# Patient Record
Sex: MALE | Race: WHITE | NOT HISPANIC OR LATINO | URBAN - METROPOLITAN AREA
[De-identification: names, ages, dates, MRNs, and addresses within clinical notes are randomized per-mention and may not be internally consistent; named-entity substitution may affect disease eponyms.]

---

## 2017-10-17 ENCOUNTER — OUTPATIENT (OUTPATIENT)
Dept: OUTPATIENT SERVICES | Facility: HOSPITAL | Age: 72
LOS: 1 days | Discharge: HOME | End: 2017-10-17

## 2017-10-17 DIAGNOSIS — D68.9 COAGULATION DEFECT, UNSPECIFIED: ICD-10-CM

## 2017-10-17 DIAGNOSIS — Z95.5 PRESENCE OF CORONARY ANGIOPLASTY IMPLANT AND GRAFT: ICD-10-CM

## 2017-10-17 DIAGNOSIS — Z01.810 ENCOUNTER FOR PREPROCEDURAL CARDIOVASCULAR EXAMINATION: ICD-10-CM

## 2017-10-17 DIAGNOSIS — I10 ESSENTIAL (PRIMARY) HYPERTENSION: ICD-10-CM

## 2017-10-17 DIAGNOSIS — I25.10 ATHEROSCLEROTIC HEART DISEASE OF NATIVE CORONARY ARTERY WITHOUT ANGINA PECTORIS: ICD-10-CM

## 2017-10-23 ENCOUNTER — INPATIENT (INPATIENT)
Facility: HOSPITAL | Age: 72
LOS: 1 days | Discharge: HOME | End: 2017-10-25
Attending: INTERNAL MEDICINE

## 2017-10-23 DIAGNOSIS — Z95.5 PRESENCE OF CORONARY ANGIOPLASTY IMPLANT AND GRAFT: ICD-10-CM

## 2017-10-27 DIAGNOSIS — Z79.84 LONG TERM (CURRENT) USE OF ORAL HYPOGLYCEMIC DRUGS: ICD-10-CM

## 2017-10-27 DIAGNOSIS — D69.6 THROMBOCYTOPENIA, UNSPECIFIED: ICD-10-CM

## 2017-10-27 DIAGNOSIS — R94.39 ABNORMAL RESULT OF OTHER CARDIOVASCULAR FUNCTION STUDY: ICD-10-CM

## 2017-10-27 DIAGNOSIS — I25.10 ATHEROSCLEROTIC HEART DISEASE OF NATIVE CORONARY ARTERY WITHOUT ANGINA PECTORIS: ICD-10-CM

## 2017-10-27 DIAGNOSIS — E78.5 HYPERLIPIDEMIA, UNSPECIFIED: ICD-10-CM

## 2017-10-27 DIAGNOSIS — R31.9 HEMATURIA, UNSPECIFIED: ICD-10-CM

## 2017-10-27 DIAGNOSIS — R07.9 CHEST PAIN, UNSPECIFIED: ICD-10-CM

## 2017-10-27 DIAGNOSIS — E11.9 TYPE 2 DIABETES MELLITUS WITHOUT COMPLICATIONS: ICD-10-CM

## 2017-10-27 DIAGNOSIS — Z95.5 PRESENCE OF CORONARY ANGIOPLASTY IMPLANT AND GRAFT: ICD-10-CM

## 2017-10-27 DIAGNOSIS — R31.0 GROSS HEMATURIA: ICD-10-CM

## 2017-10-27 DIAGNOSIS — I10 ESSENTIAL (PRIMARY) HYPERTENSION: ICD-10-CM

## 2017-10-30 DIAGNOSIS — F41.9 ANXIETY DISORDER, UNSPECIFIED: ICD-10-CM

## 2017-10-30 DIAGNOSIS — I25.110 ATHEROSCLEROTIC HEART DISEASE OF NATIVE CORONARY ARTERY WITH UNSTABLE ANGINA PECTORIS: ICD-10-CM

## 2017-11-09 DIAGNOSIS — I25.10 ATHEROSCLEROTIC HEART DISEASE OF NATIVE CORONARY ARTERY WITHOUT ANGINA PECTORIS: ICD-10-CM

## 2017-12-08 ENCOUNTER — OUTPATIENT (OUTPATIENT)
Dept: OUTPATIENT SERVICES | Facility: HOSPITAL | Age: 72
LOS: 1 days | Discharge: HOME | End: 2017-12-08

## 2017-12-08 DIAGNOSIS — I25.10 ATHEROSCLEROTIC HEART DISEASE OF NATIVE CORONARY ARTERY WITHOUT ANGINA PECTORIS: ICD-10-CM

## 2017-12-08 DIAGNOSIS — E11.9 TYPE 2 DIABETES MELLITUS WITHOUT COMPLICATIONS: ICD-10-CM

## 2017-12-08 DIAGNOSIS — Z95.5 PRESENCE OF CORONARY ANGIOPLASTY IMPLANT AND GRAFT: ICD-10-CM

## 2018-02-20 ENCOUNTER — APPOINTMENT (OUTPATIENT)
Dept: UROLOGY | Facility: CLINIC | Age: 73
End: 2018-02-20
Payer: MEDICARE

## 2018-02-20 VITALS
DIASTOLIC BLOOD PRESSURE: 78 MMHG | BODY MASS INDEX: 27.2 KG/M2 | SYSTOLIC BLOOD PRESSURE: 129 MMHG | HEART RATE: 76 BPM | WEIGHT: 190 LBS | HEIGHT: 70 IN

## 2018-02-20 DIAGNOSIS — Z82.49 FAMILY HISTORY OF ISCHEMIC HEART DISEASE AND OTHER DISEASES OF THE CIRCULATORY SYSTEM: ICD-10-CM

## 2018-02-20 DIAGNOSIS — I51.9 HEART DISEASE, UNSPECIFIED: ICD-10-CM

## 2018-02-20 DIAGNOSIS — E78.00 PURE HYPERCHOLESTEROLEMIA, UNSPECIFIED: ICD-10-CM

## 2018-02-20 DIAGNOSIS — F41.9 ANXIETY DISORDER, UNSPECIFIED: ICD-10-CM

## 2018-02-20 DIAGNOSIS — Z80.52 FAMILY HISTORY OF MALIGNANT NEOPLASM OF BLADDER: ICD-10-CM

## 2018-02-20 DIAGNOSIS — R31.9 HEMATURIA, UNSPECIFIED: ICD-10-CM

## 2018-02-20 LAB
BILIRUB UR QL STRIP: NORMAL
CLARITY UR: CLEAR
COLLECTION METHOD: NORMAL
GLUCOSE UR-MCNC: >500
HCG UR QL: 2 EU/DL
HGB UR QL STRIP.AUTO: NORMAL
KETONES UR-MCNC: NORMAL
LEUKOCYTE ESTERASE UR QL STRIP: NORMAL
NITRITE UR QL STRIP: NORMAL
PH UR STRIP: 5
PROT UR STRIP-MCNC: 30
SP GR UR STRIP: 1.02

## 2018-02-20 PROCEDURE — 99213 OFFICE O/P EST LOW 20 MIN: CPT

## 2018-02-20 PROCEDURE — 81003 URINALYSIS AUTO W/O SCOPE: CPT | Mod: QW

## 2018-02-20 RX ORDER — ASPIRIN 81 MG/1
81 TABLET, DELAYED RELEASE ORAL
Refills: 0 | Status: ACTIVE | COMMUNITY

## 2018-02-20 RX ORDER — METFORMIN HYDROCHLORIDE 1000 MG/1
1000 TABLET, COATED ORAL
Refills: 0 | Status: ACTIVE | COMMUNITY

## 2018-02-20 RX ORDER — GLIMEPIRIDE 4 MG/1
4 TABLET ORAL
Refills: 0 | Status: ACTIVE | COMMUNITY

## 2019-02-26 ENCOUNTER — RESULT REVIEW (OUTPATIENT)
Age: 74
End: 2019-02-26

## 2019-02-26 ENCOUNTER — OUTPATIENT (OUTPATIENT)
Dept: OUTPATIENT SERVICES | Facility: HOSPITAL | Age: 74
LOS: 1 days | Discharge: HOME | End: 2019-02-26

## 2019-02-26 ENCOUNTER — TRANSCRIPTION ENCOUNTER (OUTPATIENT)
Age: 74
End: 2019-02-26

## 2019-02-26 VITALS
HEART RATE: 91 BPM | WEIGHT: 195.11 LBS | SYSTOLIC BLOOD PRESSURE: 152 MMHG | DIASTOLIC BLOOD PRESSURE: 90 MMHG | RESPIRATION RATE: 18 BRPM | HEIGHT: 70 IN | TEMPERATURE: 98 F

## 2019-02-26 VITALS — HEART RATE: 88 BPM | SYSTOLIC BLOOD PRESSURE: 152 MMHG | DIASTOLIC BLOOD PRESSURE: 84 MMHG | RESPIRATION RATE: 18 BRPM

## 2019-02-26 DIAGNOSIS — Z98.890 OTHER SPECIFIED POSTPROCEDURAL STATES: Chronic | ICD-10-CM

## 2019-02-26 LAB — GLUCOSE BLDC GLUCOMTR-MCNC: 134 MG/DL — HIGH (ref 70–99)

## 2019-02-26 NOTE — H&P PST ADULT - HISTORY OF PRESENT ILLNESS
A 70 y old man with medical hx of CAD on asa and plavix (last dose 2 days ago), DM, GERD,  HTN presented for screening colonoscopy   Patient denies any complaints

## 2019-02-26 NOTE — ASU PATIENT PROFILE, ADULT - PMH
Anxiety    CAD (coronary artery disease)    Diabetes    GERD (gastroesophageal reflux disease)    HTN (hypertension)

## 2019-02-26 NOTE — PRE-ANESTHESIA EVALUATION ADULT - TEMPERATURE IN CELSIUS (DEGREES C)
36.7
Airway patent, nasal mucosa clear, mouth with normal mucosa. Throat has no vesicles, no oropharyngeal exudates and uvula is midline. Clear tympanic membranes bilaterally.

## 2019-02-28 LAB — SURGICAL PATHOLOGY STUDY: SIGNIFICANT CHANGE UP

## 2019-03-04 DIAGNOSIS — E11.9 TYPE 2 DIABETES MELLITUS WITHOUT COMPLICATIONS: ICD-10-CM

## 2019-03-04 DIAGNOSIS — D12.0 BENIGN NEOPLASM OF CECUM: ICD-10-CM

## 2019-03-04 DIAGNOSIS — K64.8 OTHER HEMORRHOIDS: ICD-10-CM

## 2019-03-04 DIAGNOSIS — I25.10 ATHEROSCLEROTIC HEART DISEASE OF NATIVE CORONARY ARTERY WITHOUT ANGINA PECTORIS: ICD-10-CM

## 2019-03-04 DIAGNOSIS — Z12.11 ENCOUNTER FOR SCREENING FOR MALIGNANT NEOPLASM OF COLON: ICD-10-CM

## 2019-03-04 DIAGNOSIS — I10 ESSENTIAL (PRIMARY) HYPERTENSION: ICD-10-CM

## 2019-03-05 ENCOUNTER — APPOINTMENT (OUTPATIENT)
Dept: OTOLARYNGOLOGY | Facility: CLINIC | Age: 74
End: 2019-03-05
Payer: MEDICARE

## 2019-03-05 VITALS
DIASTOLIC BLOOD PRESSURE: 81 MMHG | SYSTOLIC BLOOD PRESSURE: 133 MMHG | BODY MASS INDEX: 27.2 KG/M2 | WEIGHT: 190 LBS | HEIGHT: 70 IN

## 2019-03-05 DIAGNOSIS — R04.0 EPISTAXIS: ICD-10-CM

## 2019-03-05 PROCEDURE — 31231 NASAL ENDOSCOPY DX: CPT

## 2019-03-05 PROCEDURE — 99203 OFFICE O/P NEW LOW 30 MIN: CPT | Mod: 25

## 2019-03-05 NOTE — PROCEDURE
[Epistaxis] : evaluation of epistaxis [Topical Lidocaine] : topical lidocaine [Oxymetazoline HCl] : oxymetazoline HCl [Rigid Endoscope] : examined with a rigid endoscope [Normal] : the paranasal sinuses had no abnormalities [FreeTextEntry6] : The following anatomic sites were directly examined in a sequential fashion:\par The scope was introduced in the nasal passage between the middle and inferior turbinates to exam the inferior portion of the middle meatus and the fontanelle, as well as the maxillary ostia. Next, the scope was passed medically and posteriorly to the middle turbinates to examine the sphenoethmoid recess and the superior turbinate region.\par

## 2019-03-05 NOTE — HISTORY OF PRESENT ILLNESS
[de-identified] : 73 Year old male comes in the office as a new patient c/o epistaxis. Last episode was 1 week ago. Pt has been on blood thinners and they have been decreased at this point. Pt is doing well since he went off the plavix. Pt has known hypertension which is controlled. Pt has not used an y interventions on his nose. They occur in the morning.

## 2019-03-05 NOTE — REVIEW OF SYSTEMS
[Patient Intake Form Reviewed] : Patient intake form was reviewed [FreeTextEntry1] : All other ROS are negative.

## 2019-06-09 PROBLEM — I51.9 HEART DISEASE: Status: ACTIVE | Noted: 2018-02-20

## 2019-07-01 ENCOUNTER — INPATIENT (INPATIENT)
Facility: HOSPITAL | Age: 74
LOS: 8 days | Discharge: HOME | End: 2019-07-10
Attending: INTERNAL MEDICINE | Admitting: INTERNAL MEDICINE
Payer: MEDICARE

## 2019-07-01 VITALS
HEART RATE: 112 BPM | RESPIRATION RATE: 18 BRPM | DIASTOLIC BLOOD PRESSURE: 88 MMHG | SYSTOLIC BLOOD PRESSURE: 157 MMHG | TEMPERATURE: 98 F | OXYGEN SATURATION: 95 %

## 2019-07-01 DIAGNOSIS — Z98.890 OTHER SPECIFIED POSTPROCEDURAL STATES: Chronic | ICD-10-CM

## 2019-07-01 PROBLEM — I10 ESSENTIAL (PRIMARY) HYPERTENSION: Chronic | Status: ACTIVE | Noted: 2019-02-26

## 2019-07-01 PROBLEM — K21.9 GASTRO-ESOPHAGEAL REFLUX DISEASE WITHOUT ESOPHAGITIS: Chronic | Status: ACTIVE | Noted: 2019-02-26

## 2019-07-01 PROBLEM — I25.10 ATHEROSCLEROTIC HEART DISEASE OF NATIVE CORONARY ARTERY WITHOUT ANGINA PECTORIS: Chronic | Status: ACTIVE | Noted: 2019-02-26

## 2019-07-01 PROBLEM — E11.9 TYPE 2 DIABETES MELLITUS WITHOUT COMPLICATIONS: Chronic | Status: ACTIVE | Noted: 2019-02-26

## 2019-07-01 LAB
ALBUMIN SERPL ELPH-MCNC: 3.3 G/DL — LOW (ref 3.5–5.2)
ALP SERPL-CCNC: 104 U/L — SIGNIFICANT CHANGE UP (ref 30–115)
ALT FLD-CCNC: 37 U/L — SIGNIFICANT CHANGE UP (ref 0–41)
ANION GAP SERPL CALC-SCNC: 15 MMOL/L — HIGH (ref 7–14)
AST SERPL-CCNC: 32 U/L — SIGNIFICANT CHANGE UP (ref 0–41)
BILIRUB SERPL-MCNC: 0.9 MG/DL — SIGNIFICANT CHANGE UP (ref 0.2–1.2)
BUN SERPL-MCNC: 17 MG/DL — SIGNIFICANT CHANGE UP (ref 10–20)
CALCIUM SERPL-MCNC: 8.7 MG/DL — SIGNIFICANT CHANGE UP (ref 8.5–10.1)
CHLORIDE SERPL-SCNC: 104 MMOL/L — SIGNIFICANT CHANGE UP (ref 98–110)
CO2 SERPL-SCNC: 26 MMOL/L — SIGNIFICANT CHANGE UP (ref 17–32)
CREAT SERPL-MCNC: 1 MG/DL — SIGNIFICANT CHANGE UP (ref 0.7–1.5)
GLUCOSE BLDC GLUCOMTR-MCNC: 132 MG/DL — HIGH (ref 70–99)
GLUCOSE BLDC GLUCOMTR-MCNC: 250 MG/DL — HIGH (ref 70–99)
GLUCOSE BLDC GLUCOMTR-MCNC: 252 MG/DL — HIGH (ref 70–99)
GLUCOSE BLDC GLUCOMTR-MCNC: 330 MG/DL — HIGH (ref 70–99)
GLUCOSE SERPL-MCNC: 241 MG/DL — HIGH (ref 70–99)
HCT VFR BLD CALC: 29.8 % — LOW (ref 42–52)
HGB BLD-MCNC: 9.2 G/DL — LOW (ref 14–18)
LACTATE SERPL-SCNC: 2.8 MMOL/L — HIGH (ref 0.5–2.2)
MCHC RBC-ENTMCNC: 29.7 PG — SIGNIFICANT CHANGE UP (ref 27–31)
MCHC RBC-ENTMCNC: 30.9 G/DL — LOW (ref 32–37)
MCV RBC AUTO: 96.1 FL — HIGH (ref 80–94)
NRBC # BLD: 5 /100 WBCS — HIGH (ref 0–0)
NT-PROBNP SERPL-SCNC: 7990 PG/ML — HIGH (ref 0–300)
PLATELET # BLD AUTO: 87 K/UL — LOW (ref 130–400)
POTASSIUM SERPL-MCNC: 3.1 MMOL/L — LOW (ref 3.5–5)
POTASSIUM SERPL-SCNC: 3.1 MMOL/L — LOW (ref 3.5–5)
PROT SERPL-MCNC: 7.2 G/DL — SIGNIFICANT CHANGE UP (ref 6–8)
RBC # BLD: 3.1 M/UL — LOW (ref 4.7–6.1)
RBC # FLD: 18.8 % — HIGH (ref 11.5–14.5)
SODIUM SERPL-SCNC: 145 MMOL/L — SIGNIFICANT CHANGE UP (ref 135–146)
TROPONIN T SERPL-MCNC: <0.01 NG/ML — SIGNIFICANT CHANGE UP
TROPONIN T SERPL-MCNC: <0.01 NG/ML — SIGNIFICANT CHANGE UP
WBC # BLD: 2.65 K/UL — LOW (ref 4.8–10.8)
WBC # FLD AUTO: 2.65 K/UL — LOW (ref 4.8–10.8)

## 2019-07-01 PROCEDURE — 99291 CRITICAL CARE FIRST HOUR: CPT | Mod: GC

## 2019-07-01 PROCEDURE — 93010 ELECTROCARDIOGRAM REPORT: CPT

## 2019-07-01 PROCEDURE — 71045 X-RAY EXAM CHEST 1 VIEW: CPT | Mod: 26

## 2019-07-01 PROCEDURE — 93010 ELECTROCARDIOGRAM REPORT: CPT | Mod: 77

## 2019-07-01 RX ORDER — ENOXAPARIN SODIUM 100 MG/ML
40 INJECTION SUBCUTANEOUS DAILY
Refills: 0 | Status: DISCONTINUED | OUTPATIENT
Start: 2019-07-01 | End: 2019-07-10

## 2019-07-01 RX ORDER — FUROSEMIDE 40 MG
40 TABLET ORAL ONCE
Refills: 0 | Status: COMPLETED | OUTPATIENT
Start: 2019-07-01 | End: 2019-07-01

## 2019-07-01 RX ORDER — LISINOPRIL 2.5 MG/1
10 TABLET ORAL DAILY
Refills: 0 | Status: DISCONTINUED | OUTPATIENT
Start: 2019-07-01 | End: 2019-07-09

## 2019-07-01 RX ORDER — INSULIN LISPRO 100/ML
VIAL (ML) SUBCUTANEOUS
Refills: 0 | Status: DISCONTINUED | OUTPATIENT
Start: 2019-07-01 | End: 2019-07-10

## 2019-07-01 RX ORDER — SODIUM CHLORIDE 9 MG/ML
1000 INJECTION, SOLUTION INTRAVENOUS
Refills: 0 | Status: DISCONTINUED | OUTPATIENT
Start: 2019-07-01 | End: 2019-07-10

## 2019-07-01 RX ORDER — DEXTROSE 50 % IN WATER 50 %
25 SYRINGE (ML) INTRAVENOUS ONCE
Refills: 0 | Status: DISCONTINUED | OUTPATIENT
Start: 2019-07-01 | End: 2019-07-10

## 2019-07-01 RX ORDER — INSULIN LISPRO 100/ML
4 VIAL (ML) SUBCUTANEOUS
Refills: 0 | Status: DISCONTINUED | OUTPATIENT
Start: 2019-07-01 | End: 2019-07-10

## 2019-07-01 RX ORDER — CLOPIDOGREL BISULFATE 75 MG/1
75 TABLET, FILM COATED ORAL DAILY
Refills: 0 | Status: DISCONTINUED | OUTPATIENT
Start: 2019-07-01 | End: 2019-07-01

## 2019-07-01 RX ORDER — DEXTROSE 50 % IN WATER 50 %
12.5 SYRINGE (ML) INTRAVENOUS ONCE
Refills: 0 | Status: DISCONTINUED | OUTPATIENT
Start: 2019-07-01 | End: 2019-07-10

## 2019-07-01 RX ORDER — POTASSIUM CHLORIDE 20 MEQ
20 PACKET (EA) ORAL ONCE
Refills: 0 | Status: COMPLETED | OUTPATIENT
Start: 2019-07-01 | End: 2019-07-01

## 2019-07-01 RX ORDER — DIAZEPAM 5 MG
2 TABLET ORAL AT BEDTIME
Refills: 0 | Status: DISCONTINUED | OUTPATIENT
Start: 2019-07-01 | End: 2019-07-08

## 2019-07-01 RX ORDER — METOPROLOL TARTRATE 50 MG
50 TABLET ORAL
Refills: 0 | Status: DISCONTINUED | OUTPATIENT
Start: 2019-07-01 | End: 2019-07-02

## 2019-07-01 RX ORDER — FAMOTIDINE 10 MG/ML
40 INJECTION INTRAVENOUS AT BEDTIME
Refills: 0 | Status: DISCONTINUED | OUTPATIENT
Start: 2019-07-01 | End: 2019-07-10

## 2019-07-01 RX ORDER — INSULIN GLARGINE 100 [IU]/ML
10 INJECTION, SOLUTION SUBCUTANEOUS AT BEDTIME
Refills: 0 | Status: DISCONTINUED | OUTPATIENT
Start: 2019-07-01 | End: 2019-07-05

## 2019-07-01 RX ORDER — DIAZEPAM 5 MG
1 TABLET ORAL
Qty: 0 | Refills: 0 | DISCHARGE

## 2019-07-01 RX ORDER — GLUCAGON INJECTION, SOLUTION 0.5 MG/.1ML
1 INJECTION, SOLUTION SUBCUTANEOUS ONCE
Refills: 0 | Status: DISCONTINUED | OUTPATIENT
Start: 2019-07-01 | End: 2019-07-10

## 2019-07-01 RX ORDER — CHLORHEXIDINE GLUCONATE 213 G/1000ML
1 SOLUTION TOPICAL
Refills: 0 | Status: DISCONTINUED | OUTPATIENT
Start: 2019-07-01 | End: 2019-07-10

## 2019-07-01 RX ORDER — NITROGLYCERIN 6.5 MG
10 CAPSULE, EXTENDED RELEASE ORAL
Qty: 50 | Refills: 0 | Status: DISCONTINUED | OUTPATIENT
Start: 2019-07-01 | End: 2019-07-01

## 2019-07-01 RX ORDER — MECLIZINE HCL 12.5 MG
25 TABLET ORAL ONCE
Refills: 0 | Status: DISCONTINUED | OUTPATIENT
Start: 2019-07-01 | End: 2019-07-01

## 2019-07-01 RX ORDER — FUROSEMIDE 40 MG
40 TABLET ORAL
Refills: 0 | Status: DISCONTINUED | OUTPATIENT
Start: 2019-07-01 | End: 2019-07-02

## 2019-07-01 RX ORDER — DIAZEPAM 5 MG
2 TABLET ORAL ONCE
Refills: 0 | Status: DISCONTINUED | OUTPATIENT
Start: 2019-07-01 | End: 2019-07-01

## 2019-07-01 RX ORDER — ASPIRIN/CALCIUM CARB/MAGNESIUM 324 MG
81 TABLET ORAL DAILY
Refills: 0 | Status: DISCONTINUED | OUTPATIENT
Start: 2019-07-01 | End: 2019-07-10

## 2019-07-01 RX ORDER — MAGNESIUM SULFATE 500 MG/ML
2 VIAL (ML) INJECTION ONCE
Refills: 0 | Status: COMPLETED | OUTPATIENT
Start: 2019-07-01 | End: 2019-07-01

## 2019-07-01 RX ORDER — ATORVASTATIN CALCIUM 80 MG/1
20 TABLET, FILM COATED ORAL AT BEDTIME
Refills: 0 | Status: DISCONTINUED | OUTPATIENT
Start: 2019-07-01 | End: 2019-07-09

## 2019-07-01 RX ORDER — DEXTROSE 50 % IN WATER 50 %
15 SYRINGE (ML) INTRAVENOUS ONCE
Refills: 0 | Status: DISCONTINUED | OUTPATIENT
Start: 2019-07-01 | End: 2019-07-10

## 2019-07-01 RX ADMIN — Medication 40 MILLIGRAM(S): at 15:09

## 2019-07-01 RX ADMIN — Medication 20 MILLIEQUIVALENT(S): at 16:08

## 2019-07-01 RX ADMIN — Medication 4: at 18:29

## 2019-07-01 RX ADMIN — Medication 50 MILLIGRAM(S): at 17:09

## 2019-07-01 RX ADMIN — Medication 2 GRAM(S): at 16:08

## 2019-07-01 RX ADMIN — Medication 2 MILLIGRAM(S): at 16:21

## 2019-07-01 RX ADMIN — CLOPIDOGREL BISULFATE 75 MILLIGRAM(S): 75 TABLET, FILM COATED ORAL at 17:10

## 2019-07-01 RX ADMIN — ATORVASTATIN CALCIUM 20 MILLIGRAM(S): 80 TABLET, FILM COATED ORAL at 22:48

## 2019-07-01 RX ADMIN — Medication 40 MILLIGRAM(S): at 18:07

## 2019-07-01 RX ADMIN — Medication 50 MILLIEQUIVALENT(S): at 16:08

## 2019-07-01 RX ADMIN — Medication 50 GRAM(S): at 15:09

## 2019-07-01 RX ADMIN — Medication 3 MICROGRAM(S)/MIN: at 18:16

## 2019-07-01 NOTE — H&P ADULT - ASSESSMENT
73M with PMHx of CAD s/p PCI, DMII, HTN, Anxiety, presents from assisted living facility for episode of lightheadedness and worsening SOB.    #Dyspnea 2/2 likely new onset CHF   - CXR with perihilar opacities consistent with CHF  - BNP 7990, 1st set Tn negative, Repeat 2nd set  - EKG with sinus tachycardia   - admit to tele, check TTE   - Keep i < o,  trend daily weights, daily bmp  - Check tsh/t4, lipids, a1c  - Iv diuresis: lasix 40 iv bid  - Bipap and supplemental O2 prn   - cardiology evaluation     #Lightheadedness  - likely due to sinus tachycardia and dyspnea  - Monitor for events on tele  - check orthostatics     #CAD s/p PCI / HTN   - c/w asa, statin, bb, ACEI  - off plavix as per outpatient cardiology     #DMII  - monitor finger sticks, start insulin regimen with correction scale     #Anxiety  - c/w valium home dose     Dispo: from assisted living   DVT PPx; lovenox sc   DASH diet with fluid restriction

## 2019-07-01 NOTE — ED PROVIDER NOTE - CLINICAL SUMMARY MEDICAL DECISION MAKING FREE TEXT BOX
73yM p/w lightheadedness and worsening SOB - found to be hypoxic on RA, improving with O2.  Exam and workup c/w volume overload, suspected CHF.  Pt started on lasix and Mg/K+ repleted.  Pt then became acutely tachypneic, with new rhonchi and hypoxic, family reporting hx similar prior anxiety attacks, but concern for worsening pulmonary edema causing respiratory distress, so pt placed on BiPAP with immediate significant improvement.  Pt admitted to tele for further care.

## 2019-07-01 NOTE — PATIENT PROFILE ADULT - HEALTH LITERACY
Prescription approved per [facility] Refill Protocol  Yvette Rossi RN BS     no

## 2019-07-01 NOTE — ED PROVIDER NOTE - PROGRESS NOTE DETAILS
ERASMO: 73y M w/ PMH of DM, CAD s/p stent, and anxiety presents with exertional dyspnea for the past week. Has been getting progressively worse. Is associated with lightheadedness. Denies fever, chills, CP, n/v/d, abd pain, or numbness/tingling.

## 2019-07-01 NOTE — CHART NOTE - NSCHARTNOTEFT_GEN_A_CORE
Rapid response called for acute desaturation while on BiPAP. STAT ABG showed 7.40/39.3/116/24.6, Lactate 5.7. CXR earlier today showed volume overload. BNP 7990    Vitals: /104, .    Blood Gas Profile - Arterial (07.01.19 @ 17:43)    pH, Arterial: 7.40    pCO2, Arterial: 39 mmHg    pO2, Arterial: 116 mmHg    HCO3, Arterial: 25 mmoL/L    Base Excess, Arterial: -0.1 mmoL/L    Oxygen Saturation, Arterial: 98 %    Blood Gas Arterial, Lactate (07.01.19 @ 17:43)    Blood Gas Arterial, Lactate: 5.7 mmoL/L    < from: Xray Chest 1 View-PORTABLE IMMEDIATE (07.01.19 @ 13:52) >    Impression:      Perihilar opacities consistent with CHF.    Follow-up as needed.    < end of copied text >    BEDSIDE ECHO with B-lines and no right heart strain    -----    Patient was given Lasix 40 mg IV STAT, started on Nitro drip at 10 mcg/hour. Mitchell insertion for strict I&O    ======    ASSESSMENT and PLAN:    # Acute decompensated CHF  - CXR with perihilar opacities consistent with CHF  - BNP 7990, 1st set Tn negative, Repeat 2nd set  - EKG with sinus tachycardia   - ECHO  - Insert Stephen, strict I&O, daily weights  - Continue IV diuresis: Lasix 40 IV BID  - Bipap 4 on/off and qHS  - cardiology evaluation Rapid response called for acute desaturation while on BiPAP. STAT ABG showed 7.40/39.3/116/24.6, Lactate 5.7. CXR earlier today showed volume overload. BNP 7990    Vitals: /104, .    Blood Gas Profile - Arterial (07.01.19 @ 17:43)    pH, Arterial: 7.40    pCO2, Arterial: 39 mmHg    pO2, Arterial: 116 mmHg    HCO3, Arterial: 25 mmoL/L    Base Excess, Arterial: -0.1 mmoL/L    Oxygen Saturation, Arterial: 98 %    Blood Gas Arterial, Lactate (07.01.19 @ 17:43)    Blood Gas Arterial, Lactate: 5.7 mmoL/L    < from: Xray Chest 1 View-PORTABLE IMMEDIATE (07.01.19 @ 13:52) >    Impression:      Perihilar opacities consistent with CHF.    Follow-up as needed.    < end of copied text >    BEDSIDE ECHO with B-lines and no right heart strain    -----    Patient was given Lasix 40 mg IV STAT, started on Nitro drip at 10 mcg/hour. Mitchell insertion for strict I&O    ======    ASSESSMENT and PLAN:    # Acute decompensated CHF  - CXR with perihilar opacities consistent with CHF  - BNP 7990, 1st set Tn negative, Repeat 2nd set  - EKG with sinus tachycardia   - ECHO  - Insert Stephen, strict I&O, daily weights  - Continue IV diuresis: Lasix 40 IV BID  - Bipap 4 on/off and qHS  - Started Nitro infusion. Will taper off slowly over several hours as tolerated  - cardiology evaluation

## 2019-07-01 NOTE — H&P ADULT - NSHPLABSRESULTS_GEN_ALL_CORE
Vitals:    Vital Signs Last 24 Hrs  T(C): 36.7 (01 Jul 2019 11:58), Max: 36.7 (01 Jul 2019 11:58)  T(F): 98 (01 Jul 2019 11:58), Max: 98 (01 Jul 2019 11:58)  HR: 118 (01 Jul 2019 15:39) (112 - 132)  BP: 150/99 (01 Jul 2019 15:39) (150/99 - 157/88)  BP(mean): --  RR: 18 (01 Jul 2019 15:39) (18 - 23)  SpO2: 100% (01 Jul 2019 15:39) (91% - 100%)    Labs:     07-01    145  |  104  |  17  ----------------------------<  241<H>  3.1<L>   |  26  |  1.0    Ca    8.7      01 Jul 2019 13:10  Mg     1.6     07-01    TPro  7.2  /  Alb  3.3<L>  /  TBili  0.9  /  DBili  x   /  AST  32  /  ALT  37  /  AlkPhos  104  07-01                          9.2    2.65  )-----------( 87       ( 01 Jul 2019 13:10 )             29.8     CARDIAC MARKERS ( 01 Jul 2019 13:10 )  x     / <0.01 ng/mL / x     / x     / x          LIVER FUNCTIONS - ( 01 Jul 2019 13:10 )  Alb: 3.3 g/dL / Pro: 7.2 g/dL / ALK PHOS: 104 U/L / ALT: 37 U/L / AST: 32 U/L / GGT: x               RADIOLOGY    < from: Xray Chest 1 View-PORTABLE IMMEDIATE (07.01.19 @ 13:52) >      Impression:      Perihilar opacities consistent withCHF.    Follow-up as needed.    < end of copied text >

## 2019-07-01 NOTE — ED PROVIDER NOTE - CARE PLAN
Assessment and plan of treatment:	A/P: 74 y/o M presenting for lightheadedness x1 week. Has also had worsening of shortness of breath recently with exertion. Considering CHF given B lines on U/S. EKG completed. Will send basic labs, troponins, BNP; CXR; O2 via nasal cannula; plan will be to administer lasix, monitor Principal Discharge DX:	Dyspnea  Assessment and plan of treatment:	A/P: 72 y/o M presenting for lightheadedness x1 week. Has also had worsening of shortness of breath recently with exertion. Considering CHF given B lines on U/S. EKG completed. Will send basic labs, troponins, BNP; CXR; O2 via nasal cannula; plan will be to administer lasix, monitor  Secondary Diagnosis:	Pulmonary edema cardiac cause Principal Discharge DX:	Acute respiratory failure with hypoxia  Assessment and plan of treatment:	A/P: 74 y/o M presenting for lightheadedness x1 week. Has also had worsening of shortness of breath recently with exertion. Considering CHF given B lines on U/S. EKG completed. Will send basic labs, troponins, BNP; CXR; O2 via nasal cannula; plan will be to administer lasix, monitor  Secondary Diagnosis:	Volume overload  Secondary Diagnosis:	Hypokalemia  Secondary Diagnosis:	Hypomagnesemia

## 2019-07-01 NOTE — H&P ADULT - HISTORY OF PRESENT ILLNESS
73M with PMHx of CAD s/p PCI, DMII, HTN, Anxiety, presents from assisted living facility for episode of lightheadedness and worsening SOB. Patient at lunch today had episode of lightheadedness and felt as though he was almost going to pass out. He also had increasing SOB for the past 1 week. He is unable to walk 1 flight of stairs however prior to this week was able to ambulate more. He otherwise denies any fevers, chills, headache, blurry vision, headache, chest pain, n/v/d/c, LE edema, abdominal pain, dysuria. He had last cardiac cath with PCI 2-3 years ago. No history of CHF or COPD.     In ED vitals: , 150/99, 100% on 2L, RR 18. Placed initially on BIPAP with improvement in symptoms. Received x 1 IV lasix 40mg, Magnesium and potassium repleted.

## 2019-07-01 NOTE — ED PROVIDER NOTE - PLAN OF CARE
A/P: 72 y/o M presenting for lightheadedness x1 week. Has also had worsening of shortness of breath recently with exertion. Considering CHF given B lines on U/S. EKG completed. Will send basic labs, troponins, BNP; CXR; O2 via nasal cannula; plan will be to administer lasix, monitor

## 2019-07-01 NOTE — ED PROVIDER NOTE - OBJECTIVE STATEMENT
72 y/o m with pmh of diabetes, cardiac stent placement, HTN, anxiety presenting for lightheadedness x1 week. Pt. notes that this started about 1 week ago and has been worsening since then. He denies room spinning or worsening of dizziness with changes in head position. Also notes that he has been feeling short of breath since his stent placement about 2-3 yrs. ago, but this has been worsening recently. Has mid-sternal CP, also since stent placement. Denies hx of COPD, CHF, or asthma. He has had trouble eating and swallowing foods for about the past 1 month. No recent travel or sick contacts.

## 2019-07-01 NOTE — H&P ADULT - NSICDXPASTMEDICALHX_GEN_ALL_CORE_FT
PAST MEDICAL HISTORY:  Anxiety     CAD (coronary artery disease)     Diabetes     GERD (gastroesophageal reflux disease)     HTN (hypertension)

## 2019-07-01 NOTE — H&P ADULT - NSHPPHYSICALEXAM_GEN_ALL_CORE
PHYSICAL EXAM:  GEN: On Bipap, speaking full sentences, comfortable   LUNGS: Bilateral crackles at the bases, no wheezing   HEART: S1/S2 present. tachycardic   ABD: Soft, non-tender, non-distended. Bowel sounds present  MSK: trace LE edema  NEURO: AAOX3, non-focal PHYSICAL EXAM:  GEN: On Bipap, speaking full sentences, comfortable   LUNGS/RESP: Bilateral crackles at the bases, no wheezing   HEART/CVS: S1/S2 present. tachycardic   ABD/GI: Soft, non-tender, non-distended. Bowel sounds present  MSK: trace LE edema  NEURO: AAOX3, non-focal

## 2019-07-01 NOTE — ED PROVIDER NOTE - ATTENDING CONTRIBUTION TO CARE
I have personally seen and examined this patient.  I have fully participated in the care of this patient. I have reviewed all pertinent clinical information, including history, physical exam, plan and the medical student and resident’s note and agree except as noted.    73yM DM HTN CAD s/p PCI p/w worsening SOB similar to his prior - also admits to feeling dizzy, which he describes as lightheadedness and ill feeling, worse w/ exertion?  No CP or fever. +chronic cough.     Constitutional: no fevers/chills, no sick contacts  Eyes: No visual changes, eye pain or discharge. No photophobia  ENMT: No hearing changes, pain, discharge or infections. No sore throat or drooling.  Neck:  No neck pain or stiffness. No limited ROM  Cardiac: + SOB, +edema. No chest pain with exertion.  Respiratory: No cough or respiratory distress. No hemoptysis. No history of asthma or RAD  GI: No nausea, vomiting, diarrhea or abdominal pain  : No dysuria, frequency or burning. No discharge  MS: No myalgia, muscle weakness, joint pain or back pain  Neuro: No headache or weakness. No LOC, +lightheaded w/o vertigo  Skin: No skin rash  Endo: no diabetes or thyroid dysfunction  Heme: no abnormal bleeding or clotting  Except as documented in the HPI, all other systems are negative     VS notable for  BP mildly elevated 157/88 O2 sat 88% on RA --> 90s on 2L NC (and pt already feeling better)  CONSTITUTIONAL: well developed; well nourished; pale elderly male appearing chronically ill but not in acute distress  HEAD: normocephalic; atraumatic  EYES: no conjunctival injection, no scleral icterus  ENT: no nasal discharge; airway clear.  NECK: supple; non tender. + full passive ROM in all directions  CARD: S1, S2 normal; no murmurs, gallops, or rubs. Regular rate and rhythm  RESP: +b/l diminished breath sounds, mildly inc WOB  ABD: soft; non-distended; non-tender. No rebound, no guarding, no pulsatile abdominal mass  EXT: moving all extremities spontaneously, normal ROM. No clubbing, cyanosis, +edema  SKIN: warm and dry, no lesions noted, +facial/skin pallor  NEURO: alert, oriented, CN II-XII grossly intact,motor and sensory grossly intact, speech nonslurred, no focal deficits. GCS 15  PSYCH: calm, cooperative, appropriate, good eye contact, logical thought process, no apparent danger to self or others    supplemental O2  labs  EKG  CXR  lasix  reassess

## 2019-07-01 NOTE — ED PROVIDER NOTE - PHYSICAL EXAMINATION
Gen: resting in stretcher, appears to be short of breath while in stretcher, 89% O2 on RA, was put on 2L of O2 --> O2 in the 90s after O2  Heart: nml S1/S2, no m/g/r, tachycardic  Lungs: diffuse wheezing heard at posterior lung bases, tachypneic  Abd: soft, no masses, non-tender to palpation  Neuro: A&Ox2, Cn 2-12 intact    U/S: B lines present

## 2019-07-01 NOTE — H&P ADULT - ATTENDING COMMENTS
Patient seen and examined independently. Resident's H & P reviewed. Agree with the findings and plan of care except,    A73 years old presented to the ED from Assisted living for worsening sob for the last one week. Also C/O lightheadedness.     Hb: 8, MCV: 94.4  Na: 149, K: 3.1  M.7  Lactate: 5.7  BNP: 7990  CXR: CHF  EKG: Sinus tachycardia @ 114/min. NS ST, T changes. (Interpreted by me.)    O/E: Decreased BS in the bases B/L    ASSESSMENT:    1. Ac. CHF unspecified. (ECHO pending)  2. Pancytopenia  3. Anemia unspecified  4. H/O CAD S/P PCI  5. DM-2 / HTN  6. Anxiety  7. Hypernatremia  8. Hypokalemia / Hypomagnesemia    PLAN:    . Cont tele  . Change Lasix to 40 mg po q 12h  . ECHO  . Check i's and o's and daily wt.  . Cardiology eval  . Iron studies  . Hem/Onc eval for pancytopenia  . Monitor FS  . Cont his current meds.  . Replete K and Mg.

## 2019-07-01 NOTE — ED PROVIDER NOTE - NS ED ROS FT
Cardiac: endorses mid-sternal CP since stent placement 2-3 yrs. ago  Lungs: dyspnea at rest and with exertion  Abd: no nausea, vomiting, diarrhea, constipation  MSK: no joint pain or tenderness  Neuro: endorses lightheadedness, no trouble ambulating, no headache Eyes:  No visual changes, eye pain or discharge.  ENMT:  No hearing changes, pain, no sore throat or runny nose, no difficulty swallowing  Cardiac:  No chest pain, edema. No chest pain with exertion. + SOB  Respiratory:  No cough or respiratory distress. No hemoptysis. No history of asthma or RAD.   GI:  No nausea, vomiting, diarrhea or abdominal pain.  :  No dysuria, frequency or burning.  MS:  No myalgia, muscle weakness, joint pain or back pain.  Neuro:  No headache or weakness.  No LOC. + lightheadedness  Skin:  No skin rash.   Endocrine: + history of diabetes.

## 2019-07-01 NOTE — ED ADULT NURSE REASSESSMENT NOTE - NS ED NURSE REASSESS COMMENT FT1
Pt anxious and c/o SOB  with 4L NC in place. MD Klerman made aware and at bedside. BIPAP applied by respiratory therapist. Family at bedside. Plan of care reviewed with pt. Pt resting comfortably in bed. Will cont ot monitor.

## 2019-07-02 LAB
ANION GAP SERPL CALC-SCNC: 14 MMOL/L — SIGNIFICANT CHANGE UP (ref 7–14)
ANION GAP SERPL CALC-SCNC: 17 MMOL/L — HIGH (ref 7–14)
BASOPHILS # BLD AUTO: 0 K/UL — SIGNIFICANT CHANGE UP (ref 0–0.2)
BASOPHILS NFR BLD AUTO: 0 % — SIGNIFICANT CHANGE UP (ref 0–1)
BUN SERPL-MCNC: 18 MG/DL — SIGNIFICANT CHANGE UP (ref 10–20)
BUN SERPL-MCNC: 21 MG/DL — HIGH (ref 10–20)
CALCIUM SERPL-MCNC: 8.3 MG/DL — LOW (ref 8.5–10.1)
CALCIUM SERPL-MCNC: 8.4 MG/DL — LOW (ref 8.5–10.1)
CHLORIDE SERPL-SCNC: 102 MMOL/L — SIGNIFICANT CHANGE UP (ref 98–110)
CHLORIDE SERPL-SCNC: 104 MMOL/L — SIGNIFICANT CHANGE UP (ref 98–110)
CO2 SERPL-SCNC: 28 MMOL/L — SIGNIFICANT CHANGE UP (ref 17–32)
CO2 SERPL-SCNC: 30 MMOL/L — SIGNIFICANT CHANGE UP (ref 17–32)
CREAT SERPL-MCNC: 1.1 MG/DL — SIGNIFICANT CHANGE UP (ref 0.7–1.5)
CREAT SERPL-MCNC: 1.1 MG/DL — SIGNIFICANT CHANGE UP (ref 0.7–1.5)
EOSINOPHIL # BLD AUTO: 0.01 K/UL — SIGNIFICANT CHANGE UP (ref 0–0.7)
EOSINOPHIL NFR BLD AUTO: 0.4 % — SIGNIFICANT CHANGE UP (ref 0–8)
ESTIMATED AVERAGE GLUCOSE: 146 MG/DL — HIGH (ref 68–114)
FERRITIN SERPL-MCNC: 452 NG/ML — HIGH (ref 30–400)
FOLATE SERPL-MCNC: 12.6 NG/ML — SIGNIFICANT CHANGE UP
GLUCOSE BLDC GLUCOMTR-MCNC: 167 MG/DL — HIGH (ref 70–99)
GLUCOSE BLDC GLUCOMTR-MCNC: 177 MG/DL — HIGH (ref 70–99)
GLUCOSE BLDC GLUCOMTR-MCNC: 178 MG/DL — HIGH (ref 70–99)
GLUCOSE BLDC GLUCOMTR-MCNC: 226 MG/DL — HIGH (ref 70–99)
GLUCOSE SERPL-MCNC: 243 MG/DL — HIGH (ref 70–99)
GLUCOSE SERPL-MCNC: 92 MG/DL — SIGNIFICANT CHANGE UP (ref 70–99)
HAPTOGLOB SERPL-MCNC: 101 MG/DL — SIGNIFICANT CHANGE UP (ref 34–200)
HBA1C BLD-MCNC: 6.7 % — HIGH (ref 4–5.6)
HCT VFR BLD CALC: 25.5 % — LOW (ref 42–52)
HGB BLD-MCNC: 8 G/DL — LOW (ref 14–18)
IMM GRANULOCYTES NFR BLD AUTO: 0.7 % — HIGH (ref 0.1–0.3)
IRON SATN MFR SERPL: 30 % — SIGNIFICANT CHANGE UP (ref 15–50)
IRON SATN MFR SERPL: 73 UG/DL — SIGNIFICANT CHANGE UP (ref 35–150)
LYMPHOCYTES # BLD AUTO: 1.17 K/UL — LOW (ref 1.2–3.4)
LYMPHOCYTES # BLD AUTO: 42.9 % — SIGNIFICANT CHANGE UP (ref 20.5–51.1)
MAGNESIUM SERPL-MCNC: 1.7 MG/DL — LOW (ref 1.8–2.4)
MCHC RBC-ENTMCNC: 29.6 PG — SIGNIFICANT CHANGE UP (ref 27–31)
MCHC RBC-ENTMCNC: 31.4 G/DL — LOW (ref 32–37)
MCV RBC AUTO: 94.4 FL — HIGH (ref 80–94)
MONOCYTES # BLD AUTO: 0.46 K/UL — SIGNIFICANT CHANGE UP (ref 0.1–0.6)
MONOCYTES NFR BLD AUTO: 16.8 % — HIGH (ref 1.7–9.3)
NEUTROPHILS # BLD AUTO: 1.07 K/UL — LOW (ref 1.4–6.5)
NEUTROPHILS NFR BLD AUTO: 39.2 % — LOW (ref 42.2–75.2)
NRBC # BLD: 5 /100 WBCS — HIGH (ref 0–0)
PLATELET # BLD AUTO: 69 K/UL — LOW (ref 130–400)
POTASSIUM SERPL-MCNC: 3.1 MMOL/L — LOW (ref 3.5–5)
POTASSIUM SERPL-MCNC: 3.6 MMOL/L — SIGNIFICANT CHANGE UP (ref 3.5–5)
POTASSIUM SERPL-SCNC: 3.1 MMOL/L — LOW (ref 3.5–5)
POTASSIUM SERPL-SCNC: 3.6 MMOL/L — SIGNIFICANT CHANGE UP (ref 3.5–5)
RBC # BLD: 2.7 M/UL — LOW (ref 4.7–6.1)
RBC # BLD: 2.7 M/UL — LOW (ref 4.7–6.1)
RBC # FLD: 19.1 % — HIGH (ref 11.5–14.5)
RETICS #: 119.4 K/UL — SIGNIFICANT CHANGE UP (ref 25–125)
RETICS/RBC NFR: 4.4 % — HIGH (ref 0.5–1.5)
SODIUM SERPL-SCNC: 146 MMOL/L — SIGNIFICANT CHANGE UP (ref 135–146)
SODIUM SERPL-SCNC: 149 MMOL/L — HIGH (ref 135–146)
T4 AB SER-ACNC: 6.1 UG/DL — SIGNIFICANT CHANGE UP (ref 4.6–12)
TIBC SERPL-MCNC: 243 UG/DL — SIGNIFICANT CHANGE UP (ref 220–430)
TRANSFERRIN SERPL-MCNC: 208 MG/DL — SIGNIFICANT CHANGE UP (ref 200–360)
TSH SERPL-MCNC: 1.1 UIU/ML — SIGNIFICANT CHANGE UP (ref 0.27–4.2)
UIBC SERPL-MCNC: 170 UG/DL — SIGNIFICANT CHANGE UP (ref 110–370)
VIT B12 SERPL-MCNC: 1338 PG/ML — HIGH (ref 232–1245)
WBC # BLD: 2.73 K/UL — LOW (ref 4.8–10.8)
WBC # FLD AUTO: 2.73 K/UL — LOW (ref 4.8–10.8)

## 2019-07-02 PROCEDURE — 99223 1ST HOSP IP/OBS HIGH 75: CPT | Mod: AI

## 2019-07-02 PROCEDURE — 99223 1ST HOSP IP/OBS HIGH 75: CPT

## 2019-07-02 RX ORDER — FUROSEMIDE 40 MG
40 TABLET ORAL EVERY 12 HOURS
Refills: 0 | Status: DISCONTINUED | OUTPATIENT
Start: 2019-07-02 | End: 2019-07-02

## 2019-07-02 RX ORDER — METOPROLOL TARTRATE 50 MG
50 TABLET ORAL EVERY 8 HOURS
Refills: 0 | Status: DISCONTINUED | OUTPATIENT
Start: 2019-07-02 | End: 2019-07-09

## 2019-07-02 RX ORDER — FUROSEMIDE 40 MG
40 TABLET ORAL ONCE
Refills: 0 | Status: COMPLETED | OUTPATIENT
Start: 2019-07-02 | End: 2019-07-02

## 2019-07-02 RX ORDER — MAGNESIUM SULFATE 500 MG/ML
2 VIAL (ML) INJECTION ONCE
Refills: 0 | Status: COMPLETED | OUTPATIENT
Start: 2019-07-02 | End: 2019-07-02

## 2019-07-02 RX ORDER — FUROSEMIDE 40 MG
40 TABLET ORAL DAILY
Refills: 0 | Status: DISCONTINUED | OUTPATIENT
Start: 2019-07-03 | End: 2019-07-03

## 2019-07-02 RX ORDER — POTASSIUM CHLORIDE 20 MEQ
20 PACKET (EA) ORAL
Refills: 0 | Status: COMPLETED | OUTPATIENT
Start: 2019-07-02 | End: 2019-07-02

## 2019-07-02 RX ADMIN — Medication 40 MILLIGRAM(S): at 06:06

## 2019-07-02 RX ADMIN — Medication 4 UNIT(S): at 17:31

## 2019-07-02 RX ADMIN — Medication 4 UNIT(S): at 12:04

## 2019-07-02 RX ADMIN — Medication 2 MILLIGRAM(S): at 22:44

## 2019-07-02 RX ADMIN — Medication 40 MILLIGRAM(S): at 17:29

## 2019-07-02 RX ADMIN — Medication 50 MILLIGRAM(S): at 22:38

## 2019-07-02 RX ADMIN — INSULIN GLARGINE 10 UNIT(S): 100 INJECTION, SOLUTION SUBCUTANEOUS at 22:38

## 2019-07-02 RX ADMIN — Medication 50 GRAM(S): at 12:09

## 2019-07-02 RX ADMIN — Medication 4 UNIT(S): at 07:58

## 2019-07-02 RX ADMIN — Medication 2: at 17:32

## 2019-07-02 RX ADMIN — LISINOPRIL 10 MILLIGRAM(S): 2.5 TABLET ORAL at 06:06

## 2019-07-02 RX ADMIN — CHLORHEXIDINE GLUCONATE 1 APPLICATION(S): 213 SOLUTION TOPICAL at 06:06

## 2019-07-02 RX ADMIN — Medication 81 MILLIGRAM(S): at 12:17

## 2019-07-02 RX ADMIN — ENOXAPARIN SODIUM 40 MILLIGRAM(S): 100 INJECTION SUBCUTANEOUS at 12:59

## 2019-07-02 RX ADMIN — ATORVASTATIN CALCIUM 20 MILLIGRAM(S): 80 TABLET, FILM COATED ORAL at 22:44

## 2019-07-02 RX ADMIN — Medication 50 MILLIGRAM(S): at 06:06

## 2019-07-02 RX ADMIN — Medication 50 MILLIEQUIVALENT(S): at 12:09

## 2019-07-02 RX ADMIN — FAMOTIDINE 40 MILLIGRAM(S): 10 INJECTION INTRAVENOUS at 22:44

## 2019-07-02 RX ADMIN — Medication 1: at 12:03

## 2019-07-02 RX ADMIN — Medication 1: at 07:58

## 2019-07-02 RX ADMIN — Medication 50 MILLIEQUIVALENT(S): at 17:30

## 2019-07-02 RX ADMIN — Medication 50 MILLIEQUIVALENT(S): at 15:23

## 2019-07-02 NOTE — PROGRESS NOTE ADULT - ASSESSMENT
73M with PMHx of CAD s/p PCI, DMII, HTN, Anxiety, presents from assisted living facility for episode of lightheadedness and worsening SOB.    Dyspnea likely secondary to  new onset CHF   - CXR:  with perihilar opacities consistent with CHF  - BNP 7990, 1st set Tn negative, Repeat 2nd set  - EKG with sinus tachycardia   - C/w IV lasix 40mg BID, Bipap and supplemental O2 prn   - F/up Cardio Consult    -- Check tsh/t4, lipids, a1c    Lightheadedness  - likely due to sinus tachycardia and dyspnea  - Monitor for events on tele for at least 24hrs   - check orthostatics     Chronic Pancytopenia  -f/up Heme/onc Consult     CAD s/p PCI / HTN   - c/w asa, statin, bb, ACEI  - off plavix as per outpatient cardiology     #DMII  - monitor finger sticks, start insulin regimen with correction scale     #Anxiety  - c/w valium home dose     Dispo: from assisted living   DVT PPx; lovenox sc   DASH diet with fluid restriction 73M with PMHx of CAD s/p PCI, DMII, HTN, Anxiety, presents from assisted living facility for episode of lightheadedness and worsening SOB.    Dyspnea likely secondary to  new onset CHF   - CXR:  with perihilar opacities consistent with CHF  - BNP 7990, 1st set Tn negative, Repeat 2nd set  - EKG with sinus tachycardia   - C/w PO Lasix 40mg, Bipap and supplemental O2 prn   - F/up Cardio Consult, TSH, B12, Lipids and Hgb A1C      Lightheadedness: likely due to sinus tachycardia and dyspnea  - Monitor for events on tele for at least 24hrs   - check orthostatics     Chronic Pancytopenia  -f/up Heme/onc Consult     CAD s/p PCI / HTN   - c/w asa, statin, bb, ACEI  - off plavix as per outpatient cardiology     DMII: Uncontrolled  - monitor finger sticks at before meals and at bedtime   -c/w  insulin regimen      Anxiety  - c/w valium home dose     Dispo: from assisted living   DVT PPx; Lovenox sc   GI PPX: not indicated

## 2019-07-02 NOTE — CONSULT NOTE ADULT - SUBJECTIVE AND OBJECTIVE BOX
Patient is a 73y old  Male who presents with a chief complaint of Lightheadedness, and SOB (02 Jul 2019 11:15)      REASON FOR CONSULT     HPI:  73M with PMHx of CAD s/p PCI, DMII, HTN, Anxiety, presents from assisted living facility for episode of lightheadedness and worsening SOB. Patient at lunch today had episode of lightheadedness and felt as though he was almost going to pass out. He also had increasing SOB for the past 1 week. He is unable to walk 1 flight of stairs however prior to this week was able to ambulate more. He otherwise denies any fevers, chills, headache, blurry vision, headache, chest pain, n/v/d/c, LE edema, abdominal pain, dysuria. He had last cardiac cath with PCI 2-3 years ago. No history of CHF or COPD.     In ED vitals: , 150/99, 100% on 2L, RR 18. Placed initially on BIPAP with improvement in symptoms. Received x 1 IV lasix 40mg, Magnesium and potassium repleted. (01 Jul 2019 16:54)    Cardiac History:  Patient reports he follows with Dr. Castro. Has had stents placed in the past with most recent stents placed one year ago. Denies any known knowledge of heart failure. He reports he was short of breath when he was at his assisted living facility. On admission, he was found to have a BNP > 7000 with bilateral crackles on exam.       PAST MEDICAL & SURGICAL HISTORY:  GERD (gastroesophageal reflux disease)  Anxiety  CAD (coronary artery disease)  Diabetes  HTN (hypertension)  H/O colonoscopy: 10 years ago          SOCIAL HISTORY: Quit smoking 40  years ago, Denies alcohol abuse or illicit drug use.    FAMILY HISTORY: Hx of MI in his father and brother     No Known Allergies      MEDICATIONS  (STANDING):  aspirin  chewable 81 milliGRAM(s) Oral daily  atorvastatin 20 milliGRAM(s) Oral at bedtime  chlorhexidine 4% Liquid 1 Application(s) Topical <User Schedule>  dextrose 5%. 1000 milliLiter(s) (50 mL/Hr) IV Continuous <Continuous>  dextrose 50% Injectable 12.5 Gram(s) IV Push once  dextrose 50% Injectable 25 Gram(s) IV Push once  dextrose 50% Injectable 25 Gram(s) IV Push once  diazepam    Tablet 2 milliGRAM(s) Oral at bedtime  enoxaparin Injectable 40 milliGRAM(s) SubCutaneous daily  famotidine    Tablet 40 milliGRAM(s) Oral at bedtime  furosemide    Tablet 40 milliGRAM(s) Oral every 12 hours  insulin glargine Injectable (LANTUS) 10 Unit(s) SubCutaneous at bedtime  insulin lispro (HumaLOG) corrective regimen sliding scale   SubCutaneous three times a day before meals  insulin lispro Injectable (HumaLOG) 4 Unit(s) SubCutaneous three times a day before meals  lisinopril 10 milliGRAM(s) Oral daily  metoprolol tartrate 50 milliGRAM(s) Oral two times a day  potassium chloride  20 mEq/100 mL IVPB 20 milliEquivalent(s) IV Intermittent every 2 hours    MEDICATIONS  (PRN):  dextrose 40% Gel 15 Gram(s) Oral once PRN Blood Glucose LESS THAN 70 milliGRAM(s)/deciliter  glucagon  Injectable 1 milliGRAM(s) IntraMuscular once PRN Glucose LESS THAN 70 milligrams/deciliter      Vital Signs Last 24 Hrs  T(C): 36.3 (02 Jul 2019 05:22), Max: 36.4 (01 Jul 2019 21:07)  T(F): 97.3 (02 Jul 2019 05:22), Max: 97.6 (01 Jul 2019 21:07)  HR: 109 (02 Jul 2019 05:22) (87 - 150)  BP: 132/77 (02 Jul 2019 05:22) (125/81 - 220/104)  BP(mean): --  RR: 18 (02 Jul 2019 05:22) (18 - 23)  SpO2: 99% (01 Jul 2019 21:07) (84% - 100%) I&O's Detail    01 Jul 2019 07:01  -  02 Jul 2019 07:00      General: well developed, well nourished, NAD  Neuro: alert and oriented, no focal deficits, moves all extremities spontaneously  HEENT: NCAT, EOMI, anicteric, mucosa moist  Respiratory: mild crackles at the bases  CVS: tachycardic rate with regular rhythm; prominent murmur over mitral   Abdomen: soft, nontender, nondistended  Extremities: no edema, sensation and movement grossly intact  Skin: warm, dry, appropriate color  --------------------------------------------------------  IN:    Oral Fluid: 80 mL  Total IN: 80 mL    OUT:    Indwelling Catheter - Urethral: 1600 mL    Voided: 200 mL  Total OUT: 1800 mL    Total NET: -1720 mL      REVIEW OF SYSTEMS:    CONSTITUTIONAL: No weakness, fevers or chills  EYES/ENT: No visual changes;  No vertigo or throat pain   NECK: No pain or stiffness  RESPIRATORY: No cough, wheezing, hemoptysis; +VE shortness of breath  CARDIOVASCULAR: No chest pain or palpitations  GASTROINTESTINAL: No abdominal or epigastric pain. No nausea, vomiting, diarrhea or constipation   GENITOURINARY: No dysuria, frequency or hematuria  NEUROLOGICAL: No numbness or weakness  SKIN: No itching, rashes        ECG:  ECHOCARDIOGRAM:  RADIOLOGY & ADDITIONAL STUDIES:      LABS:                        8.0    2.73  )-----------( 69       ( 02 Jul 2019 05:34 )             25.5     07-02    149<H>  |  104  |  18  ----------------------------<  92  3.1<L>   |  28  |  1.1    Ca    8.3<L>      02 Jul 2019 05:34  Mg     1.7     07-02    TPro  7.2  /  Alb  3.3<L>  /  TBili  0.9  /  DBili  x   /  AST  32  /  ALT  37  /  AlkPhos  104  07-01    CARDIAC MARKERS ( 01 Jul 2019 21:31 )  x     / <0.01 ng/mL / x     / x     / x      CARDIAC MARKERS ( 01 Jul 2019 13:10 )  x     / <0.01 ng/mL / x     / x     / x            I&O's Summary    01 Jul 2019 07:01  -  02 Jul 2019 07:00  --------------------------------------------------------  IN: 80 mL / OUT: 1800 mL / NET: -1720 mL      BNP Patient is a 73y old  Male who presents with a chief complaint of Lightheadedness, and SOB (02 Jul 2019 11:15)      REASON FOR CONSULT     HPI:  73M with PMHx of CAD s/p PCI, DMII, HTN, Anxiety, presents from assisted living facility for episode of lightheadedness and worsening SOB. Patient at lunch today had episode of lightheadedness and felt as though he was almost going to pass out. He also had increasing SOB for the past 1 week. He is unable to walk 1 flight of stairs however prior to this week was able to ambulate more. He otherwise denies any fevers, chills, headache, blurry vision, headache, chest pain, n/v/d/c, LE edema, abdominal pain, dysuria. He had last cardiac cath with PCI 2-3 years ago. No history of CHF or COPD.     In ED vitals: , 150/99, 100% on 2L, RR 18. Placed initially on BIPAP with improvement in symptoms. Received x 1 IV lasix 40mg, Magnesium and potassium repleted. (01 Jul 2019 16:54)    Cardiac History:  Patient reports he follows with Dr. Castro. Has had stents placed in the past with most recent stents placed one year ago (as per patient though please note is an overall poor historian). Denies any known knowledge of heart failure. He reports he was short of breath when he was at his assisted living facility. On admission, he was found to have a BNP > 7000 with bilateral crackles on exam.       PAST MEDICAL & SURGICAL HISTORY:  GERD (gastroesophageal reflux disease)  Anxiety  CAD (coronary artery disease)  Diabetes  HTN (hypertension)  H/O colonoscopy: 10 years ago          SOCIAL HISTORY: Quit smoking 40  years ago, Denies alcohol abuse or illicit drug use.    FAMILY HISTORY: Hx of MI in his father and brother     No Known Allergies      MEDICATIONS  (STANDING):  aspirin  chewable 81 milliGRAM(s) Oral daily  atorvastatin 20 milliGRAM(s) Oral at bedtime  chlorhexidine 4% Liquid 1 Application(s) Topical <User Schedule>  dextrose 5%. 1000 milliLiter(s) (50 mL/Hr) IV Continuous <Continuous>  dextrose 50% Injectable 12.5 Gram(s) IV Push once  dextrose 50% Injectable 25 Gram(s) IV Push once  dextrose 50% Injectable 25 Gram(s) IV Push once  diazepam    Tablet 2 milliGRAM(s) Oral at bedtime  enoxaparin Injectable 40 milliGRAM(s) SubCutaneous daily  famotidine    Tablet 40 milliGRAM(s) Oral at bedtime  furosemide    Tablet 40 milliGRAM(s) Oral every 12 hours  insulin glargine Injectable (LANTUS) 10 Unit(s) SubCutaneous at bedtime  insulin lispro (HumaLOG) corrective regimen sliding scale   SubCutaneous three times a day before meals  insulin lispro Injectable (HumaLOG) 4 Unit(s) SubCutaneous three times a day before meals  lisinopril 10 milliGRAM(s) Oral daily  metoprolol tartrate 50 milliGRAM(s) Oral two times a day  potassium chloride  20 mEq/100 mL IVPB 20 milliEquivalent(s) IV Intermittent every 2 hours    MEDICATIONS  (PRN):  dextrose 40% Gel 15 Gram(s) Oral once PRN Blood Glucose LESS THAN 70 milliGRAM(s)/deciliter  glucagon  Injectable 1 milliGRAM(s) IntraMuscular once PRN Glucose LESS THAN 70 milligrams/deciliter      Vital Signs Last 24 Hrs  T(C): 36.3 (02 Jul 2019 05:22), Max: 36.4 (01 Jul 2019 21:07)  T(F): 97.3 (02 Jul 2019 05:22), Max: 97.6 (01 Jul 2019 21:07)  HR: 109 (02 Jul 2019 05:22) (87 - 150)  BP: 132/77 (02 Jul 2019 05:22) (125/81 - 220/104)  BP(mean): --  RR: 18 (02 Jul 2019 05:22) (18 - 23)  SpO2: 99% (01 Jul 2019 21:07) (84% - 100%) I&O's Detail    01 Jul 2019 07:01  -  02 Jul 2019 07:00      General: well developed, well nourished, NAD  Neuro: alert and oriented, no focal deficits, moves all extremities spontaneously  HEENT: NCAT, EOMI, anicteric, mucosa moist  Respiratory: mild crackles at the bases  CVS: tachycardic rate with regular rhythm; prominent murmur over mitral   Abdomen: soft, nontender, nondistended  Extremities: no edema, sensation and movement grossly intact  Skin: warm, dry, appropriate color  --------------------------------------------------------  IN:    Oral Fluid: 80 mL  Total IN: 80 mL    OUT:    Indwelling Catheter - Urethral: 1600 mL    Voided: 200 mL  Total OUT: 1800 mL    Total NET: -1720 mL      REVIEW OF SYSTEMS:    CONSTITUTIONAL: No weakness, fevers or chills  EYES/ENT: No visual changes;  No vertigo or throat pain   NECK: No pain or stiffness  RESPIRATORY: No cough, wheezing, hemoptysis; +VE shortness of breath  CARDIOVASCULAR: No chest pain or palpitations  GASTROINTESTINAL: No abdominal or epigastric pain. No nausea, vomiting, diarrhea or constipation   GENITOURINARY: No dysuria, frequency or hematuria  NEUROLOGICAL: No numbness or weakness  SKIN: No itching, rashes        ECG:  ECHOCARDIOGRAM:  RADIOLOGY & ADDITIONAL STUDIES:      LABS:                        8.0    2.73  )-----------( 69       ( 02 Jul 2019 05:34 )             25.5     07-02    149<H>  |  104  |  18  ----------------------------<  92  3.1<L>   |  28  |  1.1    Ca    8.3<L>      02 Jul 2019 05:34  Mg     1.7     07-02    TPro  7.2  /  Alb  3.3<L>  /  TBili  0.9  /  DBili  x   /  AST  32  /  ALT  37  /  AlkPhos  104  07-01    CARDIAC MARKERS ( 01 Jul 2019 21:31 )  x     / <0.01 ng/mL / x     / x     / x      CARDIAC MARKERS ( 01 Jul 2019 13:10 )  x     / <0.01 ng/mL / x     / x     / x            I&O's Summary    01 Jul 2019 07:01  -  02 Jul 2019 07:00  --------------------------------------------------------  IN: 80 mL / OUT: 1800 mL / NET: -1720 mL      BNP

## 2019-07-02 NOTE — CONSULT NOTE ADULT - SUBJECTIVE AND OBJECTIVE BOX
Patient is a 73y old  Male who presents with a chief complaint of Lightheadedness, and SOB (01 Jul 2019 16:54)      HPI:  73M with PMHx of CAD s/p PCI, DMII, HTN, Anxiety, presents from assisted living facility for episode of lightheadedness and worsening SOB. Patient at lunch today had episode of lightheadedness and felt as though he was almost going to pass out. He also had increasing SOB for the past 1 week. He is unable to walk 1 flight of stairs however prior to this week was able to ambulate more. He otherwise denies any fevers, chills, headache, blurry vision, headache, chest pain, n/v/d/c, LE edema, abdominal pain, dysuria. He had last cardiac cath with PCI 2-3 years ago. No history of CHF or COPD.     In ED vitals: , 150/99, 100% on 2L, RR 18. Placed initially on BIPAP with improvement in symptoms. Received x 1 IV lasix 40mg, Magnesium and potassium repleted.     Hematological hisotory       ROS:  Negative except for:    PAST MEDICAL & SURGICAL HISTORY:  GERD (gastroesophageal reflux disease)  Anxiety  CAD (coronary artery disease)  Diabetes  HTN (hypertension)  H/O colonoscopy: 10 yeras ago      SOCIAL HISTORY:    FAMILY HISTORY:      MEDICATIONS  (STANDING):  aspirin  chewable 81 milliGRAM(s) Oral daily  atorvastatin 20 milliGRAM(s) Oral at bedtime  chlorhexidine 4% Liquid 1 Application(s) Topical <User Schedule>  dextrose 5%. 1000 milliLiter(s) (50 mL/Hr) IV Continuous <Continuous>  dextrose 50% Injectable 12.5 Gram(s) IV Push once  dextrose 50% Injectable 25 Gram(s) IV Push once  dextrose 50% Injectable 25 Gram(s) IV Push once  diazepam    Tablet 2 milliGRAM(s) Oral at bedtime  enoxaparin Injectable 40 milliGRAM(s) SubCutaneous daily  famotidine    Tablet 40 milliGRAM(s) Oral at bedtime  furosemide    Tablet 40 milliGRAM(s) Oral every 12 hours  insulin glargine Injectable (LANTUS) 10 Unit(s) SubCutaneous at bedtime  insulin lispro (HumaLOG) corrective regimen sliding scale   SubCutaneous three times a day before meals  insulin lispro Injectable (HumaLOG) 4 Unit(s) SubCutaneous three times a day before meals  lisinopril 10 milliGRAM(s) Oral daily  magnesium sulfate  IVPB 2 Gram(s) IV Intermittent once  metoprolol tartrate 50 milliGRAM(s) Oral two times a day  potassium chloride  20 mEq/100 mL IVPB 20 milliEquivalent(s) IV Intermittent every 2 hours    MEDICATIONS  (PRN):  dextrose 40% Gel 15 Gram(s) Oral once PRN Blood Glucose LESS THAN 70 milliGRAM(s)/deciliter  glucagon  Injectable 1 milliGRAM(s) IntraMuscular once PRN Glucose LESS THAN 70 milligrams/deciliter    Height (cm): 177.8 (07-01-19 @ 21:07)  Weight (kg): 90 (07-01-19 @ 21:07)  BMI (kg/m2): 28.5 (07-01-19 @ 21:07)  BSA (m2): 2.08 (07-01-19 @ 21:07)  Allergies    No Known Allergies    Intolerances        Vital Signs Last 24 Hrs  T(C): 36.3 (02 Jul 2019 05:22), Max: 36.7 (01 Jul 2019 11:58)  T(F): 97.3 (02 Jul 2019 05:22), Max: 98 (01 Jul 2019 11:58)  HR: 109 (02 Jul 2019 05:22) (87 - 150)  BP: 132/77 (02 Jul 2019 05:22) (125/81 - 220/104)  BP(mean): --  RR: 18 (02 Jul 2019 05:22) (18 - 23)  SpO2: 99% (01 Jul 2019 21:07) (84% - 100%)    PHYSICAL EXAM  General: adult in NAD, on 2liter NC  Neck: supple  CV: normal S1/S2   Lungs: positive air movement b/l. Basal crackles+  Abdomen: soft non-tender non-distended,  Neuro: alert and oriented X 4,    LABS:                          8.0    2.73  )-----------( 69       ( 02 Jul 2019 05:34 )             25.5         Mean Cell Volume : 94.4 fL  Mean Cell Hemoglobin : 29.6 pg  Mean Cell Hemoglobin Concentration : 31.4 g/dL  Auto Neutrophil # : 1.07 K/uL  Auto Lymphocyte # : 1.17 K/uL  Auto Monocyte # : 0.46 K/uL  Auto Eosinophil # : 0.01 K/uL  Auto Basophil # : 0.00 K/uL  Auto Neutrophil % : 39.2 %  Auto Lymphocyte % : 42.9 %  Auto Monocyte % : 16.8 %  Auto Eosinophil % : 0.4 %  Auto Basophil % : 0.0 %      Serial CBC's  07-02 @ 05:34  Hct-25.5 / Hgb-8.0 / Plat-69 / RBC-2.70 / WBC-2.73  Serial CBC's  07-01 @ 13:10  Hct-29.8 / Hgb-9.2 / Plat-87 / RBC-3.10 / WBC-2.65      07-02    149<H>  |  104  |  18  ----------------------------<  92  3.1<L>   |  28  |  1.1    Ca    8.3<L>      02 Jul 2019 05:34  Mg     1.7     07-02    TPro  7.2  /  Alb  3.3<L>  /  TBili  0.9  /  DBili  x   /  AST  32  /  ALT  37  /  AlkPhos  104  07-01          Reticulocyte Percent: 4.4 % (07-02 @ 05:34)  Iron - Total Binding Capacity.: 243 ug/dL (07-01 @ 21:31) Patient is a 73y old  Male who presents with a chief complaint of Lightheadedness, and SOB (01 Jul 2019 16:54)      HPI:  73M with PMHx of CAD s/p PCI, DMII, HTN, Anxiety, presents from assisted living facility for episode of lightheadedness and worsening SOB. Patient at lunch today had episode of lightheadedness and felt as though he was almost going to pass out. He also had increasing SOB for the past 1 week. He is unable to walk 1 flight of stairs however prior to this week was able to ambulate more. He otherwise denies any fevers, chills, headache, blurry vision, headache, chest pain, n/v/d/c, LE edema, abdominal pain, dysuria. He had last cardiac cath with PCI 2-3 years ago. No history of CHF or COPD.       Hematological history:  Known h/o pancytopenia 10 years ago (saw hematologist at [place]- does not know name). No h/o known BM biopsy. No known treatment for the condition  On and off epistaxis while on Plavix  No recurrent hospitalizations for infections ( recent PNA)  No alcohol abuse/ Liver disease known        ROS:  Negative except for:    PAST MEDICAL & SURGICAL HISTORY:  GERD (gastroesophageal reflux disease)  Anxiety  CAD (coronary artery disease)  Diabetes  HTN (hypertension)  H/O colonoscopy: 10 yeras ago      SOCIAL HISTORY: Quit smoking 30 years ago  No alcohol abuse. No illicit drug use    FAMILY HISTORY: Bladder ca in mother at age 70  Worked as a  and then worked as an       MEDICATIONS  (STANDING):  aspirin  chewable 81 milliGRAM(s) Oral daily  atorvastatin 20 milliGRAM(s) Oral at bedtime  chlorhexidine 4% Liquid 1 Application(s) Topical <User Schedule>  dextrose 5%. 1000 milliLiter(s) (50 mL/Hr) IV Continuous <Continuous>  dextrose 50% Injectable 12.5 Gram(s) IV Push once  dextrose 50% Injectable 25 Gram(s) IV Push once  dextrose 50% Injectable 25 Gram(s) IV Push once  diazepam    Tablet 2 milliGRAM(s) Oral at bedtime  enoxaparin Injectable 40 milliGRAM(s) SubCutaneous daily  famotidine    Tablet 40 milliGRAM(s) Oral at bedtime  furosemide    Tablet 40 milliGRAM(s) Oral every 12 hours  insulin glargine Injectable (LANTUS) 10 Unit(s) SubCutaneous at bedtime  insulin lispro (HumaLOG) corrective regimen sliding scale   SubCutaneous three times a day before meals  insulin lispro Injectable (HumaLOG) 4 Unit(s) SubCutaneous three times a day before meals  lisinopril 10 milliGRAM(s) Oral daily  magnesium sulfate  IVPB 2 Gram(s) IV Intermittent once  metoprolol tartrate 50 milliGRAM(s) Oral two times a day  potassium chloride  20 mEq/100 mL IVPB 20 milliEquivalent(s) IV Intermittent every 2 hours    MEDICATIONS  (PRN):  dextrose 40% Gel 15 Gram(s) Oral once PRN Blood Glucose LESS THAN 70 milliGRAM(s)/deciliter  glucagon  Injectable 1 milliGRAM(s) IntraMuscular once PRN Glucose LESS THAN 70 milligrams/deciliter    Height (cm): 177.8 (07-01-19 @ 21:07)  Weight (kg): 90 (07-01-19 @ 21:07)  BMI (kg/m2): 28.5 (07-01-19 @ 21:07)  BSA (m2): 2.08 (07-01-19 @ 21:07)  Allergies    No Known Allergies    Intolerances        Vital Signs Last 24 Hrs  T(C): 36.3 (02 Jul 2019 05:22), Max: 36.7 (01 Jul 2019 11:58)  T(F): 97.3 (02 Jul 2019 05:22), Max: 98 (01 Jul 2019 11:58)  HR: 109 (02 Jul 2019 05:22) (87 - 150)  BP: 132/77 (02 Jul 2019 05:22) (125/81 - 220/104)  BP(mean): --  RR: 18 (02 Jul 2019 05:22) (18 - 23)  SpO2: 99% (01 Jul 2019 21:07) (84% - 100%)    PHYSICAL EXAM  General: adult in NAD, on 2liter NC  Neck: supple  CV: normal S1/S2   Lungs: positive air movement b/l. Basal crackles+  Abdomen: soft non-tender non-distended,  Neuro: alert and oriented X 4,    LABS:                          8.0    2.73  )-----------( 69       ( 02 Jul 2019 05:34 )             25.5         Mean Cell Volume : 94.4 fL  Mean Cell Hemoglobin : 29.6 pg  Mean Cell Hemoglobin Concentration : 31.4 g/dL  Auto Neutrophil # : 1.07 K/uL  Auto Lymphocyte # : 1.17 K/uL  Auto Monocyte # : 0.46 K/uL  Auto Eosinophil # : 0.01 K/uL  Auto Basophil # : 0.00 K/uL  Auto Neutrophil % : 39.2 %  Auto Lymphocyte % : 42.9 %  Auto Monocyte % : 16.8 %  Auto Eosinophil % : 0.4 %  Auto Basophil % : 0.0 %      Serial CBC's  07-02 @ 05:34  Hct-25.5 / Hgb-8.0 / Plat-69 / RBC-2.70 / WBC-2.73  Serial CBC's  07-01 @ 13:10  Hct-29.8 / Hgb-9.2 / Plat-87 / RBC-3.10 / WBC-2.65      07-02    149<H>  |  104  |  18  ----------------------------<  92  3.1<L>   |  28  |  1.1    Ca    8.3<L>      02 Jul 2019 05:34  Mg     1.7     07-02    TPro  7.2  /  Alb  3.3<L>  /  TBili  0.9  /  DBili  x   /  AST  32  /  ALT  37  /  AlkPhos  104  07-01    Reticulocyte Count in AM (07.02.19 @ 05:34)    RBC Count: 2.70 M/uL    Reticulocyte Percent: 4.4 %    Absolute Reticulocytes: 119.4 K/uL    Iron with Total Binding Capacity (07.01.19 @ 21:31)    Iron - Total Binding Capacity.: 243 ug/dL    % Saturation, Iron: 30 %    Iron Total, Serum: 73 ug/dL    Unsaturated Iron Binding Capacity: 170 ug/dL Patient is a 73y old  Male who presents with a chief complaint of Lightheadedness, and SOB (01 Jul 2019 16:54)      HPI:  73M with PMHx of CAD s/p PCI, DMII, HTN, Anxiety, presents from assisted living facility for episode of lightheadedness and worsening SOB. Patient at lunch today had episode of lightheadedness and felt as though he was almost going to pass out. He also had increasing SOB for the past 1 week. He is unable to walk 1 flight of stairs however prior to this week was able to ambulate more. He otherwise denies any fevers, chills, headache, blurry vision, headache, chest pain, n/v/d/c, LE edema, abdominal pain, dysuria. He had last cardiac cath with PCI 2-3 years ago. No history of CHF or COPD.       Hematological history:  Known h/o pancytopenia 10 years ago (saw hematologist at [place]- does not know name). No h/o known BM biopsy. No known treatment for the condition.  No c/o wt loss, fever, chills, night sweats. No h/o prior blood transfusions  On and off epistaxis while on Plavix  No recurrent hospitalizations for infections ( recent PNA)  No alcohol abuse/ Liver disease known        ROS:  Negative except for:    PAST MEDICAL & SURGICAL HISTORY:  GERD (gastroesophageal reflux disease)  Anxiety  CAD (coronary artery disease)  Diabetes  HTN (hypertension)  H/O colonoscopy: 10 yeras ago      SOCIAL HISTORY: Quit smoking 30 years ago  No alcohol abuse. No illicit drug use    FAMILY HISTORY: Bladder ca in mother at age 70  Worked as a  and then worked as an       MEDICATIONS  (STANDING):  aspirin  chewable 81 milliGRAM(s) Oral daily  atorvastatin 20 milliGRAM(s) Oral at bedtime  chlorhexidine 4% Liquid 1 Application(s) Topical <User Schedule>  dextrose 5%. 1000 milliLiter(s) (50 mL/Hr) IV Continuous <Continuous>  dextrose 50% Injectable 12.5 Gram(s) IV Push once  dextrose 50% Injectable 25 Gram(s) IV Push once  dextrose 50% Injectable 25 Gram(s) IV Push once  diazepam    Tablet 2 milliGRAM(s) Oral at bedtime  enoxaparin Injectable 40 milliGRAM(s) SubCutaneous daily  famotidine    Tablet 40 milliGRAM(s) Oral at bedtime  furosemide    Tablet 40 milliGRAM(s) Oral every 12 hours  insulin glargine Injectable (LANTUS) 10 Unit(s) SubCutaneous at bedtime  insulin lispro (HumaLOG) corrective regimen sliding scale   SubCutaneous three times a day before meals  insulin lispro Injectable (HumaLOG) 4 Unit(s) SubCutaneous three times a day before meals  lisinopril 10 milliGRAM(s) Oral daily  magnesium sulfate  IVPB 2 Gram(s) IV Intermittent once  metoprolol tartrate 50 milliGRAM(s) Oral two times a day  potassium chloride  20 mEq/100 mL IVPB 20 milliEquivalent(s) IV Intermittent every 2 hours    MEDICATIONS  (PRN):  dextrose 40% Gel 15 Gram(s) Oral once PRN Blood Glucose LESS THAN 70 milliGRAM(s)/deciliter  glucagon  Injectable 1 milliGRAM(s) IntraMuscular once PRN Glucose LESS THAN 70 milligrams/deciliter    Height (cm): 177.8 (07-01-19 @ 21:07)  Weight (kg): 90 (07-01-19 @ 21:07)  BMI (kg/m2): 28.5 (07-01-19 @ 21:07)  BSA (m2): 2.08 (07-01-19 @ 21:07)  Allergies    No Known Allergies    Intolerances        Vital Signs Last 24 Hrs  T(C): 36.3 (02 Jul 2019 05:22), Max: 36.7 (01 Jul 2019 11:58)  T(F): 97.3 (02 Jul 2019 05:22), Max: 98 (01 Jul 2019 11:58)  HR: 109 (02 Jul 2019 05:22) (87 - 150)  BP: 132/77 (02 Jul 2019 05:22) (125/81 - 220/104)  BP(mean): --  RR: 18 (02 Jul 2019 05:22) (18 - 23)  SpO2: 99% (01 Jul 2019 21:07) (84% - 100%)    PHYSICAL EXAM  General: adult in NAD, on 2liter NC  Neck: supple, No palpable lymphadenopathy  CV: normal S1/S2   Lungs: positive air movement b/l. Basal crackles+  Abdomen: soft non-tender non-distended, No HSM  Neuro: alert and oriented X 4,    LABS:                          8.0    2.73  )-----------( 69       ( 02 Jul 2019 05:34 )             25.5         Mean Cell Volume : 94.4 fL  Mean Cell Hemoglobin : 29.6 pg  Mean Cell Hemoglobin Concentration : 31.4 g/dL  Auto Neutrophil # : 1.07 K/uL  Auto Lymphocyte # : 1.17 K/uL  Auto Monocyte # : 0.46 K/uL  Auto Eosinophil # : 0.01 K/uL  Auto Basophil # : 0.00 K/uL  Auto Neutrophil % : 39.2 %  Auto Lymphocyte % : 42.9 %  Auto Monocyte % : 16.8 %  Auto Eosinophil % : 0.4 %  Auto Basophil % : 0.0 %      Serial CBC's  07-02 @ 05:34  Hct-25.5 / Hgb-8.0 / Plat-69 / RBC-2.70 / WBC-2.73  Serial CBC's  07-01 @ 13:10  Hct-29.8 / Hgb-9.2 / Plat-87 / RBC-3.10 / WBC-2.65      07-02    149<H>  |  104  |  18  ----------------------------<  92  3.1<L>   |  28  |  1.1    Ca    8.3<L>      02 Jul 2019 05:34  Mg     1.7     07-02    TPro  7.2  /  Alb  3.3<L>  /  TBili  0.9  /  DBili  x   /  AST  32  /  ALT  37  /  AlkPhos  104  07-01    Reticulocyte Count in AM (07.02.19 @ 05:34)    RBC Count: 2.70 M/uL    Reticulocyte Percent: 4.4 %    Absolute Reticulocytes: 119.4 K/uL    Iron with Total Binding Capacity (07.01.19 @ 21:31)    Iron - Total Binding Capacity.: 243 ug/dL    % Saturation, Iron: 30 %    Iron Total, Serum: 73 ug/dL    Unsaturated Iron Binding Capacity: 170 ug/dL

## 2019-07-02 NOTE — CONSULT NOTE ADULT - ASSESSMENT
Patient is a 73M with PMHx of CAD s/p PCI, DMII, HTN, Anxiety, presents from assisted living facility for episode of lightheadedness and worsening SOB. Currently undergoing treatment for suspected CHF exacerbation    #) Suspected CHF Exacerbation (EF unknown at this time)  - On presentation found to have crackles with elevated BNP  - Improved with IV diuresis and BiPAP administration, now on oral lasix q12h  - Still tachycardic on examination today, EKG showing sinus tachycardia  - Follow up results of TSH/T4  - Awaiting results of 2D Echo  - Patient is a 73M with PMHx of CAD s/p PCI, DMII, HTN, Anxiety, presents from assisted living facility for episode of lightheadedness and worsening SOB. Currently undergoing treatment for suspected CHF exacerbation    #) Suspected CHF Exacerbation (EF unknown at this time)  - On presentation found to have crackles with elevated BNP  - Improved with IV diuresis and BiPAP administration, now on oral lasix q12h  - Still tachycardic on examination today, EKG showing sinus tachycardia  - Follow up results of TSH/T4  - Awaiting results of 2D Echo  - Would recommend to increase Metoprolol to 50mg q8h  - Give IV lasix 40mg x 1 tonight and can switch to PO lasix 40mg q12h tomorrow based on volume status/ clinical improvement     Plan discussed with attending, attending note to follow

## 2019-07-02 NOTE — PROGRESS NOTE ADULT - SUBJECTIVE AND OBJECTIVE BOX
Patient is a 73y old  Male who presents with a chief complaint of Lightheadedness, and SOB (02 Jul 2019 13:44)    Interval events: None    Today is hosp day 1  Patient is resting comfortably in bed   SOB improved this AM, now on NC  Tolerating Diet with regular BM  Plan: f/up Echo and cardiology consult     PAST MEDICAL & SURGICAL HISTORY:  GERD (gastroesophageal reflux disease)  Anxiety  CAD (coronary artery disease)  Diabetes  HTN (hypertension)  H/O colonoscopy: 10 yeras ago      MEDICATIONS  (STANDING):  aspirin  chewable 81 milliGRAM(s) Oral daily  atorvastatin 20 milliGRAM(s) Oral at bedtime  chlorhexidine 4% Liquid 1 Application(s) Topical <User Schedule>  dextrose 5%. 1000 milliLiter(s) (50 mL/Hr) IV Continuous <Continuous>  dextrose 50% Injectable 12.5 Gram(s) IV Push once  dextrose 50% Injectable 25 Gram(s) IV Push once  dextrose 50% Injectable 25 Gram(s) IV Push once  diazepam    Tablet 2 milliGRAM(s) Oral at bedtime  enoxaparin Injectable 40 milliGRAM(s) SubCutaneous daily  famotidine    Tablet 40 milliGRAM(s) Oral at bedtime  furosemide    Tablet 40 milliGRAM(s) Oral every 12 hours  insulin glargine Injectable (LANTUS) 10 Unit(s) SubCutaneous at bedtime  insulin lispro (HumaLOG) corrective regimen sliding scale   SubCutaneous three times a day before meals  insulin lispro Injectable (HumaLOG) 4 Unit(s) SubCutaneous three times a day before meals  lisinopril 10 milliGRAM(s) Oral daily  metoprolol tartrate 50 milliGRAM(s) Oral two times a day  potassium chloride  20 mEq/100 mL IVPB 20 milliEquivalent(s) IV Intermittent every 2 hours    MEDICATIONS  (PRN):  dextrose 40% Gel 15 Gram(s) Oral once PRN Blood Glucose LESS THAN 70 milliGRAM(s)/deciliter  glucagon  Injectable 1 milliGRAM(s) IntraMuscular once PRN Glucose LESS THAN 70 milligrams/deciliter          Vital Signs Last 24 Hrs  T(C): 35.9 (02 Jul 2019 14:40), Max: 36.4 (01 Jul 2019 21:07)  T(F): 96.7 (02 Jul 2019 14:40), Max: 97.6 (01 Jul 2019 21:07)  HR: 123 (02 Jul 2019 14:40) (87 - 150)  BP: 136/66 (02 Jul 2019 14:40) (125/81 - 220/104)  BP(mean): --  RR: 18 (02 Jul 2019 14:40) (18 - 22)  SpO2: 99% (01 Jul 2019 21:07) (84% - 100%)  CAPILLARY BLOOD GLUCOSE      POCT Blood Glucose.: 177 mg/dL (02 Jul 2019 11:42)  POCT Blood Glucose.: 167 mg/dL (02 Jul 2019 07:41)  POCT Blood Glucose.: 132 mg/dL (01 Jul 2019 22:48)  POCT Blood Glucose.: 330 mg/dL (01 Jul 2019 18:28)  POCT Blood Glucose.: 252 mg/dL (01 Jul 2019 17:16)    I&O's Summary    01 Jul 2019 07:01  -  02 Jul 2019 07:00  --------------------------------------------------------  IN: 80 mL / OUT: 1800 mL / NET: -1720 mL        Physical Exam:    -     General : NAD, resting comfortably in bed     -      Cardiac: S1/S2 appreciated, Systolic murmur with radiation to Axilla     -      Pulm: Rhonchi B/L lung bases     -      GI: Soft, NT, ND, +BS     -      Musculoskeletal: Atraumatic, No LE edema, no skin color changes      -      Neuro: AO x 2, slurring speech ??baseline          Labs:                        8.0    2.73  )-----------( 69       ( 02 Jul 2019 05:34 )             25.5             07-02    149<H>  |  104  |  18  ----------------------------<  92  3.1<L>   |  28  |  1.1    Ca    8.3<L>      02 Jul 2019 05:34  Mg     1.7     07-02    TPro  7.2  /  Alb  3.3<L>  /  TBili  0.9  /  DBili  x   /  AST  32  /  ALT  37  /  AlkPhos  104  07-01    LIVER FUNCTIONS - ( 01 Jul 2019 13:10 )  Alb: 3.3 g/dL / Pro: 7.2 g/dL / ALK PHOS: 104 U/L / ALT: 37 U/L / AST: 32 U/L / GGT: x                   CARDIAC MARKERS ( 01 Jul 2019 21:31 )  x     / <0.01 ng/mL / x     / x     / x      CARDIAC MARKERS ( 01 Jul 2019 13:10 )  x     / <0.01 ng/mL / x     / x     / x          ABG - ( 01 Jul 2019 17:43 )  pH, Arterial: 7.40  pH, Blood: x     /  pCO2: 39    /  pO2: 116   / HCO3: 25    / Base Excess: -0.1  /  SaO2: 98              Imaging:    ECG:

## 2019-07-02 NOTE — CONSULT NOTE ADULT - ASSESSMENT
73M with PMHx of CAD s/p PCI, DMII, HTN, Anxiety, presents from assisted living facility for episode of lightheadedness and worsening SOB admitted to telemetry for Acute CHF exacerbation. Pt is found to have chronic pancytopenia ( at least 10 years history) with our lab work in 2017 showing similar CBC results.     1) Chronic pancytopenia ( No acute signs/symptoms)   Check PT, PTT, B12, folate  will look at peripheral smear  Will discuss further plan with Dr Santoro. 73M with PMHx of CAD s/p PCI, DMII, HTN, Anxiety, presents from assisted living facility for episode of lightheadedness and worsening SOB admitted to telemetry for Acute CHF exacerbation.   Pt was found to have chronic pancytopenia ( at least 10 years history) with our lab work in 2017 showing similar CBC results.     1) Chronic pancytopenia ( No acute signs/symptoms)   Check PT, PTT, B12, folate, Methylmalonic acid, LDH   Peripheral smear reviewed- Anisocytosis, Poikilocytosis, low platelets  f/u US abdomen to evaluate spleen and liver  Patient will need outpt BM biopsy and evaluation  Discussed with Dr Santoro    Medical resident aware of plan    2) Acute CHF- plan as per primary team and cardiology

## 2019-07-03 ENCOUNTER — TRANSCRIPTION ENCOUNTER (OUTPATIENT)
Age: 74
End: 2019-07-03

## 2019-07-03 LAB
ALBUMIN SERPL ELPH-MCNC: 2.8 G/DL — LOW (ref 3.5–5.2)
ALP SERPL-CCNC: 88 U/L — SIGNIFICANT CHANGE UP (ref 30–115)
ALT FLD-CCNC: 22 U/L — SIGNIFICANT CHANGE UP (ref 0–41)
ANION GAP SERPL CALC-SCNC: 11 MMOL/L — SIGNIFICANT CHANGE UP (ref 7–14)
ANION GAP SERPL CALC-SCNC: 11 MMOL/L — SIGNIFICANT CHANGE UP (ref 7–14)
APTT BLD: 30.7 SEC — SIGNIFICANT CHANGE UP (ref 27–39.2)
AST SERPL-CCNC: 16 U/L — SIGNIFICANT CHANGE UP (ref 0–41)
BASOPHILS # BLD AUTO: 0 K/UL — SIGNIFICANT CHANGE UP (ref 0–0.2)
BASOPHILS NFR BLD AUTO: 0 % — SIGNIFICANT CHANGE UP (ref 0–1)
BILIRUB SERPL-MCNC: 1 MG/DL — SIGNIFICANT CHANGE UP (ref 0.2–1.2)
BUN SERPL-MCNC: 20 MG/DL — SIGNIFICANT CHANGE UP (ref 10–20)
BUN SERPL-MCNC: 24 MG/DL — HIGH (ref 10–20)
CALCIUM SERPL-MCNC: 8.1 MG/DL — LOW (ref 8.5–10.1)
CALCIUM SERPL-MCNC: 8.1 MG/DL — LOW (ref 8.5–10.1)
CHLORIDE SERPL-SCNC: 100 MMOL/L — SIGNIFICANT CHANGE UP (ref 98–110)
CHLORIDE SERPL-SCNC: 102 MMOL/L — SIGNIFICANT CHANGE UP (ref 98–110)
CHOLEST SERPL-MCNC: 83 MG/DL — LOW (ref 100–200)
CO2 SERPL-SCNC: 29 MMOL/L — SIGNIFICANT CHANGE UP (ref 17–32)
CO2 SERPL-SCNC: 32 MMOL/L — SIGNIFICANT CHANGE UP (ref 17–32)
CREAT SERPL-MCNC: 1.1 MG/DL — SIGNIFICANT CHANGE UP (ref 0.7–1.5)
CREAT SERPL-MCNC: 1.2 MG/DL — SIGNIFICANT CHANGE UP (ref 0.7–1.5)
EOSINOPHIL # BLD AUTO: 0.01 K/UL — SIGNIFICANT CHANGE UP (ref 0–0.7)
EOSINOPHIL NFR BLD AUTO: 0.4 % — SIGNIFICANT CHANGE UP (ref 0–8)
FOLATE SERPL-MCNC: 7.4 NG/ML — SIGNIFICANT CHANGE UP
GLUCOSE BLDC GLUCOMTR-MCNC: 184 MG/DL — HIGH (ref 70–99)
GLUCOSE BLDC GLUCOMTR-MCNC: 185 MG/DL — HIGH (ref 70–99)
GLUCOSE BLDC GLUCOMTR-MCNC: 204 MG/DL — HIGH (ref 70–99)
GLUCOSE BLDC GLUCOMTR-MCNC: 222 MG/DL — HIGH (ref 70–99)
GLUCOSE SERPL-MCNC: 168 MG/DL — HIGH (ref 70–99)
GLUCOSE SERPL-MCNC: 202 MG/DL — HIGH (ref 70–99)
HCT VFR BLD CALC: 28 % — LOW (ref 42–52)
HDLC SERPL-MCNC: 25 MG/DL — LOW
HGB BLD-MCNC: 8.8 G/DL — LOW (ref 14–18)
IMM GRANULOCYTES NFR BLD AUTO: 0.8 % — HIGH (ref 0.1–0.3)
INR BLD: 1.24 RATIO — SIGNIFICANT CHANGE UP (ref 0.65–1.3)
LDH SERPL L TO P-CCNC: 268 U/L — HIGH (ref 50–242)
LIPID PNL WITH DIRECT LDL SERPL: 39 MG/DL — SIGNIFICANT CHANGE UP (ref 4–129)
LYMPHOCYTES # BLD AUTO: 1.05 K/UL — LOW (ref 1.2–3.4)
LYMPHOCYTES # BLD AUTO: 40.4 % — SIGNIFICANT CHANGE UP (ref 20.5–51.1)
MAGNESIUM SERPL-MCNC: 1.8 MG/DL — SIGNIFICANT CHANGE UP (ref 1.8–2.4)
MCHC RBC-ENTMCNC: 30 PG — SIGNIFICANT CHANGE UP (ref 27–31)
MCHC RBC-ENTMCNC: 31.4 G/DL — LOW (ref 32–37)
MCV RBC AUTO: 95.6 FL — HIGH (ref 80–94)
MONOCYTES # BLD AUTO: 0.54 K/UL — SIGNIFICANT CHANGE UP (ref 0.1–0.6)
MONOCYTES NFR BLD AUTO: 20.8 % — HIGH (ref 1.7–9.3)
NEUTROPHILS # BLD AUTO: 0.98 K/UL — LOW (ref 1.4–6.5)
NEUTROPHILS NFR BLD AUTO: 37.6 % — LOW (ref 42.2–75.2)
NRBC # BLD: 4 /100 WBCS — HIGH (ref 0–0)
PLATELET # BLD AUTO: 85 K/UL — LOW (ref 130–400)
POTASSIUM SERPL-MCNC: 3 MMOL/L — LOW (ref 3.5–5)
POTASSIUM SERPL-MCNC: 3.4 MMOL/L — LOW (ref 3.5–5)
POTASSIUM SERPL-SCNC: 3 MMOL/L — LOW (ref 3.5–5)
POTASSIUM SERPL-SCNC: 3.4 MMOL/L — LOW (ref 3.5–5)
PROT SERPL-MCNC: 6.5 G/DL — SIGNIFICANT CHANGE UP (ref 6–8)
PROTHROM AB SERPL-ACNC: 14.2 SEC — HIGH (ref 9.95–12.87)
RBC # BLD: 2.93 M/UL — LOW (ref 4.7–6.1)
RBC # FLD: 18.8 % — HIGH (ref 11.5–14.5)
SODIUM SERPL-SCNC: 142 MMOL/L — SIGNIFICANT CHANGE UP (ref 135–146)
SODIUM SERPL-SCNC: 143 MMOL/L — SIGNIFICANT CHANGE UP (ref 135–146)
TOTAL CHOLESTEROL/HDL RATIO MEASUREMENT: 3.3 RATIO — LOW (ref 4–5.5)
TRIGL SERPL-MCNC: 93 MG/DL — SIGNIFICANT CHANGE UP (ref 10–149)
VIT B12 SERPL-MCNC: 1092 PG/ML — SIGNIFICANT CHANGE UP (ref 232–1245)
WBC # BLD: 2.6 K/UL — LOW (ref 4.8–10.8)
WBC # FLD AUTO: 2.6 K/UL — LOW (ref 4.8–10.8)

## 2019-07-03 PROCEDURE — 99233 SBSQ HOSP IP/OBS HIGH 50: CPT

## 2019-07-03 PROCEDURE — 76700 US EXAM ABDOM COMPLETE: CPT | Mod: 26

## 2019-07-03 PROCEDURE — 76857 US EXAM PELVIC LIMITED: CPT | Mod: 26

## 2019-07-03 PROCEDURE — 99222 1ST HOSP IP/OBS MODERATE 55: CPT

## 2019-07-03 PROCEDURE — 93306 TTE W/DOPPLER COMPLETE: CPT | Mod: 26

## 2019-07-03 RX ORDER — POTASSIUM CHLORIDE 20 MEQ
40 PACKET (EA) ORAL ONCE
Refills: 0 | Status: COMPLETED | OUTPATIENT
Start: 2019-07-03 | End: 2019-07-03

## 2019-07-03 RX ORDER — TAMSULOSIN HYDROCHLORIDE 0.4 MG/1
0.4 CAPSULE ORAL AT BEDTIME
Refills: 0 | Status: DISCONTINUED | OUTPATIENT
Start: 2019-07-03 | End: 2019-07-10

## 2019-07-03 RX ORDER — FUROSEMIDE 40 MG
40 TABLET ORAL EVERY 12 HOURS
Refills: 0 | Status: DISCONTINUED | OUTPATIENT
Start: 2019-07-03 | End: 2019-07-05

## 2019-07-03 RX ORDER — POTASSIUM CHLORIDE 20 MEQ
40 PACKET (EA) ORAL
Refills: 0 | Status: COMPLETED | OUTPATIENT
Start: 2019-07-03 | End: 2019-07-03

## 2019-07-03 RX ADMIN — ATORVASTATIN CALCIUM 20 MILLIGRAM(S): 80 TABLET, FILM COATED ORAL at 22:21

## 2019-07-03 RX ADMIN — Medication 4 UNIT(S): at 08:03

## 2019-07-03 RX ADMIN — Medication 81 MILLIGRAM(S): at 11:54

## 2019-07-03 RX ADMIN — Medication 4 UNIT(S): at 17:44

## 2019-07-03 RX ADMIN — Medication 40 MILLIGRAM(S): at 06:21

## 2019-07-03 RX ADMIN — Medication 40 MILLIGRAM(S): at 17:43

## 2019-07-03 RX ADMIN — Medication 2 MILLIGRAM(S): at 22:21

## 2019-07-03 RX ADMIN — Medication 50 MILLIGRAM(S): at 22:21

## 2019-07-03 RX ADMIN — FAMOTIDINE 40 MILLIGRAM(S): 10 INJECTION INTRAVENOUS at 22:22

## 2019-07-03 RX ADMIN — Medication 1: at 08:03

## 2019-07-03 RX ADMIN — LISINOPRIL 10 MILLIGRAM(S): 2.5 TABLET ORAL at 06:21

## 2019-07-03 RX ADMIN — ENOXAPARIN SODIUM 40 MILLIGRAM(S): 100 INJECTION SUBCUTANEOUS at 11:54

## 2019-07-03 RX ADMIN — Medication 50 MILLIGRAM(S): at 13:25

## 2019-07-03 RX ADMIN — TAMSULOSIN HYDROCHLORIDE 0.4 MILLIGRAM(S): 0.4 CAPSULE ORAL at 22:21

## 2019-07-03 RX ADMIN — CHLORHEXIDINE GLUCONATE 1 APPLICATION(S): 213 SOLUTION TOPICAL at 06:21

## 2019-07-03 RX ADMIN — Medication 40 MILLIEQUIVALENT(S): at 13:25

## 2019-07-03 RX ADMIN — Medication 50 MILLIGRAM(S): at 06:21

## 2019-07-03 RX ADMIN — Medication 40 MILLIEQUIVALENT(S): at 20:39

## 2019-07-03 RX ADMIN — Medication 40 MILLIEQUIVALENT(S): at 11:56

## 2019-07-03 RX ADMIN — Medication 4 UNIT(S): at 11:56

## 2019-07-03 RX ADMIN — Medication 2: at 11:56

## 2019-07-03 RX ADMIN — INSULIN GLARGINE 10 UNIT(S): 100 INJECTION, SOLUTION SUBCUTANEOUS at 22:22

## 2019-07-03 NOTE — CONSULT NOTE ADULT - SUBJECTIVE AND OBJECTIVE BOX
Patient is a 73y old  Male who presents with a chief complaint of Lightheadedness, and SOB (2019 15:52)    HPI:  73M with PMHx of CAD s/p PCI (last 2018), DMII, HTN, HLD, cataracts, anxiety, presents from assisted living facility for episode of lightheadedness and worsening SOB. Patient at lunch today had episode of lightheadedness and felt as though he was almost going to pass out. He also had increasing SOB for the past 1 week. He is unable to walk 1 flight of stairs however prior to this week was able to ambulate more. He otherwise denies any fevers, chills, headache, blurry vision, headache, chest pain, n/v/d/c, LE edema, abdominal pain, dysuria. He had last cardiac cath with PCI 2-3 years ago. No history of CHF or COPD.     In ED vitals: , 150/99, 100% on 2L, RR 18. Placed initially on BIPAP with improvement in symptoms. Received x 1 IV lasix 40mg, Magnesium and potassium repleted. (2019 16:54)    Patient had an echo which revealed a new low EF 26%, G1DD, mild-mod MR and mild AS. Noted to have SVT and NSVT on tele. EP consulted for further evaluation.     REVIEW OF SYSTEMS  A ten-point review of systems was otherwise negative except as noted.      PAST MEDICAL & SURGICAL HISTORY:  GERD (gastroesophageal reflux disease)  Anxiety  CAD (coronary artery disease)  Diabetes  HTN (hypertension)  H/O colonoscopy: 10 yeras ago      Home Medications:  aspirin 81 mg oral tablet, chewable: 1 tab(s) orally once a day (2019 17:12)  atorvastatin 20 mg oral tablet: 1 tab(s) orally once a day (2019 17:12)  diazePAM 2 mg oral tablet: 1 tab(s) orally once a day (in the evening) (2019 17:12)  famotidine 40 mg oral tablet: 1 tab(s) orally once a day (at bedtime) (2019 17:12)  glimepiride 4 mg oral tablet: 1 tab(s) orally once a day (2019 17:12)  lisinopril 10 mg oral tablet: 1 tab(s) orally once a day (2019 17:12)  metFORMIN 1000 mg oral tablet: 1 tab(s) orally 2 times a day (2019 17:12)  metoprolol tartrate 50 mg oral tablet: 1 tab(s) orally 2 times a day (2019 17:12)      Allergies:    No Known Allergies      PREVIOUS DIAGNOSTIC TESTING:      ECHO  FINDINGS:  < from: Transthoracic Echocardiogram (19 @ 09:56) >  Summary:   1. LV Ejection Fraction by Cheng's Method with a biplane EF of 26 %.   2. Spectral Doppler shows impaired relaxation pattern of left   ventricular myocardial filling (Grade I diastolic dysfunction).   3. Mild to moderate mitral valve regurgitation.   4. Mitral annular calcification.   5. Thickening and calcification of the anterior and posterior mitral   valve leaflets.   6. Aortic valve thickening with decreased leaflet opening.   7. Estimated pulmonary artery systolic pressure is 41.5 mmHg assuming a   right atrial pressure of 5 mmHg, which is consistent with mild pulmonary   hypertension.   8. Peak transaortic gradient equals 16.2 mmHg, mean transaortic gradient   equals 9.9 mmHg, the calculated aortic valve area equals 1.64 cm²by the   continuity equation consistent with mild aortic stenosis.    PHYSICIAN INTERPRETATION:  Left Ventricle: The left ventricular internal cavity size is normal. Left   ventricular wall thickness is normal. Spectral Doppler shows impaired   relaxation pattern of left ventricular myocardial filling (Grade I   diastolic dysfunction).  Right Ventricle: Normal right ventricular size and function.  Left Atrium: Normal left atrial size.  Right Atrium: Normal right atrial size.  Pericardium: There is no evidence of pericardial effusion. There is a   small pleural effusion in both left and right lateral regions.  Mitral Valve: Thickening and calcification of the anterior and posterior   mitral valve leaflets. There is mitral annular calcification. No evidence   of mitral stenosis. Mild to moderate mitral valve regurgitation is seen.  Tricuspid Valve: The tricuspid valve is normal in structure. Mild   tricuspid regurgitation is visualized. Estimated pulmonary artery   systolic pressure is 41.5mmHg assuming a right atrial pressure of 5   mmHg, which is consistent with mild pulmonary hypertension.  Aortic Valve: The aortic valve is trileaflet. Aortic valve thickening   with decreased leaflet opening. Peak transaortic gradient equals 16.2   mmHg, mean transaortic gradient equals 9.9 mmHg, the calculated aortic   valve area equals 1.64 cm² by the continuity equation consistent with   mild aortic stenosis. Trivial aortic valve regurgitation is seen.  Pulmonic Valve: The pulmonic valve is normal. Trace pulmonic valve   regurgitation.  Aorta: The aortic root and ascending aorta are structurally normal, with   no evidence of dilitation.  Pulmonary Artery: The main pulmonary artery is normal in size.  Venous: The inferior vena cava was normal sized, with respiratory size   variation greater than 50%.    < end of copied text >    STRESS  FINDINGS:    CATHETERIZATION  FINDINGS:    ELECTROPHYSIOLOGY STUDY  FINDINGS:    CAROTID ULTRASOUND:  FINDINGS    VENOUS DUPLEX SCAN:  FINDINGS:    CHEST CT PULMONARY ANGIO with IV Contrast:  FINDINGS:    FAMILY HISTORY:  Father, , [place]  Brother- , [place]    SOCIAL HISTORY:  Quit smoking 40  years ago, Denies alcohol abuse or illicit drug use    MEDICATIONS  (STANDING):  aspirin  chewable 81 milliGRAM(s) Oral daily  atorvastatin 20 milliGRAM(s) Oral at bedtime  chlorhexidine 4% Liquid 1 Application(s) Topical <User Schedule>  dextrose 5%. 1000 milliLiter(s) (50 mL/Hr) IV Continuous <Continuous>  dextrose 50% Injectable 12.5 Gram(s) IV Push once  dextrose 50% Injectable 25 Gram(s) IV Push once  dextrose 50% Injectable 25 Gram(s) IV Push once  diazepam    Tablet 2 milliGRAM(s) Oral at bedtime  enoxaparin Injectable 40 milliGRAM(s) SubCutaneous daily  famotidine    Tablet 40 milliGRAM(s) Oral at bedtime  furosemide    Tablet 40 milliGRAM(s) Oral every 12 hours  insulin glargine Injectable (LANTUS) 10 Unit(s) SubCutaneous at bedtime  insulin lispro (HumaLOG) corrective regimen sliding scale   SubCutaneous three times a day before meals  insulin lispro Injectable (HumaLOG) 4 Unit(s) SubCutaneous three times a day before meals  lisinopril 10 milliGRAM(s) Oral daily  metoprolol tartrate 50 milliGRAM(s) Oral every 8 hours  tamsulosin 0.4 milliGRAM(s) Oral at bedtime    MEDICATIONS  (PRN):  dextrose 40% Gel 15 Gram(s) Oral once PRN Blood Glucose LESS THAN 70 milliGRAM(s)/deciliter  glucagon  Injectable 1 milliGRAM(s) IntraMuscular once PRN Glucose LESS THAN 70 milligrams/deciliter      Vital Signs Last 24 Hrs  T(C): 35.4 (2019 13:21), Max: 36.6 (2019 20:38)  T(F): 95.7 (2019 13:21), Max: 97.9 (2019 20:38)  HR: 126 (2019 13:21) (86 - 126)  BP: 113/58 (2019 13:21) (113/58 - 153/82)  BP(mean): --  RR: 18 (2019 13:21) (18 - 18)  SpO2: 95% (2019 20:38) (95% - 95%)    PHYSICAL EXAM:    GENERAL: In no apparent distress, well nourished, and hydrated.  HEAD:  Atraumatic, Normocephalic  EYES: EOMI, PERRLA, conjunctiva and sclera clear  NECK: Supple and normal thyroid.  No JVD or carotid bruit.  Carotid pulse is 2+ bilaterally.  HEART: Regular rate and rhythm; No murmurs, rubs, or gallops.  PULMONARY: Clear to auscultation and perfusion.  No rales, wheezing, or rhonchi bilaterally.  ABDOMEN: Soft, Nontender, Nondistended; Bowel sounds present  EXTREMITIES:  2+ Peripheral Pulses, No clubbing, cyanosis, or edema  NEUROLOGICAL: Grossly nonfocal      INTERPRETATION OF TELEMETRY:     ECG:  < from: 12 Lead ECG (19 @ 17:34) >  Ventricular Rate 142 BPM    Atrial Rate 142 BPM    P-R Interval 124 ms    QRS Duration 62 ms    Q-T Interval 280 ms    QTC Calculation(Bezet) 430 ms    P Axis 56 degrees    R Axis 9 degrees    T Axis -2 degrees    Diagnosis Line Sinus tachycardia  Minimal voltage criteria for LVH, may be normal variant  Nonspecific ST abnormality  Abnormal ECG    < end of copied text >    I&O's Detail    2019 07:  -  2019 07:00  --------------------------------------------------------  IN:  Total IN: 0 mL    OUT:    Indwelling Catheter - Urethral: 2750 mL  Total OUT: 2750 mL    Total NET: -2750 mL      2019 07:01  -  2019 16:02  --------------------------------------------------------  IN:    Oral Fluid: 1200 mL  Total IN: 1200 mL    OUT:    Indwelling Catheter - Urethral: 1000 mL  Total OUT: 1000 mL    Total NET: 200 mL    LABS:                        8.8    2.60  )-----------( 85       ( 2019 05:32 )             28.0     07-03    143  |  100  |  20  ----------------------------<  202<H>  3.0<L>   |  32  |  1.1    Ca    8.1<L>      2019 05:32  Mg     1.8     03    TPro  6.5  /  Alb  2.8<L>  /  TBili  1.0  /  DBili  x   /  AST  16  /  ALT  22  /  AlkPhos  88  07    CARDIAC MARKERS ( 2019 21:31 )  x     / <0.01 ng/mL / x     / x     / x          PT/INR - ( 2019 05:32 )   PT: 14.20 sec;   INR: 1.24 ratio         PTT - ( 2019 05:32 )  PTT:30.7 sec    BNP 7990    I&O's Detail    2019 07:  -  2019 07:00  --------------------------------------------------------  IN:  Total IN: 0 mL    OUT:    Indwelling Catheter - Urethral: 2750 mL  Total OUT: 2750 mL    Total NET: -2750 mL      2019 07:01  -  2019 16:02  --------------------------------------------------------  IN:    Oral Fluid: 1200 mL  Total IN: 1200 mL    OUT:    Indwelling Catheter - Urethral: 1000 mL  Total OUT: 1000 mL    Total NET: 200 mL    RADIOLOGY & ADDITIONAL STUDIES:  < from: Xray Chest 1 View-PORTABLE IMMEDIATE (19 @ 13:52) >  INTERPRETATION:  Clinical History / Reason for exam: Chest pain.    Comparison : Chest radiograph None.    Technique/Positioning: Frontal portable.    Findings:    Support devices: Telemetry leads overlie the thorax.    Cardiac/mediastinum/hilum: Heart is enlarged.    Lung parenchyma/Pleura: Perihilar opacities are seen.    Skeleton/soft tissues: Unchanged.    Impression:      Perihilar opacities consistent withCHF.    Follow-up as needed.    < end of copied text > Patient is a 73y old  Male who presents with a chief complaint of Lightheadedness, and SOB (2019 15:52)    HPI:  73M with PMHx of CAD s/p PCI (last 2018), DMII, HTN, HLD, cataracts, anxiety, presents from assisted living facility for episode of lightheadedness and worsening SOB. Patient at lunch today had episode of lightheadedness and felt as though he was almost going to pass out. He also had increasing SOB for the past 1 week. He is unable to walk 1 flight of stairs however prior to this week was able to ambulate more. He otherwise denies any fevers, chills, headache, blurry vision, headache, chest pain, n/v/d/c, LE edema, abdominal pain, dysuria. He had last cardiac cath with PCI 2-3 years ago. No history of CHF or COPD.     In ED vitals: , 150/99, 100% on 2L, RR 18. Placed initially on BIPAP with improvement in symptoms. Received x 1 IV lasix 40mg, Magnesium and potassium repleted. (2019 16:54)    Patient had an echo which revealed a new low EF 26%, G1DD, mild-mod MR and mild AS. Noted to have SVT and NSVT on tele. EP consulted for further evaluation.     REVIEW OF SYSTEMS  A ten-point review of systems was otherwise negative except as noted.      PAST MEDICAL & SURGICAL HISTORY:  GERD (gastroesophageal reflux disease)  Anxiety  CAD (coronary artery disease)  Diabetes  HTN (hypertension)  H/O colonoscopy: 10 yeras ago      Home Medications:  aspirin 81 mg oral tablet, chewable: 1 tab(s) orally once a day (2019 17:12)  atorvastatin 20 mg oral tablet: 1 tab(s) orally once a day (2019 17:12)  diazePAM 2 mg oral tablet: 1 tab(s) orally once a day (in the evening) (2019 17:12)  famotidine 40 mg oral tablet: 1 tab(s) orally once a day (at bedtime) (2019 17:12)  glimepiride 4 mg oral tablet: 1 tab(s) orally once a day (2019 17:12)  lisinopril 10 mg oral tablet: 1 tab(s) orally once a day (2019 17:12)  metFORMIN 1000 mg oral tablet: 1 tab(s) orally 2 times a day (2019 17:12)  metoprolol tartrate 50 mg oral tablet: 1 tab(s) orally 2 times a day (2019 17:12)      Allergies:    No Known Allergies      PREVIOUS DIAGNOSTIC TESTING:      ECHO  FINDINGS:  < from: Transthoracic Echocardiogram (19 @ 09:56) >  Summary:   1. LV Ejection Fraction by Cheng's Method with a biplane EF of 26 %.   2. Spectral Doppler shows impaired relaxation pattern of left   ventricular myocardial filling (Grade I diastolic dysfunction).   3. Mild to moderate mitral valve regurgitation.   4. Mitral annular calcification.   5. Thickening and calcification of the anterior and posterior mitral   valve leaflets.   6. Aortic valve thickening with decreased leaflet opening.   7. Estimated pulmonary artery systolic pressure is 41.5 mmHg assuming a   right atrial pressure of 5 mmHg, which is consistent with mild pulmonary   hypertension.   8. Peak transaortic gradient equals 16.2 mmHg, mean transaortic gradient   equals 9.9 mmHg, the calculated aortic valve area equals 1.64 cm²by the   continuity equation consistent with mild aortic stenosis.    PHYSICIAN INTERPRETATION:  Left Ventricle: The left ventricular internal cavity size is normal. Left   ventricular wall thickness is normal. Spectral Doppler shows impaired   relaxation pattern of left ventricular myocardial filling (Grade I   diastolic dysfunction).  Right Ventricle: Normal right ventricular size and function.  Left Atrium: Normal left atrial size.  Right Atrium: Normal right atrial size.  Pericardium: There is no evidence of pericardial effusion. There is a   small pleural effusion in both left and right lateral regions.  Mitral Valve: Thickening and calcification of the anterior and posterior   mitral valve leaflets. There is mitral annular calcification. No evidence   of mitral stenosis. Mild to moderate mitral valve regurgitation is seen.  Tricuspid Valve: The tricuspid valve is normal in structure. Mild   tricuspid regurgitation is visualized. Estimated pulmonary artery   systolic pressure is 41.5mmHg assuming a right atrial pressure of 5   mmHg, which is consistent with mild pulmonary hypertension.  Aortic Valve: The aortic valve is trileaflet. Aortic valve thickening   with decreased leaflet opening. Peak transaortic gradient equals 16.2   mmHg, mean transaortic gradient equals 9.9 mmHg, the calculated aortic   valve area equals 1.64 cm² by the continuity equation consistent with   mild aortic stenosis. Trivial aortic valve regurgitation is seen.  Pulmonic Valve: The pulmonic valve is normal. Trace pulmonic valve   regurgitation.  Aorta: The aortic root and ascending aorta are structurally normal, with   no evidence of dilitation.  Pulmonary Artery: The main pulmonary artery is normal in size.  Venous: The inferior vena cava was normal sized, with respiratory size   variation greater than 50%.    < end of copied text >    STRESS  FINDINGS:    CATHETERIZATION  FINDINGS:    ELECTROPHYSIOLOGY STUDY  FINDINGS:    CAROTID ULTRASOUND:  FINDINGS    VENOUS DUPLEX SCAN:  FINDINGS:    CHEST CT PULMONARY ANGIO with IV Contrast:  FINDINGS:    FAMILY HISTORY:  Father, , [place]  Brother- , [place]  Mother- malignant neoplasm or urinary bladder    SOCIAL HISTORY:  Quit smoking 40  years ago, Denies alcohol abuse or illicit drug use    MEDICATIONS  (STANDING):  aspirin  chewable 81 milliGRAM(s) Oral daily  atorvastatin 20 milliGRAM(s) Oral at bedtime  chlorhexidine 4% Liquid 1 Application(s) Topical <User Schedule>  dextrose 5%. 1000 milliLiter(s) (50 mL/Hr) IV Continuous <Continuous>  dextrose 50% Injectable 12.5 Gram(s) IV Push once  dextrose 50% Injectable 25 Gram(s) IV Push once  dextrose 50% Injectable 25 Gram(s) IV Push once  diazepam    Tablet 2 milliGRAM(s) Oral at bedtime  enoxaparin Injectable 40 milliGRAM(s) SubCutaneous daily  famotidine    Tablet 40 milliGRAM(s) Oral at bedtime  furosemide    Tablet 40 milliGRAM(s) Oral every 12 hours  insulin glargine Injectable (LANTUS) 10 Unit(s) SubCutaneous at bedtime  insulin lispro (HumaLOG) corrective regimen sliding scale   SubCutaneous three times a day before meals  insulin lispro Injectable (HumaLOG) 4 Unit(s) SubCutaneous three times a day before meals  lisinopril 10 milliGRAM(s) Oral daily  metoprolol tartrate 50 milliGRAM(s) Oral every 8 hours  tamsulosin 0.4 milliGRAM(s) Oral at bedtime    MEDICATIONS  (PRN):  dextrose 40% Gel 15 Gram(s) Oral once PRN Blood Glucose LESS THAN 70 milliGRAM(s)/deciliter  glucagon  Injectable 1 milliGRAM(s) IntraMuscular once PRN Glucose LESS THAN 70 milligrams/deciliter      Vital Signs Last 24 Hrs  T(C): 35.4 (2019 13:21), Max: 36.6 (2019 20:38)  T(F): 95.7 (2019 13:21), Max: 97.9 (2019 20:38)  HR: 126 (2019 13:21) (86 - 126)  BP: 113/58 (2019 13:21) (113/58 - 153/82)  BP(mean): --  RR: 18 (2019 13:21) (18 - 18)  SpO2: 95% (2019 20:38) (95% - 95%)    PHYSICAL EXAM:    GENERAL: In no apparent distress, well nourished, and hydrated.  HEAD:  Atraumatic, Normocephalic  EYES: EOMI, PERRLA, conjunctiva and sclera clear  NECK: Supple and normal thyroid.  No JVD or carotid bruit.  Carotid pulse is 2+ bilaterally.  HEART: Regular rate and rhythm; No murmurs, rubs, or gallops.  PULMONARY: Clear to auscultation and perfusion.  No rales, wheezing, or rhonchi bilaterally.  ABDOMEN: Soft, Nontender, Nondistended; Bowel sounds present  EXTREMITIES:  2+ Peripheral Pulses, No clubbing, cyanosis, or edema  NEUROLOGICAL: Grossly nonfocal      INTERPRETATION OF TELEMETRY:     ECG:  < from: 12 Lead ECG (19 @ 17:34) >  Ventricular Rate 142 BPM    Atrial Rate 142 BPM    P-R Interval 124 ms    QRS Duration 62 ms    Q-T Interval 280 ms    QTC Calculation(Bezet) 430 ms    P Axis 56 degrees    R Axis 9 degrees    T Axis -2 degrees    Diagnosis Line Sinus tachycardia  Minimal voltage criteria for LVH, may be normal variant  Nonspecific ST abnormality  Abnormal ECG    < end of copied text >    I&O's Detail    2019 07:01  -  2019 07:00  --------------------------------------------------------  IN:  Total IN: 0 mL    OUT:    Indwelling Catheter - Urethral: 2750 mL  Total OUT: 2750 mL    Total NET: -2750 mL      2019 07:01  -  2019 16:02  --------------------------------------------------------  IN:    Oral Fluid: 1200 mL  Total IN: 1200 mL    OUT:    Indwelling Catheter - Urethral: 1000 mL  Total OUT: 1000 mL    Total NET: 200 mL    LABS:                        8.8    2.60  )-----------( 85       ( 2019 05:32 )             28.0     07-03    143  |  100  |  20  ----------------------------<  202<H>  3.0<L>   |  32  |  1.1    Ca    8.1<L>      2019 05:32  Mg     1.8     07    TPro  6.5  /  Alb  2.8<L>  /  TBili  1.0  /  DBili  x   /  AST  16  /  ALT  22  /  AlkPhos  88  07    CARDIAC MARKERS ( 2019 21:31 )  x     / <0.01 ng/mL / x     / x     / x          PT/INR - ( 2019 05:32 )   PT: 14.20 sec;   INR: 1.24 ratio         PTT - ( 2019 05:32 )  PTT:30.7 sec    BNP 7990    I&O's Detail    2019 07:01  -  2019 07:00  --------------------------------------------------------  IN:  Total IN: 0 mL    OUT:    Indwelling Catheter - Urethral: 2750 mL  Total OUT: 2750 mL    Total NET: -2750 mL      2019 07:01  -  2019 16:02  --------------------------------------------------------  IN:    Oral Fluid: 1200 mL  Total IN: 1200 mL    OUT:    Indwelling Catheter - Urethral: 1000 mL  Total OUT: 1000 mL    Total NET: 200 mL    RADIOLOGY & ADDITIONAL STUDIES:  < from: Xray Chest 1 View-PORTABLE IMMEDIATE (19 @ 13:52) >  INTERPRETATION:  Clinical History / Reason for exam: Chest pain.    Comparison : Chest radiograph None.    Technique/Positioning: Frontal portable.    Findings:    Support devices: Telemetry leads overlie the thorax.    Cardiac/mediastinum/hilum: Heart is enlarged.    Lung parenchyma/Pleura: Perihilar opacities are seen.    Skeleton/soft tissues: Unchanged.    Impression:      Perihilar opacities consistent withCHF.    Follow-up as needed.    < end of copied text >

## 2019-07-03 NOTE — CONSULT NOTE ADULT - SUBJECTIVE AND OBJECTIVE BOX
HPI:  73M with PMHx of CAD s/p PCI, DMII, HTN, Anxiety, presents from assisted living facility for episode of lightheadedness and worsening SOB. Patient at lunch today had episode of lightheadedness and felt as though he was almost going to pass out. He also had increasing SOB for the past 1 week. He is unable to walk 1 flight of stairs however prior to this week was able to ambulate more. He otherwise denies any fevers, chills, headache, blurry vision, headache, chest pain, n/v/d/c, LE edema, abdominal pain, dysuria. He had last cardiac cath with PCI 2-3 years ago. No history of CHF or COPD.     In ED vitals: , 150/99, 100% on 2L, RR 18. Placed initially on BIPAP with improvement in symptoms. Received x 1 IV lasix 40mg, Magnesium and potassium repleted. (01 Jul 2019 16:54)      PAST MEDICAL & SURGICAL HISTORY:  GERD (gastroesophageal reflux disease)  Anxiety  CAD (coronary artery disease)  Diabetes  HTN (hypertension)  H/O colonoscopy: 10 yeras ago      Hospital Course:    TODAY'S SUBJECTIVE & REVIEW OF SYMPTOMS:     Constitutional WNL   Cardio WNL   Resp WNL   GI WNL  Heme WNL  Endo WNL  Skin WNL  MSK Weakness  Neuro WNL  Cognitive WNL  Psych WNL      MEDICATIONS  (STANDING):  aspirin  chewable 81 milliGRAM(s) Oral daily  atorvastatin 20 milliGRAM(s) Oral at bedtime  chlorhexidine 4% Liquid 1 Application(s) Topical <User Schedule>  dextrose 5%. 1000 milliLiter(s) (50 mL/Hr) IV Continuous <Continuous>  dextrose 50% Injectable 12.5 Gram(s) IV Push once  dextrose 50% Injectable 25 Gram(s) IV Push once  dextrose 50% Injectable 25 Gram(s) IV Push once  diazepam    Tablet 2 milliGRAM(s) Oral at bedtime  enoxaparin Injectable 40 milliGRAM(s) SubCutaneous daily  famotidine    Tablet 40 milliGRAM(s) Oral at bedtime  furosemide    Tablet 40 milliGRAM(s) Oral every 12 hours  insulin glargine Injectable (LANTUS) 10 Unit(s) SubCutaneous at bedtime  insulin lispro (HumaLOG) corrective regimen sliding scale   SubCutaneous three times a day before meals  insulin lispro Injectable (HumaLOG) 4 Unit(s) SubCutaneous three times a day before meals  lisinopril 10 milliGRAM(s) Oral daily  metoprolol tartrate 50 milliGRAM(s) Oral every 8 hours  tamsulosin 0.4 milliGRAM(s) Oral at bedtime    MEDICATIONS  (PRN):  dextrose 40% Gel 15 Gram(s) Oral once PRN Blood Glucose LESS THAN 70 milliGRAM(s)/deciliter  glucagon  Injectable 1 milliGRAM(s) IntraMuscular once PRN Glucose LESS THAN 70 milligrams/deciliter      FAMILY HISTORY:      Allergies    No Known Allergies    Intolerances        SOCIAL HISTORY:    [  ] Etoh  [  ] Smoking  [  ] Substance abuse     Home Environment:  [  ] Home Alone  [  ] Lives with Family  [  ] Home Health Aid    Dwelling:  [  ] Apartment  [  ] Private House  [  ] Adult Home  [x  ] Skilled Nursing Facility      [  ] Short Term  [  ] Long Term  [  ] Stairs       Elevator [  ]    FUNCTIONAL STATUS PTA: (Check all that apply)  Ambulation: [ x  ]Independent    [  ] Dependent     [  ] Non-Ambulatory  Assistive Device: [  ] SA Cane  [  ]  Q Cane  [  ] Walker  [  ]  Wheelchair  ADL : [ x ] Independent  [  ]  Dependent       Vital Signs Last 24 Hrs  T(C): 35.4 (03 Jul 2019 13:21), Max: 36.6 (02 Jul 2019 20:38)  T(F): 95.7 (03 Jul 2019 13:21), Max: 97.9 (02 Jul 2019 20:38)  HR: 126 (03 Jul 2019 13:21) (86 - 126)  BP: 113/58 (03 Jul 2019 13:21) (113/58 - 153/82)  BP(mean): --  RR: 18 (03 Jul 2019 13:21) (18 - 18)  SpO2: 95% (02 Jul 2019 20:38) (95% - 95%)      PHYSICAL EXAM: Alert / awake  GENERAL: NAD  HEAD:  Atraumatic, Normocephalic  CHEST/LUNG: Clear   HEART: S1S2+  ABDOMEN: Soft, Nontender  EXTREMITIES:  no calf tenderness    NERVOUS SYSTEM:  Cranial Nerves 2-12 intact [  ] Abnormal  [  ]  ROM: WFL all extremities [x  ]  Abnormal [  ]  Motor Strength: WFL all extremities  [  ]  Abnormal [ x ]4/5 all ext  Sensation: intact to light touch [ x ] Abnormal [  ]  Reflexes: Symmetric [  ]  Abnormal [  ]    FUNCTIONAL STATUS:  Bed Mobility: Independent [  ]  Supervision [  ]  Needs Assistance [x  ]  N/A [  ]  Transfers: Independent [  ]  Supervision [  ]  Needs Assistance [x  ]  N/A [  ]   Ambulation: Independent [  ]  Supervision [  ]  Needs Assistance [  ]  N/A [  ]  ADL: Independent [  ] Requires Assistance [  ] N/A [  ]      LABS:                        8.8    2.60  )-----------( 85       ( 03 Jul 2019 05:32 )             28.0     07-03    143  |  100  |  20  ----------------------------<  202<H>  3.0<L>   |  32  |  1.1    Ca    8.1<L>      03 Jul 2019 05:32  Mg     1.8     07-03    TPro  6.5  /  Alb  2.8<L>  /  TBili  1.0  /  DBili  x   /  AST  16  /  ALT  22  /  AlkPhos  88  07-03    PT/INR - ( 03 Jul 2019 05:32 )   PT: 14.20 sec;   INR: 1.24 ratio         PTT - ( 03 Jul 2019 05:32 )  PTT:30.7 sec      RADIOLOGY & ADDITIONAL STUDIES:    Assesment:

## 2019-07-03 NOTE — PROGRESS NOTE ADULT - SUBJECTIVE AND OBJECTIVE BOX
SHANE ASHTON  73y Male    CHIEF COMPLAINT:    Patient is a 73y old  Male who presents with a chief complaint of Lightheadedness, and SOB (02 Jul 2019 16:28)      INTERVAL HPI/OVERNIGHT EVENTS:    Patient seen and examined. Still having sob. On 2LNC. No cough. No fever. No cp    ROS: All other systems are negative.    Vital Signs:    T(F): 97.9 (07-02-19 @ 20:38), Max: 97.9 (07-02-19 @ 20:38)  HR: 86 (07-02-19 @ 20:38) (86 - 123)  BP: 153/82 (07-02-19 @ 20:38) (136/66 - 153/82)  RR: 18 (07-02-19 @ 20:38) (18 - 18)  SpO2: 95% (07-02-19 @ 20:38) (95% - 95%)  I&O's Summary    02 Jul 2019 07:01  -  03 Jul 2019 07:00  --------------------------------------------------------  IN: 0 mL / OUT: 2750 mL / NET: -2750 mL      Daily     Daily   CAPILLARY BLOOD GLUCOSE      POCT Blood Glucose.: 204 mg/dL (03 Jul 2019 11:18)  POCT Blood Glucose.: 184 mg/dL (03 Jul 2019 07:35)  POCT Blood Glucose.: 178 mg/dL (02 Jul 2019 21:41)  POCT Blood Glucose.: 226 mg/dL (02 Jul 2019 16:44)      PHYSICAL EXAM:    GENERAL:  NAD  SKIN: No rashes or lesions  HENT: Atraumatic Normocephalic. PERRL. Moist membranes.  NECK: Supple, No JVD. No lymphadenopathy.  PULMONARY: BS are decreased in the bases B/L. No wheezing. No rales  CVS: Normal S1, S2. Rate and Rhythm are regular. No murmurs.  ABDOMEN/GI: Soft, Nontender, Nondistended; BS present  EXTREMITIES: Peripheral pulses intact. No edema B/L LE.  NEUROLOGIC:  No motor or sensory deficit.  PSYCH: Alert & oriented x 3    Consultant(s) Notes Reviewed:  [x ] YES  [ ] NO  Care Discussed with Consultants/Other Providers [ x] YES  [ ] NO    EKG reviewed  Telemetry reviewed    LABS:                        8.8    2.60  )-----------( 85       ( 03 Jul 2019 05:32 )             28.0     07-03    143  |  100  |  20  ----------------------------<  202<H>  3.0<L>   |  32  |  1.1    Ca    8.1<L>      03 Jul 2019 05:32  Mg     1.8     07-03    TPro  6.5  /  Alb  2.8<L>  /  TBili  1.0  /  DBili  x   /  AST  16  /  ALT  22  /  AlkPhos  88  07-03    PT/INR - ( 03 Jul 2019 05:32 )   PT: 14.20 sec;   INR: 1.24 ratio         PTT - ( 03 Jul 2019 05:32 )  PTT:30.7 sec  Serum Pro-Brain Natriuretic Peptide: 7990 pg/mL (07-01-19 @ 13:10)    Trop <0.01, CKMB --, CK --, 07-01-19 @ 21:31  Trop <0.01, CKMB --, CK --, 07-01-19 @ 13:10        RADIOLOGY & ADDITIONAL TESTS:      Imaging or report Personally Reviewed:  [ ] YES  [ ] NO    Medications:  Standing  aspirin  chewable 81 milliGRAM(s) Oral daily  atorvastatin 20 milliGRAM(s) Oral at bedtime  chlorhexidine 4% Liquid 1 Application(s) Topical <User Schedule>  dextrose 5%. 1000 milliLiter(s) IV Continuous <Continuous>  dextrose 50% Injectable 12.5 Gram(s) IV Push once  dextrose 50% Injectable 25 Gram(s) IV Push once  dextrose 50% Injectable 25 Gram(s) IV Push once  diazepam    Tablet 2 milliGRAM(s) Oral at bedtime  enoxaparin Injectable 40 milliGRAM(s) SubCutaneous daily  famotidine    Tablet 40 milliGRAM(s) Oral at bedtime  furosemide    Tablet 40 milliGRAM(s) Oral every 12 hours  insulin glargine Injectable (LANTUS) 10 Unit(s) SubCutaneous at bedtime  insulin lispro (HumaLOG) corrective regimen sliding scale   SubCutaneous three times a day before meals  insulin lispro Injectable (HumaLOG) 4 Unit(s) SubCutaneous three times a day before meals  lisinopril 10 milliGRAM(s) Oral daily  metoprolol tartrate 50 milliGRAM(s) Oral every 8 hours  potassium chloride   Powder 40 milliEquivalent(s) Oral every 2 hours  tamsulosin 0.4 milliGRAM(s) Oral at bedtime    PRN Meds  dextrose 40% Gel 15 Gram(s) Oral once PRN  glucagon  Injectable 1 milliGRAM(s) IntraMuscular once PRN      Case discussed with resident    Care discussed with pt/family SHANE ASHTON  73y Male    CHIEF COMPLAINT:    Patient is a 73y old  Male who presents with a chief complaint of Lightheadedness, and SOB (02 Jul 2019 16:28)      INTERVAL HPI/OVERNIGHT EVENTS:    Patient seen and examined. Still having sob. On 2LNC. No cough. No fever. No cp. No palpitations. Having abnormal rhythm on tele.     ROS: All other systems are negative.    Vital Signs:    T(F): 97.9 (07-02-19 @ 20:38), Max: 97.9 (07-02-19 @ 20:38)  HR: 86 (07-02-19 @ 20:38) (86 - 123)  BP: 153/82 (07-02-19 @ 20:38) (136/66 - 153/82)  RR: 18 (07-02-19 @ 20:38) (18 - 18)  SpO2: 95% (07-02-19 @ 20:38) (95% - 95%)  I&O's Summary    02 Jul 2019 07:01  -  03 Jul 2019 07:00  --------------------------------------------------------  IN: 0 mL / OUT: 2750 mL / NET: -2750 mL      Daily     Daily   CAPILLARY BLOOD GLUCOSE      POCT Blood Glucose.: 204 mg/dL (03 Jul 2019 11:18)  POCT Blood Glucose.: 184 mg/dL (03 Jul 2019 07:35)  POCT Blood Glucose.: 178 mg/dL (02 Jul 2019 21:41)  POCT Blood Glucose.: 226 mg/dL (02 Jul 2019 16:44)      PHYSICAL EXAM:    GENERAL:  NAD  SKIN: No rashes or lesions  HENT: Atraumatic Normocephalic. PERRL. Moist membranes.  NECK: Supple, No JVD. No lymphadenopathy.  PULMONARY: BS are decreased in the bases B/L. No wheezing. No rales  CVS: Normal S1, S2. Rate and Rhythm are regular. No murmurs.  ABDOMEN/GI: Soft, Nontender, Nondistended; BS present  EXTREMITIES: Peripheral pulses intact. No edema B/L LE.  NEUROLOGIC:  No motor or sensory deficit.  PSYCH: Alert & oriented x 3    Consultant(s) Notes Reviewed:  [x ] YES  [ ] NO  Care Discussed with Consultants/Other Providers [ x] YES  [ ] NO    EKG reviewed  Telemetry reviewed    LABS:                        8.8    2.60  )-----------( 85       ( 03 Jul 2019 05:32 )             28.0     07-03    143  |  100  |  20  ----------------------------<  202<H>  3.0<L>   |  32  |  1.1    Ca    8.1<L>      03 Jul 2019 05:32  Mg     1.8     07-03    TPro  6.5  /  Alb  2.8<L>  /  TBili  1.0  /  DBili  x   /  AST  16  /  ALT  22  /  AlkPhos  88  07-03    PT/INR - ( 03 Jul 2019 05:32 )   PT: 14.20 sec;   INR: 1.24 ratio         PTT - ( 03 Jul 2019 05:32 )  PTT:30.7 sec  Serum Pro-Brain Natriuretic Peptide: 7990 pg/mL (07-01-19 @ 13:10)    Trop <0.01, CKMB --, CK --, 07-01-19 @ 21:31  Trop <0.01, CKMB --, CK --, 07-01-19 @ 13:10        RADIOLOGY & ADDITIONAL TESTS:      Imaging or report Personally Reviewed:  [ ] YES  [ ] NO    Medications:  Standing  aspirin  chewable 81 milliGRAM(s) Oral daily  atorvastatin 20 milliGRAM(s) Oral at bedtime  chlorhexidine 4% Liquid 1 Application(s) Topical <User Schedule>  dextrose 5%. 1000 milliLiter(s) IV Continuous <Continuous>  dextrose 50% Injectable 12.5 Gram(s) IV Push once  dextrose 50% Injectable 25 Gram(s) IV Push once  dextrose 50% Injectable 25 Gram(s) IV Push once  diazepam    Tablet 2 milliGRAM(s) Oral at bedtime  enoxaparin Injectable 40 milliGRAM(s) SubCutaneous daily  famotidine    Tablet 40 milliGRAM(s) Oral at bedtime  furosemide    Tablet 40 milliGRAM(s) Oral every 12 hours  insulin glargine Injectable (LANTUS) 10 Unit(s) SubCutaneous at bedtime  insulin lispro (HumaLOG) corrective regimen sliding scale   SubCutaneous three times a day before meals  insulin lispro Injectable (HumaLOG) 4 Unit(s) SubCutaneous three times a day before meals  lisinopril 10 milliGRAM(s) Oral daily  metoprolol tartrate 50 milliGRAM(s) Oral every 8 hours  potassium chloride   Powder 40 milliEquivalent(s) Oral every 2 hours  tamsulosin 0.4 milliGRAM(s) Oral at bedtime    PRN Meds  dextrose 40% Gel 15 Gram(s) Oral once PRN  glucagon  Injectable 1 milliGRAM(s) IntraMuscular once PRN      Case discussed with resident    Care discussed with pt/family

## 2019-07-03 NOTE — CONSULT NOTE ADULT - ASSESSMENT
IMPRESSION: Rehab of gait dysfunction      PRECAUTIONS: [  ] Cardiac  [  ] Respiratory  [  ] Seizures [  ] Contact Isolation  [  ] Droplet Isolation  [  ] Other    Weight Bearing Status:     RECOMMENDATION:    Out of Bed to Chair     DVT/Decubiti Prophylaxis    REHAB PLAN:     [ x  ] Bedside P/T 3-5 times a week   [   ]   Bedside O/T  2-3 times a week             [   ] No Rehab Therapy Indicated                   [   ]  Speech Therapy   Conditioning/ROM                                    ADL  Bed Mobility                                               Conditioning/ROM  Transfers                                                     Bed Mobility  Sitting /Standing Balance                         Transfers                                        Gait Training                                               Sitting/Standing Balance  Stair Training [   ]Applicable                    Home equipment Eval                                                                        Splinting  [   ] Only      GOALS:   ADL   [   ]   Independent                    Transfers  [ x  ] Independent                          Ambulation  [  x ] Independent     [  x  ] With device                            [   ]  CG                                                         [   ]  CG                                                                  [   ] CG                            [    ] Min A                                                   [   ] Min A                                                              [   ] Min  A          DISCHARGE PLAN:   [   ]  Good candidate for Intensive Rehabilitation/Hospital based                                             Will tolerate 3hrs Intensive Rehab Daily                                       [  x  ]  Short Term Rehab in Skilled Nursing Facility / assisted living fascility                                       [    ]  Home with Outpatient or  services                                         [    ]  Possible Candidate for Intensive Hospital based Rehab

## 2019-07-03 NOTE — PROGRESS NOTE ADULT - SUBJECTIVE AND OBJECTIVE BOX
Patient is a 73y old  Male who presents with a chief complaint of Lightheadedness, and SOB     Currently admitted to medicine with primary working diagnosis of new-onset CHF exacerbation     Interval events: none    Today is hosp day 2  Patient is resting comfortably in bed   SOB improved this AM, still with b/l base crackles on exam   Tolerating Diet with regular BM  Plan: f/up Echo results    PAST MEDICAL & SURGICAL HISTORY:  GERD (gastroesophageal reflux disease)  Anxiety  CAD (coronary artery disease)  Diabetes  HTN (hypertension)  H/O colonoscopy: 10 yeras ago      MEDICATIONS  (STANDING):  aspirin  chewable 81 milliGRAM(s) Oral daily  atorvastatin 20 milliGRAM(s) Oral at bedtime  chlorhexidine 4% Liquid 1 Application(s) Topical <User Schedule>  dextrose 5%. 1000 milliLiter(s) (50 mL/Hr) IV Continuous <Continuous>  dextrose 50% Injectable 12.5 Gram(s) IV Push once  dextrose 50% Injectable 25 Gram(s) IV Push once  dextrose 50% Injectable 25 Gram(s) IV Push once  diazepam    Tablet 2 milliGRAM(s) Oral at bedtime  enoxaparin Injectable 40 milliGRAM(s) SubCutaneous daily  famotidine    Tablet 40 milliGRAM(s) Oral at bedtime  furosemide    Tablet 40 milliGRAM(s) Oral every 12 hours  insulin glargine Injectable (LANTUS) 10 Unit(s) SubCutaneous at bedtime  insulin lispro (HumaLOG) corrective regimen sliding scale   SubCutaneous three times a day before meals  insulin lispro Injectable (HumaLOG) 4 Unit(s) SubCutaneous three times a day before meals  lisinopril 10 milliGRAM(s) Oral daily  metoprolol tartrate 50 milliGRAM(s) Oral every 8 hours  potassium chloride   Powder 40 milliEquivalent(s) Oral every 2 hours  tamsulosin 0.4 milliGRAM(s) Oral at bedtime    MEDICATIONS  (PRN):  dextrose 40% Gel 15 Gram(s) Oral once PRN Blood Glucose LESS THAN 70 milliGRAM(s)/deciliter  glucagon  Injectable 1 milliGRAM(s) IntraMuscular once PRN Glucose LESS THAN 70 milligrams/deciliter          Vital Signs Last 24 Hrs  T(C): 36.6 (02 Jul 2019 20:38), Max: 36.6 (02 Jul 2019 20:38)  T(F): 97.9 (02 Jul 2019 20:38), Max: 97.9 (02 Jul 2019 20:38)  HR: 86 (02 Jul 2019 20:38) (86 - 123)  BP: 153/82 (02 Jul 2019 20:38) (136/66 - 153/82)  BP(mean): --  RR: 18 (02 Jul 2019 20:38) (18 - 18)  SpO2: 95% (02 Jul 2019 20:38) (95% - 95%)  CAPILLARY BLOOD GLUCOSE      POCT Blood Glucose.: 204 mg/dL (03 Jul 2019 11:18)  POCT Blood Glucose.: 184 mg/dL (03 Jul 2019 07:35)  POCT Blood Glucose.: 178 mg/dL (02 Jul 2019 21:41)  POCT Blood Glucose.: 226 mg/dL (02 Jul 2019 16:44)  POCT Blood Glucose.: 177 mg/dL (02 Jul 2019 11:42)    I&O's Summary    02 Jul 2019 07:01  -  03 Jul 2019 07:00  --------------------------------------------------------  IN: 0 mL / OUT: 2750 mL / NET: -2750 mL        Physical Exam:    -        General : NAD, resting comfortably in bed     -      Cardiac: S1/S2 appreciated, Systolic murmur with radiation to Axilla     -      Pulm: Rhonchi B/L lung bases     -      GI: Soft, NT, ND, +BS     -      Musculoskeletal: Atraumatic, No LE edema, no skin color changes      -      Neuro: AO x 2, slurring speech ??baseline          Labs:                        8.8    2.60  )-----------( 85       ( 03 Jul 2019 05:32 )             28.0             07-03    143  |  100  |  20  ----------------------------<  202<H>  3.0<L>   |  32  |  1.1    Ca    8.1<L>      03 Jul 2019 05:32  Mg     1.8     07-03    TPro  6.5  /  Alb  2.8<L>  /  TBili  1.0  /  DBili  x   /  AST  16  /  ALT  22  /  AlkPhos  88  07-03    LIVER FUNCTIONS - ( 03 Jul 2019 05:32 )  Alb: 2.8 g/dL / Pro: 6.5 g/dL / ALK PHOS: 88 U/L / ALT: 22 U/L / AST: 16 U/L / GGT: x                 PT/INR - ( 03 Jul 2019 05:32 )   PT: 14.20 sec;   INR: 1.24 ratio         PTT - ( 03 Jul 2019 05:32 )  PTT:30.7 sec  CARDIAC MARKERS ( 01 Jul 2019 21:31 )  x     / <0.01 ng/mL / x     / x     / x      CARDIAC MARKERS ( 01 Jul 2019 13:10 )  x     / <0.01 ng/mL / x     / x     / x          ABG - ( 01 Jul 2019 17:43 )  pH, Arterial: 7.40  pH, Blood: x     /  pCO2: 39    /  pO2: 116   / HCO3: 25    / Base Excess: -0.1  /  SaO2: 98                      Imaging:    ECG:

## 2019-07-03 NOTE — PROGRESS NOTE ADULT - ASSESSMENT
73M with PMHx of CAD s/p PCI, DMII, HTN, Anxiety, presents from assisted living facility for episode of lightheadedness and worsening SOB admitted to telemetry for Acute CHF exacerbation.   Pt was found to have chronic pancytopenia ( at least 10 years history) with our lab work in 2017 showing similar CBC results.     1) Chronic pancytopenia   PT, PTT, B12, folate- WNL. LDH- 268(H), Ferritin: 452  f/u methylmalonic acid    Peripheral smear showed Anisocytosis, Poikilocytosis, low platelets  Us abdomen- No enlarged spleen/liver  Patient will need outpt BM biopsy and further evaluation 73M with PMHx of CAD s/p PCI, DMII, HTN, Anxiety, presents from assisted living facility for episode of lightheadedness and worsening SOB admitted to telemetry for Acute CHF exacerbation.   Pt was found to have chronic pancytopenia ( at least 10 years history) with our lab work in 2017 showing similar CBC results.     1) Chronic pancytopenia   PT, PTT, B12, folate- WNL. LDH- 268(H), Ferritin: 452  f/u methylmalonic acid    Peripheral smear showed Anisocytosis, Poikilocytosis, low platelets  Us abdomen- No enlarged spleen/liver  Patient will need outpt BM biopsy and further evaluation  f/u appt made for July 29, 2019 at 4pm

## 2019-07-03 NOTE — DISCHARGE NOTE NURSING/CASE MANAGEMENT/SOCIAL WORK - NSDCDPATPORTLINK_GEN_ALL_CORE
You can access the [facility] Portal, offered by [facility], by registering with the following website: [email or web address]

## 2019-07-03 NOTE — PROGRESS NOTE ADULT - SUBJECTIVE AND OBJECTIVE BOX
Patient is a 73y old  Male who presents with a chief complaint of Lightheadedness, and SOB (03 Jul 2019 11:44)      Subjective: SOb is improving. Pt on NC  No other new complaints       Vital Signs Last 24 Hrs  T(C): 36.6 (02 Jul 2019 20:38), Max: 36.6 (02 Jul 2019 20:38)  T(F): 97.9 (02 Jul 2019 20:38), Max: 97.9 (02 Jul 2019 20:38)  HR: 86 (02 Jul 2019 20:38) (86 - 123)  BP: 153/82 (02 Jul 2019 20:38) (136/66 - 153/82)  BP(mean): --  RR: 18 (02 Jul 2019 20:38) (18 - 18)  SpO2: 95% (02 Jul 2019 20:38) (95% - 95%)    PHYSICAL EXAM  General: adult in NAD  Neck: supple  CV: normal S1/S2   Lungs: Decreased BS b/l bases  Abdomen: soft non-tender  Ext: No  edema  Neuro: alert and oriented X 4, no focal deficits    MEDICATIONS  (STANDING):  aspirin  chewable 81 milliGRAM(s) Oral daily  atorvastatin 20 milliGRAM(s) Oral at bedtime  chlorhexidine 4% Liquid 1 Application(s) Topical <User Schedule>  dextrose 5%. 1000 milliLiter(s) (50 mL/Hr) IV Continuous <Continuous>  dextrose 50% Injectable 12.5 Gram(s) IV Push once  dextrose 50% Injectable 25 Gram(s) IV Push once  dextrose 50% Injectable 25 Gram(s) IV Push once  diazepam    Tablet 2 milliGRAM(s) Oral at bedtime  enoxaparin Injectable 40 milliGRAM(s) SubCutaneous daily  famotidine    Tablet 40 milliGRAM(s) Oral at bedtime  furosemide    Tablet 40 milliGRAM(s) Oral every 12 hours  insulin glargine Injectable (LANTUS) 10 Unit(s) SubCutaneous at bedtime  insulin lispro (HumaLOG) corrective regimen sliding scale   SubCutaneous three times a day before meals  insulin lispro Injectable (HumaLOG) 4 Unit(s) SubCutaneous three times a day before meals  lisinopril 10 milliGRAM(s) Oral daily  metoprolol tartrate 50 milliGRAM(s) Oral every 8 hours  potassium chloride   Powder 40 milliEquivalent(s) Oral every 2 hours  tamsulosin 0.4 milliGRAM(s) Oral at bedtime    MEDICATIONS  (PRN):  dextrose 40% Gel 15 Gram(s) Oral once PRN Blood Glucose LESS THAN 70 milliGRAM(s)/deciliter  glucagon  Injectable 1 milliGRAM(s) IntraMuscular once PRN Glucose LESS THAN 70 milligrams/deciliter      LABS:                          8.8    2.60  )-----------( 85       ( 03 Jul 2019 05:32 )             28.0         Mean Cell Volume : 95.6 fL  Mean Cell Hemoglobin : 30.0 pg  Mean Cell Hemoglobin Concentration : 31.4 g/dL  Auto Neutrophil # : 0.98 K/uL  Auto Lymphocyte # : 1.05 K/uL  Auto Monocyte # : 0.54 K/uL  Auto Eosinophil # : 0.01 K/uL  Auto Basophil # : 0.00 K/uL  Auto Neutrophil % : 37.6 %  Auto Lymphocyte % : 40.4 %  Auto Monocyte % : 20.8 %  Auto Eosinophil % : 0.4 %  Auto Basophil % : 0.0 %      Serial CBC's  07-03 @ 05:32  Hct-28.0 / Hgb-8.8 / Plat-85 / RBC-2.93 / WBC-2.60  Serial CBC's  07-02 @ 05:34  Hct-25.5 / Hgb-8.0 / Plat-69 / RBC-2.70 / WBC-2.73  Serial CBC's  07-01 @ 13:10  Hct-29.8 / Hgb-9.2 / Plat-87 / RBC-3.10 / WBC-2.65      07-03    143  |  100  |  20  ----------------------------<  202<H>  3.0<L>   |  32  |  1.1    Ca    8.1<L>      03 Jul 2019 05:32  Mg     1.8     07-03    TPro  6.5  /  Alb  2.8<L>  /  TBili  1.0  /  DBili  x   /  AST  16  /  ALT  22  /  AlkPhos  88  07-03      PT/INR - ( 03 Jul 2019 05:32 )   PT: 14.20 sec;   INR: 1.24 ratio         PTT - ( 03 Jul 2019 05:32 )  PTT:30.7 sec    Reticulocyte Percent: 4.4 % (07-02 @ 05:34)  Iron - Total Binding Capacity.: 243 ug/dL (07-01 @ 21:31)  Ferritin, Serum: 452 ng/mL (07-01 @ 21:31)  Vitamin B12, Serum: 1338 pg/mL (07-01 @ 21:31)  Folate, Serum: 12.6 ng/mL (07-01 @ 21:31)          RADIOLOGY & ADDITIONAL STUDIES:

## 2019-07-03 NOTE — PROGRESS NOTE ADULT - ASSESSMENT
73M with PMHx of CAD s/p PCI, DMII, HTN, Anxiety, presents from assisted living facility for episode of lightheadedness and worsening SOB.    Dyspnea likely secondary to  new onset CHF   - CXR:  with perihilar opacities consistent with CHF  - BNP 7990, 1st set Tn negative, Repeat 2nd set  - EKG with sinus tachycardia   -Cardiology Recs. appreciated and applied     *Metroprolol 50mg was increased to Q8  - C/w PO Lasix 40mg Q12, BIPAP and supplemental O2 prn   -TSH: wnl  F/up:  Lipids and Hgb A1C and ECHO       Lightheadedness: likely due to sinus tachycardia and dyspnea  - No events on tele for at least 24hrs   -Orthostatics and Echo: pending     Chronic Pancytopenia  -Heme/onc consult appreciated  -Work-up in progress:  may need Bone marrow Bx O/P  -B12 Slightly elevated, Folate: wnl   - F/UP Spleen and Liver US     CAD s/p PCI / HTN   - c/w asa, statin, bb, ACEI  - off plavix as per outpatient cardiology     DMII: Not at goal   - monitor finger sticks at before meals and at bedtime   -c/w  insulin regimen      Anxiety  - c/w valium home dose     Dispo: from assisted living   DVT PPx; Lovenox sc   GI PPX: not indicated

## 2019-07-03 NOTE — PROGRESS NOTE ADULT - ASSESSMENT
A73 years old presented to the ED from Assisted living for worsening sob for the last one week. Also C/O lightheadedness.     Hb: 8, MCV: 94.4  Na: 149, K: 3.1  M.7  Lactate: 5.7  BNP: 7990  CXR: CHF  EKG: Sinus tachycardia @ 114/min. NS ST, T changes. (Interpreted by me.)    O/E: Decreased BS in the bases B/L      1. Ac. CHF unspecified. (ECHO pending)  2. Pancytopenia  3. Anemia unspecified  4. H/O CAD S/P PCI  5. DM-2 / HTN  6. Anxiety  7. Hypernatremia  8. Hypokalemia / Hypomagnesemia         PLAN:    . Cont tele  . Change Lasix to 40 mg po q 12h  . ECHO  . Check i's and o's and daily wt.  . Cardiology eval  . Iron studies  . Hem/Onc eval for pancytopenia  . Monitor FS A73 years old presented to the ED from Assisted living for worsening sob for the last one week. Also C/O lightheadedness.     Hb: 8, MCV: 94.4  Na: 149, K: 3.1  M.7  Lactate: 5.7  BNP: 7990  CXR: CHF  EKG: Sinus tachycardia @ 114/min. NS ST, T changes. (Interpreted by me.)    O/E: Decreased BS in the bases B/L      1. Ac. CHF unspecified. (ECHO pending)  2. Pancytopenia  3. Anemia unspecified  4. H/O CAD S/P PCI  5. DM-2 / HTN  6. Anxiety  7. Hypernatremia  8. Hypokalemia / Hypomagnesemia  9. A. Fib vs SVT  10. NSVT         PLAN:    . Cont tele  . Abnormal rhythm on tele. Could be A. Fib vs SVT. D/W the cardiology. Recommended EP eval  . Change Lasix to 40 mg po q 12h  . Check ECHO  . Check i's and o's and daily wt.  . Iron studies  . Hem/Onc eval for pancytopenia noted. Will need bone marrow biopsy as an out pt.  . Monitor FS  . Replete K. Check Mg and K in AM  . Plan of care D/W the pt and his son on bedside.       #Progress Note Handoff    Pending (specify):  Consults___EP______, Tests________, Test Results_______, Other_________  Family discussion: with son  Disposition: Home___/SNF___/Other________/Unknown at this time________ A73 years old presented to the ED from Assisted living for worsening sob for the last one week. Also C/O lightheadedness.     Hb: 8, MCV: 94.4  Na: 149, K: 3.1  M.7  Lactate: 5.7  BNP: 7990  CXR: CHF  EKG: Sinus tachycardia @ 114/min. NS ST, T changes. (Interpreted by me.)    O/E: Decreased BS in the bases B/L      1. Ac. CHF unspecified. (ECHO pending)  2. Pancytopenia  3. Anemia unspecified  4. H/O CAD S/P PCI  5. DM-2 / HTN  6. Anxiety  7. Hypernatremia  8. Hypokalemia / Hypomagnesemia  9. A. Fib vs SVT  10. NSVT  12. Urinary retention         PLAN:    . Cont tele  . Abnormal rhythm on tele. Could be A. Fib vs SVT. D/W the cardiology. Recommended EP eval  . Change Lasix to 40 mg po q 12h  . Check ECHO  . Check i's and o's and daily wt.  . Iron studies  . Hem/Onc eval for pancytopenia noted. Will need bone marrow biopsy as an out pt.  . Monitor FS  . Replete K. Check Mg and K in AM  . Start Flomax 0.4 mg po daily. Voiding trial in 48-72 hrs.  . Plan of care D/W the pt and his son on bedside.       #Progress Note Handoff    Pending (specify):  Consults___EP______, Tests________, Test Results_______, Other_________  Family discussion: with son  Disposition: Home___/SNF___/Other________/Unknown at this time________

## 2019-07-03 NOTE — CONSULT NOTE ADULT - ASSESSMENT
This is a 73M with PMH of CAD s/p PCI, DM II, HTN, HLD, cataracts, Anxiety, presents from assisted living facility for episode of lightheadedness and worsening SOB, admitted with acute CHF exacerbation. New low EF 26%. Noted to have multiple runs SVT on tele and one episode of NSVT. TSH normal, troponin x 2 negative.    NSVT  SVT  HFrEF EF 26%, BNP 7990  Hypokalemia    ·	Will need ischemic w/u, please discuss with cardiology  ·	Will likely need a life vest prior to discharge and follow up with Dr. Potts in 3 months  ·	Check orthostatics  ·	Maintain k>4 and mag >2  ·	Repeat lytes in AM  ·	Cont tele monitoring  ·	Agree with increase in lopressor to 50 mg Q8 hrs    Will discuss plan with EP attending This is a 73M with PMH of CAD s/p PCI, DM II, HTN, HLD, cataracts, Anxiety, presents from assisted living facility for episode of lightheadedness and worsening SOB, admitted with acute CHF exacerbation. New low EF 26%. Noted to have multiple runs SVT on tele and one episode of NSVT. TSH normal, troponin x 2 negative.    NSVT  SVT  HFrEF EF 26%, BNP 7990  Hypokalemia    ·	Will need ischemic w/u, please discuss with cardiology  ·	Please obtain prior records from cardiologist : last TTE and stress  ·	Will likely need a life vest prior to discharge  ·	Check orthostatics  ·	Maintain k>4 and mag >2  ·	Repeat lytes in AM  ·	Cont tele monitoring  ·	Agree with increase in lopressor to 50 mg Q8 hrs    Will discuss plan with EP attending

## 2019-07-04 LAB
ALBUMIN SERPL ELPH-MCNC: 2.9 G/DL — LOW (ref 3.5–5.2)
ALP SERPL-CCNC: 78 U/L — SIGNIFICANT CHANGE UP (ref 30–115)
ALT FLD-CCNC: 19 U/L — SIGNIFICANT CHANGE UP (ref 0–41)
ANION GAP SERPL CALC-SCNC: 11 MMOL/L — SIGNIFICANT CHANGE UP (ref 7–14)
AST SERPL-CCNC: 14 U/L — SIGNIFICANT CHANGE UP (ref 0–41)
BASOPHILS # BLD AUTO: 0 K/UL — SIGNIFICANT CHANGE UP (ref 0–0.2)
BASOPHILS NFR BLD AUTO: 0 % — SIGNIFICANT CHANGE UP (ref 0–1)
BILIRUB SERPL-MCNC: 1.1 MG/DL — SIGNIFICANT CHANGE UP (ref 0.2–1.2)
BUN SERPL-MCNC: 26 MG/DL — HIGH (ref 10–20)
CALCIUM SERPL-MCNC: 8.4 MG/DL — LOW (ref 8.5–10.1)
CHLORIDE SERPL-SCNC: 104 MMOL/L — SIGNIFICANT CHANGE UP (ref 98–110)
CO2 SERPL-SCNC: 30 MMOL/L — SIGNIFICANT CHANGE UP (ref 17–32)
CREAT SERPL-MCNC: 1.2 MG/DL — SIGNIFICANT CHANGE UP (ref 0.7–1.5)
EOSINOPHIL # BLD AUTO: 0.04 K/UL — SIGNIFICANT CHANGE UP (ref 0–0.7)
EOSINOPHIL NFR BLD AUTO: 1.7 % — SIGNIFICANT CHANGE UP (ref 0–8)
ESTIMATED AVERAGE GLUCOSE: 146 MG/DL — HIGH (ref 68–114)
GLUCOSE BLDC GLUCOMTR-MCNC: 135 MG/DL — HIGH (ref 70–99)
GLUCOSE BLDC GLUCOMTR-MCNC: 203 MG/DL — HIGH (ref 70–99)
GLUCOSE BLDC GLUCOMTR-MCNC: 211 MG/DL — HIGH (ref 70–99)
GLUCOSE BLDC GLUCOMTR-MCNC: 216 MG/DL — HIGH (ref 70–99)
GLUCOSE SERPL-MCNC: 192 MG/DL — HIGH (ref 70–99)
HBA1C BLD-MCNC: 6.7 % — HIGH (ref 4–5.6)
HCT VFR BLD CALC: 28.3 % — LOW (ref 42–52)
HGB BLD-MCNC: 8.6 G/DL — LOW (ref 14–18)
IMM GRANULOCYTES NFR BLD AUTO: 0.4 % — HIGH (ref 0.1–0.3)
LYMPHOCYTES # BLD AUTO: 1.08 K/UL — LOW (ref 1.2–3.4)
LYMPHOCYTES # BLD AUTO: 47.2 % — SIGNIFICANT CHANGE UP (ref 20.5–51.1)
MAGNESIUM SERPL-MCNC: 1.7 MG/DL — LOW (ref 1.8–2.4)
MCHC RBC-ENTMCNC: 29.2 PG — SIGNIFICANT CHANGE UP (ref 27–31)
MCHC RBC-ENTMCNC: 30.4 G/DL — LOW (ref 32–37)
MCV RBC AUTO: 95.9 FL — HIGH (ref 80–94)
MONOCYTES # BLD AUTO: 0.48 K/UL — SIGNIFICANT CHANGE UP (ref 0.1–0.6)
MONOCYTES NFR BLD AUTO: 21 % — HIGH (ref 1.7–9.3)
NEUTROPHILS # BLD AUTO: 0.68 K/UL — LOW (ref 1.4–6.5)
NEUTROPHILS NFR BLD AUTO: 29.7 % — LOW (ref 42.2–75.2)
NRBC # BLD: 4 /100 WBCS — HIGH (ref 0–0)
PLATELET # BLD AUTO: 79 K/UL — LOW (ref 130–400)
POTASSIUM SERPL-MCNC: 3.7 MMOL/L — SIGNIFICANT CHANGE UP (ref 3.5–5)
POTASSIUM SERPL-SCNC: 3.7 MMOL/L — SIGNIFICANT CHANGE UP (ref 3.5–5)
PROT SERPL-MCNC: 6.4 G/DL — SIGNIFICANT CHANGE UP (ref 6–8)
RBC # BLD: 2.95 M/UL — LOW (ref 4.7–6.1)
RBC # FLD: 18.7 % — HIGH (ref 11.5–14.5)
SODIUM SERPL-SCNC: 145 MMOL/L — SIGNIFICANT CHANGE UP (ref 135–146)
WBC # BLD: 2.29 K/UL — LOW (ref 4.8–10.8)
WBC # FLD AUTO: 2.29 K/UL — LOW (ref 4.8–10.8)

## 2019-07-04 PROCEDURE — 99233 SBSQ HOSP IP/OBS HIGH 50: CPT

## 2019-07-04 RX ORDER — MAGNESIUM SULFATE 500 MG/ML
2 VIAL (ML) INJECTION ONCE
Refills: 0 | Status: COMPLETED | OUTPATIENT
Start: 2019-07-04 | End: 2019-07-04

## 2019-07-04 RX ADMIN — Medication 50 MILLIGRAM(S): at 06:08

## 2019-07-04 RX ADMIN — Medication 2: at 08:04

## 2019-07-04 RX ADMIN — INSULIN GLARGINE 10 UNIT(S): 100 INJECTION, SOLUTION SUBCUTANEOUS at 22:28

## 2019-07-04 RX ADMIN — Medication 25 GRAM(S): at 18:38

## 2019-07-04 RX ADMIN — LISINOPRIL 10 MILLIGRAM(S): 2.5 TABLET ORAL at 06:08

## 2019-07-04 RX ADMIN — CHLORHEXIDINE GLUCONATE 1 APPLICATION(S): 213 SOLUTION TOPICAL at 06:08

## 2019-07-04 RX ADMIN — TAMSULOSIN HYDROCHLORIDE 0.4 MILLIGRAM(S): 0.4 CAPSULE ORAL at 22:28

## 2019-07-04 RX ADMIN — Medication 50 MILLIGRAM(S): at 22:28

## 2019-07-04 RX ADMIN — Medication 4 UNIT(S): at 08:04

## 2019-07-04 RX ADMIN — ENOXAPARIN SODIUM 40 MILLIGRAM(S): 100 INJECTION SUBCUTANEOUS at 11:45

## 2019-07-04 RX ADMIN — ATORVASTATIN CALCIUM 20 MILLIGRAM(S): 80 TABLET, FILM COATED ORAL at 22:27

## 2019-07-04 RX ADMIN — Medication 40 MILLIGRAM(S): at 17:22

## 2019-07-04 RX ADMIN — Medication 4 UNIT(S): at 11:44

## 2019-07-04 RX ADMIN — FAMOTIDINE 40 MILLIGRAM(S): 10 INJECTION INTRAVENOUS at 22:28

## 2019-07-04 RX ADMIN — Medication 40 MILLIGRAM(S): at 06:08

## 2019-07-04 RX ADMIN — Medication 4 UNIT(S): at 17:20

## 2019-07-04 RX ADMIN — Medication 50 MILLIGRAM(S): at 14:11

## 2019-07-04 RX ADMIN — Medication 81 MILLIGRAM(S): at 11:48

## 2019-07-04 RX ADMIN — Medication 2 MILLIGRAM(S): at 22:27

## 2019-07-04 RX ADMIN — Medication 2: at 11:45

## 2019-07-04 NOTE — PROGRESS NOTE ADULT - SUBJECTIVE AND OBJECTIVE BOX
CHIEF COMPLAINT:    Patient is a 73y old  Male who presents with a chief complaint of Lightheadedness, and SOB     INTERVAL HPI/OVERNIGHT EVENTS:    Patient seen and examined at bedside. No acute overnight events occurred.    ROS: Denies palpitations. All other systems are negative.    Vital Signs:    T(F): 97.4 (19 @ 14:37), Max: 98 (19 @ 05:25)  HR: 110 (19 @ 05:25) (105 - 110)  BP: 142/86 (19 @ 05:25) (109/57 - 142/86)  RR: 18 (19 @ 14:37) (18 - 19)  SpO2: 93% (19 @ 14:54) (93% - 94%)  I&O's Summary    2019 07:  -  2019 07:00  --------------------------------------------------------  IN: 1200 mL / OUT: 1550 mL / NET: -350 mL    2019 07:  -  2019 17:03  --------------------------------------------------------  IN: 1070 mL / OUT: 500 mL / NET: 570 mL      Daily     Daily Weight in k.8 (2019 05:25)  CAPILLARY BLOOD GLUCOSE      POCT Blood Glucose.: 135 mg/dL (2019 16:49)  POCT Blood Glucose.: 211 mg/dL (2019 11:33)  POCT Blood Glucose.: 203 mg/dL (2019 07:50)  POCT Blood Glucose.: 222 mg/dL (2019 21:36)      PHYSICAL EXAM:  GENERAL:  NAD  SKIN: No rashes or lesions  HEENT: Atraumatic. Normocephalic. Anicteric  NECK:  No JVD.   PULMONARY: Clear to ausculation bilaterally. No wheezing. No rales  CVS: Normal S1, S2. Regular rate and rhythm. No murmurs.  ABDOMEN/GI: Soft, Nontender, Nondistended; Bowel sounds are present  EXTREMITIES:  No edema B/L LE.  NEUROLOGIC:  No motor deficit.  PSYCH: Alert & oriented x 3, normal affect      LABS:                        8.6    2.29  )-----------( 79       ( 2019 05:21 )             28.3     07-    145  |  104  |  26<H>  ----------------------------<  192<H>  3.7   |  30  |  1.2    Ca    8.4<L>      2019 05:21  Mg     1.7         TPro  6.4  /  Alb  2.9<L>  /  TBili  1.1  /  DBili  x   /  AST  14  /  ALT  19  /  AlkPhos  78      PT/INR - ( 2019 05:32 )   PT: 14.20 sec;   INR: 1.24 ratio         PTT - ( 2019 05:32 )  PTT:30.7 sec  Serum Pro-Brain Natriuretic Peptide: 7990 pg/mL (19 @ 13:10)    Trop <0.01, CKMB --, CK --, 19 @ 21:31        RADIOLOGY & ADDITIONAL TESTS:  Imaging or report Personally Reviewed:  [ ] YES  [ ] NO    EKG reviewed independently    Medications:  Standing  aspirin  chewable 81 milliGRAM(s) Oral daily  atorvastatin 20 milliGRAM(s) Oral at bedtime  chlorhexidine 4% Liquid 1 Application(s) Topical <User Schedule>  dextrose 5%. 1000 milliLiter(s) IV Continuous <Continuous>  dextrose 50% Injectable 12.5 Gram(s) IV Push once  dextrose 50% Injectable 25 Gram(s) IV Push once  dextrose 50% Injectable 25 Gram(s) IV Push once  diazepam    Tablet 2 milliGRAM(s) Oral at bedtime  enoxaparin Injectable 40 milliGRAM(s) SubCutaneous daily  famotidine    Tablet 40 milliGRAM(s) Oral at bedtime  furosemide    Tablet 40 milliGRAM(s) Oral every 12 hours  insulin glargine Injectable (LANTUS) 10 Unit(s) SubCutaneous at bedtime  insulin lispro (HumaLOG) corrective regimen sliding scale   SubCutaneous three times a day before meals  insulin lispro Injectable (HumaLOG) 4 Unit(s) SubCutaneous three times a day before meals  lisinopril 10 milliGRAM(s) Oral daily  metoprolol tartrate 50 milliGRAM(s) Oral every 8 hours  tamsulosin 0.4 milliGRAM(s) Oral at bedtime    PRN Meds  dextrose 40% Gel 15 Gram(s) Oral once PRN  glucagon  Injectable 1 milliGRAM(s) IntraMuscular once PRN CHIEF COMPLAINT:    Patient is a 73y old  Male who presents with a chief complaint of Lightheadedness, and SOB     INTERVAL HPI/OVERNIGHT EVENTS:    Patient seen and examined at bedside. No acute overnight events occurred.    ROS: Denies palpitations. All other systems are negative.    Vital Signs:    T(F): 97.4 (19 @ 14:37), Max: 98 (19 @ 05:25)  HR: 110 (19 @ 05:25) (105 - 110)  BP: 142/86 (19 @ 05:25) (109/57 - 142/86)  RR: 18 (19 @ 14:37) (18 - 19)  SpO2: 93% (19 @ 14:54) (93% - 94%)  I&O's Summary    2019 07:  -  2019 07:00  --------------------------------------------------------  IN: 1200 mL / OUT: 1550 mL / NET: -350 mL    2019 07:  -  2019 17:03  --------------------------------------------------------  IN: 1070 mL / OUT: 500 mL / NET: 570 mL      Daily     Daily Weight in k.8 (2019 05:25)  CAPILLARY BLOOD GLUCOSE      POCT Blood Glucose.: 135 mg/dL (2019 16:49)  POCT Blood Glucose.: 211 mg/dL (2019 11:33)  POCT Blood Glucose.: 203 mg/dL (2019 07:50)  POCT Blood Glucose.: 222 mg/dL (2019 21:36)      PHYSICAL EXAM:  GENERAL:  NAD  SKIN: No rashes or lesions  HEENT: Atraumatic. Normocephalic. Anicteric  NECK:  No JVD.   PULMONARY: Clear to ausculation bilaterally. No wheezing. No rales  CVS: Normal S1, S2. Regular rate and rhythm. No murmurs.  ABDOMEN/GI: Soft, Nontender, Nondistended; Bowel sounds are present  EXTREMITIES:  No edema B/L LE.  NEUROLOGIC:  No motor deficit.  PSYCH: Alert & oriented x 3, normal affect      LABS:                        8.6    2.29  )-----------( 79       ( 2019 05:21 )             28.3     07-    145  |  104  |  26<H>  ----------------------------<  192<H>  3.7   |  30  |  1.2    Ca    8.4<L>      2019 05:21  Mg     1.7         TPro  6.4  /  Alb  2.9<L>  /  TBili  1.1  /  DBili  x   /  AST  14  /  ALT  19  /  AlkPhos  78      PT/INR - ( 2019 05:32 )   PT: 14.20 sec;   INR: 1.24 ratio         PTT - ( 2019 05:32 )  PTT:30.7 sec  Serum Pro-Brain Natriuretic Peptide: 7990 pg/mL (19 @ 13:10)    Trop <0.01, CKMB --, CK --, 19 @ 21:31        RADIOLOGY & ADDITIONAL TESTS:  No new images    Medications:  Standing  aspirin  chewable 81 milliGRAM(s) Oral daily  atorvastatin 20 milliGRAM(s) Oral at bedtime  chlorhexidine 4% Liquid 1 Application(s) Topical <User Schedule>  dextrose 5%. 1000 milliLiter(s) IV Continuous <Continuous>  dextrose 50% Injectable 12.5 Gram(s) IV Push once  dextrose 50% Injectable 25 Gram(s) IV Push once  dextrose 50% Injectable 25 Gram(s) IV Push once  diazepam    Tablet 2 milliGRAM(s) Oral at bedtime  enoxaparin Injectable 40 milliGRAM(s) SubCutaneous daily  famotidine    Tablet 40 milliGRAM(s) Oral at bedtime  furosemide    Tablet 40 milliGRAM(s) Oral every 12 hours  insulin glargine Injectable (LANTUS) 10 Unit(s) SubCutaneous at bedtime  insulin lispro (HumaLOG) corrective regimen sliding scale   SubCutaneous three times a day before meals  insulin lispro Injectable (HumaLOG) 4 Unit(s) SubCutaneous three times a day before meals  lisinopril 10 milliGRAM(s) Oral daily  metoprolol tartrate 50 milliGRAM(s) Oral every 8 hours  tamsulosin 0.4 milliGRAM(s) Oral at bedtime    PRN Meds  dextrose 40% Gel 15 Gram(s) Oral once PRN  glucagon  Injectable 1 milliGRAM(s) IntraMuscular once PRN

## 2019-07-04 NOTE — PROGRESS NOTE ADULT - ASSESSMENT
74 yo M presented to the ED from Assisted living for worsening sob x 1 week associated with lightheadedness. Pt found to have new onset CHF    Hb: 8, MCV: 94.4  Na: 149, K: 3.1  M.7  Lactate: 5.7  BNP: 7990  CXR: CHF  EKG: Sinus tachycardia @ 114/min. NS ST, T changes. (Interpreted by me.)    O/E: Decreased BS in the bases B/L      1. Ac. CHF unspecified. (ECHO pending)  2. Pancytopenia  3. Anemia unspecified  4. H/O CAD S/P PCI  5. DM-2 / HTN  6. Anxiety  7. Hypernatremia  8. Hypokalemia / Hypomagnesemia  9. A. Fib vs SVT  10. NSVT  12. Urinary retention         PLAN:    . Cont tele  . Abnormal rhythm on tele. Could be A. Fib vs SVT. D/W the cardiology. Recommended EP eval  . Change Lasix to 40 mg po q 12h  . Check ECHO  . Check i's and o's and daily wt.  . Iron studies  . Hem/Onc eval for pancytopenia noted. Will need bone marrow biopsy as an out pt.  . Monitor FS  . Replete K. Check Mg and K in AM  . Start Flomax 0.4 mg po daily. Voiding trial in 48-72 hrs.  . Plan of care D/W the pt and his son on bedside.       #Progress Note Handoff    Pending (specify):  Consults___EP______, Tests________, Test Results_______, Other_________  Family discussion: with son  Disposition: Home___/SNF___/Other________/Unknown at this time________ 74 yo M presented to the ED from Assisted living for worsening sob x 1 week associated with lightheadedness. Pt found to have new onset CHF complicated by sinus tachycardia and pancytopenia.    Acute HFrEF  -EF 26% G1DD, mild-mod MR and mild AS.  -cardiology follow up for ischemic workup  -EP consult appreciated, will need life vest on discharge  -unable to obtain old cardiology records today, try again in AM  -c/w metoprolol, lisinopril, lasix    Pancytopenia  -hematology consult pending  -outpt BM biopsy    CAD S/P PCI  -c/w aspirin, lipitor, metoprolol    DM II  -c/w insulin    HTN  -controlled  -c/w lisinoptril, metoprlol    BPH  -c/w tamsulosin    Anxiety  -c/x xanax    Hypernatremia  -resolved    Hypokalemia   -resolved    Hypomagnesemia  -replete today, follow up am levels     A. Fib vs SVT  -Tachycardiac to max 110  -c/w metoprolol        Pending: cardio follow up, lifevest  Family discussion: with son  Disposition: Home___/SNF___/Other________/Unknown at this time____x____

## 2019-07-05 LAB
ANION GAP SERPL CALC-SCNC: 10 MMOL/L — SIGNIFICANT CHANGE UP (ref 7–14)
BUN SERPL-MCNC: 29 MG/DL — HIGH (ref 10–20)
CALCIUM SERPL-MCNC: 8.5 MG/DL — SIGNIFICANT CHANGE UP (ref 8.5–10.1)
CHLORIDE SERPL-SCNC: 103 MMOL/L — SIGNIFICANT CHANGE UP (ref 98–110)
CO2 SERPL-SCNC: 32 MMOL/L — SIGNIFICANT CHANGE UP (ref 17–32)
CREAT SERPL-MCNC: 1.1 MG/DL — SIGNIFICANT CHANGE UP (ref 0.7–1.5)
GLUCOSE BLDC GLUCOMTR-MCNC: 127 MG/DL — HIGH (ref 70–99)
GLUCOSE BLDC GLUCOMTR-MCNC: 159 MG/DL — HIGH (ref 70–99)
GLUCOSE BLDC GLUCOMTR-MCNC: 202 MG/DL — HIGH (ref 70–99)
GLUCOSE BLDC GLUCOMTR-MCNC: 227 MG/DL — HIGH (ref 70–99)
GLUCOSE SERPL-MCNC: 176 MG/DL — HIGH (ref 70–99)
MAGNESIUM SERPL-MCNC: 2.1 MG/DL — SIGNIFICANT CHANGE UP (ref 1.8–2.4)
POTASSIUM SERPL-MCNC: 3.5 MMOL/L — SIGNIFICANT CHANGE UP (ref 3.5–5)
POTASSIUM SERPL-SCNC: 3.5 MMOL/L — SIGNIFICANT CHANGE UP (ref 3.5–5)
SODIUM SERPL-SCNC: 145 MMOL/L — SIGNIFICANT CHANGE UP (ref 135–146)

## 2019-07-05 PROCEDURE — 99233 SBSQ HOSP IP/OBS HIGH 50: CPT

## 2019-07-05 PROCEDURE — 93010 ELECTROCARDIOGRAM REPORT: CPT

## 2019-07-05 PROCEDURE — 71045 X-RAY EXAM CHEST 1 VIEW: CPT | Mod: 26

## 2019-07-05 RX ORDER — ADENOSINE 3 MG/ML
60 INJECTION INTRAVENOUS ONCE
Refills: 0 | Status: COMPLETED | OUTPATIENT
Start: 2019-07-05 | End: 2019-07-06

## 2019-07-05 RX ORDER — FUROSEMIDE 40 MG
40 TABLET ORAL DAILY
Refills: 0 | Status: DISCONTINUED | OUTPATIENT
Start: 2019-07-06 | End: 2019-07-10

## 2019-07-05 RX ORDER — INSULIN GLARGINE 100 [IU]/ML
12 INJECTION, SOLUTION SUBCUTANEOUS AT BEDTIME
Refills: 0 | Status: DISCONTINUED | OUTPATIENT
Start: 2019-07-05 | End: 2019-07-10

## 2019-07-05 RX ORDER — POTASSIUM CHLORIDE 20 MEQ
40 PACKET (EA) ORAL ONCE
Refills: 0 | Status: COMPLETED | OUTPATIENT
Start: 2019-07-05 | End: 2019-07-05

## 2019-07-05 RX ORDER — PREGABALIN 225 MG/1
1000 CAPSULE ORAL ONCE
Refills: 0 | Status: DISCONTINUED | OUTPATIENT
Start: 2019-07-06 | End: 2019-07-06

## 2019-07-05 RX ADMIN — LISINOPRIL 10 MILLIGRAM(S): 2.5 TABLET ORAL at 06:44

## 2019-07-05 RX ADMIN — Medication 40 MILLIGRAM(S): at 06:44

## 2019-07-05 RX ADMIN — Medication 4 UNIT(S): at 07:35

## 2019-07-05 RX ADMIN — Medication 50 MILLIGRAM(S): at 13:00

## 2019-07-05 RX ADMIN — Medication 4 UNIT(S): at 17:00

## 2019-07-05 RX ADMIN — Medication 2: at 17:00

## 2019-07-05 RX ADMIN — Medication 50 MILLIGRAM(S): at 06:44

## 2019-07-05 RX ADMIN — Medication 2: at 07:34

## 2019-07-05 RX ADMIN — ENOXAPARIN SODIUM 40 MILLIGRAM(S): 100 INJECTION SUBCUTANEOUS at 11:12

## 2019-07-05 RX ADMIN — Medication 4 UNIT(S): at 11:13

## 2019-07-05 RX ADMIN — Medication 40 MILLIEQUIVALENT(S): at 09:52

## 2019-07-05 RX ADMIN — ATORVASTATIN CALCIUM 20 MILLIGRAM(S): 80 TABLET, FILM COATED ORAL at 22:17

## 2019-07-05 RX ADMIN — FAMOTIDINE 40 MILLIGRAM(S): 10 INJECTION INTRAVENOUS at 22:17

## 2019-07-05 RX ADMIN — Medication 50 MILLIGRAM(S): at 22:18

## 2019-07-05 RX ADMIN — Medication 1: at 11:13

## 2019-07-05 RX ADMIN — Medication 2 MILLIGRAM(S): at 22:19

## 2019-07-05 RX ADMIN — TAMSULOSIN HYDROCHLORIDE 0.4 MILLIGRAM(S): 0.4 CAPSULE ORAL at 22:17

## 2019-07-05 RX ADMIN — INSULIN GLARGINE 12 UNIT(S): 100 INJECTION, SOLUTION SUBCUTANEOUS at 22:16

## 2019-07-05 RX ADMIN — Medication 81 MILLIGRAM(S): at 11:12

## 2019-07-05 RX ADMIN — CHLORHEXIDINE GLUCONATE 1 APPLICATION(S): 213 SOLUTION TOPICAL at 06:44

## 2019-07-05 NOTE — SWALLOW BEDSIDE ASSESSMENT ADULT - ASR SWALLOW ASPIRATION MONITOR
upper respiratory infection/pneumonia/change of breathing pattern/position upright (90Y)/oral hygiene

## 2019-07-05 NOTE — PROGRESS NOTE ADULT - SUBJECTIVE AND OBJECTIVE BOX
Patient is a 73y old  Male who presents with a chief complaint of Lightheadedness, and SOB )    Currently admitted to medicine with primary working diagnosis of new-onset CHF exacerbation     Interval events: none    Today is hosp day 4  Patient is resting comfortably in bed   SOB improved, now on RA: 94%   Tolerating Diet with regular BM  Plan: f/up with Cardiology for possible Ischemic work-up     PAST MEDICAL & SURGICAL HISTORY:  GERD (gastroesophageal reflux disease)  Anxiety  CAD (coronary artery disease)  Diabetes  HTN (hypertension)  H/O colonoscopy: 10 yeras ago      MEDICATIONS  (STANDING):  aspirin  chewable 81 milliGRAM(s) Oral daily  atorvastatin 20 milliGRAM(s) Oral at bedtime  chlorhexidine 4% Liquid 1 Application(s) Topical <User Schedule>  dextrose 5%. 1000 milliLiter(s) (50 mL/Hr) IV Continuous <Continuous>  dextrose 50% Injectable 12.5 Gram(s) IV Push once  dextrose 50% Injectable 25 Gram(s) IV Push once  dextrose 50% Injectable 25 Gram(s) IV Push once  diazepam    Tablet 2 milliGRAM(s) Oral at bedtime  enoxaparin Injectable 40 milliGRAM(s) SubCutaneous daily  famotidine    Tablet 40 milliGRAM(s) Oral at bedtime  furosemide    Tablet 40 milliGRAM(s) Oral every 12 hours  insulin glargine Injectable (LANTUS) 12 Unit(s) SubCutaneous at bedtime  insulin lispro (HumaLOG) corrective regimen sliding scale   SubCutaneous three times a day before meals  insulin lispro Injectable (HumaLOG) 4 Unit(s) SubCutaneous three times a day before meals  lisinopril 10 milliGRAM(s) Oral daily  metoprolol tartrate 50 milliGRAM(s) Oral every 8 hours  potassium chloride    Tablet ER 40 milliEquivalent(s) Oral once  tamsulosin 0.4 milliGRAM(s) Oral at bedtime    MEDICATIONS  (PRN):  dextrose 40% Gel 15 Gram(s) Oral once PRN Blood Glucose LESS THAN 70 milliGRAM(s)/deciliter  glucagon  Injectable 1 milliGRAM(s) IntraMuscular once PRN Glucose LESS THAN 70 milligrams/deciliter          Vital Signs Last 24 Hrs  T(C): 36.6 (05 Jul 2019 05:16), Max: 36.6 (05 Jul 2019 05:16)  T(F): 97.9 (05 Jul 2019 05:16), Max: 97.9 (05 Jul 2019 05:16)  HR: 100 (05 Jul 2019 05:16) (97 - 107)  BP: 113/62 (05 Jul 2019 05:16) (85/56 - 117/59)  BP(mean): --  RR: 18 (05 Jul 2019 05:16) (18 - 18)  SpO2: 95% (05 Jul 2019 07:53) (93% - 95%)  CAPILLARY BLOOD GLUCOSE      POCT Blood Glucose.: 202 mg/dL (05 Jul 2019 07:30)  POCT Blood Glucose.: 216 mg/dL (04 Jul 2019 21:42)  POCT Blood Glucose.: 135 mg/dL (04 Jul 2019 16:49)  POCT Blood Glucose.: 211 mg/dL (04 Jul 2019 11:33)    I&O's Summary    04 Jul 2019 07:01  -  05 Jul 2019 07:00  --------------------------------------------------------  IN: 1360 mL / OUT: 1400 mL / NET: -40 mL        Physical Exam:    --        General : NAD, resting comfortably in bed     -      Cardiac: S1/S2 appreciated, Systolic murmur with radiation to Axilla     -      Pulm: Rhonchi B/L lung bases     -      GI: Soft, NT, ND, +BS     -      Musculoskeletal: Atraumatic, No LE edema, no skin color changes      -      Neuro: AO x 2, slurring speech ??baseline          Labs:                        8.6    2.29  )-----------( 79       ( 04 Jul 2019 05:21 )             28.3             07-05    145  |  103  |  29<H>  ----------------------------<  176<H>  3.5   |  32  |  1.1    Ca    8.5      05 Jul 2019 05:28  Mg     2.1     07-05    TPro  6.4  /  Alb  2.9<L>  /  TBili  1.1  /  DBili  x   /  AST  14  /  ALT  19  /  AlkPhos  78  07-04    LIVER FUNCTIONS - ( 04 Jul 2019 05:21 )  Alb: 2.9 g/dL / Pro: 6.4 g/dL / ALK PHOS: 78 U/L / ALT: 19 U/L / AST: 14 U/L / GGT: x                               Imaging:    ECG: Patient is a 73y old  Male who presents with a chief complaint of Lightheadedness, and SOB )    Currently admitted to medicine with primary working diagnosis of new-onset CHF exacerbation     Interval events: none    Today is hosp day 4  Patient is resting comfortably in bed   SOB improved, now on RA: 94%   Tolerating Diet with regular BM  Plan: f/up with Cardiology for possible Ischemic work-up   no cp, sob, abd pain, fever  no cp, palpitations, orthopnea, pnd    PAST MEDICAL & SURGICAL HISTORY:  GERD (gastroesophageal reflux disease)  Anxiety  CAD (coronary artery disease)  Diabetes  HTN (hypertension)  H/O colonoscopy: 10 yeras ago      MEDICATIONS  (STANDING):  aspirin  chewable 81 milliGRAM(s) Oral daily  atorvastatin 20 milliGRAM(s) Oral at bedtime  chlorhexidine 4% Liquid 1 Application(s) Topical <User Schedule>  dextrose 5%. 1000 milliLiter(s) (50 mL/Hr) IV Continuous <Continuous>  dextrose 50% Injectable 12.5 Gram(s) IV Push once  dextrose 50% Injectable 25 Gram(s) IV Push once  dextrose 50% Injectable 25 Gram(s) IV Push once  diazepam    Tablet 2 milliGRAM(s) Oral at bedtime  enoxaparin Injectable 40 milliGRAM(s) SubCutaneous daily  famotidine    Tablet 40 milliGRAM(s) Oral at bedtime  furosemide    Tablet 40 milliGRAM(s) Oral every 12 hours  insulin glargine Injectable (LANTUS) 12 Unit(s) SubCutaneous at bedtime  insulin lispro (HumaLOG) corrective regimen sliding scale   SubCutaneous three times a day before meals  insulin lispro Injectable (HumaLOG) 4 Unit(s) SubCutaneous three times a day before meals  lisinopril 10 milliGRAM(s) Oral daily  metoprolol tartrate 50 milliGRAM(s) Oral every 8 hours  potassium chloride    Tablet ER 40 milliEquivalent(s) Oral once  tamsulosin 0.4 milliGRAM(s) Oral at bedtime    MEDICATIONS  (PRN):  dextrose 40% Gel 15 Gram(s) Oral once PRN Blood Glucose LESS THAN 70 milliGRAM(s)/deciliter  glucagon  Injectable 1 milliGRAM(s) IntraMuscular once PRN Glucose LESS THAN 70 milligrams/deciliter          Vital Signs Last 24 Hrs  T(C): 36.6 (05 Jul 2019 05:16), Max: 36.6 (05 Jul 2019 05:16)  T(F): 97.9 (05 Jul 2019 05:16), Max: 97.9 (05 Jul 2019 05:16)  HR: 100 (05 Jul 2019 05:16) (97 - 107)  BP: 113/62 (05 Jul 2019 05:16) (85/56 - 117/59)  BP(mean): --  RR: 18 (05 Jul 2019 05:16) (18 - 18)  SpO2: 95% (05 Jul 2019 07:53) (93% - 95%)  CAPILLARY BLOOD GLUCOSE      POCT Blood Glucose.: 202 mg/dL (05 Jul 2019 07:30)  POCT Blood Glucose.: 216 mg/dL (04 Jul 2019 21:42)  POCT Blood Glucose.: 135 mg/dL (04 Jul 2019 16:49)  POCT Blood Glucose.: 211 mg/dL (04 Jul 2019 11:33)    I&O's Summary    04 Jul 2019 07:01  -  05 Jul 2019 07:00  --------------------------------------------------------  IN: 1360 mL / OUT: 1400 mL / NET: -40 mL        Physical Exam:    --        General : NAD, resting comfortably in bed     -      Cardiac: S1/S2 appreciated, Systolic murmur with radiation to Axilla     -      Pulm: Rhonchi B/L lung bases     -      GI: Soft, NT, ND, +BS     -      Musculoskeletal: Atraumatic, No LE edema, no skin color changes      -      Neuro: AO x 2, slurring speech ??baseline          Labs:                        8.6    2.29  )-----------( 79       ( 04 Jul 2019 05:21 )             28.3             07-05    145  |  103  |  29<H>  ----------------------------<  176<H>  3.5   |  32  |  1.1    Ca    8.5      05 Jul 2019 05:28  Mg     2.1     07-05    TPro  6.4  /  Alb  2.9<L>  /  TBili  1.1  /  DBili  x   /  AST  14  /  ALT  19  /  AlkPhos  78  07-04    LIVER FUNCTIONS - ( 04 Jul 2019 05:21 )  Alb: 2.9 g/dL / Pro: 6.4 g/dL / ALK PHOS: 78 U/L / ALT: 19 U/L / AST: 14 U/L / GGT: x                               Imaging:    ECG:

## 2019-07-05 NOTE — PROGRESS NOTE ADULT - ASSESSMENT
73M with PMH of CAD s/p PCI, DM II, HTN, HLD, cataracts, Anxiety, presents from assisted living facility for episode of lightheadedness and worsening SOB, admitted with acute CHF exacerbation.   New low EF 26%.   Noted to have multiple runs SVT on tele and one episode of NSVT.     Plan:  Cardiology on board, plan Adenosine stress test  Maintain electrolytes K>4.0 Mg >2.0  Con't tele  Con't Lopressor 50mg q8h  Consider Entresto over Lisinopril  Will follow after ischemic work up    D/W attending

## 2019-07-05 NOTE — PHYSICAL THERAPY INITIAL EVALUATION ADULT - LEVEL OF INDEPENDENCE: GAIT, REHAB EVAL
minimum assist (75% patients effort)/pt ambulated 75ft RW Amina/CG with an unsteady gait pattern - losing his balance a few times requiring assistance to maintain up right standing, safety compromised unaware of safety concerns. trivializes current functional situation.

## 2019-07-05 NOTE — PHYSICAL THERAPY INITIAL EVALUATION ADULT - PERTINENT HX OF CURRENT PROBLEM, REHAB EVAL
73M with PMHx of CAD s/p PCI, DMII, HTN, Anxiety, presents from assisted living facility for episode of lightheadedness and worsening SOB.

## 2019-07-05 NOTE — PROGRESS NOTE ADULT - ASSESSMENT
73M with PMHx of CAD s/p PCI, DMII, HTN, Anxiety, presents from assisted living facility for episode of lightheadedness and worsening SOB.    Dyspnea likely secondary to  new onset CHF   - CXR:  with perihilar opacities consistent with CHF  - BNP 7990, 1st set Tn negative, Repeat 2nd set  - EKG with sinus tachycardia, -TSH: wnl   -ECHO: EF: 26%, GIDD,mild-Mod MR, Mild Pulm HTN, Mild Aortic Stenosis   -Cardiology Recs. appreciated and applied     *Metroprolol 50mg was increased to Q8 for Tachycardia   - C/w PO Lasix 40mg Q12, BIPAP and supplemental O2 prn   -F/UP with Cardiologist for Possible Ischemic work-up      Lightheadedness: likely due to sinus tachycardia and dyspnea: Resolved   -Orthostatics: neg   - Echo: see Above     Chronic Pancytopenia  -Heme/onc consult appreciated  - Bone marrow Bx O/P: Appt: 7/29/19 with Dr Tompkins   -B12 Slightly elevated, Folate: wnl   -US of spleen  and Liver: Unremarkable     CAD s/p PCI / HTN   - c/w asa, statin, bb, ACEI  - off Plavix as per outpatient cardiology     DMII: Not at goal   - monitor FS before meals and at bedtime   -c/w  insulin regimen    -HGBA1C: 6.7%, Lipids: wnl     Anxiety  - c/w valium home dose     Dispo: from assisted living   DVT PPx; Lovenox sc   GI PPX: not indicated

## 2019-07-05 NOTE — PHYSICAL THERAPY INITIAL EVALUATION ADULT - ADDITIONAL COMMENTS
pt reports being ind PTA, no AD.   pt lives in [facility] where he reports being ind but the facility cooks his meals for him

## 2019-07-05 NOTE — SWALLOW BEDSIDE ASSESSMENT ADULT - SLP PERTINENT HISTORY OF CURRENT PROBLEM
Pt admitted w/ SOB and lightheadedness. +suspected CHF exacerbation. Pt w/ reported h/o anxiety, exhibits jerking body movements and speech is characterized by prolongations, hesitations and imprecise articulation which family stated to MD is baseline

## 2019-07-05 NOTE — PROGRESS NOTE ADULT - SUBJECTIVE AND OBJECTIVE BOX
Subjective:  Patient was admitted with symptoms and signs of CHF, echo was reported 26% EF, in prior cath EF was 55% and in prior nuclear stress test EF 51%. He denies chest pain; no known hx of MI since last cath and stent placement.    MEDICATIONS  (STANDING):  aspirin  chewable 81 milliGRAM(s) Oral daily  atorvastatin 20 milliGRAM(s) Oral at bedtime  chlorhexidine 4% Liquid 1 Application(s) Topical <User Schedule>  dextrose 5%. 1000 milliLiter(s) (50 mL/Hr) IV Continuous <Continuous>  dextrose 50% Injectable 12.5 Gram(s) IV Push once  dextrose 50% Injectable 25 Gram(s) IV Push once  dextrose 50% Injectable 25 Gram(s) IV Push once  diazepam    Tablet 2 milliGRAM(s) Oral at bedtime  enoxaparin Injectable 40 milliGRAM(s) SubCutaneous daily  famotidine    Tablet 40 milliGRAM(s) Oral at bedtime  insulin glargine Injectable (LANTUS) 12 Unit(s) SubCutaneous at bedtime  insulin lispro (HumaLOG) corrective regimen sliding scale   SubCutaneous three times a day before meals  insulin lispro Injectable (HumaLOG) 4 Unit(s) SubCutaneous three times a day before meals  lisinopril 10 milliGRAM(s) Oral daily  metoprolol tartrate 50 milliGRAM(s) Oral every 8 hours  tamsulosin 0.4 milliGRAM(s) Oral at bedtime    MEDICATIONS  (PRN):  dextrose 40% Gel 15 Gram(s) Oral once PRN Blood Glucose LESS THAN 70 milliGRAM(s)/deciliter  glucagon  Injectable 1 milliGRAM(s) IntraMuscular once PRN Glucose LESS THAN 70 milligrams/deciliter            Vital Signs Last 24 Hrs  T(C): 36.6 (05 Jul 2019 05:16), Max: 36.6 (05 Jul 2019 05:16)  T(F): 97.9 (05 Jul 2019 05:16), Max: 97.9 (05 Jul 2019 05:16)  HR: 100 (05 Jul 2019 05:16) (97 - 107)  BP: 113/62 (05 Jul 2019 05:16) (85/56 - 117/59)  BP(mean): --  RR: 18 (05 Jul 2019 05:16) (18 - 18)  SpO2: 95% (05 Jul 2019 07:53) (93% - 95%)             REVIEW OF SYSTEMS:  CONSTITUTIONAL: no fever, no chills, no diaphoresis  CARDIOLOGY: no chest pain, no palpitation, no diaphoresis, no faint   RESPIRATORY: dyspnea, wheeze, orthopnea, no PND   NEUROLOGICAL: no dizziness, headache, focal deficits to report.  GI: no abdominal pain, no dyspepsia, no nausea, no vomiting, no diarrhea.    HEENT: no congestion, no nasal bleeding  SKIN: no ecchymosis, no petechia             PHYSICAL EXAM:  · CONSTITUTIONAL: Looks stable  . NECK: Supple, no JVD, no bruit   · RESPIRATORY: Normal air entry to lung base, no wheeze, no crackle, no wet rales  · CARDIOVASCULAR: S1, A2, P2, no murmur, no gallop  · EXTREMITIES: No cyanosis, no clubbing, no edema  · VASCULAR: Pulses are regular, equal, bilateral in upper and lower extremities  	  TELEMETRY: NSR    ECG: < from: 12 Lead ECG (07.01.19 @ 17:34) >  Sinus tachycardia  Minimal voltage criteria for LVH, may be normal variant  Nonspecific ST abnormality    < end of copied text >      TTE: < from: Transthoracic Echocardiogram (07.03.19 @ 09:56) >   1. LV Ejection Fraction by Cheng's Method with a biplane EF of 26 %.   2. Spectral Doppler shows impaired relaxation pattern of left   ventricular myocardial filling (Grade I diastolic dysfunction).   3. Mild to moderate mitral valve regurgitation.   4. Mitral annular calcification.   5. Thickening and calcification of the anterior and posterior mitral   valve leaflets.   6. Aortic valve thickening with decreased leaflet opening.   7. Estimated pulmonary artery systolic pressure is 41.5 mmHg assuming a   right atrial pressure of 5 mmHg, which is consistent with mild pulmonary   hypertension.   8. Peak transaortic gradient equals 16.2 mmHg, mean transaortic gradient   equals 9.9 mmHg, the calculated aortic valve area equals 1.64 cm²by the   continuity equation consistent with mild aortic stenosis.    < end of copied text >      LABS:                        8.6    2.29  )-----------( 79       ( 04 Jul 2019 05:21 )             28.3     07-05    145  |  103  |  29<H>  ----------------------------<  176<H>  3.5   |  32  |  1.1    Ca    8.5      05 Jul 2019 05:28  Mg     2.1     07-05    TPro  6.4  /  Alb  2.9<L>  /  TBili  1.1  /  DBili  x   /  AST  14  /  ALT  19  /  AlkPhos  78  07-04            I&O's Summary    04 Jul 2019 07:01  -  05 Jul 2019 07:00  --------------------------------------------------------  IN: 1360 mL / OUT: 1400 mL / NET: -40 mL    05 Jul 2019 07:01  -  05 Jul 2019 14:42  --------------------------------------------------------  IN: 0 mL / OUT: 400 mL / NET: -400 mL      BNP  RADIOLOGY & ADDITIONAL STUDIES: < from: Xray Chest 1 View- PORTABLE-Urgent (07.05.19 @ 11:16) >    Basilar effusions, accounting for technique with slight improvement.    < end of copied text >      IMPRESSION AND PLAN:

## 2019-07-05 NOTE — SWALLOW BEDSIDE ASSESSMENT ADULT - COMMENTS
+toleration w/o overt s/s of penetration/aspiration w/ nectar +toleration w/o overt s/s of penetration/aspiration w/ soft solids Pt reported that he coughs w/ larger sips, unable to report how long this is happening, admits to compensting by taking small sips +toleration w/o overt s/s of penetration/aspiration w/ puree

## 2019-07-05 NOTE — PROGRESS NOTE ADULT - SUBJECTIVE AND OBJECTIVE BOX
INTERVAL HPI/OVERNIGHT EVENTS:  on Tele multiple short Supraventricular tachycardia runs 4-6sec, multiple NSVT 2.9sec longest, primarely 2-4 bts  Out-pt records in chart,  Prior EF 55% by LHC (WILLIAM pCx at that time) 10/17 and  51% by ETT 10/17  Denies any symptoms    MEDICATIONS  (STANDING):  adenosine Injectable (ADENOSCAN) 60 milliGRAM(s) IV Bolus once  aspirin  chewable 81 milliGRAM(s) Oral daily  atorvastatin 20 milliGRAM(s) Oral at bedtime  chlorhexidine 4% Liquid 1 Application(s) Topical <User Schedule>  dextrose 5%. 1000 milliLiter(s) (50 mL/Hr) IV Continuous <Continuous>  dextrose 50% Injectable 12.5 Gram(s) IV Push once  dextrose 50% Injectable 25 Gram(s) IV Push once  dextrose 50% Injectable 25 Gram(s) IV Push once  diazepam    Tablet 2 milliGRAM(s) Oral at bedtime  enoxaparin Injectable 40 milliGRAM(s) SubCutaneous daily  famotidine    Tablet 40 milliGRAM(s) Oral at bedtime  insulin glargine Injectable (LANTUS) 12 Unit(s) SubCutaneous at bedtime  insulin lispro (HumaLOG) corrective regimen sliding scale   SubCutaneous three times a day before meals  insulin lispro Injectable (HumaLOG) 4 Unit(s) SubCutaneous three times a day before meals  lisinopril 10 milliGRAM(s) Oral daily  metoprolol tartrate 50 milliGRAM(s) Oral every 8 hours  tamsulosin 0.4 milliGRAM(s) Oral at bedtime    MEDICATIONS  (PRN):  dextrose 40% Gel 15 Gram(s) Oral once PRN Blood Glucose LESS THAN 70 milliGRAM(s)/deciliter  glucagon  Injectable 1 milliGRAM(s) IntraMuscular once PRN Glucose LESS THAN 70 milligrams/deciliter      Allergies    No Known Allergies    Intolerances        REVIEW OF SYSTEMS    [x] A ten-point review of systems was otherwise negative except as noted.      Vital Signs Last 24 Hrs  T(C): 36.6 (05 Jul 2019 05:16), Max: 36.6 (05 Jul 2019 05:16)  T(F): 97.9 (05 Jul 2019 05:16), Max: 97.9 (05 Jul 2019 05:16)  HR: 100 (05 Jul 2019 05:16) (97 - 107)  BP: 113/62 (05 Jul 2019 05:16) (85/56 - 117/59)  BP(mean): --  RR: 18 (05 Jul 2019 05:16) (18 - 18)  SpO2: 95% (05 Jul 2019 07:53) (95% - 95%)    07-04-19 @ 07:01  -  07-05-19 @ 07:00  --------------------------------------------------------  IN: 1360 mL / OUT: 1400 mL / NET: -40 mL    07-05-19 @ 07:01  -  07-05-19 @ 17:18  --------------------------------------------------------  IN: 0 mL / OUT: 400 mL / NET: -400 mL        PHYSICAL EXAM:    GENERAL: In no apparent distress, well nourished, and hydrated.  HEART: Regular rate and rhythm; No murmur; NO rubs, or gallops.  PULMONARY: Clear to auscultation and percussion.  Normal expansion/effort. No rales, wheezing, or rhonchi bilaterally.  ABDOMEN: Soft, Nontender, Nondistended; Bowel sounds present  EXTREMITIES:  Extremities warm, pink, well-perfused, 2+ Peripheral Pulses, No clubbing, cyanosis, or edema  NEUROLOGICAL: alert & oriented x 3, no focal deficits, PERRLA, EOMI    LABS:                        8.6    2.29  )-----------( 79       ( 04 Jul 2019 05:21 )             28.3     07-05    145  |  103  |  29<H>  ----------------------------<  176<H>  3.5   |  32  |  1.1    Ca    8.5      05 Jul 2019 05:28  Mg     2.1     07-05    TPro  6.4  /  Alb  2.9<L>  /  TBili  1.1  /  DBili  x   /  AST  14  /  ALT  19  /  AlkPhos  78  07-04          RADIOLOGY & ADDITIONAL TESTS:

## 2019-07-05 NOTE — PROGRESS NOTE ADULT - ASSESSMENT
new CHF, new drop in EF, will do nuclear stress test, already I ordered it to assess any location of ischemia  CAD, latest cardiac Cath was done on Oct. 2017, stents in LAD and LCx-OM, EF 55%,   low BP    current therapy  will do Adenosine nuclear stress test, if EF is low or ischemic defect then will do cath  possibly down the road will switch Lisinopril to Entresto

## 2019-07-05 NOTE — PROGRESS NOTE ADULT - ATTENDING COMMENTS
#Acute HFrEF #Acute HFrEF, EF 26% down from 55% in 2017; plan for NST today  possible [facility] monday  f/u cards  improving on lasix 40 po  c/b NSVT and SVT; EPS following, no further episodes, controlled on lopressor 50 tid    #Progress Note Handoff:  Pending (specify):  Consults_________, Tests________, Test Results_______, Other___NST______  Family discussion:d/w pt at bedside  Disposition: Home___/SNF___/Other________/Unknown at this time_____x___

## 2019-07-06 LAB
ACANTHOCYTES BLD QL SMEAR: SLIGHT — SIGNIFICANT CHANGE UP
ANION GAP SERPL CALC-SCNC: 13 MMOL/L — SIGNIFICANT CHANGE UP (ref 7–14)
ANISOCYTOSIS BLD QL: SLIGHT — SIGNIFICANT CHANGE UP
APPEARANCE UR: CLEAR — SIGNIFICANT CHANGE UP
BASOPHILS # BLD AUTO: 0.02 K/UL — SIGNIFICANT CHANGE UP (ref 0–0.2)
BASOPHILS NFR BLD AUTO: 0.9 % — SIGNIFICANT CHANGE UP (ref 0–1)
BILIRUB UR-MCNC: NEGATIVE — SIGNIFICANT CHANGE UP
BUN SERPL-MCNC: 28 MG/DL — HIGH (ref 10–20)
CALCIUM SERPL-MCNC: 8.4 MG/DL — LOW (ref 8.5–10.1)
CHLORIDE SERPL-SCNC: 102 MMOL/L — SIGNIFICANT CHANGE UP (ref 98–110)
CO2 SERPL-SCNC: 28 MMOL/L — SIGNIFICANT CHANGE UP (ref 17–32)
COLOR SPEC: YELLOW — SIGNIFICANT CHANGE UP
CREAT SERPL-MCNC: 1.1 MG/DL — SIGNIFICANT CHANGE UP (ref 0.7–1.5)
DIFF PNL FLD: ABNORMAL
ELLIPTOCYTES BLD QL SMEAR: SIGNIFICANT CHANGE UP
EOSINOPHIL # BLD AUTO: 0.05 K/UL — SIGNIFICANT CHANGE UP (ref 0–0.7)
EOSINOPHIL NFR BLD AUTO: 2.6 % — SIGNIFICANT CHANGE UP (ref 0–8)
GIANT PLATELETS BLD QL SMEAR: PRESENT — SIGNIFICANT CHANGE UP
GLUCOSE BLDC GLUCOMTR-MCNC: 179 MG/DL — HIGH (ref 70–99)
GLUCOSE BLDC GLUCOMTR-MCNC: 201 MG/DL — HIGH (ref 70–99)
GLUCOSE BLDC GLUCOMTR-MCNC: 251 MG/DL — HIGH (ref 70–99)
GLUCOSE BLDC GLUCOMTR-MCNC: 252 MG/DL — HIGH (ref 70–99)
GLUCOSE SERPL-MCNC: 167 MG/DL — HIGH (ref 70–99)
GLUCOSE UR QL: NEGATIVE MG/DL — SIGNIFICANT CHANGE UP
HCT VFR BLD CALC: 27.8 % — LOW (ref 42–52)
HGB BLD-MCNC: 8.4 G/DL — LOW (ref 14–18)
KETONES UR-MCNC: NEGATIVE — SIGNIFICANT CHANGE UP
LEUKOCYTE ESTERASE UR-ACNC: NEGATIVE — SIGNIFICANT CHANGE UP
LYMPHOCYTES # BLD AUTO: 1.06 K/UL — LOW (ref 1.2–3.4)
LYMPHOCYTES # BLD AUTO: 54.4 % — HIGH (ref 20.5–51.1)
MACROCYTES BLD QL: SLIGHT — SIGNIFICANT CHANGE UP
MANUAL SMEAR VERIFICATION: SIGNIFICANT CHANGE UP
MCHC RBC-ENTMCNC: 29.3 PG — SIGNIFICANT CHANGE UP (ref 27–31)
MCHC RBC-ENTMCNC: 30.2 G/DL — LOW (ref 32–37)
MCV RBC AUTO: 96.9 FL — HIGH (ref 80–94)
MONOCYTES # BLD AUTO: 0.26 K/UL — SIGNIFICANT CHANGE UP (ref 0.1–0.6)
MONOCYTES NFR BLD AUTO: 13.2 % — HIGH (ref 1.7–9.3)
NEUTROPHILS # BLD AUTO: 0.56 K/UL — LOW (ref 1.4–6.5)
NEUTROPHILS NFR BLD AUTO: 28.9 % — LOW (ref 42.2–75.2)
NITRITE UR-MCNC: NEGATIVE — SIGNIFICANT CHANGE UP
NRBC # BLD: 3 /100 — HIGH (ref 0–0)
NRBC # BLD: SIGNIFICANT CHANGE UP /100 WBCS (ref 0–0)
OVALOCYTES BLD QL SMEAR: SIGNIFICANT CHANGE UP
PH UR: 6.5 — SIGNIFICANT CHANGE UP (ref 5–8)
PLAT MORPH BLD: NORMAL — SIGNIFICANT CHANGE UP
PLATELET # BLD AUTO: 78 K/UL — LOW (ref 130–400)
POIKILOCYTOSIS BLD QL AUTO: SIGNIFICANT CHANGE UP
POTASSIUM SERPL-MCNC: 3.7 MMOL/L — SIGNIFICANT CHANGE UP (ref 3.5–5)
POTASSIUM SERPL-SCNC: 3.7 MMOL/L — SIGNIFICANT CHANGE UP (ref 3.5–5)
PROT UR-MCNC: 30 MG/DL
RBC # BLD: 2.87 M/UL — LOW (ref 4.7–6.1)
RBC # FLD: 19.1 % — HIGH (ref 11.5–14.5)
RBC BLD AUTO: ABNORMAL
RBC CASTS # UR COMP ASSIST: >50 /HPF
SCHISTOCYTES BLD QL AUTO: SLIGHT — SIGNIFICANT CHANGE UP
SMUDGE CELLS # BLD: PRESENT — SIGNIFICANT CHANGE UP
SODIUM SERPL-SCNC: 143 MMOL/L — SIGNIFICANT CHANGE UP (ref 135–146)
SP GR SPEC: 1.01 — SIGNIFICANT CHANGE UP (ref 1.01–1.03)
UROBILINOGEN FLD QL: 1 MG/DL (ref 0.2–0.2)
WBC # BLD: 1.94 K/UL — LOW (ref 4.8–10.8)
WBC # FLD AUTO: 1.94 K/UL — LOW (ref 4.8–10.8)

## 2019-07-06 PROCEDURE — 93016 CV STRESS TEST SUPVJ ONLY: CPT

## 2019-07-06 PROCEDURE — 93018 CV STRESS TEST I&R ONLY: CPT

## 2019-07-06 PROCEDURE — 99233 SBSQ HOSP IP/OBS HIGH 50: CPT

## 2019-07-06 PROCEDURE — 78452 HT MUSCLE IMAGE SPECT MULT: CPT | Mod: 26

## 2019-07-06 PROCEDURE — 99222 1ST HOSP IP/OBS MODERATE 55: CPT

## 2019-07-06 RX ADMIN — Medication 40 MILLIGRAM(S): at 05:59

## 2019-07-06 RX ADMIN — Medication 50 MILLIGRAM(S): at 22:17

## 2019-07-06 RX ADMIN — Medication 4 UNIT(S): at 08:36

## 2019-07-06 RX ADMIN — Medication 50 MILLIGRAM(S): at 16:37

## 2019-07-06 RX ADMIN — ATORVASTATIN CALCIUM 20 MILLIGRAM(S): 80 TABLET, FILM COATED ORAL at 22:18

## 2019-07-06 RX ADMIN — FAMOTIDINE 40 MILLIGRAM(S): 10 INJECTION INTRAVENOUS at 22:18

## 2019-07-06 RX ADMIN — Medication 4 UNIT(S): at 17:14

## 2019-07-06 RX ADMIN — Medication 50 MILLIGRAM(S): at 05:59

## 2019-07-06 RX ADMIN — Medication 1: at 08:36

## 2019-07-06 RX ADMIN — Medication 4 UNIT(S): at 12:18

## 2019-07-06 RX ADMIN — Medication 2 MILLIGRAM(S): at 22:21

## 2019-07-06 RX ADMIN — ADENOSINE 600 MILLIGRAM(S): 3 INJECTION INTRAVENOUS at 14:46

## 2019-07-06 RX ADMIN — INSULIN GLARGINE 12 UNIT(S): 100 INJECTION, SOLUTION SUBCUTANEOUS at 22:17

## 2019-07-06 RX ADMIN — TAMSULOSIN HYDROCHLORIDE 0.4 MILLIGRAM(S): 0.4 CAPSULE ORAL at 22:18

## 2019-07-06 RX ADMIN — LISINOPRIL 10 MILLIGRAM(S): 2.5 TABLET ORAL at 05:59

## 2019-07-06 RX ADMIN — ENOXAPARIN SODIUM 40 MILLIGRAM(S): 100 INJECTION SUBCUTANEOUS at 12:19

## 2019-07-06 RX ADMIN — Medication 3: at 17:14

## 2019-07-06 RX ADMIN — Medication 81 MILLIGRAM(S): at 12:19

## 2019-07-06 RX ADMIN — Medication 2: at 12:18

## 2019-07-06 NOTE — PROGRESS NOTE ADULT - ASSESSMENT
73M with PMHx of CAD s/p PCI, DMII, HTN, Anxiety, presents from assisted living facility for episode of lightheadedness and worsening SOB.    #Acute HFrEF  EF 26% down from 55% in 2017  NST with small reversible defect, f/u cards for possible cath  c/b NSVT and SVT; EPS following, no further episodes, controlled on lopressor 50 tid  euvolemic on lasix 40 po daily          Chronic Pancytopenia  -Heme/onc consult appreciated  - Bone marrow Bx O/P: Appt: 7/29/19 with Dr Tompkins   -B12 Slightly elevated, Folate: wnl   -US of spleen  and Liver: Unremarkable     CAD s/p PCI / HTN   - c/w asa, statin, bb, ACEI  - off Plavix as per outpatient cardiology     DMII: Not at goal   - monitor FS before meals and at bedtime   -c/w  insulin regimen    -HGBA1C: 6.7%, Lipids: wnl     Anxiety  - c/w valium home dose     Dispo: from assisted living   DVT PPx; Lovenox sc   GI PPX: not indicated 73M with PMHx of CAD s/p PCI, DMII, HTN, Anxiety, presents from assisted living facility for episode of lightheadedness and worsening SOB.    Acute HFrEF  EF 26% down from 55% in 2017  NST with small reversible defect, f/u cards for possible cath  c/b NSVT and SVT; EPS following, no further episodes, controlled on lopressor 50 tid  euvolemic on lasix 40 po daily    Chronic Pancytopenia  -Heme/onc consult appreciated  - Bone marrow Bx O/P: Appt: 7/29/19 with Dr Tompkins   -B12 Slightly elevated, Folate: wnl   -US of spleen  and Liver: Unremarkable     Painless Hematuria with Clots: 7/6/19  -Urology consulted     CAD s/p PCI / HTN   - c/w asa, statin, bb, ACEI  - off Plavix as per outpatient cardiology     DMII: Not at goal   - monitor FS before meals and at bedtime   -c/w  insulin regimen    -HGBA1C: 6.7%, Lipids: wnl     Anxiety  - c/w valium home dose     Dispo: from assisted living   DVT PPx; Lovenox sc   GI PPX: not indicated

## 2019-07-06 NOTE — CONSULT NOTE ADULT - SUBJECTIVE AND OBJECTIVE BOX
72 yo male admitted for SOB and weakness. During the course of the hospitalization, pt had Mitchell placed and subsequently removed (pt on ASA and SC Lovenox). After Mitchell removal pt was noted to have blood tinged urine and small clots per meatus,  asked to evaluate. Pt has one to one sit and is very confused at time of consultation    PAST MEDICAL & SURGICAL HISTORY:  GERD (gastroesophageal reflux disease)  Anxiety  CAD (coronary artery disease)  Diabetes  HTN (hypertension)  H/O colonoscopy: 10 yeras ago    MEDICATIONS  (STANDING):  aspirin  chewable 81 milliGRAM(s) Oral daily  atorvastatin 20 milliGRAM(s) Oral at bedtime  diazepam    Tablet 2 milliGRAM(s) Oral at bedtime  enoxaparin Injectable 40 milliGRAM(s) SubCutaneous daily  famotidine    Tablet 40 milliGRAM(s) Oral at bedtime  furosemide    Tablet 40 milliGRAM(s) Oral daily  insulin glargine Injectable (LANTUS) 12 Unit(s) SubCutaneous at bedtime  lisinopril 10 milliGRAM(s) Oral daily  metoprolol tartrate 50 milliGRAM(s) Oral every 8 hours  tamsulosin 0.4 milliGRAM(s) Oral at bedtime    Allergies    No Known Allergies    Intolerances    SHx - NC    FHx - NC    ROS - negative except as noted in HPI    Vital Signs Last 24 Hrs  T(C): 37.2 (06 Jul 2019 12:39), Max: 37.2 (06 Jul 2019 12:39)  T(F): 98.9 (06 Jul 2019 12:39), Max: 98.9 (06 Jul 2019 12:39)  HR: 109 (06 Jul 2019 12:39) (99 - 109)  BP: 112/70 (06 Jul 2019 12:39) (112/70 - 148/71)  RR: 20 (06 Jul 2019 12:39) (18 - 20)  SpO2: 95% (06 Jul 2019 08:17) (95% - 95%)    pt seen and examined at bedside  a+ox1, nad, non toxic, confused, poor historian  abd - soft, nd, nttp, no palpable bladder, no spt  gu - + small blood clot per meatus, genitalia wnl, no swelling                          8.4    1.94  )-----------( 78       ( 06 Jul 2019 05:56 )             27.8     07-06    143  |  102  |  28<H>  ----------------------------<  167<H>  3.7   |  28  |  1.1    Ca    8.4<L>      06 Jul 2019 05:56  Mg     2.1     07-05    < from:  Urinary Bladder (07.03.19 @ 10:06) >  Impression:  The urinary bladderis collapsed around a Mitchell catheter and therefore   not evaluated.

## 2019-07-06 NOTE — PROGRESS NOTE ADULT - SUBJECTIVE AND OBJECTIVE BOX
Patient is a 73y old  Male who presents with a chief complaint of Lightheadedness, and SOB (05 Jul 2019 17:18)      SUBJECTIVE / OVERNIGHT EVENTS:  no cp, sob, abd pain, fever  no cp, palpitations, orthopnea, pnd    MEDICATIONS  (STANDING):  adenosine Injectable (ADENOSCAN) 60 milliGRAM(s) IV Bolus once  aspirin  chewable 81 milliGRAM(s) Oral daily  atorvastatin 20 milliGRAM(s) Oral at bedtime  chlorhexidine 4% Liquid 1 Application(s) Topical <User Schedule>  cyanocobalamin Injectable 1000 MICROGram(s) IntraMuscular once  dextrose 5%. 1000 milliLiter(s) (50 mL/Hr) IV Continuous <Continuous>  dextrose 50% Injectable 12.5 Gram(s) IV Push once  dextrose 50% Injectable 25 Gram(s) IV Push once  dextrose 50% Injectable 25 Gram(s) IV Push once  diazepam    Tablet 2 milliGRAM(s) Oral at bedtime  enoxaparin Injectable 40 milliGRAM(s) SubCutaneous daily  famotidine    Tablet 40 milliGRAM(s) Oral at bedtime  furosemide    Tablet 40 milliGRAM(s) Oral daily  insulin glargine Injectable (LANTUS) 12 Unit(s) SubCutaneous at bedtime  insulin lispro (HumaLOG) corrective regimen sliding scale   SubCutaneous three times a day before meals  insulin lispro Injectable (HumaLOG) 4 Unit(s) SubCutaneous three times a day before meals  lisinopril 10 milliGRAM(s) Oral daily  metoprolol tartrate 50 milliGRAM(s) Oral every 8 hours  tamsulosin 0.4 milliGRAM(s) Oral at bedtime    MEDICATIONS  (PRN):  dextrose 40% Gel 15 Gram(s) Oral once PRN Blood Glucose LESS THAN 70 milliGRAM(s)/deciliter  glucagon  Injectable 1 milliGRAM(s) IntraMuscular once PRN Glucose LESS THAN 70 milligrams/deciliter        CAPILLARY BLOOD GLUCOSE      POCT Blood Glucose.: 179 mg/dL (06 Jul 2019 07:44)  POCT Blood Glucose.: 127 mg/dL (05 Jul 2019 21:35)  POCT Blood Glucose.: 227 mg/dL (05 Jul 2019 16:56)    I&O's Summary    05 Jul 2019 07:01  -  06 Jul 2019 07:00  --------------------------------------------------------  IN: 0 mL / OUT: 400 mL / NET: -400 mL    06 Jul 2019 07:01  -  06 Jul 2019 11:34  --------------------------------------------------------  IN: 0 mL / OUT: 550 mL / NET: -550 mL        PHYSICAL EXAM:  GENERAL: nad, a+o x3  EYES:  NECK:   CHEST/LUNG: ctab no w/r/r  HEART: rrr no m/g/r  ABDOMEN: soft nt nd  EXTREMITIES:  no edema bl  PSYCH:   NEUROLOGY:   SKIN:    LABS:                        8.4    1.94  )-----------( 78       ( 06 Jul 2019 05:56 )             27.8     07-06    143  |  102  |  28<H>  ----------------------------<  167<H>  3.7   |  28  |  1.1    Ca    8.4<L>      06 Jul 2019 05:56  Mg     2.1     07-05                RADIOLOGY & ADDITIONAL TESTS:    Imaging Personally Reviewed:  Yes    Consultant(s) Notes Reviewed:      Care Discussed with Consultants/Other Providers:    Assessment and Plan   PAST MEDICAL & SURGICAL HISTORY:  GERD (gastroesophageal reflux disease)  Anxiety  CAD (coronary artery disease)  Diabetes  HTN (hypertension)  H/O colonoscopy: 10 yeras ago  No significant past surgical history Patient is a 73y old  Male who presents with a chief complaint of Lightheadedness, and SOB      Currently admitted to medicine with primary working diagnosis of new-onset CHF exacerbation     SUBJECTIVE / OVERNIGHT EVENTS:  no cp, sob, abd pain, fever  no cp, palpitations, orthopnea, pnd      Today is hosp day 5  Patient is resting comfortably in bed   SOB improved, now on RA: 95%   Tolerating Diet with regular BM  Plan: Part II of Nuclear stress test   F/up results           MEDICATIONS  (STANDING):  adenosine Injectable (ADENOSCAN) 60 milliGRAM(s) IV Bolus once  aspirin  chewable 81 milliGRAM(s) Oral daily  atorvastatin 20 milliGRAM(s) Oral at bedtime  chlorhexidine 4% Liquid 1 Application(s) Topical <User Schedule>  cyanocobalamin Injectable 1000 MICROGram(s) IntraMuscular once  dextrose 5%. 1000 milliLiter(s) (50 mL/Hr) IV Continuous <Continuous>  dextrose 50% Injectable 12.5 Gram(s) IV Push once  dextrose 50% Injectable 25 Gram(s) IV Push once  dextrose 50% Injectable 25 Gram(s) IV Push once  diazepam    Tablet 2 milliGRAM(s) Oral at bedtime  enoxaparin Injectable 40 milliGRAM(s) SubCutaneous daily  famotidine    Tablet 40 milliGRAM(s) Oral at bedtime  furosemide    Tablet 40 milliGRAM(s) Oral daily  insulin glargine Injectable (LANTUS) 12 Unit(s) SubCutaneous at bedtime  insulin lispro (HumaLOG) corrective regimen sliding scale   SubCutaneous three times a day before meals  insulin lispro Injectable (HumaLOG) 4 Unit(s) SubCutaneous three times a day before meals  lisinopril 10 milliGRAM(s) Oral daily  metoprolol tartrate 50 milliGRAM(s) Oral every 8 hours  tamsulosin 0.4 milliGRAM(s) Oral at bedtime    MEDICATIONS  (PRN):  dextrose 40% Gel 15 Gram(s) Oral once PRN Blood Glucose LESS THAN 70 milliGRAM(s)/deciliter  glucagon  Injectable 1 milliGRAM(s) IntraMuscular once PRN Glucose LESS THAN 70 milligrams/deciliter        CAPILLARY BLOOD GLUCOSE      POCT Blood Glucose.: 179 mg/dL (06 Jul 2019 07:44)  POCT Blood Glucose.: 127 mg/dL (05 Jul 2019 21:35)  POCT Blood Glucose.: 227 mg/dL (05 Jul 2019 16:56)    I&O's Summary    05 Jul 2019 07:01  -  06 Jul 2019 07:00  --------------------------------------------------------  IN: 0 mL / OUT: 400 mL / NET: -400 mL    06 Jul 2019 07:01  -  06 Jul 2019 11:34  --------------------------------------------------------  IN: 0 mL / OUT: 550 mL / NET: -550 mL        PHYSICAL EXAM:  GENERAL: nad, a+o x3  EYES:  NECK:   CHEST/LUNG: ctab no w/r/r  HEART: rrr no m/g/r  ABDOMEN: soft nt nd  EXTREMITIES:  no edema bl  PSYCH:   NEUROLOGY:   SKIN:    LABS:                        8.4    1.94  )-----------( 78       ( 06 Jul 2019 05:56 )             27.8     07-06    143  |  102  |  28<H>  ----------------------------<  167<H>  3.7   |  28  |  1.1    Ca    8.4<L>      06 Jul 2019 05:56  Mg     2.1     07-05        RADIOLOGY & ADDITIONAL TESTS:    Imaging Personally Reviewed:  Yes    Consultant(s) Notes Reviewed:      Care Discussed with Consultants/Other Providers:    Assessment and Plan   PAST MEDICAL & SURGICAL HISTORY:  GERD (gastroesophageal reflux disease)  Anxiety  CAD (coronary artery disease)  Diabetes  HTN (hypertension)  H/O colonoscopy: 10 yeras ago  No significant past surgical history Patient is a 73y old  Male who presents with a chief complaint of Lightheadedness, and SOB      Currently admitted to medicine with primary working diagnosis of new-onset CHF exacerbation     SUBJECTIVE / OVERNIGHT EVENTS:  no cp, sob, abd pain, fever  no cp, palpitations, orthopnea, pnd      Today is hosp day 5  Patient is resting comfortably in bed   SOB improved, now on RA: 95%   Tolerating Diet with regular BM  Plan: Part II of Nuclear stress test , F/up results   F/up Urology Consult for painless hematuria this am           MEDICATIONS  (STANDING):  adenosine Injectable (ADENOSCAN) 60 milliGRAM(s) IV Bolus once  aspirin  chewable 81 milliGRAM(s) Oral daily  atorvastatin 20 milliGRAM(s) Oral at bedtime  chlorhexidine 4% Liquid 1 Application(s) Topical <User Schedule>  cyanocobalamin Injectable 1000 MICROGram(s) IntraMuscular once  dextrose 5%. 1000 milliLiter(s) (50 mL/Hr) IV Continuous <Continuous>  dextrose 50% Injectable 12.5 Gram(s) IV Push once  dextrose 50% Injectable 25 Gram(s) IV Push once  dextrose 50% Injectable 25 Gram(s) IV Push once  diazepam    Tablet 2 milliGRAM(s) Oral at bedtime  enoxaparin Injectable 40 milliGRAM(s) SubCutaneous daily  famotidine    Tablet 40 milliGRAM(s) Oral at bedtime  furosemide    Tablet 40 milliGRAM(s) Oral daily  insulin glargine Injectable (LANTUS) 12 Unit(s) SubCutaneous at bedtime  insulin lispro (HumaLOG) corrective regimen sliding scale   SubCutaneous three times a day before meals  insulin lispro Injectable (HumaLOG) 4 Unit(s) SubCutaneous three times a day before meals  lisinopril 10 milliGRAM(s) Oral daily  metoprolol tartrate 50 milliGRAM(s) Oral every 8 hours  tamsulosin 0.4 milliGRAM(s) Oral at bedtime    MEDICATIONS  (PRN):  dextrose 40% Gel 15 Gram(s) Oral once PRN Blood Glucose LESS THAN 70 milliGRAM(s)/deciliter  glucagon  Injectable 1 milliGRAM(s) IntraMuscular once PRN Glucose LESS THAN 70 milligrams/deciliter        CAPILLARY BLOOD GLUCOSE      POCT Blood Glucose.: 179 mg/dL (06 Jul 2019 07:44)  POCT Blood Glucose.: 127 mg/dL (05 Jul 2019 21:35)  POCT Blood Glucose.: 227 mg/dL (05 Jul 2019 16:56)    I&O's Summary    05 Jul 2019 07:01  -  06 Jul 2019 07:00  --------------------------------------------------------  IN: 0 mL / OUT: 400 mL / NET: -400 mL    06 Jul 2019 07:01  -  06 Jul 2019 11:34  --------------------------------------------------------  IN: 0 mL / OUT: 550 mL / NET: -550 mL        PHYSICAL EXAM:  GENERAL: nad, a+o x3  EYES:  NECK:   CHEST/LUNG: ctab no w/r/r  HEART: rrr no m/g/r  ABDOMEN: soft nt nd  EXTREMITIES:  no edema bl  PSYCH:   NEUROLOGY:   SKIN:    LABS:                        8.4    1.94  )-----------( 78       ( 06 Jul 2019 05:56 )             27.8     07-06    143  |  102  |  28<H>  ----------------------------<  167<H>  3.7   |  28  |  1.1    Ca    8.4<L>      06 Jul 2019 05:56  Mg     2.1     07-05        RADIOLOGY & ADDITIONAL TESTS:    Imaging Personally Reviewed:  Yes    Consultant(s) Notes Reviewed:      Care Discussed with Consultants/Other Providers:    Assessment and Plan   PAST MEDICAL & SURGICAL HISTORY:  GERD (gastroesophageal reflux disease)  Anxiety  CAD (coronary artery disease)  Diabetes  HTN (hypertension)  H/O colonoscopy: 10 yeras ago  No significant past surgical history

## 2019-07-06 NOTE — CONSULT NOTE ADULT - CONSULT REASON
AF  vs SVT
Dyspnea with Acute CHF Exacerbation
gait dysfunction
gross hematuria
Chronic pancytopenia

## 2019-07-06 NOTE — CONSULT NOTE ADULT - REASON FOR ADMISSION
Lightheadedness, and SOB

## 2019-07-06 NOTE — CONSULT NOTE ADULT - ASSESSMENT
72 yo male admitted for SOB and weakness    pt had Mitchell placed and subsequently removed, causing hematuria and clots per meatus, pt also on ASA and SC Lovenox, blood in urine and per meatus likely resulting from catheter manipulation    pt voiding on his own    plan  -no acute gu intervention needed at this time  - trend h/h      recall if bleeding persists

## 2019-07-07 LAB
ANION GAP SERPL CALC-SCNC: 11 MMOL/L — SIGNIFICANT CHANGE UP (ref 7–14)
BASOPHILS # BLD AUTO: 0 K/UL — SIGNIFICANT CHANGE UP (ref 0–0.2)
BASOPHILS NFR BLD AUTO: 0 % — SIGNIFICANT CHANGE UP (ref 0–1)
BUN SERPL-MCNC: 25 MG/DL — HIGH (ref 10–20)
CALCIUM SERPL-MCNC: 8.4 MG/DL — LOW (ref 8.5–10.1)
CHLORIDE SERPL-SCNC: 104 MMOL/L — SIGNIFICANT CHANGE UP (ref 98–110)
CO2 SERPL-SCNC: 26 MMOL/L — SIGNIFICANT CHANGE UP (ref 17–32)
CREAT SERPL-MCNC: 1.1 MG/DL — SIGNIFICANT CHANGE UP (ref 0.7–1.5)
EOSINOPHIL # BLD AUTO: 0.06 K/UL — SIGNIFICANT CHANGE UP (ref 0–0.7)
EOSINOPHIL NFR BLD AUTO: 3 % — SIGNIFICANT CHANGE UP (ref 0–8)
GLUCOSE BLDC GLUCOMTR-MCNC: 161 MG/DL — HIGH (ref 70–99)
GLUCOSE BLDC GLUCOMTR-MCNC: 167 MG/DL — HIGH (ref 70–99)
GLUCOSE BLDC GLUCOMTR-MCNC: 227 MG/DL — HIGH (ref 70–99)
GLUCOSE BLDC GLUCOMTR-MCNC: 227 MG/DL — HIGH (ref 70–99)
GLUCOSE SERPL-MCNC: 177 MG/DL — HIGH (ref 70–99)
HCT VFR BLD CALC: 26.7 % — LOW (ref 42–52)
HGB BLD-MCNC: 8.1 G/DL — LOW (ref 14–18)
IMM GRANULOCYTES NFR BLD AUTO: 1.5 % — HIGH (ref 0.1–0.3)
INR BLD: 1.17 RATIO — SIGNIFICANT CHANGE UP (ref 0.65–1.3)
LYMPHOCYTES # BLD AUTO: 1.02 K/UL — LOW (ref 1.2–3.4)
LYMPHOCYTES # BLD AUTO: 50.2 % — SIGNIFICANT CHANGE UP (ref 20.5–51.1)
MCHC RBC-ENTMCNC: 29.5 PG — SIGNIFICANT CHANGE UP (ref 27–31)
MCHC RBC-ENTMCNC: 30.3 G/DL — LOW (ref 32–37)
MCV RBC AUTO: 97.1 FL — HIGH (ref 80–94)
METHYLMALONATE SERPL-SCNC: 216 NMOL/L — SIGNIFICANT CHANGE UP (ref 0–378)
MONOCYTES # BLD AUTO: 0.46 K/UL — SIGNIFICANT CHANGE UP (ref 0.1–0.6)
MONOCYTES NFR BLD AUTO: 22.7 % — HIGH (ref 1.7–9.3)
NEUTROPHILS # BLD AUTO: 0.46 K/UL — LOW (ref 1.4–6.5)
NEUTROPHILS NFR BLD AUTO: 22.6 % — LOW (ref 42.2–75.2)
NRBC # BLD: 1 /100 WBCS — HIGH (ref 0–0)
PLATELET # BLD AUTO: 71 K/UL — LOW (ref 130–400)
POTASSIUM SERPL-MCNC: 3.5 MMOL/L — SIGNIFICANT CHANGE UP (ref 3.5–5)
POTASSIUM SERPL-SCNC: 3.5 MMOL/L — SIGNIFICANT CHANGE UP (ref 3.5–5)
PROTHROM AB SERPL-ACNC: 13.4 SEC — HIGH (ref 9.95–12.87)
RBC # BLD: 2.75 M/UL — LOW (ref 4.7–6.1)
RBC # FLD: 19 % — HIGH (ref 11.5–14.5)
SODIUM SERPL-SCNC: 141 MMOL/L — SIGNIFICANT CHANGE UP (ref 135–146)
WBC # BLD: 2.03 K/UL — LOW (ref 4.8–10.8)
WBC # FLD AUTO: 2.03 K/UL — LOW (ref 4.8–10.8)

## 2019-07-07 PROCEDURE — 99233 SBSQ HOSP IP/OBS HIGH 50: CPT

## 2019-07-07 RX ADMIN — Medication 4 UNIT(S): at 11:29

## 2019-07-07 RX ADMIN — ENOXAPARIN SODIUM 40 MILLIGRAM(S): 100 INJECTION SUBCUTANEOUS at 11:29

## 2019-07-07 RX ADMIN — Medication 2: at 11:29

## 2019-07-07 RX ADMIN — Medication 1: at 17:16

## 2019-07-07 RX ADMIN — Medication 2 MILLIGRAM(S): at 21:57

## 2019-07-07 RX ADMIN — LISINOPRIL 10 MILLIGRAM(S): 2.5 TABLET ORAL at 06:37

## 2019-07-07 RX ADMIN — INSULIN GLARGINE 12 UNIT(S): 100 INJECTION, SOLUTION SUBCUTANEOUS at 21:57

## 2019-07-07 RX ADMIN — FAMOTIDINE 40 MILLIGRAM(S): 10 INJECTION INTRAVENOUS at 21:58

## 2019-07-07 RX ADMIN — Medication 50 MILLIGRAM(S): at 21:57

## 2019-07-07 RX ADMIN — TAMSULOSIN HYDROCHLORIDE 0.4 MILLIGRAM(S): 0.4 CAPSULE ORAL at 21:57

## 2019-07-07 RX ADMIN — Medication 50 MILLIGRAM(S): at 06:37

## 2019-07-07 RX ADMIN — Medication 1: at 07:44

## 2019-07-07 RX ADMIN — Medication 4 UNIT(S): at 07:44

## 2019-07-07 RX ADMIN — ATORVASTATIN CALCIUM 20 MILLIGRAM(S): 80 TABLET, FILM COATED ORAL at 21:57

## 2019-07-07 RX ADMIN — Medication 40 MILLIGRAM(S): at 06:38

## 2019-07-07 RX ADMIN — Medication 81 MILLIGRAM(S): at 11:29

## 2019-07-07 RX ADMIN — Medication 4 UNIT(S): at 17:16

## 2019-07-07 RX ADMIN — Medication 50 MILLIGRAM(S): at 14:57

## 2019-07-07 NOTE — PROGRESS NOTE ADULT - ASSESSMENT
73M with PMHx of CAD s/p PCI, DMII, HTN, Anxiety, presents from assisted living facility for episode of lightheadedness and worsening SOB.  A/P:   Acute HFrEF  EF 26% down from 55% in 2017  NST with small reversible defect, f/u cards for possible cath tomorrow.   c/b NSVT and SVT; EPS following, no further episodes, controlled on lopressor 50 tid  Continue Metoprolol, Lasix 40 po daily and Lisinopril,     Chronic Pancytopenia  Heme/onc consult appreciated  Bone marrow Bx O/P: Appt: 7/29/19 with Dr Tompkins   B12 Slightly elevated, Folate: wnl   US of spleen  and Liver: Unremarkable     Painless Hematuria with Clots: 7/6/19  Urology evaluated the patient, no further workup needed for now.   Likely from thrombocytopenia and     CAD s/p PCI / HTN   Continue ASA, Lipitor and Metoprolol  off Plavix as per outpatient cardiology     DMII:   monitor FS before meals and at bedtime   -c/w  insulin regimen    -HGBA1C: 6.7%, Lipids: wnl   #Progress Note Handoff:  Pending (specify):  cardiac cath tomorrow.   Family discussion: with patient and his son at bedside, he agreed with plan  Disposition: Home once medically stable.

## 2019-07-07 NOTE — PROGRESS NOTE ADULT - SUBJECTIVE AND OBJECTIVE BOX
SHANE ASHTON  73y  Male      Patient is a 73y old  Male who presents with a chief complaint of Lightheadedness, and SOB (2019 15:33)      INTERVAL HPI/OVERNIGHT EVENTS:  He feels ok, no chest pain or SOB  Vital Signs Last 24 Hrs  T(C): 36.1 (2019 13:46), Max: 36.3 (2019 05:51)  T(F): 96.9 (2019 13:46), Max: 97.3 (2019 05:51)  HR: 104 (2019 13:46) (102 - 110)  BP: 123/61 (2019 13:46) (122/79 - 177/87)  BP(mean): --  RR: 18 (2019 13:46) (18 - 18)  SpO2: 97% (2019 08:25) (97% - 97%)      19 @ 07:01  -  19 @ 07:00  --------------------------------------------------------  IN: 350 mL / OUT: 1350 mL / NET: -1000 mL    19 @ 07:01  -  19 @ 15:12  --------------------------------------------------------  IN: 0 mL / OUT: 900 mL / NET: -900 mL            Consultant(s) Notes Reviewed:  [x ] YES  [ ] NO          MEDICATIONS  (STANDING):  aspirin  chewable 81 milliGRAM(s) Oral daily  atorvastatin 20 milliGRAM(s) Oral at bedtime  chlorhexidine 4% Liquid 1 Application(s) Topical <User Schedule>  dextrose 5%. 1000 milliLiter(s) (50 mL/Hr) IV Continuous <Continuous>  dextrose 50% Injectable 12.5 Gram(s) IV Push once  dextrose 50% Injectable 25 Gram(s) IV Push once  dextrose 50% Injectable 25 Gram(s) IV Push once  diazepam    Tablet 2 milliGRAM(s) Oral at bedtime  enoxaparin Injectable 40 milliGRAM(s) SubCutaneous daily  famotidine    Tablet 40 milliGRAM(s) Oral at bedtime  furosemide    Tablet 40 milliGRAM(s) Oral daily  insulin glargine Injectable (LANTUS) 12 Unit(s) SubCutaneous at bedtime  insulin lispro (HumaLOG) corrective regimen sliding scale   SubCutaneous three times a day before meals  insulin lispro Injectable (HumaLOG) 4 Unit(s) SubCutaneous three times a day before meals  lisinopril 10 milliGRAM(s) Oral daily  metoprolol tartrate 50 milliGRAM(s) Oral every 8 hours  tamsulosin 0.4 milliGRAM(s) Oral at bedtime    MEDICATIONS  (PRN):  dextrose 40% Gel 15 Gram(s) Oral once PRN Blood Glucose LESS THAN 70 milliGRAM(s)/deciliter  glucagon  Injectable 1 milliGRAM(s) IntraMuscular once PRN Glucose LESS THAN 70 milligrams/deciliter      LABS                          8.1    2.03  )-----------( 71       ( 2019 05:40 )             26.7         141  |  104  |  25<H>  ----------------------------<  177<H>  3.5   |  26  |  1.1    Ca    8.4<L>      2019 05:40        Urinalysis Basic - ( 2019 14:00 )    Color: Yellow / Appearance: Clear / S.010 / pH: x  Gluc: x / Ketone: Negative  / Bili: Negative / Urobili: 1.0 mg/dL   Blood: x / Protein: 30 mg/dL / Nitrite: Negative   Leuk Esterase: Negative / RBC: >50 /HPF / WBC x   Sq Epi: x / Non Sq Epi: x / Bacteria: x      PT/INR - ( 2019 05:40 )   PT: 13.40 sec;   INR: 1.17 ratio           Lactate Trend        CAPILLARY BLOOD GLUCOSE      POCT Blood Glucose.: 227 mg/dL (2019 11:17)        RADIOLOGY & ADDITIONAL TESTS:    Imaging Personally Reviewed:  [ ] YES  [ ] NO    HEALTH ISSUES - PROBLEM Dx:        PHYSICAL EXAM:  GENERAL: NAD, well-developed  HEAD:  Atraumatic, Normocephalic  EYES: EOMI, PERRLA, conjunctiva and sclera clear  NECK: Supple, No JVD  CHEST/LUNG: Clear to auscultation bilaterally; No wheeze  HEART: Regular rate and rhythm; S1 S2  ABDOMEN: Soft, Nontender, Nondistended; Bowel sounds present  EXTREMITIES:  2+ Peripheral Pulses, No clubbing, cyanosis, or edema  PSYCH: AAOx3  NEUROLOGY: non-focal  SKIN: No rashes or lesions

## 2019-07-08 LAB
-  AMPICILLIN: SIGNIFICANT CHANGE UP
-  CIPROFLOXACIN: SIGNIFICANT CHANGE UP
-  LEVOFLOXACIN: SIGNIFICANT CHANGE UP
-  NITROFURANTOIN: SIGNIFICANT CHANGE UP
-  TETRACYCLINE: SIGNIFICANT CHANGE UP
-  VANCOMYCIN: SIGNIFICANT CHANGE UP
ANION GAP SERPL CALC-SCNC: 9 MMOL/L — SIGNIFICANT CHANGE UP (ref 7–14)
BASOPHILS # BLD AUTO: 0.01 K/UL — SIGNIFICANT CHANGE UP (ref 0–0.2)
BASOPHILS NFR BLD AUTO: 0.4 % — SIGNIFICANT CHANGE UP (ref 0–1)
BUN SERPL-MCNC: 24 MG/DL — HIGH (ref 10–20)
CALCIUM SERPL-MCNC: 8.7 MG/DL — SIGNIFICANT CHANGE UP (ref 8.5–10.1)
CHLORIDE SERPL-SCNC: 105 MMOL/L — SIGNIFICANT CHANGE UP (ref 98–110)
CO2 SERPL-SCNC: 31 MMOL/L — SIGNIFICANT CHANGE UP (ref 17–32)
CREAT SERPL-MCNC: 1.1 MG/DL — SIGNIFICANT CHANGE UP (ref 0.7–1.5)
CULTURE RESULTS: SIGNIFICANT CHANGE UP
EOSINOPHIL # BLD AUTO: 0.08 K/UL — SIGNIFICANT CHANGE UP (ref 0–0.7)
EOSINOPHIL NFR BLD AUTO: 3.5 % — SIGNIFICANT CHANGE UP (ref 0–8)
GLUCOSE BLDC GLUCOMTR-MCNC: 177 MG/DL — HIGH (ref 70–99)
GLUCOSE BLDC GLUCOMTR-MCNC: 194 MG/DL — HIGH (ref 70–99)
GLUCOSE BLDC GLUCOMTR-MCNC: 200 MG/DL — HIGH (ref 70–99)
GLUCOSE SERPL-MCNC: 179 MG/DL — HIGH (ref 70–99)
HCT VFR BLD CALC: 27.2 % — LOW (ref 42–52)
HGB BLD-MCNC: 8.1 G/DL — LOW (ref 14–18)
IMM GRANULOCYTES NFR BLD AUTO: 0.4 % — HIGH (ref 0.1–0.3)
LYMPHOCYTES # BLD AUTO: 0.99 K/UL — LOW (ref 1.2–3.4)
LYMPHOCYTES # BLD AUTO: 43 % — SIGNIFICANT CHANGE UP (ref 20.5–51.1)
MCHC RBC-ENTMCNC: 29.1 PG — SIGNIFICANT CHANGE UP (ref 27–31)
MCHC RBC-ENTMCNC: 29.8 G/DL — LOW (ref 32–37)
MCV RBC AUTO: 97.8 FL — HIGH (ref 80–94)
METHOD TYPE: SIGNIFICANT CHANGE UP
MONOCYTES # BLD AUTO: 0.64 K/UL — HIGH (ref 0.1–0.6)
MONOCYTES NFR BLD AUTO: 27.8 % — HIGH (ref 1.7–9.3)
NEUTROPHILS # BLD AUTO: 0.57 K/UL — LOW (ref 1.4–6.5)
NEUTROPHILS NFR BLD AUTO: 24.9 % — LOW (ref 42.2–75.2)
NRBC # BLD: 1 /100 WBCS — HIGH (ref 0–0)
ORGANISM # SPEC MICROSCOPIC CNT: SIGNIFICANT CHANGE UP
ORGANISM # SPEC MICROSCOPIC CNT: SIGNIFICANT CHANGE UP
PLATELET # BLD AUTO: 57 K/UL — LOW (ref 130–400)
POTASSIUM SERPL-MCNC: 4 MMOL/L — SIGNIFICANT CHANGE UP (ref 3.5–5)
POTASSIUM SERPL-SCNC: 4 MMOL/L — SIGNIFICANT CHANGE UP (ref 3.5–5)
RBC # BLD: 2.78 M/UL — LOW (ref 4.7–6.1)
RBC # FLD: 19.1 % — HIGH (ref 11.5–14.5)
SODIUM SERPL-SCNC: 145 MMOL/L — SIGNIFICANT CHANGE UP (ref 135–146)
SPECIMEN SOURCE: SIGNIFICANT CHANGE UP
WBC # BLD: 2.3 K/UL — LOW (ref 4.8–10.8)
WBC # FLD AUTO: 2.3 K/UL — LOW (ref 4.8–10.8)

## 2019-07-08 PROCEDURE — 99233 SBSQ HOSP IP/OBS HIGH 50: CPT

## 2019-07-08 RX ORDER — VERAPAMIL HCL 240 MG
40 CAPSULE, EXTENDED RELEASE PELLETS 24 HR ORAL DAILY
Refills: 0 | Status: DISCONTINUED | OUTPATIENT
Start: 2019-07-08 | End: 2019-07-08

## 2019-07-08 RX ORDER — SPIRONOLACTONE 25 MG/1
25 TABLET, FILM COATED ORAL DAILY
Refills: 0 | Status: DISCONTINUED | OUTPATIENT
Start: 2019-07-08 | End: 2019-07-10

## 2019-07-08 RX ADMIN — Medication 1: at 16:59

## 2019-07-08 RX ADMIN — Medication 4 UNIT(S): at 16:59

## 2019-07-08 RX ADMIN — Medication 50 MILLIGRAM(S): at 23:09

## 2019-07-08 RX ADMIN — INSULIN GLARGINE 12 UNIT(S): 100 INJECTION, SOLUTION SUBCUTANEOUS at 23:10

## 2019-07-08 RX ADMIN — Medication 81 MILLIGRAM(S): at 08:57

## 2019-07-08 RX ADMIN — Medication 50 MILLIGRAM(S): at 14:44

## 2019-07-08 RX ADMIN — LISINOPRIL 10 MILLIGRAM(S): 2.5 TABLET ORAL at 06:26

## 2019-07-08 RX ADMIN — Medication 40 MILLIGRAM(S): at 06:26

## 2019-07-08 RX ADMIN — Medication 4 UNIT(S): at 11:24

## 2019-07-08 RX ADMIN — TAMSULOSIN HYDROCHLORIDE 0.4 MILLIGRAM(S): 0.4 CAPSULE ORAL at 23:09

## 2019-07-08 RX ADMIN — Medication 2: at 11:22

## 2019-07-08 RX ADMIN — ATORVASTATIN CALCIUM 20 MILLIGRAM(S): 80 TABLET, FILM COATED ORAL at 23:09

## 2019-07-08 RX ADMIN — FAMOTIDINE 40 MILLIGRAM(S): 10 INJECTION INTRAVENOUS at 23:09

## 2019-07-08 RX ADMIN — Medication 50 MILLIGRAM(S): at 06:26

## 2019-07-08 RX ADMIN — SPIRONOLACTONE 25 MILLIGRAM(S): 25 TABLET, FILM COATED ORAL at 14:44

## 2019-07-08 RX ADMIN — Medication 2 MILLIGRAM(S): at 23:14

## 2019-07-08 RX ADMIN — CHLORHEXIDINE GLUCONATE 1 APPLICATION(S): 213 SOLUTION TOPICAL at 06:26

## 2019-07-08 NOTE — CHART NOTE - NSCHARTNOTEFT_GEN_A_CORE
Preliminary Cardiac Catheterization Post-Procedure Report:07-08-19 @ 10:49    Procedure Performed:  [X ] Left Heart Catheterization  [ ] Right Heart Catheterization  [ ] Percutaneous Coronary Intervention    Primary Physician: Dr. Gloria MD  Assistant(s): Dr. Dereje MD and Dr. Jeremi MD    Preliminary Procedure Summary (Official full report to follow)    Pre-procedure diagnosis: NSTEMI  Post Procedure Diagnosis/Impression:     Left Heart Catheterization:  approximate EF%:   [ ] Normal Coronary Arteries  [ ] Luminal Irregularities  [X ] non-obstructive CAD  [ ] vessel coronary artery disease       Anesthesia Type  [x] conscious sedation  [x] local/regional anesthesia  [  ] general anesthesia    Estimated Blood Loss  [x ] less than 30 ml    Amount of Contrast used:  60 ml    Access  [ ] Rt. Femoral A  [ ] Rt. Femoral V  [X ] Rt. Radial A  [ ] Rt. Brachial V    Condition of patient after procedure  [x] stable  [  ] guarded  [  ] satisfactory     CATH SUMMARY/FINDINGS:    Dominance:   [ ] Right  [ ] Left  [X] Co-dominant                  Coronary Circulation:  The coronary circulation is co-dominant. There was no angiographic evidence for occlusive coronary artery disease. Left main: The vessel was medium to large sized. Angiography showed no evidence of disease. LAD: The vessel was medium to large sized. Angiography showed minor luminal irregularities with no flow limiting lesions. There was a 10 % stenosis at the site of a prior stent. Distal LAD: The vessel was medium sized. Angiography showed the vessel to wrap around the cardiac apex and minor luminal irregularities with no flow limiting lesions. Circumflex: The vessel was medium sized. Angiography showed moderate atherosclerosis with no clinical lesions appreciated. Proximal circumflex: There was a diffuse 10 % stenosis at the site of a prior stent. Distal circumflex: The vessel was medium sized. Angiography showed mild atherosclerosis with no flow limiting lesions. 1st obtuse marginal: There was a diffuse 40 % stenosis after the stent RCA: The vessel was medium sized. Angiography showed mild atherosclerosis with no flow limiting lesions. Mid RCA: The vessel was medium sized. Angiography showed moderate atherosclerosis with no clinical lesions appreciated. Distal RCA: The vessel was medium sized. Angiography showed minor luminal irregularities with no flow limiting lesions. Right PDA: The vessel was medium sized. Angiography showed minor luminal irregularities with no flow limiting lesions.     Specimens obtained: n/a      Follow-up Care:  [ ] D/C Home today  [X ] Return to In-patient bed  [ ] Admit to:  [ ] Return for staged procedure:  [ ] CT Surgery consult called  [ ] DAPT, statin, BB, ACEI  [x ] intensive medical management  [ ] EPS/nephrology evaluation requested Preliminary Cardiac Catheterization Post-Procedure Report:07-08-19 @ 10:49    Procedure Performed:  [X ] Left Heart Catheterization  [ ] Right Heart Catheterization  [ ] Percutaneous Coronary Intervention    Primary Physician: Dr. Gloria MD  Assistant(s): Dr. Dereje MD and Dr. Jeremi MD    Preliminary Procedure Summary (Official full report to follow)    Pre-procedure diagnosis: NSTEMI  Post Procedure Diagnosis/Impression:     Left Heart Catheterization:  approximate EF%:   [ ] Normal Coronary Arteries  [ ] Luminal Irregularities  [X ] non-obstructive CAD  [ ] vessel coronary artery disease       Anesthesia Type  [x] conscious sedation  [x] local/regional anesthesia  [  ] general anesthesia    Estimated Blood Loss  [x ] less than 30 ml    Amount of Contrast used:  60 ml    Access  [ ] Rt. Femoral A  [ ] Rt. Femoral V  [X ] Rt. Radial A  [ ] Rt. Brachial V    Condition of patient after procedure  [x] stable  [  ] guarded  [  ] satisfactory     CATH SUMMARY/FINDINGS:    Dominance:   [ ] Right  [ ] Left  [X] Co-dominant                  Coronary Circulation:  The coronary circulation is co-dominant. There was no angiographic evidence for occlusive coronary artery disease. Left main: The vessel was medium to large sized. Angiography showed no evidence of disease. LAD: The vessel was medium to large sized. Angiography showed minor luminal irregularities with no flow limiting lesions. There was a 10 % stenosis at the site of a prior stent. Distal LAD: The vessel was medium sized. Angiography showed the vessel to wrap around the cardiac apex and minor luminal irregularities with no flow limiting lesions. Circumflex: The vessel was medium sized. Angiography showed moderate atherosclerosis with no clinical lesions appreciated. Proximal circumflex: There was a diffuse 10 % stenosis at the site of a prior stent. Distal circumflex: The vessel was medium sized. Angiography showed mild atherosclerosis with no flow limiting lesions. 1st obtuse marginal: There was a diffuse 40 % stenosis after the stent RCA: The vessel was medium sized. Angiography showed mild atherosclerosis with no flow limiting lesions. Mid RCA: The vessel was medium sized. Angiography showed moderate atherosclerosis with no clinical lesions appreciated. Distal RCA: The vessel was medium sized. Angiography showed minor luminal irregularities with no flow limiting lesions. Right PDA: The vessel was medium sized. Angiography showed minor luminal irregularities with no flow limiting lesions.     Specimens obtained: n/a      Follow-up Care:  [ ] D/C Home today  [X ] Return to In-patient bed  [ ] Admit to:  [ ] Return for staged procedure:  [ ] CT Surgery consult called  [ ] DAPT, statin, BB, ACEI  [x ] intensive medical management  [ ] EPS/nephrology evaluation requested    continue with ASA 81 mg, D/C plavix  closely monitor platelets  no intervention needed  plan is medical thgerapy with Lisinopril, Metoprolol, ASA, Lipitor 80, [place] 25 mg daily, Lasix 40 mg daily, life vest for now  if Renal function remains steady then in the recent future will switch to Entresto

## 2019-07-08 NOTE — PROGRESS NOTE ADULT - SUBJECTIVE AND OBJECTIVE BOX
INTERVAL HPI/OVERNIGHT EVENTS:  Patient with new ICMP EF 53% from 50% '17  after positive stress test over the weekend patient underwent LHC today that revealed non-obstructive CAD.  Tele remains with multiple runs of SVT, frequent PVC and short runs of NSVT despite Metoprolol 50mg q8h    LHC:  The coronary circulation is co-dominant. There was no angiographic evidence for occlusive coronary artery disease.   Left main: The vessel was medium to large sized. Angiography showed no evidence of disease.   LAD: The vessel was medium to large sized. Angiography showed minor luminal irregularities with no flow limiting lesions. There was a 10 % stenosis at the site of a prior stent. Distal LAD: The vessel was medium sized. Angiography showed the vessel to wrap around the cardiac apex and minor luminal irregularities with no flow limiting lesions.   Circumflex: The vessel was medium sized. Angiography showed moderate atherosclerosis with no clinical lesions appreciated. Proximal circumflex: There was a diffuse 10 % stenosis at the site of a prior stent. Distal circumflex: The vessel was medium sized. Angiography showed mild atherosclerosis with no flow limiting lesions. 1st obtuse marginal: There was a diffuse 40 % stenosis after the stent   RCA: The vessel was medium sized. Angiography showed mild atherosclerosis with no flow limiting lesions. Mid RCA: The vessel was medium sized. Angiography showed moderate atherosclerosis with no clinical lesions appreciated. Distal RCA: The vessel was medium sized. Angiography showed minor luminal irregularities with no flow limiting lesions. Right PDA: The vessel was medium sized. Angiography showed minor luminal irregularities with no flow limiting lesions.     NM Nuclear Stress Pharmacologic Multiple (07.06.19 @ 10:54) >  1. Small-size reversible defect in the anterobasal wall of the left ventricle consistent with ischemia.  2. Moderate global hypokinesia.  3. Calculated left ventricular ejection fraction of <35 % which is below the lower limits of normal of 50%.    Left ventricular ejection fraction estimated from echocardiogram dated 7/3/19 was 26%.  < end of copied text >      MEDICATIONS  (STANDING):  aspirin  chewable 81 milliGRAM(s) Oral daily  atorvastatin 20 milliGRAM(s) Oral at bedtime  chlorhexidine 4% Liquid 1 Application(s) Topical <User Schedule>  dextrose 5%. 1000 milliLiter(s) (50 mL/Hr) IV Continuous <Continuous>  dextrose 50% Injectable 12.5 Gram(s) IV Push once  dextrose 50% Injectable 25 Gram(s) IV Push once  dextrose 50% Injectable 25 Gram(s) IV Push once  diazepam    Tablet 2 milliGRAM(s) Oral at bedtime  enoxaparin Injectable 40 milliGRAM(s) SubCutaneous daily  famotidine    Tablet 40 milliGRAM(s) Oral at bedtime  furosemide    Tablet 40 milliGRAM(s) Oral daily  insulin glargine Injectable (LANTUS) 12 Unit(s) SubCutaneous at bedtime  insulin lispro (HumaLOG) corrective regimen sliding scale   SubCutaneous three times a day before meals  insulin lispro Injectable (HumaLOG) 4 Unit(s) SubCutaneous three times a day before meals  lisinopril 10 milliGRAM(s) Oral daily  metoprolol tartrate 50 milliGRAM(s) Oral every 8 hours  spironolactone 25 milliGRAM(s) Oral daily  tamsulosin 0.4 milliGRAM(s) Oral at bedtime    MEDICATIONS  (PRN):  dextrose 40% Gel 15 Gram(s) Oral once PRN Blood Glucose LESS THAN 70 milliGRAM(s)/deciliter  glucagon  Injectable 1 milliGRAM(s) IntraMuscular once PRN Glucose LESS THAN 70 milligrams/deciliter      Allergies    No Known Allergies    Intolerances        REVIEW OF SYSTEMS    [x] A ten-point review of systems was otherwise negative except as noted.      Vital Signs Last 24 Hrs  T(C): 36.9 (08 Jul 2019 14:44), Max: 36.9 (08 Jul 2019 14:44)  T(F): 98.4 (08 Jul 2019 14:44), Max: 98.4 (08 Jul 2019 14:44)  HR: 104 (08 Jul 2019 14:44) (97 - 104)  BP: 117/75 (08 Jul 2019 14:44) (94/58 - 131/71)  BP(mean): --  RR: 17 (08 Jul 2019 14:44) (17 - 18)  SpO2: 95% (08 Jul 2019 05:53) (95% - 97%)    07-07-19 @ 07:01  -  07-08-19 @ 07:00  --------------------------------------------------------  IN: 0 mL / OUT: 1500 mL / NET: -1500 mL        PHYSICAL EXAM:    GENERAL: In no apparent distress, well nourished, and hydrated.  HEART: Regular rate and rhythm; No murmur; NO rubs, or gallops.  PULMONARY: Clear to auscultation and percussion.  Normal expansion/effort. No rales, wheezing, or rhonchi bilaterally.  ABDOMEN: Soft, Nontender, Nondistended; Bowel sounds present  EXTREMITIES:  Extremities warm, pink, well-perfused, 2+ Peripheral Pulses, No clubbing, cyanosis, or edema  NEUROLOGICAL: alert & oriented x 3, no focal deficits, PERRLA, EOMI    LABS:                        8.1    2.30  )-----------( 57       ( 08 Jul 2019 05:31 )             27.2     07-08    145  |  105  |  24<H>  ----------------------------<  179<H>  4.0   |  31  |  1.1    Ca    8.7      08 Jul 2019 05:31      PT/INR - ( 07 Jul 2019 05:40 )   PT: 13.40 sec;   INR: 1.17 ratio               RADIOLOGY & ADDITIONAL TESTS: INTERVAL HPI/OVERNIGHT EVENTS:  Patient with new cardiomyopathy with drop in EF to < 35 % from from 50% in 2017  after positive stress test over the weekend patient underwent LHC today that revealed non-obstructive CAD.  Tele remains with multiple runs of SVT, frequent PVC and short runs of NSVT despite Metoprolol 50mg q8h    ECHO 7.3.19  < from: Transthoracic Echocardiogram (07.03.19 @ 09:56) >   1. LV Ejection Fraction by Cheng's Method with a biplane EF of 26 %.   2. Spectral Doppler shows impaired relaxation pattern of left   ventricular myocardial filling (Grade I diastolic dysfunction).   3. Mild to moderate mitral valve regurgitation.   4. Mitral annular calcification.   5. Thickening and calcification of the anterior and posterior mitral   valve leaflets.   6. Aortic valve thickening with decreased leaflet opening.   7. Estimated pulmonary artery systolic pressure is 41.5 mmHg assuming a   right atrial pressure of 5 mmHg, which is consistent with mild pulmonary   hypertension.   8. Peak transaortic gradient equals 16.2 mmHg, mean transaortic gradient   equals 9.9 mmHg, the calculated aortic valve area equals 1.64 cm²by the   continuity equation consistent with mild aortic stenosis.    < end of copied text >    [facility] 7.8.19  The coronary circulation is co-dominant. There was no angiographic evidence for occlusive coronary artery disease.   Left main: The vessel was medium to large sized. Angiography showed no evidence of disease.   LAD: The vessel was medium to large sized. Angiography showed minor luminal irregularities with no flow limiting lesions. There was a 10 % stenosis at the site of a prior stent. Distal LAD: The vessel was medium sized. Angiography showed the vessel to wrap around the cardiac apex and minor luminal irregularities with no flow limiting lesions.   Circumflex: The vessel was medium sized. Angiography showed moderate atherosclerosis with no clinical lesions appreciated. Proximal circumflex: There was a diffuse 10 % stenosis at the site of a prior stent. Distal circumflex: The vessel was medium sized. Angiography showed mild atherosclerosis with no flow limiting lesions. 1st obtuse marginal: There was a diffuse 40 % stenosis after the stent   RCA: The vessel was medium sized. Angiography showed mild atherosclerosis with no flow limiting lesions. Mid RCA: The vessel was medium sized. Angiography showed moderate atherosclerosis with no clinical lesions appreciated. Distal RCA: The vessel was medium sized. Angiography showed minor luminal irregularities with no flow limiting lesions. Right PDA: The vessel was medium sized. Angiography showed minor luminal irregularities with no flow limiting lesions.     NM Nuclear Stress Pharmacologic Multiple (07.06.19 @ 10:54) >  1. Small-size reversible defect in the anterobasal wall of the left ventricle consistent with ischemia.  2. Moderate global hypokinesia.  3. Calculated left ventricular ejection fraction of <35 % which is below the lower limits of normal of 50%.    Left ventricular ejection fraction estimated from echocardiogram dated 7/3/19 was 26%.  < end of copied text >      MEDICATIONS  (STANDING):  aspirin  chewable 81 milliGRAM(s) Oral daily  atorvastatin 20 milliGRAM(s) Oral at bedtime  chlorhexidine 4% Liquid 1 Application(s) Topical <User Schedule>  dextrose 5%. 1000 milliLiter(s) (50 mL/Hr) IV Continuous <Continuous>  dextrose 50% Injectable 12.5 Gram(s) IV Push once  dextrose 50% Injectable 25 Gram(s) IV Push once  dextrose 50% Injectable 25 Gram(s) IV Push once  diazepam    Tablet 2 milliGRAM(s) Oral at bedtime  enoxaparin Injectable 40 milliGRAM(s) SubCutaneous daily  famotidine    Tablet 40 milliGRAM(s) Oral at bedtime  furosemide    Tablet 40 milliGRAM(s) Oral daily  insulin glargine Injectable (LANTUS) 12 Unit(s) SubCutaneous at bedtime  insulin lispro (HumaLOG) corrective regimen sliding scale   SubCutaneous three times a day before meals  insulin lispro Injectable (HumaLOG) 4 Unit(s) SubCutaneous three times a day before meals  lisinopril 10 milliGRAM(s) Oral daily  metoprolol tartrate 50 milliGRAM(s) Oral every 8 hours  spironolactone 25 milliGRAM(s) Oral daily  tamsulosin 0.4 milliGRAM(s) Oral at bedtime    MEDICATIONS  (PRN):  dextrose 40% Gel 15 Gram(s) Oral once PRN Blood Glucose LESS THAN 70 milliGRAM(s)/deciliter  glucagon  Injectable 1 milliGRAM(s) IntraMuscular once PRN Glucose LESS THAN 70 milligrams/deciliter      Allergies    No Known Allergies    Intolerances        REVIEW OF SYSTEMS    [x] A ten-point review of systems was otherwise negative except as noted.      Vital Signs Last 24 Hrs  T(C): 36.9 (08 Jul 2019 14:44), Max: 36.9 (08 Jul 2019 14:44)  T(F): 98.4 (08 Jul 2019 14:44), Max: 98.4 (08 Jul 2019 14:44)  HR: 104 (08 Jul 2019 14:44) (97 - 104)  BP: 117/75 (08 Jul 2019 14:44) (94/58 - 131/71)  BP(mean): --  RR: 17 (08 Jul 2019 14:44) (17 - 18)  SpO2: 95% (08 Jul 2019 05:53) (95% - 97%)    07-07-19 @ 07:01  -  07-08-19 @ 07:00  --------------------------------------------------------  IN: 0 mL / OUT: 1500 mL / NET: -1500 mL        PHYSICAL EXAM:  GENERAL: In no apparent distress, well nourished, and hydrated.  HEART: Regular rate and rhythm; No murmur; NO rubs, or gallops.  PULMONARY: Clear to auscultation and percussion.  Normal expansion/effort. No rales, wheezing, or rhonchi bilaterally.  ABDOMEN: Soft, Nontender, Nondistended; Bowel sounds present  EXTREMITIES:  Extremities warm, pink, well-perfused, 2+ Peripheral Pulses, No clubbing, cyanosis, or edema  NEUROLOGICAL: alert & oriented x 3, no focal deficits, PERRLA, EOMI    LABS:                        8.1    2.30  )-----------( 57       ( 08 Jul 2019 05:31 )             27.2     07-08    145  |  105  |  24<H>  ----------------------------<  179<H>  4.0   |  31  |  1.1    Ca    8.7      08 Jul 2019 05:31      PT/INR - ( 07 Jul 2019 05:40 )   PT: 13.40 sec;   INR: 1.17 ratio               RADIOLOGY & ADDITIONAL TESTS:

## 2019-07-08 NOTE — CHART NOTE - NSCHARTNOTEFT_GEN_A_CORE
PREOPERATIVE DAY OF PROCEDURE EVALUATION:  I have personally seen and examined the patient.  I agree with the history and physical which I have reviewed and noted any changes below.  (Signed electronically by dr samuels)  07-08-19 @ 10:45

## 2019-07-08 NOTE — DIETITIAN INITIAL EVALUATION ADULT. - ENERGY NEEDS
calorie 1771-1932kcal (MSJ x 1.1-1.2 AF) for overweight BMI  protein 86-13g (1-1.2g/kg CBW)  fluid per LIP

## 2019-07-08 NOTE — DIETITIAN INITIAL EVALUATION ADULT. - PHYSICAL APPEARANCE
overweight/BMI 28.5, alert, somewhat confused, skin: ecchymosis (BS 17), 1+ generalized edema, 2+ to l, R arm

## 2019-07-08 NOTE — DIETITIAN INITIAL EVALUATION ADULT. - CONTINUE CURRENT NUTRITION CARE PLAN
yes/Continue DASH/TLC diet and add consistent carb w/snack diet modifier Continue DASH/TLC diet and add consistent carb w/snack diet modifier and dysphagia 3 soft thin liq per SLP recs/yes

## 2019-07-08 NOTE — DIETITIAN INITIAL EVALUATION ADULT. - OTHER INFO
P/w: Lightheadedness, and SOB. Acute HFrEF. S/p cardiac cath today. Chronic Pancytopenia: heme/onc following. CAD s/p PCI / HTN. DMII: insulin regimen. P/w: Lightheadedness, and SOB. Acute HFrEF. S/p cardiac cath today. Chronic Pancytopenia: heme/onc following. CAD s/p PCI / HTN. DMII: insulin regimen. SLP rec dysphagia 3 soft thin liquids.

## 2019-07-08 NOTE — PROGRESS NOTE ADULT - SUBJECTIVE AND OBJECTIVE BOX
74 y/o male initially admitted with lightheadedness and sob, also with traumatic ocampo placement with small clots, on ac with mild hematuria,  as per pca and pt no hematuria x 2 days, pt voiding normally with normal colored urine.    Vital Signs Last 24 Hrs  T(C): 36.8 (08 Jul 2019 05:53), Max: 36.8 (08 Jul 2019 05:53)  T(F): 98.2 (08 Jul 2019 05:53), Max: 98.2 (08 Jul 2019 05:53)  HR: 99 (08 Jul 2019 05:53) (97 - 104)  BP: 131/71 (08 Jul 2019 05:53) (94/58 - 131/71)  BP(mean): --  RR: 18 (08 Jul 2019 05:53) (18 - 18)  SpO2: 95% (08 Jul 2019 05:53) (95% - 97%)    On exam  pt nad  abd: soft nt nd, no palpable bladder  gu no ocampo present    a/p 74 y/o male on ac with resolved hematuria  - continue to monitor voiding  - continue current management  - no acute urologic intervention at this time

## 2019-07-08 NOTE — PROGRESS NOTE ADULT - ATTENDING COMMENTS
Cardiologist: Dr. Castro  Covering for Dr. Potts    74 yo M with history of MI, CAD s/p PCI, DM II, HTN, HL, admitted from assisted living with lightheadedness and worsening SOB, found to have acute CHF exacerbation.     Impression  Cardiomyopathy  Acute systolic heart failure  Multiple runs of nonsustained SVT  One episode of NSVT with neg ischemic workup    Recommend  - Plan for wearable defibrillator for cardiomyopathy ([employer] rep was contacted)  - In lieu of adding CCB, would recommend increasing Metoprolol and changing it to XL for heart failure  - Consider Entresto over Lisinopril  - Maintain electrolytes K>4.0 Mg >2.0  - f/u as outpt with Dr Potts in 4-6wk  - Repeat Echo in 3 mos, while on appropriate HF regimen. If EF still < 35% in 3 months, pt will need an ICD.

## 2019-07-08 NOTE — PROGRESS NOTE ADULT - ASSESSMENT
73M with PMHx of CAD s/p PCI, DMII, HTN, Anxiety, presents from assisted living facility for episode of lightheadedness and worsening SOB.    Acute HFrEF  -CXR:  with perihilar opacities consistent with CHF  - BNP 7990, Troponin negx2   - EKG with sinus tachycardia, -TSH: wnl   -ECHO: EF: 26%, GIDD,mild-Mod MR, Mild Pulm HTN, Mild Aortic Stenosis       (EF 26% down from 55% in 2017)   -NST with small reversible defect  -S/p LHC: 7/8/19: 40% occlusion after RCA graft, Will need aggressive medical therapy   -c/w ASA, Metoprolol, Aldactone, Lipitor, Lisinopril,  Lasix   -PLan: f/up with EP concerning Life vest Placement     Chronic Pancytopenia  -Heme/onc consult appreciated  - Bone marrow Bx O/P: Appt: 7/29/19 with Dr Tompkins   -B12 Slightly elevated, Folate: wnl   -US of spleen  and Liver: Unremarkable     Painless Hematuria with Clots: 7/6/19: resolved   -Urology consulted: Likely from [facility]     CAD s/p PCI / HTN   - c/w asa, statin, bb, ACEI  - off Plavix as per outpatient cardiology     DMII: Not at goal   - monitor FS before meals and at bedtime   -c/w  insulin regimen    -HGBA1C: 6.7%, Lipids: wnl     Anxiety  - c/w valium home dose     Dispo: from assisted living   DVT PPx; Lovenox sc   GI PPX: not indicated

## 2019-07-08 NOTE — DIETITIAN INITIAL EVALUATION ADULT. - RD TO REMAIN AVAILABLE
yes/RD to monitor energy intake, body composition, NFPF, glucose profile yes/RD to monitor diet order,  energy intake, body composition, NFPF, glucose profile

## 2019-07-08 NOTE — PROGRESS NOTE ADULT - SUBJECTIVE AND OBJECTIVE BOX
SHANE ASHTON  73y  Male      Patient is a 73y old  Male who presents with a chief complaint of Lightheadedness, and SOB (06 Jul 2019 15:33)    Vital Signs Last 24 Hrs  T(C): 36.8 (08 Jul 2019 05:53), Max: 36.8 (08 Jul 2019 05:53)  T(F): 98.2 (08 Jul 2019 05:53), Max: 98.2 (08 Jul 2019 05:53)  HR: 99 (08 Jul 2019 05:53) (97 - 99)  BP: 131/71 (08 Jul 2019 05:53) (94/58 - 131/71)  RR: 18 (08 Jul 2019 05:53) (18 - 18)  SpO2: 95% (08 Jul 2019 05:53) (95% - 97%)            Consultant(s) Notes Reviewed:  [x ] YES  [ ] NO          MEDICATIONS  (STANDING):  aspirin  chewable 81 milliGRAM(s) Oral daily  atorvastatin 20 milliGRAM(s) Oral at bedtime  chlorhexidine 4% Liquid 1 Application(s) Topical <User Schedule>  dextrose 5%. 1000 milliLiter(s) (50 mL/Hr) IV Continuous <Continuous>  dextrose 50% Injectable 12.5 Gram(s) IV Push once  dextrose 50% Injectable 25 Gram(s) IV Push once  dextrose 50% Injectable 25 Gram(s) IV Push once  diazepam    Tablet 2 milliGRAM(s) Oral at bedtime  enoxaparin Injectable 40 milliGRAM(s) SubCutaneous daily  famotidine    Tablet 40 milliGRAM(s) Oral at bedtime  furosemide    Tablet 40 milliGRAM(s) Oral daily  insulin glargine Injectable (LANTUS) 12 Unit(s) SubCutaneous at bedtime  insulin lispro (HumaLOG) corrective regimen sliding scale   SubCutaneous three times a day before meals  insulin lispro Injectable (HumaLOG) 4 Unit(s) SubCutaneous three times a day before meals  lisinopril 10 milliGRAM(s) Oral daily  metoprolol tartrate 50 milliGRAM(s) Oral every 8 hours  tamsulosin 0.4 milliGRAM(s) Oral at bedtime    MEDICATIONS  (PRN):  dextrose 40% Gel 15 Gram(s) Oral once PRN Blood Glucose LESS THAN 70 milliGRAM(s)/deciliter  glucagon  Injectable 1 milliGRAM(s) IntraMuscular once PRN Glucose LESS THAN 70 milligrams/deciliter      LABS                          8.1    2.03  )-----------( 71       ( 07 Jul 2019 05:40 )             26.7     07-07    141  |  104  |  25<H>  ----------------------------<  177<H>  3.5   |  26  |  1.1    Ca    8.4<L>      07 Jul 2019 05:40           PHYSICAL EXAM:  GENERAL: NAD, well-developed  HEAD:  Atraumatic, Normocephalic  EYES: EOMI, PERRLA, conjunctiva and sclera clear  NECK: Supple, No JVD  CHEST/LUNG: Clear to auscultation bilaterally; No wheeze  HEART: Regular rate and rhythm; S1 S2  ABDOMEN: Soft, Nontender, Nondistended; Bowel sounds present  EXTREMITIES:  2+ Peripheral Pulses, No clubbing, cyanosis, or edema  PSYCH: AAOx3  NEUROLOGY: non-focal  SKIN: No rashes or lesions

## 2019-07-08 NOTE — DIETITIAN INITIAL EVALUATION ADULT. - FACTORS AFF FOOD INTAKE
reports he's not always fond of hospital meals, difficulty swallowing hard foods, but reports tolerating current diet, denies N/V/D/C, last BM 7/7/none

## 2019-07-08 NOTE — DIETITIAN INITIAL EVALUATION ADULT. - DIET TYPE
Pt. reports eating relatively well PTP, he resides at assisted living facility. Pt. reports being on diabetic diet with regular texture/consistency. No supplement or nutritional shakes use per pt. Weight trends stable for the most part per pt. NKFA.

## 2019-07-08 NOTE — PROGRESS NOTE ADULT - ASSESSMENT
73M with PMH of MI, CAD s/p PCI, DM II, HTN, HLD, cataracts, Anxiety, presents from assisted living facility for episode of lightheadedness and worsening SOB, admitted with acute CHF exacerbation.   New low EF 26%.   Noted to have multiple runs SVT on tele and one episode of NSVT.   Ischemic work up is negative    Recommend wearable defibrillator for ischemic cardiomyopathy  ([employer] rep contacted)  consider increasing Metoprolol instead of adding CCB  Consider Entresto over Lisinopril  Maintain electrolytes K>4.0 Mg >2.0  Con't tele  f/u as out pt with Dr Potts in 4-6wks  Repeat Echo in 3mos, while on appropriate HF regiment    D/W attending

## 2019-07-08 NOTE — PROGRESS NOTE ADULT - SUBJECTIVE AND OBJECTIVE BOX
Patient is a 73y old  Male who presents with a chief complaint of Lightheadedness, and SOB (08 Jul 2019 14:26)    Currently admitted to medicine with primary working diagnosis of new-onset CHF exacerbation     Interval events: none     Today is hosp day 7  Patient is resting comfortably in bed   SOB improved, now on Room air   Tolerating Diet with regular BM  Plan: F/up with EP concerning Life vest       PAST MEDICAL & SURGICAL HISTORY:  GERD (gastroesophageal reflux disease)  Anxiety  CAD (coronary artery disease)  Diabetes  HTN (hypertension)  H/O colonoscopy: 10 yeras ago      MEDICATIONS  (STANDING):  aspirin  chewable 81 milliGRAM(s) Oral daily  atorvastatin 20 milliGRAM(s) Oral at bedtime  chlorhexidine 4% Liquid 1 Application(s) Topical <User Schedule>  dextrose 5%. 1000 milliLiter(s) (50 mL/Hr) IV Continuous <Continuous>  dextrose 50% Injectable 12.5 Gram(s) IV Push once  dextrose 50% Injectable 25 Gram(s) IV Push once  dextrose 50% Injectable 25 Gram(s) IV Push once  diazepam    Tablet 2 milliGRAM(s) Oral at bedtime  enoxaparin Injectable 40 milliGRAM(s) SubCutaneous daily  famotidine    Tablet 40 milliGRAM(s) Oral at bedtime  furosemide    Tablet 40 milliGRAM(s) Oral daily  insulin glargine Injectable (LANTUS) 12 Unit(s) SubCutaneous at bedtime  insulin lispro (HumaLOG) corrective regimen sliding scale   SubCutaneous three times a day before meals  insulin lispro Injectable (HumaLOG) 4 Unit(s) SubCutaneous three times a day before meals  lisinopril 10 milliGRAM(s) Oral daily  metoprolol tartrate 50 milliGRAM(s) Oral every 8 hours  spironolactone 25 milliGRAM(s) Oral daily  tamsulosin 0.4 milliGRAM(s) Oral at bedtime    MEDICATIONS  (PRN):  dextrose 40% Gel 15 Gram(s) Oral once PRN Blood Glucose LESS THAN 70 milliGRAM(s)/deciliter  glucagon  Injectable 1 milliGRAM(s) IntraMuscular once PRN Glucose LESS THAN 70 milligrams/deciliter          Vital Signs Last 24 Hrs  T(C): 36.9 (08 Jul 2019 14:44), Max: 36.9 (08 Jul 2019 14:44)  T(F): 98.4 (08 Jul 2019 14:44), Max: 98.4 (08 Jul 2019 14:44)  HR: 104 (08 Jul 2019 14:44) (97 - 104)  BP: 117/75 (08 Jul 2019 14:44) (94/58 - 131/71)  BP(mean): --  RR: 17 (08 Jul 2019 14:44) (17 - 18)  SpO2: 95% (08 Jul 2019 05:53) (95% - 97%)  CAPILLARY BLOOD GLUCOSE      POCT Blood Glucose.: 177 mg/dL (08 Jul 2019 07:31)  POCT Blood Glucose.: 227 mg/dL (07 Jul 2019 21:50)  POCT Blood Glucose.: 167 mg/dL (07 Jul 2019 17:04)    I&O's Summary    07 Jul 2019 07:01  -  08 Jul 2019 07:00  --------------------------------------------------------  IN: 0 mL / OUT: 1500 mL / NET: -1500 mL        Physical Exam:    ---        General : NAD, resting comfortably in bed     -      Cardiac: S1/S2 appreciated, Systolic murmur with radiation to Axilla     -      Pulm: Rhonchi B/L lung bases     -      GI: Soft, NT, ND, +BS     -      Musculoskeletal: Atraumatic, No LE edema, no skin color changes      -      Neuro: AO x 2, slurring speech ??baseline            Labs:                        8.1    2.30  )-----------( 57       ( 08 Jul 2019 05:31 )             27.2             07-08    145  |  105  |  24<H>  ----------------------------<  179<H>  4.0   |  31  |  1.1    Ca    8.7      08 Jul 2019 05:31              PT/INR - ( 07 Jul 2019 05:40 )   PT: 13.40 sec;   INR: 1.17 ratio                     Culture - Urine (collected 06 Jul 2019 14:00)  Source: .Urine Clean Catch (Midstream)  Preliminary Report (07 Jul 2019 20:14):    >100,000 CFU/ml Enterococcus species        Imaging:    ECG:

## 2019-07-08 NOTE — PROGRESS NOTE ADULT - ASSESSMENT
73M with PMHx of CAD s/p PCI, DMII, HTN, Anxiety, presents from assisted living facility for episode of lightheadedness and worsening SOB.    A/P:   Acute Systolic CHF   EF 26% down from 55% in 2017  NST with small reversible defect  c/b NSVT and SVT; EPS following, no further episodes, controlled on lopressor 50 tid  Continue Metoprolol, Lasix 40 po daily and Lisinopril,     Chronic Pancytopenia  Heme/onc consult appreciated  Bone marrow Bx O/P: Appt: 7/29/19 with Dr Tompkins   B12 Slightly elevated, Folate: wnl   US of spleen  and Liver: Unremarkable     Painless Hematuria with Clots: 7/6/19  Urology evaluated the patient, no further workup needed for now.   Likely from thrombocytopenia or traumatic Mitchell     CAD s/p PCI / HTN   Continue ASA, Lipitor and Metoprolol  off Plavix as per outpatient cardiology     DMII:   monitor FS before meals and at bedtime   -c/w  insulin regimen    -HGBA1C: 6.7%, Lipids: wnl     #Progress Note Handoff:  Pending (specify):  cardiac cath today   Family discussion: with patient and his son at bedside, he agreed with plan  Disposition: Home once medically stable.

## 2019-07-09 LAB
ALBUMIN SERPL ELPH-MCNC: 3 G/DL — LOW (ref 3.5–5.2)
ALP SERPL-CCNC: 77 U/L — SIGNIFICANT CHANGE UP (ref 30–115)
ALT FLD-CCNC: 16 U/L — SIGNIFICANT CHANGE UP (ref 0–41)
ANION GAP SERPL CALC-SCNC: 12 MMOL/L — SIGNIFICANT CHANGE UP (ref 7–14)
AST SERPL-CCNC: 17 U/L — SIGNIFICANT CHANGE UP (ref 0–41)
BASOPHILS # BLD AUTO: 0 K/UL — SIGNIFICANT CHANGE UP (ref 0–0.2)
BASOPHILS NFR BLD AUTO: 0 % — SIGNIFICANT CHANGE UP (ref 0–1)
BILIRUB SERPL-MCNC: 0.9 MG/DL — SIGNIFICANT CHANGE UP (ref 0.2–1.2)
BUN SERPL-MCNC: 19 MG/DL — SIGNIFICANT CHANGE UP (ref 10–20)
CALCIUM SERPL-MCNC: 8.5 MG/DL — SIGNIFICANT CHANGE UP (ref 8.5–10.1)
CHLORIDE SERPL-SCNC: 102 MMOL/L — SIGNIFICANT CHANGE UP (ref 98–110)
CO2 SERPL-SCNC: 27 MMOL/L — SIGNIFICANT CHANGE UP (ref 17–32)
CREAT SERPL-MCNC: 1.1 MG/DL — SIGNIFICANT CHANGE UP (ref 0.7–1.5)
EOSINOPHIL # BLD AUTO: 0.06 K/UL — SIGNIFICANT CHANGE UP (ref 0–0.7)
EOSINOPHIL NFR BLD AUTO: 2.7 % — SIGNIFICANT CHANGE UP (ref 0–8)
GLUCOSE BLDC GLUCOMTR-MCNC: 169 MG/DL — HIGH (ref 70–99)
GLUCOSE BLDC GLUCOMTR-MCNC: 175 MG/DL — HIGH (ref 70–99)
GLUCOSE BLDC GLUCOMTR-MCNC: 204 MG/DL — HIGH (ref 70–99)
GLUCOSE BLDC GLUCOMTR-MCNC: 212 MG/DL — HIGH (ref 70–99)
GLUCOSE BLDC GLUCOMTR-MCNC: 264 MG/DL — HIGH (ref 70–99)
GLUCOSE SERPL-MCNC: 189 MG/DL — HIGH (ref 70–99)
HCT VFR BLD CALC: 26.7 % — LOW (ref 42–52)
HGB BLD-MCNC: 8.3 G/DL — LOW (ref 14–18)
IMM GRANULOCYTES NFR BLD AUTO: 0.4 % — HIGH (ref 0.1–0.3)
LYMPHOCYTES # BLD AUTO: 0.99 K/UL — LOW (ref 1.2–3.4)
LYMPHOCYTES # BLD AUTO: 44.2 % — SIGNIFICANT CHANGE UP (ref 20.5–51.1)
MCHC RBC-ENTMCNC: 30.3 PG — SIGNIFICANT CHANGE UP (ref 27–31)
MCHC RBC-ENTMCNC: 31.1 G/DL — LOW (ref 32–37)
MCV RBC AUTO: 97.4 FL — HIGH (ref 80–94)
MONOCYTES # BLD AUTO: 0.53 K/UL — SIGNIFICANT CHANGE UP (ref 0.1–0.6)
MONOCYTES NFR BLD AUTO: 23.7 % — HIGH (ref 1.7–9.3)
NEUTROPHILS # BLD AUTO: 0.65 K/UL — LOW (ref 1.4–6.5)
NEUTROPHILS NFR BLD AUTO: 29 % — LOW (ref 42.2–75.2)
NRBC # BLD: 2 /100 WBCS — HIGH (ref 0–0)
PLATELET # BLD AUTO: 56 K/UL — LOW (ref 130–400)
POTASSIUM SERPL-MCNC: 3.7 MMOL/L — SIGNIFICANT CHANGE UP (ref 3.5–5)
POTASSIUM SERPL-SCNC: 3.7 MMOL/L — SIGNIFICANT CHANGE UP (ref 3.5–5)
PROT SERPL-MCNC: 6.7 G/DL — SIGNIFICANT CHANGE UP (ref 6–8)
RBC # BLD: 2.74 M/UL — LOW (ref 4.7–6.1)
RBC # FLD: 19.2 % — HIGH (ref 11.5–14.5)
SODIUM SERPL-SCNC: 141 MMOL/L — SIGNIFICANT CHANGE UP (ref 135–146)
WBC # BLD: 2.24 K/UL — LOW (ref 4.8–10.8)
WBC # FLD AUTO: 2.24 K/UL — LOW (ref 4.8–10.8)

## 2019-07-09 PROCEDURE — 99233 SBSQ HOSP IP/OBS HIGH 50: CPT

## 2019-07-09 RX ORDER — SACUBITRIL AND VALSARTAN 24; 26 MG/1; MG/1
1 TABLET, FILM COATED ORAL
Refills: 0 | Status: DISCONTINUED | OUTPATIENT
Start: 2019-07-10 | End: 2019-07-10

## 2019-07-09 RX ORDER — METOPROLOL TARTRATE 50 MG
100 TABLET ORAL
Refills: 0 | Status: DISCONTINUED | OUTPATIENT
Start: 2019-07-09 | End: 2019-07-10

## 2019-07-09 RX ORDER — ATORVASTATIN CALCIUM 80 MG/1
80 TABLET, FILM COATED ORAL AT BEDTIME
Refills: 0 | Status: DISCONTINUED | OUTPATIENT
Start: 2019-07-09 | End: 2019-07-10

## 2019-07-09 RX ORDER — INSULIN LISPRO 100/ML
2 VIAL (ML) SUBCUTANEOUS ONCE
Refills: 0 | Status: COMPLETED | OUTPATIENT
Start: 2019-07-09 | End: 2019-07-09

## 2019-07-09 RX ADMIN — SPIRONOLACTONE 25 MILLIGRAM(S): 25 TABLET, FILM COATED ORAL at 06:57

## 2019-07-09 RX ADMIN — INSULIN GLARGINE 12 UNIT(S): 100 INJECTION, SOLUTION SUBCUTANEOUS at 21:49

## 2019-07-09 RX ADMIN — ENOXAPARIN SODIUM 40 MILLIGRAM(S): 100 INJECTION SUBCUTANEOUS at 12:24

## 2019-07-09 RX ADMIN — Medication 1: at 08:21

## 2019-07-09 RX ADMIN — Medication 2 UNIT(S): at 21:50

## 2019-07-09 RX ADMIN — ATORVASTATIN CALCIUM 80 MILLIGRAM(S): 80 TABLET, FILM COATED ORAL at 21:49

## 2019-07-09 RX ADMIN — TAMSULOSIN HYDROCHLORIDE 0.4 MILLIGRAM(S): 0.4 CAPSULE ORAL at 21:49

## 2019-07-09 RX ADMIN — FAMOTIDINE 40 MILLIGRAM(S): 10 INJECTION INTRAVENOUS at 21:49

## 2019-07-09 RX ADMIN — Medication 4 UNIT(S): at 17:36

## 2019-07-09 RX ADMIN — Medication 4 UNIT(S): at 12:24

## 2019-07-09 RX ADMIN — Medication 1: at 12:23

## 2019-07-09 RX ADMIN — Medication 100 MILLIGRAM(S): at 17:37

## 2019-07-09 RX ADMIN — Medication 40 MILLIGRAM(S): at 06:57

## 2019-07-09 RX ADMIN — Medication 81 MILLIGRAM(S): at 12:24

## 2019-07-09 RX ADMIN — Medication 4 UNIT(S): at 08:21

## 2019-07-09 RX ADMIN — Medication 2: at 17:36

## 2019-07-09 NOTE — PROGRESS NOTE ADULT - SUBJECTIVE AND OBJECTIVE BOX
SUBJECTIVE:    Patient is a 73y old Male who presents with a chief complaint of Lightheadedness, and SOB (08 Jul 2019 17:17)    Stable, no acute events overngiht    PAST MEDICAL & SURGICAL HISTORY  GERD (gastroesophageal reflux disease)  Anxiety  CAD (coronary artery disease)  Diabetes  HTN (hypertension)  H/O colonoscopy: 10 yeras ago       ALLERGIES:  No Known Allergies    MEDICATIONS:  STANDING MEDICATIONS  aspirin  chewable 81 milliGRAM(s) Oral daily  atorvastatin 80 milliGRAM(s) Oral at bedtime  chlorhexidine 4% Liquid 1 Application(s) Topical <User Schedule>  dextrose 5%. 1000 milliLiter(s) IV Continuous <Continuous>  dextrose 50% Injectable 12.5 Gram(s) IV Push once  dextrose 50% Injectable 25 Gram(s) IV Push once  dextrose 50% Injectable 25 Gram(s) IV Push once  enoxaparin Injectable 40 milliGRAM(s) SubCutaneous daily  famotidine    Tablet 40 milliGRAM(s) Oral at bedtime  furosemide    Tablet 40 milliGRAM(s) Oral daily  insulin glargine Injectable (LANTUS) 12 Unit(s) SubCutaneous at bedtime  insulin lispro (HumaLOG) corrective regimen sliding scale   SubCutaneous three times a day before meals  insulin lispro Injectable (HumaLOG) 4 Unit(s) SubCutaneous three times a day before meals  lisinopril 10 milliGRAM(s) Oral daily  metoprolol tartrate 100 milliGRAM(s) Oral two times a day  spironolactone 25 milliGRAM(s) Oral daily  tamsulosin 0.4 milliGRAM(s) Oral at bedtime    PRN MEDICATIONS  dextrose 40% Gel 15 Gram(s) Oral once PRN  glucagon  Injectable 1 milliGRAM(s) IntraMuscular once PRN    VITALS:   T(F): 97.4  HR: 98  BP: 99/54  RR: 18  SpO2: --    LABS:                        8.3    2.24  )-----------( 56       ( 09 Jul 2019 05:39 )             26.7     07-09    141  |  102  |  19  ----------------------------<  189<H>  3.7   |  27  |  1.1    Ca    8.5      09 Jul 2019 05:39    TPro  6.7  /  Alb  3.0<L>  /  TBili  0.9  /  DBili  x   /  AST  17  /  ALT  16  /  AlkPhos  77  07-09              Culture - Urine (collected 06 Jul 2019 14:00)  Source: .Urine Clean Catch (Midstream)  Final Report (08 Jul 2019 22:35):    >100,000 CFU/ml Enterococcus faecalis  Organism: Enterococcus faecalis (08 Jul 2019 22:35)  Organism: Enterococcus faecalis (08 Jul 2019 22:35)              07-08-19 @ 07:01  -  07-09-19 @ 07:00  --------------------------------------------------------  IN: 0 mL / OUT: 725 mL / NET: -725 mL    07-09-19 @ 07:01  -  07-09-19 @ 11:18  --------------------------------------------------------  IN: 0 mL / OUT: 120 mL / NET: -120 mL          PHYSICAL EXAM:  GEN: NAD, comfortable  LUNGS: CTAB, no w/r/r  HEART: RRR, no m/r/g  ABD: soft, NT/ND, +BS  EXT: no edema, PP b/l  NEURO: AAOx3 SUBJECTIVE:    Patient is a 73y old Male who presents with a chief complaint of Lightheadedness, and SOB (08 Jul 2019 17:17)    Stable, no acute events overngiht    PAST MEDICAL & SURGICAL HISTORY  GERD (gastroesophageal reflux disease)  Anxiety  CAD (coronary artery disease)  Diabetes  HTN (hypertension)  H/O colonoscopy: 10 yeras ago       ALLERGIES:  No Known Allergies    MEDICATIONS:  STANDING MEDICATIONS  aspirin  chewable 81 milliGRAM(s) Oral daily  atorvastatin 80 milliGRAM(s) Oral at bedtime  chlorhexidine 4% Liquid 1 Application(s) Topical <User Schedule>  dextrose 5%. 1000 milliLiter(s) IV Continuous <Continuous>  dextrose 50% Injectable 12.5 Gram(s) IV Push once  dextrose 50% Injectable 25 Gram(s) IV Push once  dextrose 50% Injectable 25 Gram(s) IV Push once  enoxaparin Injectable 40 milliGRAM(s) SubCutaneous daily  famotidine    Tablet 40 milliGRAM(s) Oral at bedtime  furosemide    Tablet 40 milliGRAM(s) Oral daily  insulin glargine Injectable (LANTUS) 12 Unit(s) SubCutaneous at bedtime  insulin lispro (HumaLOG) corrective regimen sliding scale   SubCutaneous three times a day before meals  insulin lispro Injectable (HumaLOG) 4 Unit(s) SubCutaneous three times a day before meals  lisinopril 10 milliGRAM(s) Oral daily  metoprolol tartrate 100 milliGRAM(s) Oral two times a day  spironolactone 25 milliGRAM(s) Oral daily  tamsulosin 0.4 milliGRAM(s) Oral at bedtime    PRN MEDICATIONS  dextrose 40% Gel 15 Gram(s) Oral once PRN  glucagon  Injectable 1 milliGRAM(s) IntraMuscular once PRN    VITALS:   T(F): 97.4  HR: 98  BP: 99/54  RR: 18  SpO2: --    LABS:                        8.3    2.24  )-----------( 56       ( 09 Jul 2019 05:39 )             26.7     07-09    141  |  102  |  19  ----------------------------<  189<H>  3.7   |  27  |  1.1    Ca    8.5      09 Jul 2019 05:39    TPro  6.7  /  Alb  3.0<L>  /  TBili  0.9  /  DBili  x   /  AST  17  /  ALT  16  /  AlkPhos  77  07-09              Culture - Urine (collected 06 Jul 2019 14:00)  Source: .Urine Clean Catch (Midstream)  Final Report (08 Jul 2019 22:35):    >100,000 CFU/ml Enterococcus faecalis  Organism: Enterococcus faecalis (08 Jul 2019 22:35)  Organism: Enterococcus faecalis (08 Jul 2019 22:35)              07-08-19 @ 07:01  -  07-09-19 @ 07:00  --------------------------------------------------------  IN: 0 mL / OUT: 725 mL / NET: -725 mL    07-09-19 @ 07:01  -  07-09-19 @ 11:18  --------------------------------------------------------  IN: 0 mL / OUT: 120 mL / NET: -120 mL          PHYSICAL EXAM:  GEN: NAD, comfortable  LUNGS: CTAB  HEART: regular, tachycardic  ABD: soft, NT/ND, +BS  EXT: no edema, PP b/l  NEURO: AAOx3, moves all extremities, neuro exam is non focal, patient speaks slowly but seems like this is just his way of speaking. Cranial nerve exam is normal. SUBJECTIVE:    Patient is a 73y old Male who presents with a chief complaint of Lightheadedness, and SOB (08 Jul 2019 17:17)    Stable, no acute events overngiht    PAST MEDICAL & SURGICAL HISTORY  GERD (gastroesophageal reflux disease)  Anxiety  CAD (coronary artery disease)  Diabetes  HTN (hypertension)  H/O colonoscopy: 10 yeras ago       ALLERGIES:  No Known Allergies    MEDICATIONS:  STANDING MEDICATIONS  aspirin  chewable 81 milliGRAM(s) Oral daily  atorvastatin 80 milliGRAM(s) Oral at bedtime  chlorhexidine 4% Liquid 1 Application(s) Topical <User Schedule>  dextrose 5%. 1000 milliLiter(s) IV Continuous <Continuous>  dextrose 50% Injectable 12.5 Gram(s) IV Push once  dextrose 50% Injectable 25 Gram(s) IV Push once  dextrose 50% Injectable 25 Gram(s) IV Push once  enoxaparin Injectable 40 milliGRAM(s) SubCutaneous daily  famotidine    Tablet 40 milliGRAM(s) Oral at bedtime  furosemide    Tablet 40 milliGRAM(s) Oral daily  insulin glargine Injectable (LANTUS) 12 Unit(s) SubCutaneous at bedtime  insulin lispro (HumaLOG) corrective regimen sliding scale   SubCutaneous three times a day before meals  insulin lispro Injectable (HumaLOG) 4 Unit(s) SubCutaneous three times a day before meals  lisinopril 10 milliGRAM(s) Oral daily  metoprolol tartrate 100 milliGRAM(s) Oral two times a day  spironolactone 25 milliGRAM(s) Oral daily  tamsulosin 0.4 milliGRAM(s) Oral at bedtime    PRN MEDICATIONS  dextrose 40% Gel 15 Gram(s) Oral once PRN  glucagon  Injectable 1 milliGRAM(s) IntraMuscular once PRN    VITALS:   T(F): 97.4  HR: 98  BP: 99/54  RR: 18      LABS:                        8.3    2.24  )-----------( 56       ( 09 Jul 2019 05:39 )             26.7     07-09    141  |  102  |  19  ----------------------------<  189<H>  3.7   |  27  |  1.1    Ca    8.5      09 Jul 2019 05:39    TPro  6.7  /  Alb  3.0<L>  /  TBili  0.9  /  DBili  x   /  AST  17  /  ALT  16  /  AlkPhos  77  07-09              Culture - Urine (collected 06 Jul 2019 14:00)  Source: .Urine Clean Catch (Midstream)  Final Report (08 Jul 2019 22:35):    >100,000 CFU/ml Enterococcus faecalis  Organism: Enterococcus faecalis (08 Jul 2019 22:35)  Organism: Enterococcus faecalis (08 Jul 2019 22:35)            PHYSICAL EXAM:  GEN: NAD, comfortable  LUNGS: CTAB  HEART: regular, tachycardic  ABD: soft, NT/ND, +BS  EXT: no edema, PP b/l  NEURO: AAOx3, moves all extremities, neuro exam is non focal, patient speaks slowly but seems like this is just his way of speaking. Cranial nerve exam is normal.

## 2019-07-09 NOTE — PROGRESS NOTE ADULT - REASON FOR ADMISSION
Lightheadedness, and SOB

## 2019-07-09 NOTE — PROGRESS NOTE ADULT - PROVIDER SPECIALTY LIST ADULT
Cardiology
Electrophysiology
Electrophysiology
Heme/Onc
Hospitalist
Hospitalist
Internal Medicine
Urology
Internal Medicine
Hospitalist

## 2019-07-09 NOTE — PROGRESS NOTE ADULT - ATTENDING COMMENTS
#Progress Note Handoff    Pending (specify):  Life vest. Change ACEI to entresto and Metoprolol to long acting   Family discussion: D/W family   Disposition: Unknown at this time #Progress Note Handoff    Pending (specify):  Life vest. Change ACEI to entresto and Metoprolol to long acting   Family discussion: D/W family , daughter and son.   Disposition: Unknown at this time. Anticipating d/c in 48 hours.   D/W CM for home care arrangements.

## 2019-07-09 NOTE — PROGRESS NOTE ADULT - ASSESSMENT
Acute HFrEF  -CXR:  with perihilar opacities consistent with CHF  -BNP 7990, Troponin negx2   -EKG with sinus tachycardia, -TSH: wnl   -ECHO: EF: 26%, GIDD,mild-Mod MR, Mild Pulm HTN, Mild Aortic Stenosis (EF 26% down from 55% in 2017)   -NST with small reversible defect  -S/p LHC: 7/8/19: 40% occlusion after RCA graft, Will need aggressive medical therapy   -c/w ASA, Metoprolol, Aldactone, Lipitor, Lisinopril,  Lasix  -Pending life vest    Chronic Pancytopenia  -Heme/onc consulted  -Bone marrow Bx O/P: Appt: 7/29/19 with Dr Tompkins   -B12 Slightly elevated, Folate: wnl   -US of spleen  and Liver: Unremarkable     Painless Hematuria with Clots: 7/6/19: resolved   -Urology consulted: Likely from Mitchell Manipulation     CAD s/p PCI / HTN   - c/w asa, statin, bb, ACEI  - off Plavix as per outpatient cardiology     DMII: Not at goal   - monitor FS before meals and at bedtime   -c/w  insulin regimen    -HGBA1C: 6.7%, Lipids: wnl     Anxiety  - c/w valium home dose     Dispo: from assisted living   DVT PPx; Lovenox sc   GI PPX: not indicated

## 2019-07-10 ENCOUNTER — TRANSCRIPTION ENCOUNTER (OUTPATIENT)
Age: 74
End: 2019-07-10

## 2019-07-10 ENCOUNTER — INBOUND DOCUMENT (OUTPATIENT)
Age: 74
End: 2019-07-10

## 2019-07-10 VITALS
TEMPERATURE: 98 F | DIASTOLIC BLOOD PRESSURE: 55 MMHG | HEART RATE: 101 BPM | WEIGHT: 175.93 LBS | SYSTOLIC BLOOD PRESSURE: 112 MMHG | RESPIRATION RATE: 19 BRPM

## 2019-07-10 LAB
ANION GAP SERPL CALC-SCNC: 11 MMOL/L — SIGNIFICANT CHANGE UP (ref 7–14)
BASOPHILS # BLD AUTO: 0 K/UL — SIGNIFICANT CHANGE UP (ref 0–0.2)
BASOPHILS NFR BLD AUTO: 0 % — SIGNIFICANT CHANGE UP (ref 0–1)
BUN SERPL-MCNC: 24 MG/DL — HIGH (ref 10–20)
CALCIUM SERPL-MCNC: 8.6 MG/DL — SIGNIFICANT CHANGE UP (ref 8.5–10.1)
CHLORIDE SERPL-SCNC: 105 MMOL/L — SIGNIFICANT CHANGE UP (ref 98–110)
CO2 SERPL-SCNC: 28 MMOL/L — SIGNIFICANT CHANGE UP (ref 17–32)
CREAT SERPL-MCNC: 1.1 MG/DL — SIGNIFICANT CHANGE UP (ref 0.7–1.5)
EOSINOPHIL # BLD AUTO: 0.04 K/UL — SIGNIFICANT CHANGE UP (ref 0–0.7)
EOSINOPHIL NFR BLD AUTO: 1.8 % — SIGNIFICANT CHANGE UP (ref 0–8)
GLUCOSE BLDC GLUCOMTR-MCNC: 187 MG/DL — HIGH (ref 70–99)
GLUCOSE BLDC GLUCOMTR-MCNC: 208 MG/DL — HIGH (ref 70–99)
GLUCOSE SERPL-MCNC: 158 MG/DL — HIGH (ref 70–99)
HCT VFR BLD CALC: 26.3 % — LOW (ref 42–52)
HGB BLD-MCNC: 8.1 G/DL — LOW (ref 14–18)
IMM GRANULOCYTES NFR BLD AUTO: 0.9 % — HIGH (ref 0.1–0.3)
LYMPHOCYTES # BLD AUTO: 1.14 K/UL — LOW (ref 1.2–3.4)
LYMPHOCYTES # BLD AUTO: 50.4 % — SIGNIFICANT CHANGE UP (ref 20.5–51.1)
MCHC RBC-ENTMCNC: 30.1 PG — SIGNIFICANT CHANGE UP (ref 27–31)
MCHC RBC-ENTMCNC: 30.8 G/DL — LOW (ref 32–37)
MCV RBC AUTO: 97.8 FL — HIGH (ref 80–94)
MONOCYTES # BLD AUTO: 0.47 K/UL — SIGNIFICANT CHANGE UP (ref 0.1–0.6)
MONOCYTES NFR BLD AUTO: 20.8 % — HIGH (ref 1.7–9.3)
NEUTROPHILS # BLD AUTO: 0.59 K/UL — LOW (ref 1.4–6.5)
NEUTROPHILS NFR BLD AUTO: 26.1 % — LOW (ref 42.2–75.2)
NRBC # BLD: 0 /100 WBCS — SIGNIFICANT CHANGE UP (ref 0–0)
PLATELET # BLD AUTO: 53 K/UL — LOW (ref 130–400)
POTASSIUM SERPL-MCNC: 3.7 MMOL/L — SIGNIFICANT CHANGE UP (ref 3.5–5)
POTASSIUM SERPL-SCNC: 3.7 MMOL/L — SIGNIFICANT CHANGE UP (ref 3.5–5)
RBC # BLD: 2.69 M/UL — LOW (ref 4.7–6.1)
RBC # FLD: 18.7 % — HIGH (ref 11.5–14.5)
SODIUM SERPL-SCNC: 144 MMOL/L — SIGNIFICANT CHANGE UP (ref 135–146)
WBC # BLD: 2.26 K/UL — LOW (ref 4.8–10.8)
WBC # FLD AUTO: 2.26 K/UL — LOW (ref 4.8–10.8)

## 2019-07-10 PROCEDURE — 99239 HOSP IP/OBS DSCHRG MGMT >30: CPT

## 2019-07-10 RX ORDER — LISINOPRIL 2.5 MG/1
1 TABLET ORAL
Qty: 0 | Refills: 0 | DISCHARGE

## 2019-07-10 RX ORDER — ATORVASTATIN CALCIUM 80 MG/1
1 TABLET, FILM COATED ORAL
Qty: 0 | Refills: 0 | DISCHARGE

## 2019-07-10 RX ORDER — SPIRONOLACTONE 25 MG/1
1 TABLET, FILM COATED ORAL
Qty: 30 | Refills: 0
Start: 2019-07-10 | End: 2019-08-08

## 2019-07-10 RX ORDER — METOPROLOL TARTRATE 50 MG
1 TABLET ORAL
Qty: 60 | Refills: 0
Start: 2019-07-10 | End: 2019-08-08

## 2019-07-10 RX ORDER — ASPIRIN/CALCIUM CARB/MAGNESIUM 324 MG
1 TABLET ORAL
Qty: 0 | Refills: 0 | DISCHARGE

## 2019-07-10 RX ORDER — DIAZEPAM 5 MG
1 TABLET ORAL
Qty: 0 | Refills: 0 | DISCHARGE

## 2019-07-10 RX ORDER — ASPIRIN/CALCIUM CARB/MAGNESIUM 324 MG
1 TABLET ORAL
Qty: 30 | Refills: 0
Start: 2019-07-10 | End: 2019-08-08

## 2019-07-10 RX ORDER — SACUBITRIL AND VALSARTAN 24; 26 MG/1; MG/1
1 TABLET, FILM COATED ORAL
Qty: 60 | Refills: 0
Start: 2019-07-10 | End: 2019-08-08

## 2019-07-10 RX ORDER — METOPROLOL TARTRATE 50 MG
1 TABLET ORAL
Qty: 0 | Refills: 0 | DISCHARGE

## 2019-07-10 RX ORDER — FUROSEMIDE 40 MG
1 TABLET ORAL
Qty: 30 | Refills: 0
Start: 2019-07-10 | End: 2019-08-08

## 2019-07-10 RX ORDER — TAMSULOSIN HYDROCHLORIDE 0.4 MG/1
1 CAPSULE ORAL
Qty: 0 | Refills: 0 | DISCHARGE
Start: 2019-07-10

## 2019-07-10 RX ORDER — ATORVASTATIN CALCIUM 80 MG/1
1 TABLET, FILM COATED ORAL
Qty: 30 | Refills: 0
Start: 2019-07-10 | End: 2019-08-08

## 2019-07-10 RX ADMIN — Medication 1: at 12:10

## 2019-07-10 RX ADMIN — SPIRONOLACTONE 25 MILLIGRAM(S): 25 TABLET, FILM COATED ORAL at 05:54

## 2019-07-10 RX ADMIN — Medication 4 UNIT(S): at 12:10

## 2019-07-10 RX ADMIN — Medication 100 MILLIGRAM(S): at 05:54

## 2019-07-10 RX ADMIN — ENOXAPARIN SODIUM 40 MILLIGRAM(S): 100 INJECTION SUBCUTANEOUS at 12:09

## 2019-07-10 RX ADMIN — Medication 4 UNIT(S): at 08:13

## 2019-07-10 RX ADMIN — Medication 81 MILLIGRAM(S): at 12:09

## 2019-07-10 RX ADMIN — Medication 40 MILLIGRAM(S): at 05:54

## 2019-07-10 RX ADMIN — SACUBITRIL AND VALSARTAN 1 TABLET(S): 24; 26 TABLET, FILM COATED ORAL at 05:54

## 2019-07-10 RX ADMIN — Medication 2: at 08:13

## 2019-07-10 NOTE — DISCHARGE NOTE PROVIDER - HOSPITAL COURSE
73M with PMHx of CAD s/p PCI, DMII, HTN, Anxiety, presents from assisted living facility for episode of lightheadedness and worsening SOB. Was found to have new EF of 26 percent whereas previously was normal. Was diuresed and had ischemic workup with cath which did not show any obstruction. Has been setup with the life vest which he will need to wear. Patient was also found to be pancytopenic which is chronic and he was evaluated by our heme/onc service who will be doing a bone marrow biopsy which has been setup as outpatient on 7/29/2019.

## 2019-07-10 NOTE — DISCHARGE NOTE PROVIDER - CARE PROVIDER_API CALL
Abhijeet Potts (MD)  Cardiac Electrophysiology; Cardiovascular Disease; [facility] Medicine  [address] [place] [address]  Phone: [phone]  Fax: [phone]  Follow Up Time: 1 month Abhijeet Potts)  Cardiac Electrophysiology; Cardiovascular Disease; [facility]  [address] [facility], [address] [place] [address]  Phone: [phone]  Fax: [phone]  Follow Up Time: 1 month    Grady Castro)  Cardiology; Internal Medicine; Nuclear Cardiology  [address] [place] [address], [address]  [place] [address]  Phone: [phone]  Fax: [phone]  Follow Up Time: 2 weeks    Víctor Santoro)  Hematology; Internal Medicine; Medical Oncology  [address] [place] [address]  Phone: [phone]  Fax: [phone]  Follow Up Time: 1 month

## 2019-07-10 NOTE — DISCHARGE NOTE PROVIDER - CARE PROVIDERS DIRECT ADDRESSES
[email or web address]

## 2019-07-10 NOTE — DISCHARGE NOTE PROVIDER - NSDCCPCAREPLAN_GEN_ALL_CORE_FT
PRINCIPAL DISCHARGE DIAGNOSIS  Diagnosis: Heart failure  Assessment and Plan of Treatment: You were found to have a newly diagnosed heart failure which means your heart is not pumping as efficiently as it should be. We have started you on new heart medications which it is vital you continue. You will also need to continue wearing the life vest. Please follow up with your cardiologist within 1-2 weeks of discharge. Please also follow up with the electrophysiologists within 1-2 weeks of discharge. Also follow up with your family doctor within 1 week.      SECONDARY DISCHARGE DIAGNOSES  Diagnosis: Pancytopenia  Assessment and Plan of Treatment: Your blood levels were low. You will need a bone biopsy after you leave the hospital. Please follow up with the hematologist on 7/29/2019 for bone marroe biopsy. PRINCIPAL DISCHARGE DIAGNOSIS  Diagnosis: Heart failure  Assessment and Plan of Treatment: You were found to have a newly diagnosed heart failure which means your heart is not pumping as efficiently as it should be. We have started you on new heart medications which it is vital you continue. You will also need to continue wearing the life vest. Please follow up with your cardiologist within 1-2 weeks of discharge. Please also follow up with the electrophysiologists within 4-6 weeks of discharge. Also follow up with your family doctor within 1 week.      SECONDARY DISCHARGE DIAGNOSES  Diagnosis: Pancytopenia  Assessment and Plan of Treatment: Your blood levels were low. You will need a bone biopsy after you leave the hospital. Please follow up with the hematologist on 7/29/2019 for bone marroe biopsy.

## 2019-07-10 NOTE — DISCHARGE NOTE PROVIDER - PROVIDER TOKENS
PROVIDER:[TOKEN:[[id number]],FOLLOWUP:[1 month]] PROVIDER:[TOKEN:[[id number]],FOLLOWUP:[1 month]],PROVIDER:[TOKEN:[[id number]],FOLLOWUP:[2 weeks]],PROVIDER:[TOKEN:[[id number]],FOLLOWUP:[1 month]]

## 2019-07-15 DIAGNOSIS — E11.9 TYPE 2 DIABETES MELLITUS WITHOUT COMPLICATIONS: ICD-10-CM

## 2019-07-15 DIAGNOSIS — Z79.82 LONG TERM (CURRENT) USE OF ASPIRIN: ICD-10-CM

## 2019-07-15 DIAGNOSIS — I47.2 VENTRICULAR TACHYCARDIA: ICD-10-CM

## 2019-07-15 DIAGNOSIS — E87.0 HYPEROSMOLALITY AND HYPERNATREMIA: ICD-10-CM
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
## 2019-07-15 DIAGNOSIS — D61.818 OTHER PANCYTOPENIA: ICD-10-CM

## 2019-07-15 DIAGNOSIS — I25.10 ATHEROSCLEROTIC HEART DISEASE OF NATIVE CORONARY ARTERY WITHOUT ANGINA PECTORIS: ICD-10-CM

## 2019-07-15 DIAGNOSIS — E78.5 HYPERLIPIDEMIA, UNSPECIFIED: ICD-10-CM

## 2019-07-15 DIAGNOSIS — Z95.5 PRESENCE OF CORONARY ANGIOPLASTY IMPLANT AND GRAFT: ICD-10-CM

## 2019-07-15 DIAGNOSIS — Z79.84 LONG TERM (CURRENT) USE OF ORAL HYPOGLYCEMIC DRUGS: ICD-10-CM

## 2019-07-15 DIAGNOSIS — J96.01 ACUTE RESPIRATORY FAILURE WITH HYPOXIA: ICD-10-CM

## 2019-07-15 DIAGNOSIS — E83.42 HYPOMAGNESEMIA: ICD-10-CM

## 2019-07-15 DIAGNOSIS — I50.21 ACUTE SYSTOLIC (CONGESTIVE) HEART FAILURE: ICD-10-CM

## 2019-07-15 DIAGNOSIS — I11.0 HYPERTENSIVE HEART DISEASE WITH HEART FAILURE: ICD-10-CM

## 2019-07-15 DIAGNOSIS — T83.091A OTHER MECHANICAL COMPLICATION OF INDWELLING URETHRAL CATHETER, INITIAL ENCOUNTER: ICD-10-CM

## 2019-07-15 DIAGNOSIS — R42 DIZZINESS AND GIDDINESS: ICD-10-CM

## 2019-07-15 DIAGNOSIS — I08.0 RHEUMATIC DISORDERS OF BOTH MITRAL AND AORTIC VALVES: ICD-10-CM

## 2019-07-15 DIAGNOSIS — I27.20 PULMONARY HYPERTENSION, UNSPECIFIED: ICD-10-CM

## 2019-07-15 DIAGNOSIS — R31.9 HEMATURIA, UNSPECIFIED: ICD-10-CM

## 2019-07-15 DIAGNOSIS — T82.855A STENOSIS OF CORONARY ARTERY STENT, INITIAL ENCOUNTER: ICD-10-CM

## 2019-07-15 DIAGNOSIS — E87.6 HYPOKALEMIA: ICD-10-CM

## 2019-07-15 DIAGNOSIS — R33.8 OTHER RETENTION OF URINE: ICD-10-CM

## 2019-07-15 DIAGNOSIS — T83.83XA HEMORRHAGE DUE TO GENITOURINARY PROSTHETIC DEVICES, IMPLANTS AND GRAFTS, INITIAL ENCOUNTER: ICD-10-CM

## 2019-07-15 DIAGNOSIS — K21.9 GASTRO-ESOPHAGEAL REFLUX DISEASE WITHOUT ESOPHAGITIS: ICD-10-CM

## 2019-07-15 DIAGNOSIS — Z87.891 PERSONAL HISTORY OF NICOTINE DEPENDENCE: ICD-10-CM

## 2019-07-15 DIAGNOSIS — I95.9 HYPOTENSION, UNSPECIFIED: ICD-10-CM

## 2019-07-15 DIAGNOSIS — Z80.52 FAMILY HISTORY OF MALIGNANT NEOPLASM OF BLADDER: ICD-10-CM

## 2019-07-15 DIAGNOSIS — R06.00 DYSPNEA, UNSPECIFIED: ICD-10-CM

## 2019-07-15 DIAGNOSIS — I48.91 UNSPECIFIED ATRIAL FIBRILLATION: ICD-10-CM

## 2019-07-15 DIAGNOSIS — F41.9 ANXIETY DISORDER, UNSPECIFIED: ICD-10-CM

## 2019-07-15 DIAGNOSIS — D64.9 ANEMIA, UNSPECIFIED: ICD-10-CM

## 2019-07-17 ENCOUNTER — INPATIENT (INPATIENT)
Facility: HOSPITAL | Age: 74
LOS: 9 days | Discharge: HOME HEALTH | End: 2019-07-27
Attending: INTERNAL MEDICINE | Admitting: INTERNAL MEDICINE
Payer: MEDICARE

## 2019-07-17 VITALS
DIASTOLIC BLOOD PRESSURE: 92 MMHG | TEMPERATURE: 97 F | SYSTOLIC BLOOD PRESSURE: 143 MMHG | RESPIRATION RATE: 18 BRPM | OXYGEN SATURATION: 100 % | HEART RATE: 110 BPM

## 2019-07-17 DIAGNOSIS — Z98.890 OTHER SPECIFIED POSTPROCEDURAL STATES: Chronic | ICD-10-CM

## 2019-07-17 LAB
ALBUMIN SERPL ELPH-MCNC: 3.3 G/DL — LOW (ref 3.5–5.2)
ALP SERPL-CCNC: 76 U/L — SIGNIFICANT CHANGE UP (ref 30–115)
ALT FLD-CCNC: 21 U/L — SIGNIFICANT CHANGE UP (ref 0–41)
ANION GAP SERPL CALC-SCNC: 13 MMOL/L — SIGNIFICANT CHANGE UP (ref 7–14)
AST SERPL-CCNC: 17 U/L — SIGNIFICANT CHANGE UP (ref 0–41)
BASOPHILS # BLD AUTO: 0.01 K/UL — SIGNIFICANT CHANGE UP (ref 0–0.2)
BASOPHILS NFR BLD AUTO: 0.3 % — SIGNIFICANT CHANGE UP (ref 0–1)
BILIRUB SERPL-MCNC: 0.4 MG/DL — SIGNIFICANT CHANGE UP (ref 0.2–1.2)
BUN SERPL-MCNC: 26 MG/DL — HIGH (ref 10–20)
CALCIUM SERPL-MCNC: 9 MG/DL — SIGNIFICANT CHANGE UP (ref 8.5–10.1)
CHLORIDE SERPL-SCNC: 107 MMOL/L — SIGNIFICANT CHANGE UP (ref 98–110)
CK MB CFR SERPL CALC: 1.7 NG/ML — SIGNIFICANT CHANGE UP (ref 0.6–6.3)
CK SERPL-CCNC: 39 U/L — SIGNIFICANT CHANGE UP (ref 0–225)
CO2 SERPL-SCNC: 22 MMOL/L — SIGNIFICANT CHANGE UP (ref 17–32)
CREAT SERPL-MCNC: 1.2 MG/DL — SIGNIFICANT CHANGE UP (ref 0.7–1.5)
EOSINOPHIL # BLD AUTO: 0.11 K/UL — SIGNIFICANT CHANGE UP (ref 0–0.7)
EOSINOPHIL NFR BLD AUTO: 3.1 % — SIGNIFICANT CHANGE UP (ref 0–8)
GLUCOSE BLDC GLUCOMTR-MCNC: 172 MG/DL — HIGH (ref 70–99)
GLUCOSE SERPL-MCNC: 129 MG/DL — HIGH (ref 70–99)
HCT VFR BLD CALC: 29.5 % — LOW (ref 42–52)
HGB BLD-MCNC: 9.2 G/DL — LOW (ref 14–18)
IMM GRANULOCYTES NFR BLD AUTO: 0.6 % — HIGH (ref 0.1–0.3)
LYMPHOCYTES # BLD AUTO: 1.79 K/UL — SIGNIFICANT CHANGE UP (ref 1.2–3.4)
LYMPHOCYTES # BLD AUTO: 50.1 % — SIGNIFICANT CHANGE UP (ref 20.5–51.1)
MCHC RBC-ENTMCNC: 29.7 PG — SIGNIFICANT CHANGE UP (ref 27–31)
MCHC RBC-ENTMCNC: 31.2 G/DL — LOW (ref 32–37)
MCV RBC AUTO: 95.2 FL — HIGH (ref 80–94)
MONOCYTES # BLD AUTO: 0.52 K/UL — SIGNIFICANT CHANGE UP (ref 0.1–0.6)
MONOCYTES NFR BLD AUTO: 14.6 % — HIGH (ref 1.7–9.3)
NEUTROPHILS # BLD AUTO: 1.12 K/UL — LOW (ref 1.4–6.5)
NEUTROPHILS NFR BLD AUTO: 31.3 % — LOW (ref 42.2–75.2)
NRBC # BLD: 0 /100 WBCS — SIGNIFICANT CHANGE UP (ref 0–0)
NT-PROBNP SERPL-SCNC: 5408 PG/ML — HIGH (ref 0–300)
PLATELET # BLD AUTO: 109 K/UL — LOW (ref 130–400)
POTASSIUM SERPL-MCNC: 3.9 MMOL/L — SIGNIFICANT CHANGE UP (ref 3.5–5)
POTASSIUM SERPL-SCNC: 3.9 MMOL/L — SIGNIFICANT CHANGE UP (ref 3.5–5)
PROT SERPL-MCNC: 7.4 G/DL — SIGNIFICANT CHANGE UP (ref 6–8)
RBC # BLD: 3.1 M/UL — LOW (ref 4.7–6.1)
RBC # FLD: 18.4 % — HIGH (ref 11.5–14.5)
SODIUM SERPL-SCNC: 142 MMOL/L — SIGNIFICANT CHANGE UP (ref 135–146)
TROPONIN T SERPL-MCNC: <0.01 NG/ML — SIGNIFICANT CHANGE UP
TROPONIN T SERPL-MCNC: <0.01 NG/ML — SIGNIFICANT CHANGE UP
WBC # BLD: 3.57 K/UL — LOW (ref 4.8–10.8)
WBC # FLD AUTO: 3.57 K/UL — LOW (ref 4.8–10.8)

## 2019-07-17 PROCEDURE — 99291 CRITICAL CARE FIRST HOUR: CPT

## 2019-07-17 PROCEDURE — 71045 X-RAY EXAM CHEST 1 VIEW: CPT | Mod: 26

## 2019-07-17 PROCEDURE — 93010 ELECTROCARDIOGRAM REPORT: CPT

## 2019-07-17 PROCEDURE — 71275 CT ANGIOGRAPHY CHEST: CPT | Mod: 26

## 2019-07-17 PROCEDURE — 88189 FLOWCYTOMETRY/READ 16 & >: CPT

## 2019-07-17 RX ORDER — SPIRONOLACTONE 25 MG/1
25 TABLET, FILM COATED ORAL DAILY
Refills: 0 | Status: DISCONTINUED | OUTPATIENT
Start: 2019-07-17 | End: 2019-07-27

## 2019-07-17 RX ORDER — FUROSEMIDE 40 MG
40 TABLET ORAL DAILY
Refills: 0 | Status: DISCONTINUED | OUTPATIENT
Start: 2019-07-17 | End: 2019-07-18

## 2019-07-17 RX ORDER — CHLORHEXIDINE GLUCONATE 213 G/1000ML
1 SOLUTION TOPICAL
Refills: 0 | Status: DISCONTINUED | OUTPATIENT
Start: 2019-07-17 | End: 2019-07-27

## 2019-07-17 RX ORDER — DILTIAZEM HCL 120 MG
5 CAPSULE, EXT RELEASE 24 HR ORAL
Qty: 125 | Refills: 0 | Status: DISCONTINUED | OUTPATIENT
Start: 2019-07-17 | End: 2019-07-18

## 2019-07-17 RX ORDER — TAMSULOSIN HYDROCHLORIDE 0.4 MG/1
0.4 CAPSULE ORAL AT BEDTIME
Refills: 0 | Status: DISCONTINUED | OUTPATIENT
Start: 2019-07-17 | End: 2019-07-27

## 2019-07-17 RX ORDER — PANTOPRAZOLE SODIUM 20 MG/1
40 TABLET, DELAYED RELEASE ORAL
Refills: 0 | Status: DISCONTINUED | OUTPATIENT
Start: 2019-07-17 | End: 2019-07-27

## 2019-07-17 RX ORDER — ASPIRIN/CALCIUM CARB/MAGNESIUM 324 MG
81 TABLET ORAL DAILY
Refills: 0 | Status: DISCONTINUED | OUTPATIENT
Start: 2019-07-17 | End: 2019-07-27

## 2019-07-17 RX ORDER — METOPROLOL TARTRATE 50 MG
100 TABLET ORAL
Refills: 0 | Status: DISCONTINUED | OUTPATIENT
Start: 2019-07-17 | End: 2019-07-17

## 2019-07-17 RX ORDER — SACUBITRIL AND VALSARTAN 24; 26 MG/1; MG/1
1 TABLET, FILM COATED ORAL
Refills: 0 | Status: DISCONTINUED | OUTPATIENT
Start: 2019-07-17 | End: 2019-07-27

## 2019-07-17 RX ORDER — ATORVASTATIN CALCIUM 80 MG/1
80 TABLET, FILM COATED ORAL AT BEDTIME
Refills: 0 | Status: DISCONTINUED | OUTPATIENT
Start: 2019-07-17 | End: 2019-07-27

## 2019-07-17 RX ORDER — DILTIAZEM HCL 120 MG
10 CAPSULE, EXT RELEASE 24 HR ORAL ONCE
Refills: 0 | Status: COMPLETED | OUTPATIENT
Start: 2019-07-17 | End: 2019-07-17

## 2019-07-17 RX ORDER — ENOXAPARIN SODIUM 100 MG/ML
75 INJECTION SUBCUTANEOUS
Refills: 0 | Status: DISCONTINUED | OUTPATIENT
Start: 2019-07-17 | End: 2019-07-22

## 2019-07-17 RX ORDER — METOPROLOL TARTRATE 50 MG
50 TABLET ORAL
Refills: 0 | Status: DISCONTINUED | OUTPATIENT
Start: 2019-07-17 | End: 2019-07-18

## 2019-07-17 RX ADMIN — Medication 5 MG/HR: at 18:32

## 2019-07-17 RX ADMIN — Medication 10 MILLIGRAM(S): at 18:46

## 2019-07-17 RX ADMIN — Medication 100 MILLIGRAM(S): at 14:18

## 2019-07-17 NOTE — ED ADULT NURSE NOTE - NSIMPLEMENTINTERV_GEN_ALL_ED
Implemented All Fall with Harm Risk Interventions:  [place] to call system. Call bell, personal items and telephone within reach. Instruct patient to call for assistance. Room bathroom lighting operational. Non-slip footwear when patient is off stretcher. Physically safe environment: no spills, clutter or unnecessary equipment. Stretcher in lowest position, wheels locked, appropriate side rails in place. Provide visual cue, wrist band, yellow gown, etc. Monitor gait and stability. Monitor for mental status changes and reorient to person, place, and time. Review medications for side effects contributing to fall risk. Reinforce activity limits and safety measures with patient and family. Provide visual clues: red socks.

## 2019-07-17 NOTE — CONSULT NOTE ADULT - ASSESSMENT
A.fib, tachycardia  cardiomyopathy<30% EF  Recent acute MI with patent coronary arteries  hx of severe epistaxis, resulted in packing and cauterization, just on Feb-March of 2019, while taking ASA + Plavix    for now Lovenox therapeutic dose  Cardizem drip, temporarily  Metoprolol 50 bid, if HR slows down will try to D/C Cardizem  restart Entresto, Lipitor (his recent medication)  will manage him for Afib, Cardiomyopathy, CAD  This is a star and readmission patient, but previous admission was for pulmonary edema, CHF, MI, this one is for Afib-tachycardia, and in general he is a sick patient with a very limited mobility and multiple medical problems.

## 2019-07-17 NOTE — ED PROVIDER NOTE - PHYSICAL EXAMINATION
Physical Exam    Vital Signs: I have reviewed the initial vital signs  Constitutional: well-nourished, appears stated age, no acute distress  EENT: Conjunctiva pink, Sclera clear, PERRLA, EOMI. Mucous membranes moist, no exudates or lesions noted, uvula midline.  Cardiovascular: S1 and S2 present, irregular rate, irregular rhythm, tachy. Well perfused extremities, no peripheral edema  Respiratory: unlabored respiratory effort, clear to auscultation bilaterally no wheezing, rales and rhonchi  Gastrointestinal: soft, non-tender abdomen  Musculoskeletal: supple nontender neck, no midline tenderness, no joint pain  Integumentary: warm, dry, no rash  Neurologic: A & O x 3, CN II-XII grossly intact, all extremities’ motor and sensory functions grossly intact  Psychiatric: appropriate mood, appropriate affect

## 2019-07-17 NOTE — H&P ADULT - ATTENDING COMMENTS
Patient seen and examined independently. Resident's H & P reviewed. Agree with the findings and plan of care except,    A 74 years old male presented with Lt sided squeezing type cp which started 5 AM lasting for 5 hrs and associated with palpitations and sob.    CTA chest: No PE  CXR: NAPD  EKG: A. Fib @ 127/min. (Interpreted by me)  CE x 2 negative.   BNP: 5408    ASSESSMENT:    1. A. Fib with RVR  2. CP due to rapid A. Fib  3. CM  4. Non obst CAD  5. HTN  6. No CHF. H/O Ch. HFrEF    PLAN: Patient seen and examined independently. Resident's H & P reviewed. Agree with the findings and plan of care except,    A 74 years old male presented with Lt sided squeezing type cp which started 5 AM lasting for 5 hrs and was associated with palpitations and sob.    CTA chest: No PE  CXR: NAPD  EKG: A. Fib @ 127/min. (Interpreted by me)  CE x 2 negative.   BNP: 5408    ASSESSMENT:    1. A. Fib with RVR  2. CP due to rapid A. Fib  3. CM  4. Non obst CAD  5. HTN  6. No CHF. H/O Ch. HFrEF  7. Pancytopenia / Thrombocytopenia  8. Hematuria    PLAN:    . Cont tele  . Increase Metoprolol to 50 mg po q 8h  . Increase Cardizem drip to 10 mg/hr  . Cont Lovenox for now  . Cont ASA, Plavix, Metoprolol and Lipitor  . Cont Spironolactone.   . Hem/Onc eval for thrombocytopenia and pancytopenia  . Urology eval for hematuria

## 2019-07-17 NOTE — H&P ADULT - HISTORY OF PRESENT ILLNESS
74 year old male w/ past medical history of CAD s/p stent, non-obstructive cardiomyopathy with EF 26%, hypertension, hyperlipidemia, DM II presenting with chief complaint of L-sided chest pain and palpitations.    Pt reports his symptoms starting at 5AM with L-sided squeezing chest pain that lasted 5 hours. The pain was non-radiating, 6/10 in intensity, and was associated with shortness of breath. He was recently hospitalized for new CHF with reduced ejection fraction, had cardiac cath which showed non-obstructive CAD, and was discharged on medical therapy and life vest for low EF.    In the ED, VS: /92  RR 18 T97.2  satting 100% on 2LNC. EKG showed Afib w/ tachycardia. LVH pattern.  Pt received Cardizem 10mgIVP x1, Metoprolol 100mg,and was started on cardizem drip.

## 2019-07-17 NOTE — H&P ADULT - NSICDXPASTMEDICALHX_GEN_ALL_CORE_FT
PAST MEDICAL HISTORY:  CAD (coronary artery disease)     Diabetes     GERD (gastroesophageal reflux disease)     HTN (hypertension)

## 2019-07-17 NOTE — H&P ADULT - NSHPPHYSICALEXAM_GEN_ALL_CORE
Vital Signs Last 24 Hrs  T(C): 36.8 (17 Jul 2019 18:44), Max: 37.4 (17 Jul 2019 12:15)  T(F): 98.2 (17 Jul 2019 18:44), Max: 99.4 (17 Jul 2019 12:15)  HR: 106 (17 Jul 2019 20:31) (102 - 124)  BP: 109/69 (17 Jul 2019 20:31) (96/50 - 143/92)  BP(mean): --  RR: 18 (17 Jul 2019 20:31) (18 - 18)  SpO2: 98% (17 Jul 2019 20:31) (98% - 100%)    PHYSICAL EXAM:    General: Not in distress.  speaks in full sentences. AAOx3  HEENT: Atraumatic, normocephalic. Moist mucus membranes. PERRLA.  Cardio: Irregular rhythm, tachycardic. S1S2 appreciated. No murmurs  Pulm: Decreased BS bilaterally  Abdomen: Soft, non-tender, non-distended.   Extremities: No cyanosis or edema bilaterally.   Neuro: No focal deficits. Motor 5/5 throughout. Sensation intact Vital Signs Last 24 Hrs  T(C): 36.8 (17 Jul 2019 18:44), Max: 37.4 (17 Jul 2019 12:15)  T(F): 98.2 (17 Jul 2019 18:44), Max: 99.4 (17 Jul 2019 12:15)  HR: 106 (17 Jul 2019 20:31) (102 - 124)  BP: 109/69 (17 Jul 2019 20:31) (96/50 - 143/92)  BP(mean): --  RR: 18 (17 Jul 2019 20:31) (18 - 18)  SpO2: 98% (17 Jul 2019 20:31) (98% - 100%)    PHYSICAL EXAM:    General: Not in distress.  speaks in full sentences. AAOx3  HEENT: Atraumatic, normocephalic. Moist mucus membranes. PERRLA.  Cardio: Irregular rhythm, tachycardic. S1S2 appreciated. No murmurs  Pulm: Decreased BS bilaterally  Abdomen/GI: Soft, non-tender, non-distended.   Extremities: No cyanosis or edema bilaterally.   Neuro: No focal deficits. Motor 5/5 throughout. Sensation intact

## 2019-07-17 NOTE — ED PROVIDER NOTE - OBJECTIVE STATEMENT
74 year old male presenting hx HTN. HLD, DM, with chest pain and shortness of breath since this morning. patient woke up around 5 am and had palpitations and chest pain. he denies fever, chills, headache, cough, abd pain, n/v/d, dysuria. Patient seen in ED last week for shortness of breath and given life vest. Seen by Raúl. caridolgist is Gloria.

## 2019-07-17 NOTE — ED CLERICAL - NS ED CLERK NOTE PRE-ARRIVAL INFORMATION; ADDITIONAL PRE-ARRIVAL INFORMATION
This patient is enrolled in the STAR readmission reduction initiative and has active care navigation. This patient can be followed up by the care navigation team within 24 hours.  To arrange close follow-up or to obtain additional clinical information, please call the contact number above. The on call [facility] Hospitalist has been notified and will coordinate care in concert with the ED Physician including consults as necessary. Call [phone] to reach the hospitalist. Consider CDU for management per guidelines

## 2019-07-17 NOTE — H&P ADULT - ASSESSMENT
74 year old male w/ past medical history of CAD s/p stent, non-obstructive cardiomyopathy with EF 26%, hypertension, hyperlipidemia, DM II presenting with chief complaint of L-sided chest pain and palpitations found to be in Afib w/ RVR.    # New onset Afib w/ RVR in the setting of non-obstructive cardiomyopathy w/ clean cath   - Pt clinically and hemodynamically stable at this time. Rate controlled  - s/p  Cardizem 10mg IVP with successful rate control. Currently on cardizem drip.   - Restart metoprolol 50mg bid, Entresto 24/26 q12hr, Aspirin, and high dose statin  - CHADSVASC: 4: Lovenox 75mg bid for anticoagulation  - EKG: Afib w/ tachycardia. LVH  - Trops -ve x2. F/u trops at 11.30PM  - TSH ordered  - 2-D echo (07/03): LVEF 26%. Mild aortic stenosis. mild pulmonary hypertension.    #Hypertension  - C/w metoprolol 50mg bid, and Entresto 24/26 q12hr    #DM II  - FS qAC and qHS  - Start ISS if FS>180    #BPH  - C/w flomax 0.4mg QD    Diet: DASH/ CC  DVT ppx: On therapeutic lovenox  Activity: Ambulate as tolerated  #Dispo: Telemetry    #Code Status: Full

## 2019-07-17 NOTE — ED PROVIDER NOTE - NS ED ROS FT
Review of Systems         Constitutional: (-) fever (-) chills (-) weakness       EENT: (-) visual changes (-) sore throat       Cardiovascular: (-) chest pain (-) syncope (+) palpitations       Respiratory: (-) cough, (-) shortness of breath       Gastrointestinal: (-) abdominal pain (-) vomiting (-) diarrhea (-) nausea       Genitourinary: (-) dysuria (-) frequency (-) hematuria       Musculoskeletal: (-) neck pain (-) back pain (-) joint pain       Integumentary: (-) rash       Neurological: (-) headache (-) altered mental status (-) dizziness (-) paresthesias       Psych: (-) psych history

## 2019-07-17 NOTE — CONSULT NOTE ADULT - SUBJECTIVE AND OBJECTIVE BOX
Patient is a 74y old  Male who presents with a chief complaint of sob, chest discomfort    HPI: Patient was sent to hospital for above complaint, he was found to be in atrial fibrillation with tachycardia.   Patient was recently d/c from [facility], who was admitted for acute pulmonary edema, acute MI, cardiomyopathy was detected, he had cardiac Cath that revealed 40% OM stenosis after the patent stent, no major obstruction was found, but EF was very low, no clear coronary culprit was found for the MI, he was d/c on Entresto and rest of the medication. He supposed to see me in the office, but prior to visit came back to hospital.  He has a Hx of severe epistaxis to the point that was unable to take anti coagulation, it was a heavy bleeding.  ECG is suggestive of A.fib, tachycardia, LVH pattern  cardiac enzyme is negative this time.      PAST MEDICAL & SURGICAL HISTORY:  GERD (gastroesophageal reflux disease)  Anxiety  CAD (coronary artery disease)  Diabetes  HTN (hypertension)  H/O colonoscopy: 10 yeras ago      PREVIOUS DIAGNOSTIC TESTING:      ECHO  FINDINGS: < from: Transthoracic Echocardiogram (07.03.19 @ 09:56) >   1. LV Ejection Fraction by Cheng's Method with a biplane EF of 26 %.   2. Spectral Doppler shows impaired relaxation pattern of left   ventricular myocardial filling (Grade I diastolic dysfunction).   3. Mild to moderate mitral valve regurgitation.   4. Mitral annular calcification.   5. Thickening and calcification of the anterior and posterior mitral   valve leaflets.   6. Aortic valve thickening with decreased leaflet opening.   7. Estimated pulmonary artery systolic pressure is 41.5 mmHg assuming a   right atrial pressure of 5 mmHg, which is consistent with mild pulmonary   hypertension.   8. Peak transaortic gradient equals 16.2 mmHg, mean transaortic gradient   equals 9.9 mmHg, the calculated aortic valve area equals 1.64 cm²by the   continuity equation consistent with mild aortic stenosis.    < end of copied text >      STRESS TEST  FINDINGS: < from: NM Nuclear Stress Pharmacologic Multiple (07.06.19 @ 10:54) >  1. Small-size reversible defect in the anterobasal wall of the left   ventricle consistent with ischemia.    2. Moderate global hypokinesia.    3. Calculated left ventricular ejection fraction of <35 % which is below   the lower limits of normal of 50%.  Left ventricular ejection fraction   estimated from echocardiogram dated 7/3/19 was 26%.    < end of copied text >      CATHETERIZATION  FINDINGS:  < from: Cardiac Cath Lab - Adult (07.08.19 @ 09:22) >  VALVES: AORTIC VALVE: No significant aortic gradient.         CORONARY CIRCULATION: The coronary circulation is co-dominant. There was no    angiographic evidence for occlusive coronary artery disease. Left main:    The vessel was medium to large sized. Angiography showed no evidence of    disease. LAD: The vessel was medium to large sized. Angiography showed    minor luminal irregularities with no flow limiting lesions. There was a 10    % stenosis at the site of a prior stent. Distal LAD: The vessel was medium    sized. Angiography showed the vessel to wrap around the cardiac apex and    minor luminal irregularities with no flow limiting lesions. Circumflex:    The vessel was medium sized. Angiography showed moderate atherosclerosis    with no clinical lesions appreciated. Proximal circumflex: There was a    diffuse 10 % stenosis at the site of a prior stent. Distal circumflex: The    vessel was medium sized. Angiography showed mild atherosclerosis with no    flow limiting lesions. 1st obtuse marginal: There was a 40 % stenosis just      IMPRESSIONS: Non-obstructive coronary artery disease.    after the previuous stent. RCA: The vessel was medium sized. Angiography    showed mild atherosclerosis with no flow limiting lesions. Mid RCA: The    vessel was medium sized. Angiography showed moderate atherosclerosis with    no clinical lesions appreciated. Distal RCA: The vessel was medium sized.    Angiography showed minor luminal irregularities with no flow limiting    lesions. Right PDA: The vessel was medium sized. Angiography showed minor    luminal irregularities with no flow limiting lesions.         COMPLICATIONS: No complications occurred during the cath lab visit.         < end of copied text >      MEDICATIONS  (STANDING):  diltiazem Infusion 5 mG/Hr (5 mL/Hr) IV Continuous <Continuous>  diltiazem Injectable 10 milliGRAM(s) IV Push once  metoprolol tartrate 100 milliGRAM(s) Oral two times a day  pantoprazole    Tablet 40 milliGRAM(s) Oral before breakfast  tamsulosin 0.4 milliGRAM(s) Oral at bedtime    MEDICATIONS  (PRN):      FAMILY HISTORY:      SOCIAL HISTORY:  CIGARETTES: no  ALCOHOL: bno  DRUGS: no                      REVIEW OF SYSTEMS:  CONSTITUTIONAL: mild distress by talking  NECK: No pain  RESPIRATORY: No cough, wheezing, but shortness of breath  CARDIOVASCULAR: No chest pain, feels uncomfortable, no leg swelling  GASTROINTESTINAL: No abdominal or epigastric pain. No nausea, vomiting, or hematemesis;  No melena.  NEUROLOGICAL: No dizziness, headaches, generally stable memory  SKIN: No itching, burning, rashes, or lesions   ENDOCRINE: No heat or cold intolerance        Vital Signs Last 24 Hrs  T(C): 37.4 (17 Jul 2019 12:15), Max: 37.4 (17 Jul 2019 12:15)  T(F): 99.4 (17 Jul 2019 12:15), Max: 99.4 (17 Jul 2019 12:15)  HR: 110 (17 Jul 2019 11:28) (110 - 110)  BP: 143/92 (17 Jul 2019 11:28) (143/92 - 143/92)  BP(mean): --  RR: 18 (17 Jul 2019 11:28) (18 - 18)  SpO2: 100% (17 Jul 2019 11:28) (100% - 100%)                      PHYSICAL EXAM:  GENERAL: Generally stable  HEAD:  Atraumatic, Normocephalic  NECK: Supple, No JVD  NERVOUS SYSTEM:  Alert, Awake, Oriented to place, person; adequate memory and speech  CHEST/LUNG: adequate air entry to lung base bilaterally; No wheeze, no crackle,  HEART: Irregular heart beat, S1, A2, P2, No S3, No gallop, murmur  ABDOMEN: Soft, Non tender, Non distended; Bowel sounds present  EXTREMITIES:  1+ Peripheral Pulses, No clubbing, No edema    TELEMETRY: afib, tachycardia    ECG: afib, tachycardia, lvh pattern    I&O's Detail      LABS:                        9.2    3.57  )-----------( 109      ( 17 Jul 2019 11:52 )             29.5     07-17    142  |  107  |  26<H>  ----------------------------<  129<H>  3.9   |  22  |  1.2    Ca    9.0      17 Jul 2019 11:52    TPro  7.4  /  Alb  3.3<L>  /  TBili  0.4  /  DBili  x   /  AST  17  /  ALT  21  /  AlkPhos  76  07-17    CARDIAC MARKERS ( 17 Jul 2019 11:52 )  x     / <0.01 ng/mL / x     / x     / x              I&O's Summary      RADIOLOGY & ADDITIONAL STUDIES: < from: Xray Chest 1 View-PORTABLE IMMEDIATE (07.17.19 @ 12:35) >  No radiographic evidence of acute cardiopulmonary disease.    < end of copied text >

## 2019-07-17 NOTE — ED PROVIDER NOTE - ATTENDING CONTRIBUTION TO CARE
74 year old male presenting hx HTN. HLD, DM, with chest pain and shortness of breath since this morning. Patient has been endorsing palpiations since 5 am this morning, patient ekg in the ed found to be in new onset afib, no chest pain, no fever or hemoptysis, no n/v/d, no loc    CONSTITUTIONAL: Well-developed; well-nourished; in no acute distress. Sitting up and providing appropriate history and physical examination  SKIN: skin exam is warm and dry, no acute rash.  HEAD: Normocephalic; atraumatic.  EYES: PERRL, 3 mm bilateral, no nystagmus, EOM intact; conjunctiva and sclera clear.  ENT: No nasal discharge; airway clear.  NECK: Supple; non tender. + full passive ROM in all directions. No JVD  CARD: S1, S2 normal; no murmurs, gallops, or rubs. + Rapid and Irregular rate and rhythm. + Symmetric Strong Pulses  RESP: No wheezes, rales or rhonchi. Good air movement bilaterally  ABD: soft; non-distended; non-tender. No Rebound, No Guarding, No signs of peritonitis, No CVA tenderness. No pulsatile abdominal mass. + Strong and Symmetric Pulses  EXT: Normal ROM. No clubbing, cyanosis or edema. Dp and Pt Pulses intact. Cap refill less than 3 seconds  NEURO: CN 2-12 intact, normal finger to nose, no sensory or motor deficits, Alert, oriented, grossly unremarkable. No Focal deficits. GCS 15. NIH 0  PSYCH: Cooperative, appropriate.

## 2019-07-17 NOTE — PROGRESS NOTE ADULT - ASSESSMENT
·	Likely newly diagnosed AFib with RVR  ·	Recently diagnosed systolic heart failure, recent decompensation; currently stable   ·	NIDDM (type 2); on Oral AntiDiabetics as outpatient   ·	BPH on Flomax  ·	NO evidence of occlusive CAD on recent cath     REC  ·	ED team planning to R/O PE in the setting of chest pain/ negative CXR/ neg initial cardiac workup  ·	If R/O for PE, we advise cardiac eval and AVN blockade: Calcium Channel Blocker or Beta-blocker therapy (patient on metoprolol 100 Q12 at baseline). cardiology input re: rate vs rhythm control therapy.   ·	Trend second set of cardiac enzyme  ·	Adding a Proton Pump Inhibitor (possible dyspepsia based on circumstances)   ·	Discussed with nephew at bedside    Discussed with ED Team. Might be a candidate for OBS 24-48 hour stay

## 2019-07-17 NOTE — ED PROVIDER NOTE - CRITICAL CARE PROVIDED
additional history taking/interpretation of diagnostic studies/consultation with other physicians/documentation/direct patient care (not related to procedure)/conducted a detailed discussion of DNR status

## 2019-07-17 NOTE — ED PROVIDER NOTE - CLINICAL SUMMARY MEDICAL DECISION MAKING FREE TEXT BOX
I have personally performed a history and physical exam on this patient and personally directed the management of the patient. Patient CTA unremarkable, no PE, patient rate controlled, discussed with inpatient, they will assess for anticoagulation given the elevated chadsvasc score

## 2019-07-17 NOTE — H&P ADULT - NSHPLABSRESULTS_GEN_ALL_CORE
9.2    3.57  )-----------( 109      ( 17 Jul 2019 11:52 )             29.5       07-17    142  |  107  |  26<H>  ----------------------------<  129<H>  3.9   |  22  |  1.2    Ca    9.0      17 Jul 2019 11:52    TPro  7.4  /  Alb  3.3<L>  /  TBili  0.4  /  DBili  x   /  AST  17  /  ALT  21  /  AlkPhos  76  07-17    Cardiology:    CARDIAC MARKERS ( 17 Jul 2019 20:16 )  x     / <0.01 ng/mL / 39 U/L / x     / 1.7 ng/mL  CARDIAC MARKERS ( 17 Jul 2019 11:52 )  x     / <0.01 ng/mL / x     / x     / x          < from: 12 Lead ECG (07.17.19 @ 11:36) >    Atrial fibrillation with rapid ventricular response  Minimal voltage criteria for LVH, may be normal variant    Cardiac cath (07/09):    The coronary circulation is co-dominant. There was no angiographic evidence for occlusive coronary artery disease. Left main: The vessel was medium to large sized. Angiography showed no evidence of disease. LAD: The vessel was medium to large sized. Angiography showed minor luminal irregularities with no flow limiting lesions. There was a 10 % stenosis at the site of a prior stent. Distal LAD: The vessel was medium sized. Angiography showed the vessel to wrap around the cardiac apex and minor luminal irregularities with no flow limiting lesions. Circumflex: The vessel was medium sized. Angiography showed moderate atherosclerosis with no clinical lesions appreciated. Proximal circumflex: There was a diffuse 10 % stenosis at the site of a prior stent. Distal circumflex: The vessel was medium sized. Angiography showed mild atherosclerosis with no flow limiting lesions. 1st obtuse marginal: There was a diffuse 40 % stenosis after the stent RCA: The vessel was medium sized. Angiography showed mild atherosclerosis with no flow limiting lesions. Mid RCA: The vessel was medium sized. Angiography showed moderate atherosclerosis with no clinical lesions appreciated. Distal RCA: The vessel was medium sized. Angiography showed minor luminal irregularities with no flow limiting lesions. Right PDA: The vessel was medium sized. Angiography showed minor luminal irregularities with no flow limiting lesions.

## 2019-07-17 NOTE — PROGRESS NOTE ADULT - SUBJECTIVE AND OBJECTIVE BOX
SWAT protocol follow up      SHANE ASHTON  74y, Male  Allergy: No Known Allergies    Hospital Day:     Patient seen and examined earlier today.     PMH/PSH:  PAST MEDICAL & SURGICAL HISTORY:  GERD (gastroesophageal reflux disease)  Anxiety  CAD (coronary artery disease)  Diabetes  HTN (hypertension)  H/O colonoscopy: 10 yeras ago      LAST 24-Hr EVENTS:    VITALS:  T(F): 99.4 (07-17-19 @ 12:15), Max: 99.4 (07-17-19 @ 12:15)  HR: 110 (07-17-19 @ 11:28)  BP: 143/92 (07-17-19 @ 11:28) (143/92 - 143/92)  RR: 18 (07-17-19 @ 11:28)  SpO2: 100% (07-17-19 @ 11:28)    TESTS & MEASUREMENTS:  Weight (Kg):                             9.2    3.57  )-----------( 109      ( 17 Jul 2019 11:52 )             29.5       07-17    142  |  107  |  26<H>  ----------------------------<  129<H>  3.9   |  22  |  1.2    Ca    9.0      17 Jul 2019 11:52    TPro  7.4  /  Alb  3.3<L>  /  TBili  0.4  /  DBili  x   /  AST  17  /  ALT  21  /  AlkPhos  76  07-17    LIVER FUNCTIONS - ( 17 Jul 2019 11:52 )  Alb: 3.3 g/dL / Pro: 7.4 g/dL / ALK PHOS: 76 U/L / ALT: 21 U/L / AST: 17 U/L / GGT: x           CARDIAC MARKERS ( 17 Jul 2019 11:52 )  x     / <0.01 ng/mL / x     / x     / x        Serum Pro-Brain Natriuretic Peptide: 5408 pg/mL (07.17.19 @ 11:52)    Serum Pro-Brain Natriuretic Peptide: 7990 pg/mL (07.01.19 @ 13:10)      RADIOLOGY & ADDITIONAL TESTS:  < from: Xray Chest 1 View-PORTABLE IMMEDIATE (07.17.19 @ 12:35) >  No radiographic evidence of acute cardiopulmonary disease.    ECG: AFib at 120-130; no acute STTWaves-changes (prelim reading)      MEDICATIONS:  MEDICATIONS  (STANDING):  metoprolol tartrate 100 milliGRAM(s) Oral two times a day      HOME MEDICATIONS (unverified)  famotidine 40 mg oral tablet (07-01)  glimepiride 4 mg oral tablet (07-01)  metFORMIN 1000 mg oral tablet (07-01)  famotidine    Tablet 40 milliGRAM(s) Oral at bedtime  furosemide    Tablet 40 milliGRAM(s) Oral daily  lisinopril 10 milliGRAM(s) Oral daily  metoprolol tartrate 100 milliGRAM(s) Oral two times a day  spironolactone 25 milliGRAM(s) Oral daily  tamsulosin 0.4 milliGRAM(s) Oral at bedtime        PHYSICAL EXAM:  GENERAL: A&O x3,  in NAD/P, disheveled   NECK: No Swelling  CHEST/LUNG: Good air entry, No wheezing  HEART: irregular, No murmurs  ABDOMEN: Soft, Bowel sounds present  EXTREMITIES:  No clubbing,

## 2019-07-18 LAB
ALBUMIN SERPL ELPH-MCNC: 2.8 G/DL — LOW (ref 3.5–5.2)
ALP SERPL-CCNC: 62 U/L — SIGNIFICANT CHANGE UP (ref 30–115)
ALT FLD-CCNC: 16 U/L — SIGNIFICANT CHANGE UP (ref 0–41)
ANION GAP SERPL CALC-SCNC: 13 MMOL/L — SIGNIFICANT CHANGE UP (ref 7–14)
APPEARANCE UR: CLEAR — SIGNIFICANT CHANGE UP
AST SERPL-CCNC: 13 U/L — SIGNIFICANT CHANGE UP (ref 0–41)
BACTERIA # UR AUTO: NEGATIVE — SIGNIFICANT CHANGE UP
BASOPHILS # BLD AUTO: 0 K/UL — SIGNIFICANT CHANGE UP (ref 0–0.2)
BASOPHILS NFR BLD AUTO: 0 % — SIGNIFICANT CHANGE UP (ref 0–1)
BILIRUB SERPL-MCNC: 0.4 MG/DL — SIGNIFICANT CHANGE UP (ref 0.2–1.2)
BILIRUB UR-MCNC: NEGATIVE — SIGNIFICANT CHANGE UP
BUN SERPL-MCNC: 24 MG/DL — HIGH (ref 10–20)
CALCIUM SERPL-MCNC: 8.4 MG/DL — LOW (ref 8.5–10.1)
CHLORIDE SERPL-SCNC: 106 MMOL/L — SIGNIFICANT CHANGE UP (ref 98–110)
CO2 SERPL-SCNC: 24 MMOL/L — SIGNIFICANT CHANGE UP (ref 17–32)
COLOR SPEC: SIGNIFICANT CHANGE UP
CREAT SERPL-MCNC: 1.1 MG/DL — SIGNIFICANT CHANGE UP (ref 0.7–1.5)
DIFF PNL FLD: ABNORMAL
EOSINOPHIL # BLD AUTO: 0.08 K/UL — SIGNIFICANT CHANGE UP (ref 0–0.7)
EOSINOPHIL NFR BLD AUTO: 3.2 % — SIGNIFICANT CHANGE UP (ref 0–8)
EPI CELLS # UR: 1 /HPF — SIGNIFICANT CHANGE UP (ref 0–5)
GLUCOSE BLDC GLUCOMTR-MCNC: 113 MG/DL — HIGH (ref 70–99)
GLUCOSE BLDC GLUCOMTR-MCNC: 115 MG/DL — HIGH (ref 70–99)
GLUCOSE BLDC GLUCOMTR-MCNC: 187 MG/DL — HIGH (ref 70–99)
GLUCOSE BLDC GLUCOMTR-MCNC: 262 MG/DL — HIGH (ref 70–99)
GLUCOSE SERPL-MCNC: 104 MG/DL — HIGH (ref 70–99)
GLUCOSE UR QL: ABNORMAL
HCT VFR BLD CALC: 25.6 % — LOW (ref 42–52)
HCT VFR BLD CALC: 27.3 % — LOW (ref 42–52)
HCT VFR BLD CALC: 28 % — LOW (ref 42–52)
HGB BLD-MCNC: 8 G/DL — LOW (ref 14–18)
HGB BLD-MCNC: 8.4 G/DL — LOW (ref 14–18)
HGB BLD-MCNC: 8.9 G/DL — LOW (ref 14–18)
HYALINE CASTS # UR AUTO: 4 /LPF — SIGNIFICANT CHANGE UP (ref 0–7)
IMM GRANULOCYTES NFR BLD AUTO: 0.4 % — HIGH (ref 0.1–0.3)
KETONES UR-MCNC: NEGATIVE — SIGNIFICANT CHANGE UP
LEUKOCYTE ESTERASE UR-ACNC: NEGATIVE — SIGNIFICANT CHANGE UP
LYMPHOCYTES # BLD AUTO: 1.5 K/UL — SIGNIFICANT CHANGE UP (ref 1.2–3.4)
LYMPHOCYTES # BLD AUTO: 60.7 % — HIGH (ref 20.5–51.1)
MCHC RBC-ENTMCNC: 29.5 PG — SIGNIFICANT CHANGE UP (ref 27–31)
MCHC RBC-ENTMCNC: 29.7 PG — SIGNIFICANT CHANGE UP (ref 27–31)
MCHC RBC-ENTMCNC: 30.2 PG — SIGNIFICANT CHANGE UP (ref 27–31)
MCHC RBC-ENTMCNC: 30.8 G/DL — LOW (ref 32–37)
MCHC RBC-ENTMCNC: 31.3 G/DL — LOW (ref 32–37)
MCHC RBC-ENTMCNC: 31.8 G/DL — LOW (ref 32–37)
MCV RBC AUTO: 94.5 FL — HIGH (ref 80–94)
MCV RBC AUTO: 94.9 FL — HIGH (ref 80–94)
MCV RBC AUTO: 96.5 FL — HIGH (ref 80–94)
MONOCYTES # BLD AUTO: 0.35 K/UL — SIGNIFICANT CHANGE UP (ref 0.1–0.6)
MONOCYTES NFR BLD AUTO: 14.2 % — HIGH (ref 1.7–9.3)
NEUTROPHILS # BLD AUTO: 0.53 K/UL — LOW (ref 1.4–6.5)
NEUTROPHILS NFR BLD AUTO: 21.5 % — LOW (ref 42.2–75.2)
NITRITE UR-MCNC: NEGATIVE — SIGNIFICANT CHANGE UP
NRBC # BLD: 0 /100 WBCS — SIGNIFICANT CHANGE UP (ref 0–0)
PH UR: 5.5 — SIGNIFICANT CHANGE UP (ref 5–8)
PLATELET # BLD AUTO: 58 K/UL — LOW (ref 130–400)
PLATELET # BLD AUTO: 64 K/UL — LOW (ref 130–400)
PLATELET # BLD AUTO: 69 K/UL — LOW (ref 130–400)
POTASSIUM SERPL-MCNC: 3.6 MMOL/L — SIGNIFICANT CHANGE UP (ref 3.5–5)
POTASSIUM SERPL-SCNC: 3.6 MMOL/L — SIGNIFICANT CHANGE UP (ref 3.5–5)
PROT SERPL-MCNC: 6.4 G/DL — SIGNIFICANT CHANGE UP (ref 6–8)
PROT UR-MCNC: SIGNIFICANT CHANGE UP
RBC # BLD: 2.71 M/UL — LOW (ref 4.7–6.1)
RBC # BLD: 2.83 M/UL — LOW (ref 4.7–6.1)
RBC # BLD: 2.95 M/UL — LOW (ref 4.7–6.1)
RBC # FLD: 18.2 % — HIGH (ref 11.5–14.5)
RBC # FLD: 18.2 % — HIGH (ref 11.5–14.5)
RBC # FLD: 18.5 % — HIGH (ref 11.5–14.5)
RBC CASTS # UR COMP ASSIST: 59 /HPF — HIGH (ref 0–4)
SODIUM SERPL-SCNC: 143 MMOL/L — SIGNIFICANT CHANGE UP (ref 135–146)
SP GR SPEC: 1.02 — SIGNIFICANT CHANGE UP (ref 1.01–1.02)
TROPONIN T SERPL-MCNC: <0.01 NG/ML — SIGNIFICANT CHANGE UP
TROPONIN T SERPL-MCNC: <0.01 NG/ML — SIGNIFICANT CHANGE UP
TSH SERPL-MCNC: 1.83 UIU/ML — SIGNIFICANT CHANGE UP (ref 0.27–4.2)
UROBILINOGEN FLD QL: SIGNIFICANT CHANGE UP
WBC # BLD: 2.22 K/UL — LOW (ref 4.8–10.8)
WBC # BLD: 2.33 K/UL — LOW (ref 4.8–10.8)
WBC # BLD: 2.47 K/UL — LOW (ref 4.8–10.8)
WBC # FLD AUTO: 2.22 K/UL — LOW (ref 4.8–10.8)
WBC # FLD AUTO: 2.33 K/UL — LOW (ref 4.8–10.8)
WBC # FLD AUTO: 2.47 K/UL — LOW (ref 4.8–10.8)
WBC UR QL: 1 /HPF — SIGNIFICANT CHANGE UP (ref 0–5)

## 2019-07-18 PROCEDURE — 99223 1ST HOSP IP/OBS HIGH 75: CPT | Mod: AI

## 2019-07-18 RX ORDER — DIGOXIN 250 MCG
0.12 TABLET ORAL DAILY
Refills: 0 | Status: DISCONTINUED | OUTPATIENT
Start: 2019-07-19 | End: 2019-07-27

## 2019-07-18 RX ORDER — METOPROLOL TARTRATE 50 MG
50 TABLET ORAL THREE TIMES A DAY
Refills: 0 | Status: DISCONTINUED | OUTPATIENT
Start: 2019-07-18 | End: 2019-07-20

## 2019-07-18 RX ORDER — GLUCAGON INJECTION, SOLUTION 0.5 MG/.1ML
1 INJECTION, SOLUTION SUBCUTANEOUS ONCE
Refills: 0 | Status: DISCONTINUED | OUTPATIENT
Start: 2019-07-18 | End: 2019-07-27

## 2019-07-18 RX ORDER — INSULIN LISPRO 100/ML
VIAL (ML) SUBCUTANEOUS
Refills: 0 | Status: DISCONTINUED | OUTPATIENT
Start: 2019-07-18 | End: 2019-07-27

## 2019-07-18 RX ORDER — INSULIN LISPRO 100/ML
6 VIAL (ML) SUBCUTANEOUS
Refills: 0 | Status: DISCONTINUED | OUTPATIENT
Start: 2019-07-18 | End: 2019-07-27

## 2019-07-18 RX ORDER — DEXTROSE 50 % IN WATER 50 %
15 SYRINGE (ML) INTRAVENOUS ONCE
Refills: 0 | Status: DISCONTINUED | OUTPATIENT
Start: 2019-07-18 | End: 2019-07-27

## 2019-07-18 RX ORDER — SODIUM CHLORIDE 9 MG/ML
1000 INJECTION, SOLUTION INTRAVENOUS
Refills: 0 | Status: DISCONTINUED | OUTPATIENT
Start: 2019-07-18 | End: 2019-07-27

## 2019-07-18 RX ORDER — DIGOXIN 250 MCG
0.25 TABLET ORAL EVERY 4 HOURS
Refills: 0 | Status: COMPLETED | OUTPATIENT
Start: 2019-07-18 | End: 2019-07-19

## 2019-07-18 RX ORDER — INSULIN GLARGINE 100 [IU]/ML
18 INJECTION, SOLUTION SUBCUTANEOUS AT BEDTIME
Refills: 0 | Status: DISCONTINUED | OUTPATIENT
Start: 2019-07-18 | End: 2019-07-27

## 2019-07-18 RX ORDER — DEXTROSE 50 % IN WATER 50 %
12.5 SYRINGE (ML) INTRAVENOUS ONCE
Refills: 0 | Status: DISCONTINUED | OUTPATIENT
Start: 2019-07-18 | End: 2019-07-27

## 2019-07-18 RX ORDER — FUROSEMIDE 40 MG
20 TABLET ORAL DAILY
Refills: 0 | Status: DISCONTINUED | OUTPATIENT
Start: 2019-07-18 | End: 2019-07-19

## 2019-07-18 RX ADMIN — Medication 0.25 MILLIGRAM(S): at 22:09

## 2019-07-18 RX ADMIN — Medication 1: at 17:25

## 2019-07-18 RX ADMIN — SACUBITRIL AND VALSARTAN 1 TABLET(S): 24; 26 TABLET, FILM COATED ORAL at 06:19

## 2019-07-18 RX ADMIN — SACUBITRIL AND VALSARTAN 1 TABLET(S): 24; 26 TABLET, FILM COATED ORAL at 17:27

## 2019-07-18 RX ADMIN — PANTOPRAZOLE SODIUM 40 MILLIGRAM(S): 20 TABLET, DELAYED RELEASE ORAL at 06:20

## 2019-07-18 RX ADMIN — SPIRONOLACTONE 25 MILLIGRAM(S): 25 TABLET, FILM COATED ORAL at 06:20

## 2019-07-18 RX ADMIN — ATORVASTATIN CALCIUM 80 MILLIGRAM(S): 80 TABLET, FILM COATED ORAL at 22:08

## 2019-07-18 RX ADMIN — TAMSULOSIN HYDROCHLORIDE 0.4 MILLIGRAM(S): 0.4 CAPSULE ORAL at 22:08

## 2019-07-18 RX ADMIN — ENOXAPARIN SODIUM 75 MILLIGRAM(S): 100 INJECTION SUBCUTANEOUS at 17:26

## 2019-07-18 RX ADMIN — INSULIN GLARGINE 18 UNIT(S): 100 INJECTION, SOLUTION SUBCUTANEOUS at 22:09

## 2019-07-18 RX ADMIN — Medication 40 MILLIGRAM(S): at 06:19

## 2019-07-18 RX ADMIN — Medication 50 MILLIGRAM(S): at 06:20

## 2019-07-18 RX ADMIN — Medication 0.25 MILLIGRAM(S): at 13:42

## 2019-07-18 RX ADMIN — Medication 50 MILLIGRAM(S): at 22:08

## 2019-07-18 RX ADMIN — ENOXAPARIN SODIUM 75 MILLIGRAM(S): 100 INJECTION SUBCUTANEOUS at 06:30

## 2019-07-18 RX ADMIN — CHLORHEXIDINE GLUCONATE 1 APPLICATION(S): 213 SOLUTION TOPICAL at 06:18

## 2019-07-18 RX ADMIN — Medication 81 MILLIGRAM(S): at 14:05

## 2019-07-18 RX ADMIN — Medication 6 UNIT(S): at 17:25

## 2019-07-18 RX ADMIN — Medication 0.25 MILLIGRAM(S): at 17:27

## 2019-07-18 NOTE — MEDICAL STUDENT PROGRESS NOTE(EDUCATION) - NS MD HP STUD ASPLAN ASSES FT
74 year old male w/ past medical history of CAD s/p stent, non-obstructive cardiomyopathy with EF 26%, hypertension, hyperlipidemia, DM II presenting with chief complaint of L-sided chest pain and palpitations found to be in Afib w/ RVR.

## 2019-07-18 NOTE — MEDICAL STUDENT PROGRESS NOTE(EDUCATION) - SUBJECTIVE AND OBJECTIVE BOX
SUBJECTIVE:    74 year old male w/ past medical history of CAD s/p stent, non-obstructive cardiomyopathy with EF 26%, hypertension, hyperlipidemia, DM II presenting with chief complaint of L-sided chest pain and palpitations. Patient states that he woke up yesterday morning with constant mild left-sided chest pain around 6AM. He additionally felt more SOB and weaker than the previous days.  The chest pain did not worsen and was described as a non-radiating 6/10 in intensity. He denies n/v and diaphoresis. No new LE swelling or orthopnea. His assisted living nurse came to see him roughly at 11AM at which time EMS was called due to his complaints of chest pain and SOB. The patient notes that the chest pain fully resolved en route to the ED upon EMS evaluation/treatment with supplemental O2.     In the ED, VS: /92  RR 18 T97.2  satting 100% on 2LNC. EKG showed Afib w/ tachycardia. LVH pattern.  Pt received Cardizem 10mgIVP x1, Metoprolol 100mg,and was started on cardizem drip.    Of note Patient was recently d/c from [facility], who was admitted for acute pulmonary edema, acute MI, cardiomyopathy was detected, he had cardiac Cath that revealed 40% OM stenosis after the patent stent, no major obstruction was found, but EF was very low, no clear coronary culprit was found for the MI, he was d/c on Entresto and rest of the medication.     He is currently admitted to medicine with the primary diagnosis of New onset atrial fibrillation with RVR. Today is hospital day 1d. This morning he is resting comfortably in bed. He specifically denies chest pains, palpitations, and SOB. He reports one episode of hematuria overnight but denies seeing any clots.     INTERVAL EVENTS:     PAST MEDICAL & SURGICAL HISTORY  GERD (gastroesophageal reflux disease)  CAD (coronary artery disease)  Diabetes  HTN (hypertension)  H/O colonoscopy: 10 yeras ago      ALLERGIES:  No Known Allergies    MEDICATIONS:  STANDING MEDICATIONS  aspirin  chewable 81 milliGRAM(s) Oral daily  atorvastatin 80 milliGRAM(s) Oral at bedtime  chlorhexidine 4% Liquid 1 Application(s) Topical <User Schedule>  diltiazem Infusion 5 mG/Hr IV Continuous <Continuous>  enoxaparin Injectable 75 milliGRAM(s) SubCutaneous two times a day  furosemide    Tablet 40 milliGRAM(s) Oral daily  metoprolol tartrate 50 milliGRAM(s) Oral three times a day  pantoprazole    Tablet 40 milliGRAM(s) Oral before breakfast  sacubitril 24 mG/valsartan 26 mG 1 Tablet(s) Oral two times a day  spironolactone 25 milliGRAM(s) Oral daily  tamsulosin 0.4 milliGRAM(s) Oral at bedtime    PRN MEDICATIONS    VITALS:   T(F): 97.3  HR: 120  BP: 160/80  RR: 18  SpO2: 98%    LABS:                        8.4    2.22  )-----------( 58       ( 2019 05:28 )             27.3         143  |  106  |  24<H>  ----------------------------<  104<H>  3.6   |  24  |  1.1    Ca    8.4<L>      2019 05:28    TPro  6.4  /  Alb  2.8<L>  /  TBili  0.4  /  DBili  x   /  AST  13  /  ALT  16  /  AlkPhos  62          ABG - ( 2019 12:05 )  pH, Arterial: 7.35  pH, Blood: x     /  pCO2: 42    /  pO2: 45    / HCO3: 23    / Base Excess: -2.3  /  SaO2: 77                Troponin T, Serum: <0.01 ng/mL (19 @ 05:28)  Troponin T, Serum: <0.01 ng/mL (19 @ 00:39)  Creatine Kinase, Serum: 39 U/L (19 @ 20:16)  Troponin T, Serum: <0.01 ng/mL (19 @ 20:16)  Troponin T, Serum: <0.01 ng/mL (19 @ 11:52)      CARDIAC MARKERS ( 2019 05:28 )  x     / <0.01 ng/mL / x     / x     / x      CARDIAC MARKERS ( 2019 00:39 )  x     / <0.01 ng/mL / x     / x     / x      CARDIAC MARKERS ( 2019 20:16 )  x     / <0.01 ng/mL / 39 U/L / x     / 1.7 ng/mL  CARDIAC MARKERS ( 2019 11:52 )  x     / <0.01 ng/mL / x     / x     / x          RADIOLOGY:    EXAM: CT ANGIO CHEST (W)AW IC   PROCEDURE DATE: 2019     INTERPRETATION: Clinical History / Reason for exam: Chest pain and   shortness of breath.     TECHNIQUE: Multislice helical sections were obtained from the thoracic inlet   to the lung bases during rapid administration of intravenous contrast. Thin   sections were reconstructed through the pulmonary vasculature. MIP, coronal   and sagittal reformatted images are also submitted.     COMPARISON: None     FINDINGS:     PULMONARY VASCULATURE: No evidence of pulmonary embolism.     LUNGS, PLEURA AND AIRWAYS: Dependent atelectasis. No focal consolidation or   pleural effusion.     Multiple pulmonary nodules, for example:     4.4 mm right upper lobe pulmonary nodule (series 5 image 87), with slightly   irregular margins.   9 mm perifissural right lower lobe pulmonary nodule (series 5 image 142),   benign finding.   4 mm right lower lobe pulmonary nodule (series 5 image 143)   3 mm left upper lobe pulmonary nodule (series 5 image 150)     MEDIASTINUM/LYMPH NODES: No supraclavicular, mediastinal, hilar or axillary   adenopathy.     HEART/GREAT VESSELS: No pericardial effusion. Thoracic aorta and main   pulmonary arteries are normal in caliber. Atherosclerotic disease, including   coronary artery calcifications.     BONES/SOFT TISSUES: Multilevel degenerative changes of the thoracic spine.     VISUALIZED UPPER ABDOMEN: Calcific atherosclerosis of the aorta and branch   vessels.     IMPRESSION:     No evidence of pulmonary embolism.     Multiple subcentimeter pulmonary nodules. Recommend short-term follow-up (6   months) of right upper lobe pulmonary nodule, on image #87 of series 5,   measuring 4.4 mm.       EXAM: XR CHEST PORTABLE IMMED 1V     PROCEDURE DATE: 2019     INTERPRETATION: Clinical History / Reason for exam: Shortness of breath     Comparison : Chest radiograph 2019.     Technique/Positionin frontal views.     Findings:     Support devices: Monitoring devices obscure portions of the lung   parenchyma.. Telemetry leads..     Cardiac/mediastinum/hilum: Unremarkable.     Lung parenchyma/Pleura: Within normal limits.     Skeleton/soft tissues: Stable.     Impression:     No radiographic evidence of acute cardiopulmonary disease.         PHYSICAL EXAM:  GEN: No acute distress  PULM/CHEST: Clear to auscultation bilaterally, no rales, rhonchi or wheezes   CVS: Regular rate and rhythm, S1-S2, no murmurs  ABD: Soft, non-tender, non-distended, +BS  EXT: No edema  NEURO: AAOx3    Mitchell Catheter: SUBJECTIVE:    74 year old male w/ past medical history of CAD s/p stent, non-obstructive cardiomyopathy with EF 26%, hypertension, hyperlipidemia, DM II presenting with chief complaint of L-sided chest pain and palpitations. Patient states that he woke up yesterday morning with constant mild left-sided chest pain around 6AM. He additionally felt more SOB and weaker than the previous days.  The chest pain did not worsen and was described as a non-radiating 6/10 in intensity. He denies n/v and diaphoresis. No new LE swelling or orthopnea. His assisted living nurse came to see him roughly at 11AM at which time EMS was called due to his complaints of chest pain and SOB. The patient notes that the chest pain fully resolved en route to the ED upon EMS evaluation/treatment with supplemental O2.     In the ED, VS: /92  RR 18 T97.2  satting 100% on 2LNC. EKG showed Afib w/ tachycardia. LVH pattern.  Pt received Cardizem 10mgIVP x1, Metoprolol 100mg,and was started on cardizem drip.    Of note Patient was recently d/c from [facility], who was admitted for acute pulmonary edema, acute MI, cardiomyopathy was detected, he had cardiac Cath that revealed 40% OM stenosis after the patent stent, no major obstruction was found, but EF was very low, no clear coronary culprit was found for the MI, he was d/c on Entresto and rest of the medication.     He is currently admitted to medicine with the primary diagnosis of New onset atrial fibrillation with RVR. Today is hospital day 1d. This morning he is resting comfortably in bed. He specifically denies chest pains, palpitations, and SOB. He reports one episode of hematuria overnight but denies seeing any clots in urine.     INTERVAL EVENTS:     PAST MEDICAL & SURGICAL HISTORY  GERD (gastroesophageal reflux disease)  CAD (coronary artery disease)  Diabetes  HTN (hypertension)  H/O colonoscopy: 10 yeras ago      ALLERGIES:  No Known Allergies    MEDICATIONS:  STANDING MEDICATIONS  aspirin  chewable 81 milliGRAM(s) Oral daily  atorvastatin 80 milliGRAM(s) Oral at bedtime  chlorhexidine 4% Liquid 1 Application(s) Topical <User Schedule>  diltiazem Infusion 5 mG/Hr IV Continuous <Continuous>  enoxaparin Injectable 75 milliGRAM(s) SubCutaneous two times a day  furosemide    Tablet 40 milliGRAM(s) Oral daily  metoprolol tartrate 50 milliGRAM(s) Oral three times a day  pantoprazole    Tablet 40 milliGRAM(s) Oral before breakfast  sacubitril 24 mG/valsartan 26 mG 1 Tablet(s) Oral two times a day  spironolactone 25 milliGRAM(s) Oral daily  tamsulosin 0.4 milliGRAM(s) Oral at bedtime    PRN MEDICATIONS    VITALS:   T(F): 97.3  HR: 120  BP: 160/80  RR: 18  SpO2: 98%    LABS:                        8.4    2.22  )-----------( 58       ( 2019 05:28 )             27.3         143  |  106  |  24<H>  ----------------------------<  104<H>  3.6   |  24  |  1.1    Ca    8.4<L>      2019 05:28    TPro  6.4  /  Alb  2.8<L>  /  TBili  0.4  /  DBili  x   /  AST  13  /  ALT  16  /  AlkPhos  62          ABG - ( 2019 12:05 )  pH, Arterial: 7.35  pH, Blood: x     /  pCO2: 42    /  pO2: 45    / HCO3: 23    / Base Excess: -2.3  /  SaO2: 77                Troponin T, Serum: <0.01 ng/mL (19 @ 05:28)  Troponin T, Serum: <0.01 ng/mL (19 @ 00:39)  Creatine Kinase, Serum: 39 U/L (19 @ 20:16)  Troponin T, Serum: <0.01 ng/mL (19 @ 20:16)  Troponin T, Serum: <0.01 ng/mL (19 @ 11:52)      CARDIAC MARKERS ( 2019 05:28 )  x     / <0.01 ng/mL / x     / x     / x      CARDIAC MARKERS ( 2019 00:39 )  x     / <0.01 ng/mL / x     / x     / x      CARDIAC MARKERS ( 2019 20:16 )  x     / <0.01 ng/mL / 39 U/L / x     / 1.7 ng/mL  CARDIAC MARKERS ( 2019 11:52 )  x     / <0.01 ng/mL / x     / x     / x          RADIOLOGY:    EXAM: CT ANGIO CHEST (W)AW IC   PROCEDURE DATE: 2019     INTERPRETATION: Clinical History / Reason for exam: Chest pain and   shortness of breath.     TECHNIQUE: Multislice helical sections were obtained from the thoracic inlet   to the lung bases during rapid administration of intravenous contrast. Thin   sections were reconstructed through the pulmonary vasculature. MIP, coronal   and sagittal reformatted images are also submitted.     COMPARISON: None     FINDINGS:     PULMONARY VASCULATURE: No evidence of pulmonary embolism.     LUNGS, PLEURA AND AIRWAYS: Dependent atelectasis. No focal consolidation or   pleural effusion.     Multiple pulmonary nodules, for example:     4.4 mm right upper lobe pulmonary nodule (series 5 image 87), with slightly   irregular margins.   9 mm perifissural right lower lobe pulmonary nodule (series 5 image 142),   benign finding.   4 mm right lower lobe pulmonary nodule (series 5 image 143)   3 mm left upper lobe pulmonary nodule (series 5 image 150)     MEDIASTINUM/LYMPH NODES: No supraclavicular, mediastinal, hilar or axillary   adenopathy.     HEART/GREAT VESSELS: No pericardial effusion. Thoracic aorta and main   pulmonary arteries are normal in caliber. Atherosclerotic disease, including   coronary artery calcifications.     BONES/SOFT TISSUES: Multilevel degenerative changes of the thoracic spine.     VISUALIZED UPPER ABDOMEN: Calcific atherosclerosis of the aorta and branch   vessels.     IMPRESSION:     No evidence of pulmonary embolism.     Multiple subcentimeter pulmonary nodules. Recommend short-term follow-up (6   months) of right upper lobe pulmonary nodule, on image #87 of series 5,   measuring 4.4 mm.       EXAM: XR CHEST PORTABLE IMMED 1V     PROCEDURE DATE: 2019     INTERPRETATION: Clinical History / Reason for exam: Shortness of breath     Comparison : Chest radiograph 2019.     Technique/Positionin frontal views.     Findings:     Support devices: Monitoring devices obscure portions of the lung   parenchyma.. Telemetry leads..     Cardiac/mediastinum/hilum: Unremarkable.     Lung parenchyma/Pleura: Within normal limits.     Skeleton/soft tissues: Stable.     Impression:     No radiographic evidence of acute cardiopulmonary disease.         PHYSICAL EXAM:  GEN: No acute distress  PULM/CHEST: Clear to auscultation bilaterally, no rales, rhonchi or wheezes   CVS: Regular rate and rhythm, S1-S2, no murmurs  ABD: Soft, non-tender, non-distended, +BS  EXT: No edema  NEURO: AAOx3    Mitchell Catheter: SUBJECTIVE:    74 year old male w/ past medical history of CAD s/p stent, non-obstructive cardiomyopathy with EF 26%, hypertension, hyperlipidemia, DM II presenting with chief complaint of L-sided chest pain and palpitations. Patient states that he woke up yesterday morning with constant mild left-sided chest pain around 6AM. He additionally felt more SOB and weaker than the previous days.  The chest pain did not worsen and was described as a non-radiating 6/10 in intensity. He denies n/v and diaphoresis. No new LE swelling or orthopnea. His assisted living nurse came to see him roughly at 11AM at which time EMS was called due to his complaints of chest pain and SOB. The patient notes that the chest pain fully resolved en route to the ED upon EMS evaluation/treatment with supplemental O2.     In the ED, VS: /92  RR 18 T97.2  satting 100% on 2LNC. EKG showed Afib w/ tachycardia. LVH pattern.  Pt received Cardizem 10mgIVP x1, Metoprolol 100mg,and was started on cardizem drip.    Of note Patient was recently d/c from [facility], who was admitted for acute pulmonary edema, acute MI, cardiomyopathy was detected, he had cardiac Cath that revealed 40% OM stenosis after the patent stent, no major obstruction was found, but EF was very low, no clear coronary culprit was found for the MI, he was d/c on Entresto and rest of the medication.     He is currently admitted to medicine with the primary diagnosis of New onset atrial fibrillation with RVR. Today is hospital day 1d. This morning he is resting comfortably in bed. He specifically denies chest pains, palpitations, and SOB. He reports one episode of hematuria overnight but denies seeing any clots in urine.     INTERVAL EVENTS:     PAST MEDICAL & SURGICAL HISTORY  GERD (gastroesophageal reflux disease)  CAD (coronary artery disease)  Diabetes  HTN (hypertension)  H/O colonoscopy: 10 yeras ago      ALLERGIES:  No Known Allergies    MEDICATIONS:  STANDING MEDICATIONS  aspirin  chewable 81 milliGRAM(s) Oral daily  atorvastatin 80 milliGRAM(s) Oral at bedtime  chlorhexidine 4% Liquid 1 Application(s) Topical <User Schedule>  diltiazem Infusion 5 mG/Hr IV Continuous <Continuous>  enoxaparin Injectable 75 milliGRAM(s) SubCutaneous two times a day  furosemide    Tablet 40 milliGRAM(s) Oral daily  metoprolol tartrate 50 milliGRAM(s) Oral three times a day  pantoprazole    Tablet 40 milliGRAM(s) Oral before breakfast  sacubitril 24 mG/valsartan 26 mG 1 Tablet(s) Oral two times a day  spironolactone 25 milliGRAM(s) Oral daily  tamsulosin 0.4 milliGRAM(s) Oral at bedtime    PRN MEDICATIONS    VITALS:   T(F): 97.3  HR: 120  BP: 160/80  RR: 18  SpO2: 98%    LABS:                        8.4    2.22  )-----------( 58       ( 2019 05:28 )             27.3         143  |  106  |  24<H>  ----------------------------<  104<H>  3.6   |  24  |  1.1    Ca    8.4<L>      2019 05:28    TPro  6.4  /  Alb  2.8<L>  /  TBili  0.4  /  DBili  x   /  AST  13  /  ALT  16  /  AlkPhos  62          ABG - ( 2019 12:05 )  pH, Arterial: 7.35  pH, Blood: x     /  pCO2: 42    /  pO2: 45    / HCO3: 23    / Base Excess: -2.3  /  SaO2: 77                Troponin T, Serum: <0.01 ng/mL (19 @ 05:28)  Troponin T, Serum: <0.01 ng/mL (19 @ 00:39)  Creatine Kinase, Serum: 39 U/L (19 @ 20:16)  Troponin T, Serum: <0.01 ng/mL (19 @ 20:16)  Troponin T, Serum: <0.01 ng/mL (19 @ 11:52)      CARDIAC MARKERS ( 2019 05:28 )  x     / <0.01 ng/mL / x     / x     / x      CARDIAC MARKERS ( 2019 00:39 )  x     / <0.01 ng/mL / x     / x     / x      CARDIAC MARKERS ( 2019 20:16 )  x     / <0.01 ng/mL / 39 U/L / x     / 1.7 ng/mL  CARDIAC MARKERS ( 2019 11:52 )  x     / <0.01 ng/mL / x     / x     / x          RADIOLOGY:    EXAM: CT ANGIO CHEST (W)AW IC   PROCEDURE DATE: 2019     INTERPRETATION: Clinical History / Reason for exam: Chest pain and   shortness of breath.     TECHNIQUE: Multislice helical sections were obtained from the thoracic inlet   to the lung bases during rapid administration of intravenous contrast. Thin   sections were reconstructed through the pulmonary vasculature. MIP, coronal   and sagittal reformatted images are also submitted.     COMPARISON: None     FINDINGS:     PULMONARY VASCULATURE: No evidence of pulmonary embolism.     LUNGS, PLEURA AND AIRWAYS: Dependent atelectasis. No focal consolidation or   pleural effusion.     Multiple pulmonary nodules, for example:     4.4 mm right upper lobe pulmonary nodule (series 5 image 87), with slightly   irregular margins.   9 mm perifissural right lower lobe pulmonary nodule (series 5 image 142),   benign finding.   4 mm right lower lobe pulmonary nodule (series 5 image 143)   3 mm left upper lobe pulmonary nodule (series 5 image 150)     MEDIASTINUM/LYMPH NODES: No supraclavicular, mediastinal, hilar or axillary   adenopathy.     HEART/GREAT VESSELS: No pericardial effusion. Thoracic aorta and main   pulmonary arteries are normal in caliber. Atherosclerotic disease, including   coronary artery calcifications.     BONES/SOFT TISSUES: Multilevel degenerative changes of the thoracic spine.     VISUALIZED UPPER ABDOMEN: Calcific atherosclerosis of the aorta and branch   vessels.     IMPRESSION:     No evidence of pulmonary embolism.     Multiple subcentimeter pulmonary nodules. Recommend short-term follow-up (6   months) of right upper lobe pulmonary nodule, on image #87 of series 5,   measuring 4.4 mm.       EXAM: XR CHEST PORTABLE IMMED 1V     PROCEDURE DATE: 2019     INTERPRETATION: Clinical History / Reason for exam: Shortness of breath     Comparison : Chest radiograph 2019.     Technique/Positionin frontal views.     Findings:     Support devices: Monitoring devices obscure portions of the lung   parenchyma.. Telemetry leads..     Cardiac/mediastinum/hilum: Unremarkable.     Lung parenchyma/Pleura: Within normal limits.     Skeleton/soft tissues: Stable.     Impression:     No radiographic evidence of acute cardiopulmonary disease.         PHYSICAL EXAM:  GEN: No acute distress  PULM/CHEST: Clear to auscultation bilaterally, no rales, rhonchi or wheezes   CVS: Regular rate and rhythm, S1-S2, no murmurs  ABD: Soft, non-tender, non-distended, +BS  EXT: No edema  NEURO: AAOx3

## 2019-07-18 NOTE — MEDICAL STUDENT PROGRESS NOTE(EDUCATION) - NS MD HP STUD ASPLAN PLAN FT
# New onset Afib w/ RVR in the setting of non-obstructive cardiomyopathy w/ clean cath  -s/p Cardizem 10mg IVP and 100mg Metoprolol with successful rate control. Currently on Cardizem drip at dose rate of 5mG/hr. Patient is still tachycardic in the 130s this morning. -Will c/w metoprolol 50mg PO but will switch from BID to TID.   -Will increase Cardizem drip dose rate if HR is still tachy.   -C/w Entresto 24/26 q12hr, Aspirin, and high dose statin  -CHADSVASC: 4: Lovenox 75mg bid for anticoagulation  -Troponin x 3 have been negative.  -CT Angio Chest was reviewed. Showed multiple subcentimeter pulmonary nodules but no evidence of PE. Short-term follow up will be arranged.   -CXR reviewed and was unremarkable.  -TSH sent but results are pending.   Continue telemetry monitoring.    #Hematuria  -Likely from lovenox. Patient has only had one episode thus for. Will continue to administer anticoagulation.      #Hypertension  -C/w metoprolol 50mg TID, and Entresto 24/26 q12hr      #DM II  - FS qAC and qHS  - Start ISS if FS>180    #BPH  - C/w flomax 0.4mg QD    Diet: DASH/ CC  DVT ppx: On therapeutic lovenox  Activity: Ambulate as tolerated  #Dispo: Telemetry    #Code Status: Full

## 2019-07-18 NOTE — PROGRESS NOTE ADULT - SUBJECTIVE AND OBJECTIVE BOX
Patient is a 74y old  Male who presents with a chief complaint of New onset Afib (17 Jul 2019 21:21)      HPI: Patient is in a.flutter now, /min, while sitting on the chair BP dropped to 85 mmHg put cate up by getting into the bed, euvolemic, asymptomatic, has no chest pain, sob, palpitation, no dizziness, no light headdness in the bed  rate still fast, not responded much to Cardizem.  severe BM suppression, low PLT count, Low RBC & WBC, anemia, Hgb at 8.0, no bleeding on ASA and Lovenox    74 year old male w/ past medical history of CAD s/p stent, non-obstructive cardiomyopathy with EF 26%, hypertension, hyperlipidemia, DM II presenting with chief complaint of L-sided chest pain and palpitations.    Pt reports his symptoms starting at 5AM with L-sided squeezing chest pain that lasted 5 hours. The pain was non-radiating, 6/10 in intensity, and was associated with shortness of breath. He was recently hospitalized for new CHF with reduced ejection fraction, had cardiac cath which showed non-obstructive CAD, and was discharged on medical therapy and life vest for low EF.    In the ED, VS: /92  RR 18 T97.2  satting 100% on 2LNC. EKG showed Afib w/ tachycardia. LVH pattern.  Pt received Cardizem 10mgIVP x1, Metoprolol 100mg,and was started on cardizem drip. (17 Jul 2019 21:21)      PAST MEDICAL & SURGICAL HISTORY:  GERD (gastroesophageal reflux disease)  CAD (coronary artery disease)  Diabetes  HTN (hypertension)  H/O colonoscopy: 10 yeras ago      PREVIOUS DIAGNOSTIC TESTING:      ECHO  FINDINGS:    STRESS TEST  FINDINGS:    CATHETERIZATION  FINDINGS:    MEDICATIONS  (STANDING):  aspirin  chewable 81 milliGRAM(s) Oral daily  atorvastatin 80 milliGRAM(s) Oral at bedtime  chlorhexidine 4% Liquid 1 Application(s) Topical <User Schedule>  digoxin  Injectable 0.25 milliGRAM(s) IV Push every 4 hours  enoxaparin Injectable 75 milliGRAM(s) SubCutaneous two times a day  furosemide    Tablet 20 milliGRAM(s) Oral daily  metoprolol tartrate 50 milliGRAM(s) Oral three times a day  pantoprazole    Tablet 40 milliGRAM(s) Oral before breakfast  sacubitril 24 mG/valsartan 26 mG 1 Tablet(s) Oral two times a day  spironolactone 25 milliGRAM(s) Oral daily  tamsulosin 0.4 milliGRAM(s) Oral at bedtime    MEDICATIONS  (PRN):      FAMILY HISTORY:  FH: hypertension      SOCIAL HISTORY:  CIGARETTES:  ALCOHOL:  DRUGS:                      REVIEW OF SYSTEMS:  CONSTITUTIONAL: No distress, Looks stable  NECK: No pain   RESPIRATORY: No cough, but shortness of breath  CARDIOVASCULAR: No chest pain, palpitations, leg swelling  GASTROINTESTINAL: No abdominal or epigastric pain. No nausea, vomiting, or hematemesis;  No melena.  NEUROLOGICAL: No dizziness, headaches,   PSYCHIATRIC: No depression, anxiety, mood swings, or difficulty sleeping  ALLERGY: No hives, itching, rash          Vital Signs Last 24 Hrs  T(C): 36.1 (18 Jul 2019 10:02), Max: 36.8 (17 Jul 2019 18:44)  T(F): 96.9 (18 Jul 2019 10:02), Max: 98.2 (17 Jul 2019 18:44)  HR: 127 (18 Jul 2019 10:02) (57 - 127)  BP: 85/53 (18 Jul 2019 10:02) (85/53 - 160/80)  BP(mean): --  RR: 18 (18 Jul 2019 10:02) (18 - 18)  SpO2: 98% (17 Jul 2019 23:45) (98% - 100%)                      PHYSICAL EXAM:  GENERAL: No distress  HEAD:  Atraumatic, Normocephalic  NECK: Supple, No JVD  NERVOUS SYSTEM:  Alert, Awake, Oriented to place, person;   CHEST/LUNG: Normal air entry to lung base bilaterally; No wheeze, crackle  HEART: fast irregular heart beat, S1, A2, P2, No gallop, but murmur  ABDOMEN: Soft, Non tender, Non distended; Bowel sounds present  EXTREMITIES:  1+ Peripheral Pulses, No clubbing, No edema  SKIN: No rashes or lesions    TELEMETRY: aflutter 2: 1 conduction    ECG: < from: 12 Lead ECG (07.17.19 @ 11:36) >  Atrial fibrillation with rapid ventricular response  Minimal voltage criteria for LVH, may be normal variant    < end of copied text >      I&O's Detail    17 Jul 2019 07:01  -  18 Jul 2019 07:00  --------------------------------------------------------  IN:    diltiazem Infusion: 25 mL  Total IN: 25 mL    OUT:    Voided: 300 mL  Total OUT: 300 mL    Total NET: -275 mL      18 Jul 2019 07:01  -  18 Jul 2019 12:28  --------------------------------------------------------  IN:    Oral Fluid: 120 mL  Total IN: 120 mL    OUT:    Voided: 200 mL  Total OUT: 200 mL    Total NET: -80 mL          LABS:                        8.9    2.33  )-----------( 69       ( 18 Jul 2019 12:02 )             28.0     07-18    143  |  106  |  24<H>  ----------------------------<  104<H>  3.6   |  24  |  1.1    Ca    8.4<L>      18 Jul 2019 05:28    TPro  6.4  /  Alb  2.8<L>  /  TBili  0.4  /  DBili  x   /  AST  13  /  ALT  16  /  AlkPhos  62  07-18    CARDIAC MARKERS ( 18 Jul 2019 05:28 )  x     / <0.01 ng/mL / x     / x     / x      CARDIAC MARKERS ( 18 Jul 2019 00:39 )  x     / <0.01 ng/mL / x     / x     / x      CARDIAC MARKERS ( 17 Jul 2019 20:16 )  x     / <0.01 ng/mL / 39 U/L / x     / 1.7 ng/mL  CARDIAC MARKERS ( 17 Jul 2019 11:52 )  x     / <0.01 ng/mL / x     / x     / x              I&O's Summary    17 Jul 2019 07:01  -  18 Jul 2019 07:00  --------------------------------------------------------  IN: 25 mL / OUT: 300 mL / NET: -275 mL    18 Jul 2019 07:01  -  18 Jul 2019 12:28  --------------------------------------------------------  IN: 120 mL / OUT: 200 mL / NET: -80 mL        RADIOLOGY & ADDITIONAL STUDIES:

## 2019-07-18 NOTE — PROGRESS NOTE ADULT - ASSESSMENT
pancytopenia  significant anemia  prior hx of severe epistaxis in 2019, and plus above problems, not a candidate for the long term Anti coagulation  A.fib, A.flutter tachycardia, not responded to current therapy  orthostatic hypotension, dysautonomia  significant drop in the BP just by sitting at the chair    decrease Lasix to 20 mg daily  d/c Cardizem 5 mg IV  start digitalizin doses of 0.25 mg iv digoxin as i ordered, then o.125 mg po daily from tomorrow  continue with Metoprolol 50 bid and Entresto and Lipitor, and for now keep him on Lovenox in [facility] and ASA, but no DOAC or Warfarin for the long term A.C therapy.

## 2019-07-19 ENCOUNTER — TRANSCRIPTION ENCOUNTER (OUTPATIENT)
Age: 74
End: 2019-07-19

## 2019-07-19 LAB
ALBUMIN SERPL ELPH-MCNC: 3.1 G/DL — LOW (ref 3.5–5.2)
ALP SERPL-CCNC: 61 U/L — SIGNIFICANT CHANGE UP (ref 30–115)
ALT FLD-CCNC: 15 U/L — SIGNIFICANT CHANGE UP (ref 0–41)
ANION GAP SERPL CALC-SCNC: 11 MMOL/L — SIGNIFICANT CHANGE UP (ref 7–14)
AST SERPL-CCNC: 13 U/L — SIGNIFICANT CHANGE UP (ref 0–41)
BASOPHILS # BLD AUTO: 0 K/UL — SIGNIFICANT CHANGE UP (ref 0–0.2)
BASOPHILS NFR BLD AUTO: 0 % — SIGNIFICANT CHANGE UP (ref 0–1)
BILIRUB SERPL-MCNC: 0.6 MG/DL — SIGNIFICANT CHANGE UP (ref 0.2–1.2)
BUN SERPL-MCNC: 18 MG/DL — SIGNIFICANT CHANGE UP (ref 10–20)
CALCIUM SERPL-MCNC: 8.4 MG/DL — LOW (ref 8.5–10.1)
CHLORIDE SERPL-SCNC: 105 MMOL/L — SIGNIFICANT CHANGE UP (ref 98–110)
CO2 SERPL-SCNC: 25 MMOL/L — SIGNIFICANT CHANGE UP (ref 17–32)
CREAT SERPL-MCNC: 1 MG/DL — SIGNIFICANT CHANGE UP (ref 0.7–1.5)
CULTURE RESULTS: SIGNIFICANT CHANGE UP
EOSINOPHIL # BLD AUTO: 0.07 K/UL — SIGNIFICANT CHANGE UP (ref 0–0.7)
EOSINOPHIL NFR BLD AUTO: 3.7 % — SIGNIFICANT CHANGE UP (ref 0–8)
GLUCOSE BLDC GLUCOMTR-MCNC: 100 MG/DL — HIGH (ref 70–99)
GLUCOSE BLDC GLUCOMTR-MCNC: 174 MG/DL — HIGH (ref 70–99)
GLUCOSE BLDC GLUCOMTR-MCNC: 211 MG/DL — HIGH (ref 70–99)
GLUCOSE BLDC GLUCOMTR-MCNC: 317 MG/DL — HIGH (ref 70–99)
GLUCOSE SERPL-MCNC: 171 MG/DL — HIGH (ref 70–99)
HCT VFR BLD CALC: 26.1 % — LOW (ref 42–52)
HGB BLD-MCNC: 8.2 G/DL — LOW (ref 14–18)
IMM GRANULOCYTES NFR BLD AUTO: 0.5 % — HIGH (ref 0.1–0.3)
LYMPHOCYTES # BLD AUTO: 0.71 K/UL — LOW (ref 1.2–3.4)
LYMPHOCYTES # BLD AUTO: 37.2 % — SIGNIFICANT CHANGE UP (ref 20.5–51.1)
MAGNESIUM SERPL-MCNC: 1.3 MG/DL — LOW (ref 1.8–2.4)
MCHC RBC-ENTMCNC: 29.9 PG — SIGNIFICANT CHANGE UP (ref 27–31)
MCHC RBC-ENTMCNC: 31.4 G/DL — LOW (ref 32–37)
MCV RBC AUTO: 95.3 FL — HIGH (ref 80–94)
MONOCYTES # BLD AUTO: 0.38 K/UL — SIGNIFICANT CHANGE UP (ref 0.1–0.6)
MONOCYTES NFR BLD AUTO: 19.9 % — HIGH (ref 1.7–9.3)
NEUTROPHILS # BLD AUTO: 0.74 K/UL — LOW (ref 1.4–6.5)
NEUTROPHILS NFR BLD AUTO: 38.7 % — LOW (ref 42.2–75.2)
NRBC # BLD: 0 /100 WBCS — SIGNIFICANT CHANGE UP (ref 0–0)
PHOSPHATE SERPL-MCNC: 3.6 MG/DL — SIGNIFICANT CHANGE UP (ref 2.1–4.9)
PLATELET # BLD AUTO: 70 K/UL — LOW (ref 130–400)
POTASSIUM SERPL-MCNC: 3.5 MMOL/L — SIGNIFICANT CHANGE UP (ref 3.5–5)
POTASSIUM SERPL-SCNC: 3.5 MMOL/L — SIGNIFICANT CHANGE UP (ref 3.5–5)
PROT SERPL-MCNC: 6.6 G/DL — SIGNIFICANT CHANGE UP (ref 6–8)
RBC # BLD: 2.74 M/UL — LOW (ref 4.7–6.1)
RBC # FLD: 18.4 % — HIGH (ref 11.5–14.5)
SODIUM SERPL-SCNC: 141 MMOL/L — SIGNIFICANT CHANGE UP (ref 135–146)
SPECIMEN SOURCE: SIGNIFICANT CHANGE UP
WBC # BLD: 1.91 K/UL — LOW (ref 4.8–10.8)
WBC # FLD AUTO: 1.91 K/UL — LOW (ref 4.8–10.8)

## 2019-07-19 PROCEDURE — 93010 ELECTROCARDIOGRAM REPORT: CPT | Mod: 77

## 2019-07-19 PROCEDURE — 99233 SBSQ HOSP IP/OBS HIGH 50: CPT

## 2019-07-19 PROCEDURE — 99221 1ST HOSP IP/OBS SF/LOW 40: CPT

## 2019-07-19 PROCEDURE — 76770 US EXAM ABDO BACK WALL COMP: CPT | Mod: 26

## 2019-07-19 PROCEDURE — 93010 ELECTROCARDIOGRAM REPORT: CPT

## 2019-07-19 RX ORDER — FUROSEMIDE 40 MG
20 TABLET ORAL ONCE
Refills: 0 | Status: COMPLETED | OUTPATIENT
Start: 2019-07-19 | End: 2019-07-19

## 2019-07-19 RX ORDER — MAGNESIUM SULFATE 500 MG/ML
2 VIAL (ML) INJECTION
Refills: 0 | Status: COMPLETED | OUTPATIENT
Start: 2019-07-19 | End: 2019-07-19

## 2019-07-19 RX ORDER — FUROSEMIDE 40 MG
40 TABLET ORAL DAILY
Refills: 0 | Status: DISCONTINUED | OUTPATIENT
Start: 2019-07-20 | End: 2019-07-27

## 2019-07-19 RX ORDER — METOPROLOL TARTRATE 50 MG
5 TABLET ORAL ONCE
Refills: 0 | Status: COMPLETED | OUTPATIENT
Start: 2019-07-19 | End: 2019-07-19

## 2019-07-19 RX ORDER — DIAZEPAM 5 MG
2 TABLET ORAL DAILY
Refills: 0 | Status: DISCONTINUED | OUTPATIENT
Start: 2019-07-19 | End: 2019-07-26

## 2019-07-19 RX ORDER — DILTIAZEM HCL 120 MG
10 CAPSULE, EXT RELEASE 24 HR ORAL ONCE
Refills: 0 | Status: COMPLETED | OUTPATIENT
Start: 2019-07-19 | End: 2019-07-19

## 2019-07-19 RX ADMIN — Medication 25 GRAM(S): at 12:02

## 2019-07-19 RX ADMIN — Medication 6 UNIT(S): at 17:12

## 2019-07-19 RX ADMIN — Medication 0.25 MILLIGRAM(S): at 03:39

## 2019-07-19 RX ADMIN — Medication 4: at 13:26

## 2019-07-19 RX ADMIN — Medication 6 UNIT(S): at 13:25

## 2019-07-19 RX ADMIN — SACUBITRIL AND VALSARTAN 1 TABLET(S): 24; 26 TABLET, FILM COATED ORAL at 17:08

## 2019-07-19 RX ADMIN — SPIRONOLACTONE 25 MILLIGRAM(S): 25 TABLET, FILM COATED ORAL at 05:59

## 2019-07-19 RX ADMIN — Medication 2 MILLIGRAM(S): at 12:22

## 2019-07-19 RX ADMIN — CHLORHEXIDINE GLUCONATE 1 APPLICATION(S): 213 SOLUTION TOPICAL at 06:06

## 2019-07-19 RX ADMIN — Medication 20 MILLIGRAM(S): at 05:59

## 2019-07-19 RX ADMIN — ATORVASTATIN CALCIUM 80 MILLIGRAM(S): 80 TABLET, FILM COATED ORAL at 22:33

## 2019-07-19 RX ADMIN — Medication 2: at 17:12

## 2019-07-19 RX ADMIN — TAMSULOSIN HYDROCHLORIDE 0.4 MILLIGRAM(S): 0.4 CAPSULE ORAL at 22:33

## 2019-07-19 RX ADMIN — Medication 10 MILLIGRAM(S): at 07:03

## 2019-07-19 RX ADMIN — Medication 50 MILLIGRAM(S): at 05:59

## 2019-07-19 RX ADMIN — ENOXAPARIN SODIUM 75 MILLIGRAM(S): 100 INJECTION SUBCUTANEOUS at 17:07

## 2019-07-19 RX ADMIN — Medication 0.12 MILLIGRAM(S): at 05:58

## 2019-07-19 RX ADMIN — Medication 5 MILLIGRAM(S): at 06:41

## 2019-07-19 RX ADMIN — PANTOPRAZOLE SODIUM 40 MILLIGRAM(S): 20 TABLET, DELAYED RELEASE ORAL at 05:59

## 2019-07-19 RX ADMIN — Medication 5 MILLIGRAM(S): at 12:20

## 2019-07-19 RX ADMIN — Medication 5 MILLIGRAM(S): at 07:00

## 2019-07-19 RX ADMIN — SACUBITRIL AND VALSARTAN 1 TABLET(S): 24; 26 TABLET, FILM COATED ORAL at 05:59

## 2019-07-19 RX ADMIN — ENOXAPARIN SODIUM 75 MILLIGRAM(S): 100 INJECTION SUBCUTANEOUS at 05:59

## 2019-07-19 RX ADMIN — Medication 81 MILLIGRAM(S): at 12:03

## 2019-07-19 RX ADMIN — Medication 50 MILLIGRAM(S): at 13:28

## 2019-07-19 RX ADMIN — Medication 25 GRAM(S): at 08:17

## 2019-07-19 RX ADMIN — Medication 50 MILLIGRAM(S): at 22:33

## 2019-07-19 NOTE — PROGRESS NOTE ADULT - ASSESSMENT
A 74 years old male presented with Lt sided squeezing type cp which started 5 AM lasting for 5 hrs and was associated with palpitations and sob.    CTA chest: No PE  CXR: NAPD  EKG: A. Fib @ 127/min. (Interpreted by me)  CE x 2 negative.   BNP: 5408        1. A. Fib with RVR  2. CP due to rapid A. Fib  3. CM  4. Non obst CAD  5. HTN  6. No CHF. H/O Ch. HFrEF  7. Pancytopenia / Thrombocytopenia  8. Hematuria          PLAN:    ·	Cont tele  ·	Cardiology eval noted. Pt with H/O epistaxis, now having hematuria. Having pancytopenia/Thrombocytopenia and significant anemia. Not a candidate for long term A/C as per cardiology.  ·	On D/C will switch him to Metoprolol Succinate 75 mg po daily  ·	Was loaded with digoxin yesterday. Cont Digoxin 0.125 mg po daily  ·	BP is stable. Will cont Lasix 40 mg po daily  ·	Called Urology eval for hematuria  ·	Case was D/W the Hem/Onc Dr. Santoro. Pt has appt with him in two weeks for bone marrow biopsy  ·	Cont ASA, Lipitor, Metoprolol and Spironolactone.   ·	Discussed goals of care with niece and nephew who are health care proxy. Undecided about DNR/DNI yet. Full code  ·	Anticipate D/C in AM

## 2019-07-19 NOTE — CONSULT NOTE ADULT - ASSESSMENT
74y old Male with pancytopenia/thrombocytopenia, new onset Afib started on therapeutic Lovenox who developed gross hematuria yesterday -- resolved.    PLAN:  1.	Continue AC/antiplatelets per cardio/primary team  2.	Monitor for downtrending H/H, worsening of hematuria  3.	F/u Ucx 74y old Male with pancytopenia/thrombocytopenia, new onset Afib started on therapeutic Lovenox who developed gross hematuria yesterday -- resolved.    PLAN:  1.	Continue AC/antiplatelets per cardio/primary team  2.	Monitor for downtrending H/H, worsening of hematuria  3.	Can obtain R/B sono to r/o debris/clots/mass/incomplete emptying  4.	Ucx 74y old Male with pancytopenia/thrombocytopenia, new onset Afib started on therapeutic Lovenox who developed gross hematuria yesterday -- resolved.    PLAN:  1.	Continue AC/antiplatelets per cardio/primary team  2.	Monitor for downtrending H/H, worsening of hematuria  3.	Can obtain R/B sono to r/o debris/clots/mass/incomplete emptying  4.	Ucx, urine cytology    Patient seen and examined with Dr. Mcdowell 7/19 PM; note to be signed 7/20. 74y old Male with pancytopenia/thrombocytopenia, new onset Afib started on therapeutic Lovenox who developed gross hematuria yesterday -- resolved.    PLAN:  1.	Continue AC/antiplatelets per cardio/primary team  2.	Monitor for downtrending H/H, worsening of hematuria  3.	Can obtain R/B sono to r/o debris/clots/mass/incomplete emptying  4.	Ucx, urine cytology    Patient seen and examined with Dr. Mcdowell 7/19 PM; note to be signed 7/20.    The patient says he is voiding comfortably but he is unreliable. If he is observed to void and has a decent stream all well and good if not please consider renal bladder sono to make sure is not incompletely emptying that he is not left clots inside. I still don’t know why he developed hematuria so urine culture and cytology would be helpful. We will follow.

## 2019-07-19 NOTE — PROGRESS NOTE ADULT - SUBJECTIVE AND OBJECTIVE BOX
SHANE ASHTON  74y Male    CHIEF COMPLAINT:    Patient is a 74y old  Male who presents with a chief complaint of New onset Afib (18 Jul 2019 12:28)      INTERVAL HPI/OVERNIGHT EVENTS:    Patient seen and examined. No palpitations. No hematuria today. No sob. Started on digoxin. Watching him for rate control    ROS: All other systems are negative.    Vital Signs:    T(F): 97.8 (07-19-19 @ 11:59), Max: 99 (07-19-19 @ 05:23)  HR: 160 (07-19-19 @ 06:35) (102 - 160)  BP: 135/87 (07-19-19 @ 11:59) (101/56 - 139/66)  RR: 18 (07-19-19 @ 11:59) (18 - 18)  SpO2: 99% (07-19-19 @ 11:59) (95% - 99%)  I&O's Summary    18 Jul 2019 07:01  -  19 Jul 2019 07:00  --------------------------------------------------------  IN: 595 mL / OUT: 1100 mL / NET: -505 mL    19 Jul 2019 07:01  -  19 Jul 2019 12:02  --------------------------------------------------------  IN: 120 mL / OUT: 500 mL / NET: -380 mL      Daily     Daily   CAPILLARY BLOOD GLUCOSE      POCT Blood Glucose.: 174 mg/dL (19 Jul 2019 07:25)  POCT Blood Glucose.: 115 mg/dL (18 Jul 2019 22:04)  POCT Blood Glucose.: 187 mg/dL (18 Jul 2019 17:18)      PHYSICAL EXAM:    GENERAL:  NAD  SKIN: No rashes or lesions  HENT: Atraumatic Normocephalic. PERRL. Moist membranes.  NECK: Supple, No JVD. No lymphadenopathy.  PULMONARY: CTA B/L. No wheezing. No rales  CVS: Normal S1, S2. Rate and Rhythm are IRIR. No murmurs.  ABDOMEN/GI: Soft, Nontender, Nondistended; BS present  EXTREMITIES: Peripheral pulses intact. No edema B/L LE.  NEUROLOGIC:  No motor or sensory deficit.  PSYCH: Alert & oriented x 3    Consultant(s) Notes Reviewed:  [x ] YES  [ ] NO  Care Discussed with Consultants/Other Providers [ x] YES  [ ] NO    EKG reviewed  Telemetry reviewed    LABS:                        8.2    1.91  )-----------( 70       ( 19 Jul 2019 04:55 )             26.1     07-19    141  |  105  |  18  ----------------------------<  171<H>  3.5   |  25  |  1.0    Ca    8.4<L>      19 Jul 2019 04:55  Phos  3.6     07-19  Mg     1.3     07-19    TPro  6.6  /  Alb  3.1<L>  /  TBili  0.6  /  DBili  x   /  AST  13  /  ALT  15  /  AlkPhos  61  07-19      Serum Pro-Brain Natriuretic Peptide: 5408 pg/mL (07-17-19 @ 11:52)    Trop <0.01, CKMB --, CK --, 07-18-19 @ 05:28  Trop <0.01, CKMB --, CK --, 07-18-19 @ 00:39  Trop <0.01, CKMB 1.7, CK 39, 07-17-19 @ 20:16  Trop <0.01, CKMB --, CK --, 07-17-19 @ 11:52        RADIOLOGY & ADDITIONAL TESTS:      Imaging or report Personally Reviewed:  [ ] YES  [ ] NO    Medications:  Standing  aspirin  chewable 81 milliGRAM(s) Oral daily  atorvastatin 80 milliGRAM(s) Oral at bedtime  chlorhexidine 4% Liquid 1 Application(s) Topical <User Schedule>  dextrose 5%. 1000 milliLiter(s) IV Continuous <Continuous>  dextrose 50% Injectable 12.5 Gram(s) IV Push once  diazepam    Tablet 2 milliGRAM(s) Oral daily  digoxin     Tablet 0.125 milliGRAM(s) Oral daily  enoxaparin Injectable 75 milliGRAM(s) SubCutaneous two times a day  furosemide    Tablet 20 milliGRAM(s) Oral once  insulin glargine Injectable (LANTUS) 18 Unit(s) SubCutaneous at bedtime  insulin lispro (HumaLOG) corrective regimen sliding scale   SubCutaneous three times a day before meals  insulin lispro Injectable (HumaLOG) 6 Unit(s) SubCutaneous three times a day before meals  magnesium sulfate  IVPB 2 Gram(s) IV Intermittent every 2 hours  metoprolol tartrate 50 milliGRAM(s) Oral three times a day  pantoprazole    Tablet 40 milliGRAM(s) Oral before breakfast  sacubitril 24 mG/valsartan 26 mG 1 Tablet(s) Oral two times a day  spironolactone 25 milliGRAM(s) Oral daily  tamsulosin 0.4 milliGRAM(s) Oral at bedtime    PRN Meds  dextrose 40% Gel 15 Gram(s) Oral once PRN  glucagon  Injectable 1 milliGRAM(s) IntraMuscular once PRN      Case discussed with resident    Care discussed with pt/family

## 2019-07-19 NOTE — CONSULT NOTE ADULT - SUBJECTIVE AND OBJECTIVE BOX
74y old Male with pancytopenia/thrombocytopenia, new onset Afib started on therapeutic Lovenox who developed gross hematuria yesterday. Pt not reliable historian, unable to provide complete hx. As per PCA, pt with clear yellow urine today; was noted to have blood & some blood clots at meatus yesterday and this AM. Pt also w hx severe epistaxis on ASA/Plavix in past. Per cardio, not candidate for AC.     PAST MEDICAL & SURGICAL HISTORY:  GERD (gastroesophageal reflux disease)  CAD (coronary artery disease)  Diabetes  HTN (hypertension)  H/O colonoscopy: 10 yeras ago    MEDS: aspirin  chewable 81 milliGRAM(s)  atorvastatin 80 milliGRAM(s)  chlorhexidine 4% Liquid 1 Application(s)  dextrose 40% Gel 15 Gram(s) PRN  dextrose 5%. 1000 milliLiter(s)  dextrose 50% Injectable 12.5 Gram(s)  diazepam    Tablet 2 milliGRAM(s)  digoxin     Tablet 0.125 milliGRAM(s)  enoxaparin Injectable 75 milliGRAM(s)  glucagon  Injectable 1 milliGRAM(s) PRN  insulin glargine Injectable (LANTUS) 18 Unit(s)  insulin lispro (HumaLOG) corrective regimen sliding scale    insulin lispro Injectable (HumaLOG) 6 Unit(s)  metoprolol tartrate 50 milliGRAM(s)  pantoprazole    Tablet 40 milliGRAM(s)  sacubitril 24 mG/valsartan 26 mG 1 Tablet(s)  spironolactone 25 milliGRAM(s)  tamsulosin 0.4 milliGRAM(s)   MEDICATIONS  (STANDING):  aspirin  chewable 81 milliGRAM(s) Oral daily  atorvastatin 80 milliGRAM(s) Oral at bedtime  chlorhexidine 4% Liquid 1 Application(s) Topical <User Schedule>  dextrose 5%. 1000 milliLiter(s) (50 mL/Hr) IV Continuous <Continuous>  dextrose 50% Injectable 12.5 Gram(s) IV Push once  diazepam    Tablet 2 milliGRAM(s) Oral daily  digoxin     Tablet 0.125 milliGRAM(s) Oral daily  enoxaparin Injectable 75 milliGRAM(s) SubCutaneous two times a day  insulin glargine Injectable (LANTUS) 18 Unit(s) SubCutaneous at bedtime  insulin lispro (HumaLOG) corrective regimen sliding scale   SubCutaneous three times a day before meals  insulin lispro Injectable (HumaLOG) 6 Unit(s) SubCutaneous three times a day before meals  metoprolol tartrate 50 milliGRAM(s) Oral three times a day  pantoprazole    Tablet 40 milliGRAM(s) Oral before breakfast  sacubitril 24 mG/valsartan 26 mG 1 Tablet(s) Oral two times a day  spironolactone 25 milliGRAM(s) Oral daily  tamsulosin 0.4 milliGRAM(s) Oral at bedtime    MEDICATIONS  (PRN):  dextrose 40% Gel 15 Gram(s) Oral once PRN Blood Glucose LESS THAN 70 milliGRAM(s)/deciliter  glucagon  Injectable 1 milliGRAM(s) IntraMuscular once PRN Glucose LESS THAN 70 milligrams/deciliter    ALLERGIES: No Known Allergies    VS: T(F): 97.8, Max: 99 ( @ 05:23)  HR: 135 (102 - 160)  BP: 151/70 (101/56 - 151/70)  RR: 18  SpO2: 99% (95% - 99%)  GEN: Alert, awake, NAD  ABD/: soft, NT/ND, non palpable bladder, no suprapubic TTP, circ Male, no discharge or blood at meatus, urinal at bedside empty    LABS/IMAGIN.2    1.91  )-----------( 70       ( 2019 04:55 )             26.1         141  |  105  |  18  ----------------------------<  171<H>  3.5   |  25  |  1.0    Ca    8.4<L>      2019 04:55  Phos  3.6       Mg     1.3         TPro  6.6  /  Alb  3.1<L>  /  TBili  0.6  /  DBili  x   /  AST  13  /  ALT  15  /  AlkPhos  61      Urinalysis Basic - ( 2019 15:30 )  Color: Light Yellow / Appearance: Clear / S.021 / pH: x  Gluc: x / Ketone: Negative  / Bili: Negative / Urobili: <2 mg/dL   Blood: x / Protein: Trace / Nitrite: Negative   Leuk Esterase: Negative / RBC: 59 /HPF / WBC 1 /HPF   Sq Epi: x / Non Sq Epi: 1 /HPF / Bacteria: Negative 74y old Male with pancytopenia/thrombocytopenia, new onset Afib started on therapeutic Lovenox who developed gross hematuria yesterday. Pt not reliable historian, unable to provide complete hx. As per PCA, pt with clear yellow urine today; was noted to have blood & some blood clots at meatus yesterday and this AM. Pt also w hx severe epistaxis on ASA/Plavix in past. Per cardio, not candidate for AC. Denies dysuria, urgency, frequency.    PAST MEDICAL & SURGICAL HISTORY:  GERD (gastroesophageal reflux disease)  CAD (coronary artery disease)  Diabetes  HTN (hypertension)  H/O colonoscopy: 10 yeras ago    MEDS: aspirin  chewable 81 milliGRAM(s)  atorvastatin 80 milliGRAM(s)  chlorhexidine 4% Liquid 1 Application(s)  dextrose 40% Gel 15 Gram(s) PRN  dextrose 5%. 1000 milliLiter(s)  dextrose 50% Injectable 12.5 Gram(s)  diazepam    Tablet 2 milliGRAM(s)  digoxin     Tablet 0.125 milliGRAM(s)  enoxaparin Injectable 75 milliGRAM(s)  glucagon  Injectable 1 milliGRAM(s) PRN  insulin glargine Injectable (LANTUS) 18 Unit(s)  insulin lispro (HumaLOG) corrective regimen sliding scale    insulin lispro Injectable (HumaLOG) 6 Unit(s)  metoprolol tartrate 50 milliGRAM(s)  pantoprazole    Tablet 40 milliGRAM(s)  sacubitril 24 mG/valsartan 26 mG 1 Tablet(s)  spironolactone 25 milliGRAM(s)  tamsulosin 0.4 milliGRAM(s)   MEDICATIONS  (STANDING):  aspirin  chewable 81 milliGRAM(s) Oral daily  atorvastatin 80 milliGRAM(s) Oral at bedtime  chlorhexidine 4% Liquid 1 Application(s) Topical <User Schedule>  dextrose 5%. 1000 milliLiter(s) (50 mL/Hr) IV Continuous <Continuous>  dextrose 50% Injectable 12.5 Gram(s) IV Push once  diazepam    Tablet 2 milliGRAM(s) Oral daily  digoxin     Tablet 0.125 milliGRAM(s) Oral daily  enoxaparin Injectable 75 milliGRAM(s) SubCutaneous two times a day  insulin glargine Injectable (LANTUS) 18 Unit(s) SubCutaneous at bedtime  insulin lispro (HumaLOG) corrective regimen sliding scale   SubCutaneous three times a day before meals  insulin lispro Injectable (HumaLOG) 6 Unit(s) SubCutaneous three times a day before meals  metoprolol tartrate 50 milliGRAM(s) Oral three times a day  pantoprazole    Tablet 40 milliGRAM(s) Oral before breakfast  sacubitril 24 mG/valsartan 26 mG 1 Tablet(s) Oral two times a day  spironolactone 25 milliGRAM(s) Oral daily  tamsulosin 0.4 milliGRAM(s) Oral at bedtime    MEDICATIONS  (PRN):  dextrose 40% Gel 15 Gram(s) Oral once PRN Blood Glucose LESS THAN 70 milliGRAM(s)/deciliter  glucagon  Injectable 1 milliGRAM(s) IntraMuscular once PRN Glucose LESS THAN 70 milligrams/deciliter    ALLERGIES: No Known Allergies    VS: T(F): 97.8, Max: 99 ( @ 05:23)  HR: 135 (102 - 160)  BP: 151/70 (101/56 - 151/70)  RR: 18  SpO2: 99% (95% - 99%)  GEN: Alert, awake, NAD  ABD/: soft, NT/ND, non palpable bladder, no suprapubic TTP, circ Male, no discharge or blood at meatus, urinal at bedside empty    LABS/IMAGIN.2    1.91  )-----------( 70       ( 2019 04:55 )             26.1         141  |  105  |  18  ----------------------------<  171<H>  3.5   |  25  |  1.0    Ca    8.4<L>      2019 04:55  Phos  3.6       Mg     1.3         TPro  6.6  /  Alb  3.1<L>  /  TBili  0.6  /  DBili  x   /  AST  13  /  ALT  15  /  AlkPhos  61      Urinalysis Basic - ( 2019 15:30 )  Color: Light Yellow / Appearance: Clear / S.021 / pH: x  Gluc: x / Ketone: Negative  / Bili: Negative / Urobili: <2 mg/dL   Blood: x / Protein: Trace / Nitrite: Negative   Leuk Esterase: Negative / RBC: 59 /HPF / WBC 1 /HPF   Sq Epi: x / Non Sq Epi: 1 /HPF / Bacteria: Negative

## 2019-07-19 NOTE — MEDICAL STUDENT PROGRESS NOTE(EDUCATION) - SUBJECTIVE AND OBJECTIVE BOX
SUBJECTIVE:    Patient is a 74 year old male w/ past medical history of CAD s/p stent, non-obstructive cardiomyopathy with EF 26%, hypertension, hyperlipidemia, DM II, and chronic panyctopenia presenting with chief complaint of L-sided chest pain and palpitations. Patient states that he woke up yesterday morning with constant mild left-sided chest pain around 6AM. He additionally felt more SOB and weaker than the previous days.  The chest pain did not worsen and was described as a non-radiating 6/10 in intensity. He denies n/v and diaphoresis. No new LE swelling or orthopnea. His assisted living nurse came to see him roughly at 11AM at which time EMS was called due to his complaints of chest pain and SOB. The patient notes that the chest pain fully resolved en route to the ED upon EMS evaluation/treatment with supplemental O2.     In the ED, VS: /92  RR 18 T97.2  satting 100% on 2LNC. EKG showed Afib w/ tachycardia. LVH pattern.  Pt received Cardizem 10mgIVP x1, Metoprolol 100mg,and was started on cardizem drip.      Today is hospital day 2d currently admitted to medicine with the primary diagnosis of New onset atrial fibrillation with RVR.   This morning around 615AM the patient was found to be tachy in the 160's on tele. The patient was awakened and did not report any chest pain or SOB. He reported feeling a "little crummier than yesterday." 5mg metoprolol IVP x2 and 10mg Cardizem IVP x1 was given and rate was controlled to the 90s.  otherwise the patient had no complaints and slept well throughout the night.     INTERVAL EVENTS:     PAST MEDICAL & SURGICAL HISTORY  GERD (gastroesophageal reflux disease)  CAD (coronary artery disease)  Diabetes  HTN (hypertension)  H/O colonoscopy: 10 yeras ago      ALLERGIES:  No Known Allergies    MEDICATIONS:  STANDING MEDICATIONS  aspirin  chewable 81 milliGRAM(s) Oral daily  atorvastatin 80 milliGRAM(s) Oral at bedtime  chlorhexidine 4% Liquid 1 Application(s) Topical <User Schedule>  dextrose 5%. 1000 milliLiter(s) IV Continuous <Continuous>  dextrose 50% Injectable 12.5 Gram(s) IV Push once  diazepam    Tablet 2 milliGRAM(s) Oral daily  digoxin     Tablet 0.125 milliGRAM(s) Oral daily  enoxaparin Injectable 75 milliGRAM(s) SubCutaneous two times a day  furosemide    Tablet 20 milliGRAM(s) Oral once  insulin glargine Injectable (LANTUS) 18 Unit(s) SubCutaneous at bedtime  insulin lispro (HumaLOG) corrective regimen sliding scale   SubCutaneous three times a day before meals  insulin lispro Injectable (HumaLOG) 6 Unit(s) SubCutaneous three times a day before meals  magnesium sulfate  IVPB 2 Gram(s) IV Intermittent every 2 hours  metoprolol tartrate 50 milliGRAM(s) Oral three times a day  pantoprazole    Tablet 40 milliGRAM(s) Oral before breakfast  sacubitril 24 mG/valsartan 26 mG 1 Tablet(s) Oral two times a day  spironolactone 25 milliGRAM(s) Oral daily  tamsulosin 0.4 milliGRAM(s) Oral at bedtime    PRN MEDICATIONS  dextrose 40% Gel 15 Gram(s) Oral once PRN  glucagon  Injectable 1 milliGRAM(s) IntraMuscular once PRN    VITALS:   T(F): 99  HR: 160  BP: 131/73  RR: 18  SpO2: 95%    LABS:                        8.2    1.91  )-----------( 70       ( 2019 04:55 )             26.1     07-19    141  |  105  |  18  ----------------------------<  171<H>  3.5   |  25  |  1.0    Ca    8.4<L>      2019 04:55  Phos  3.6     -  Mg     1.3         TPro  6.6  /  Alb  3.1<L>  /  TBili  0.6  /  DBili  x   /  AST  13  /  ALT  15  /  AlkPhos  61  -      Urinalysis Basic - ( 2019 15:30 )    Color: Light Yellow / Appearance: Clear / S.021 / pH: x  Gluc: x / Ketone: Negative  / Bili: Negative / Urobili: <2 mg/dL   Blood: x / Protein: Trace / Nitrite: Negative   Leuk Esterase: Negative / RBC: 59 /HPF / WBC 1 /HPF   Sq Epi: x / Non Sq Epi: 1 /HPF / Bacteria: Negative      ABG - ( 2019 12:05 )  pH, Arterial: 7.35  pH, Blood: x     /  pCO2: 42    /  pO2: 45    / HCO3: 23    / Base Excess: -2.3  /  SaO2: 77                    CARDIAC MARKERS ( 2019 05:28 )  x     / <0.01 ng/mL / x     / x     / x      CARDIAC MARKERS ( 2019 00:39 )  x     / <0.01 ng/mL / x     / x     / x      CARDIAC MARKERS ( 2019 20:16 )  x     / <0.01 ng/mL / 39 U/L / x     / 1.7 ng/mL  CARDIAC MARKERS ( 2019 11:52 )  x     / <0.01 ng/mL / x     / x     / x          RADIOLOGY:    PHYSICAL EXAM:  GEN: No acute distress  PULM/CHEST: Clear to auscultation bilaterally, no rales, rhonchi or wheezes   CVS: Tachycardic rate and rhythm, S1-S2, no murmurs  ABD: Soft, non-tender, non-distended, +BS  EXT: No edema  NEURO: AAOx3    Mitchell Catheter: SUBJECTIVE:    Patient is a 74 year old male w/ past medical history of CAD s/p stent, non-obstructive cardiomyopathy with EF 26%, hypertension, hyperlipidemia, DM II, and chronic panyctopenia presenting with chief complaint of L-sided chest pain and palpitations. Patient states that he woke up yesterday morning with constant mild left-sided chest pain around 6AM. He additionally felt more SOB and weaker than the previous days.  The chest pain did not worsen and was described as a non-radiating 6/10 in intensity. He denies n/v and diaphoresis. No new LE swelling or orthopnea. His assisted living nurse came to see him roughly at 11AM at which time EMS was called due to his complaints of chest pain and SOB. The patient notes that the chest pain fully resolved en route to the ED upon EMS evaluation/treatment with supplemental O2.     In the ED, VS: /92  RR 18 T97.2  satting 100% on 2LNC. EKG showed Afib w/ tachycardia. LVH pattern.  Pt received Cardizem 10mgIVP x1, Metoprolol 100mg,and was started on cardizem drip.      Today is hospital day 2d currently admitted to medicine with the primary diagnosis of New onset atrial fibrillation with RVR.   This morning around 615AM the patient was found to be tachy in the 160's on tele, AFIB w RVR. The patient was awakened and did not report any chest pain or SOB. He reported feeling a "little crummier than yesterday." 5mg metoprolol IVP x2 and 10mg Cardizem IVP x1 was given and rate was controlled to the 90s.  otherwise the patient had no complaints and slept well throughout the night.     INTERVAL EVENTS:     PAST MEDICAL & SURGICAL HISTORY  GERD (gastroesophageal reflux disease)  CAD (coronary artery disease)  Diabetes  HTN (hypertension)  H/O colonoscopy: 10 yeras ago      ALLERGIES:  No Known Allergies    MEDICATIONS:  STANDING MEDICATIONS  aspirin  chewable 81 milliGRAM(s) Oral daily  atorvastatin 80 milliGRAM(s) Oral at bedtime  chlorhexidine 4% Liquid 1 Application(s) Topical <User Schedule>  dextrose 5%. 1000 milliLiter(s) IV Continuous <Continuous>  dextrose 50% Injectable 12.5 Gram(s) IV Push once  diazepam    Tablet 2 milliGRAM(s) Oral daily  digoxin     Tablet 0.125 milliGRAM(s) Oral daily  enoxaparin Injectable 75 milliGRAM(s) SubCutaneous two times a day  furosemide    Tablet 20 milliGRAM(s) Oral once  insulin glargine Injectable (LANTUS) 18 Unit(s) SubCutaneous at bedtime  insulin lispro (HumaLOG) corrective regimen sliding scale   SubCutaneous three times a day before meals  insulin lispro Injectable (HumaLOG) 6 Unit(s) SubCutaneous three times a day before meals  magnesium sulfate  IVPB 2 Gram(s) IV Intermittent every 2 hours  metoprolol tartrate 50 milliGRAM(s) Oral three times a day  pantoprazole    Tablet 40 milliGRAM(s) Oral before breakfast  sacubitril 24 mG/valsartan 26 mG 1 Tablet(s) Oral two times a day  spironolactone 25 milliGRAM(s) Oral daily  tamsulosin 0.4 milliGRAM(s) Oral at bedtime    PRN MEDICATIONS  dextrose 40% Gel 15 Gram(s) Oral once PRN  glucagon  Injectable 1 milliGRAM(s) IntraMuscular once PRN    VITALS:   T(F): 99  HR: 160  BP: 131/73  RR: 18  SpO2: 95%    LABS:                        8.2    1.91  )-----------( 70       ( 2019 04:55 )             26.1     07-19    141  |  105  |  18  ----------------------------<  171<H>  3.5   |  25  |  1.0    Ca    8.4<L>      2019 04:55  Phos  3.6     -  Mg     1.3     -    TPro  6.6  /  Alb  3.1<L>  /  TBili  0.6  /  DBili  x   /  AST  13  /  ALT  15  /  AlkPhos  61  07-19      Urinalysis Basic - ( 2019 15:30 )    Color: Light Yellow / Appearance: Clear / S.021 / pH: x  Gluc: x / Ketone: Negative  / Bili: Negative / Urobili: <2 mg/dL   Blood: x / Protein: Trace / Nitrite: Negative   Leuk Esterase: Negative / RBC: 59 /HPF / WBC 1 /HPF   Sq Epi: x / Non Sq Epi: 1 /HPF / Bacteria: Negative      ABG - ( 2019 12:05 )  pH, Arterial: 7.35  pH, Blood: x     /  pCO2: 42    /  pO2: 45    / HCO3: 23    / Base Excess: -2.3  /  SaO2: 77                    CARDIAC MARKERS ( 2019 05:28 )  x     / <0.01 ng/mL / x     / x     / x      CARDIAC MARKERS ( 2019 00:39 )  x     / <0.01 ng/mL / x     / x     / x      CARDIAC MARKERS ( 2019 20:16 )  x     / <0.01 ng/mL / 39 U/L / x     / 1.7 ng/mL  CARDIAC MARKERS ( 2019 11:52 )  x     / <0.01 ng/mL / x     / x     / x          RADIOLOGY:    PHYSICAL EXAM:  GEN: No acute distress  PULM/CHEST: Clear to auscultation bilaterally, no rales, rhonchi or wheezes   CVS: Tachycardic rate and rhythm, S1-S2, no murmurs  ABD: Soft, non-tender, non-distended, +BS  EXT: No edema  NEURO: AAOx3    Mitchell Catheter:

## 2019-07-19 NOTE — PROVIDER CONTACT NOTE (OTHER) - SITUATION
patient blood sugar is elevated ,317,post meal md nii PERALTA notified ,patiet refused to checkthe blood sugar before the meal.

## 2019-07-19 NOTE — PROGRESS NOTE ADULT - ASSESSMENT
A 74 years old male presented with Lt sided squeezing type cp which started 5 AM lasting for 5 hrs and was associated with palpitations and sob.    CTA chest: No PE  CXR: NAPD  EKG: A. Fib @ 127/min. (Interpreted by me)  CE x 2 negative.   BNP: 5408        1. A. Fib with RVR  2. CP due to rapid A. Fib  3. CM  4. Non obst CAD  5. HTN  6. No CHF. H/O Ch. HFrEF  7. Pancytopenia / Thrombocytopenia  8. Hematuria          PLAN:    ·	Cont tele  ·	Cardiology eval noted. Pt with H/O epistaxis, now having hematuria. Having pancytopenia/Thrombocytopenia and significant anemia. Not a candidate for long term A/C as per cardiology.  ·	On D/C will switch him to Metoprolol Succinate 75 mg po daily  ·	Was loaded with digoxin yesterday. Cont Digoxin 0.125 mg po daily  ·	BP is stable. Will cont Lasix 40 mg po daily  ·	Called Urology eval for hematuria  ·	Case was D/W the Hem/Onc Dr. Santoro. Pt has appt with him in two weeks for bone marrow biopsy  ·	Cont ASA, Lipitor, Metoprolol and Spironolactone.   ·	Discussed goals of care with niece and nephew who are health care proxy. Undecided about DNR/DNI yet.  ·	Anticipate D/C in AM

## 2019-07-19 NOTE — DISCHARGE NOTE NURSING/CASE MANAGEMENT/SOCIAL WORK - NSDCDPATPORTLINK_GEN_ALL_CORE
You can access the [facility], offered by [facility], by registering with the following website: [email or web address]

## 2019-07-19 NOTE — PROGRESS NOTE ADULT - SUBJECTIVE AND OBJECTIVE BOX
SHANE ASHTON  74y Male    CHIEF COMPLAINT:    Patient is a 74y old  Male who presents with a chief complaint of New onset Afib (19 Jul 2019 12:02)      INTERVAL HPI/OVERNIGHT EVENTS:    Patient seen and examined. No sob. No hematuria today. No palpitations. Still having rapid HR.    ROS: All other systems are negative.    Vital Signs:    T(F): 97.8 (07-19-19 @ 11:59), Max: 99 (07-19-19 @ 05:23)  HR: 160 (07-19-19 @ 06:35) (102 - 160)  BP: 135/87 (07-19-19 @ 11:59) (101/56 - 139/66)  RR: 18 (07-19-19 @ 11:59) (18 - 18)  SpO2: 99% (07-19-19 @ 11:59) (95% - 99%)  I&O's Summary    18 Jul 2019 07:01  -  19 Jul 2019 07:00  --------------------------------------------------------  IN: 595 mL / OUT: 1100 mL / NET: -505 mL    19 Jul 2019 07:01  -  19 Jul 2019 12:23  --------------------------------------------------------  IN: 120 mL / OUT: 500 mL / NET: -380 mL      Daily     Daily   CAPILLARY BLOOD GLUCOSE      POCT Blood Glucose.: 174 mg/dL (19 Jul 2019 07:25)  POCT Blood Glucose.: 115 mg/dL (18 Jul 2019 22:04)  POCT Blood Glucose.: 187 mg/dL (18 Jul 2019 17:18)      PHYSICAL EXAM:    GENERAL:  NAD  SKIN: No rashes or lesions  HENT: Atraumatic Normocephalic. PERRL. Moist membranes.  NECK: Supple, No JVD. No lymphadenopathy.  PULMONARY: CTA B/L. No wheezing. No rales  CVS: Normal S1, S2. Rate and Rhythm are IRIR. No murmurs.  ABDOMEN/GI: Soft, Nontender, Nondistended; BS present  EXTREMITIES: Peripheral pulses intact. No edema B/L LE.  NEUROLOGIC:  No motor or sensory deficit.  PSYCH: Alert & oriented x 3    Consultant(s) Notes Reviewed:  [x ] YES  [ ] NO  Care Discussed with Consultants/Other Providers [ x] YES  [ ] NO    EKG reviewed  Telemetry reviewed    LABS:                        8.2    1.91  )-----------( 70       ( 19 Jul 2019 04:55 )             26.1   Hemoglobin: 8.2 g/dL (07-19 @ 04:55)  Hemoglobin: 8.0 g/dL (07-18 @ 18:18)  Hemoglobin: 8.9 g/dL (07-18 @ 12:02)  Hemoglobin: 8.4 g/dL (07-18 @ 05:28)  Hemoglobin: 9.2 g/dL (07-17 @ 11:52)    07-19    141  |  105  |  18  ----------------------------<  171<H>  3.5   |  25  |  1.0    Ca    8.4<L>      19 Jul 2019 04:55  Phos  3.6     07-19  Mg     1.3     07-19    TPro  6.6  /  Alb  3.1<L>  /  TBili  0.6  /  DBili  x   /  AST  13  /  ALT  15  /  AlkPhos  61  07-19      Serum Pro-Brain Natriuretic Peptide: 5408 pg/mL (07-17-19 @ 11:52)    Trop <0.01, CKMB --, CK --, 07-18-19 @ 05:28  Trop <0.01, CKMB --, CK --, 07-18-19 @ 00:39  Trop <0.01, CKMB 1.7, CK 39, 07-17-19 @ 20:16  Trop <0.01, CKMB --, CK --, 07-17-19 @ 11:52        RADIOLOGY & ADDITIONAL TESTS:      Imaging or report Personally Reviewed:  [ ] YES  [ ] NO    Medications:  Standing  aspirin  chewable 81 milliGRAM(s) Oral daily  atorvastatin 80 milliGRAM(s) Oral at bedtime  chlorhexidine 4% Liquid 1 Application(s) Topical <User Schedule>  dextrose 5%. 1000 milliLiter(s) IV Continuous <Continuous>  dextrose 50% Injectable 12.5 Gram(s) IV Push once  diazepam    Tablet 2 milliGRAM(s) Oral daily  digoxin     Tablet 0.125 milliGRAM(s) Oral daily  enoxaparin Injectable 75 milliGRAM(s) SubCutaneous two times a day  furosemide    Tablet 20 milliGRAM(s) Oral once  insulin glargine Injectable (LANTUS) 18 Unit(s) SubCutaneous at bedtime  insulin lispro (HumaLOG) corrective regimen sliding scale   SubCutaneous three times a day before meals  insulin lispro Injectable (HumaLOG) 6 Unit(s) SubCutaneous three times a day before meals  metoprolol tartrate 50 milliGRAM(s) Oral three times a day  pantoprazole    Tablet 40 milliGRAM(s) Oral before breakfast  sacubitril 24 mG/valsartan 26 mG 1 Tablet(s) Oral two times a day  spironolactone 25 milliGRAM(s) Oral daily  tamsulosin 0.4 milliGRAM(s) Oral at bedtime    PRN Meds  dextrose 40% Gel 15 Gram(s) Oral once PRN  glucagon  Injectable 1 milliGRAM(s) IntraMuscular once PRN      Case discussed with resident    Care discussed with pt/family

## 2019-07-19 NOTE — GOALS OF CARE CONVERSATION - PERSONAL ADVANCE DIRECTIVE - CONVERSATION DETAILS
Discussed goals of care both with niece and nephew who are health care proxy. Undecided about DNR/DNI yet until they discuss with his uncle. Full code. Discussed goals of care both with niece and nephew who are health care proxy. Undecided about DNR/DNI yet until they discuss with his uncle. Full code.    Pt's has health care proxy and the pt has decided to be DNR/DNI. Papers signed. Do not want Palliative or Hospice care.

## 2019-07-19 NOTE — MEDICAL STUDENT PROGRESS NOTE(EDUCATION) - NS MD HP STUD ASPLAN PLAN FT
# New onset Afib w/ RVR in the setting of non-obstructive cardiomyopathy w/ clean cath  - per cardiology recommendations: Cardizem drip was discontinued yesterday. Loading dose of digoxin (0.25mg x 4) was provided. Will keep patient on Digoxin 0.125mg PO QD. Will monitor renal function closely for signs of toxicity.   -Will c/w metoprolol 50mg PO TID.   -Will increase Lasix from 20mg to 40mg PO Daily   -C/w Entresto 24/26 q12hr, Aspirin, and high dose statin  -CHADSVASC: 5: Lovenox 75mg bid for anticoagulation  -Continue telemetry monitoring.    #Hematuria  -Patient now with multiple episodes of hematuria. Likely side-effect from Lovenox in the setting of chronic pancytopenia. Will continue to monitor. Per cardiology, he is not a candidate for long term anticoagulation. Heme/Onc was consulted yesterday. They agree with cardiology. Will continue with their plan with outpatient BM biopsy for further chronic pancytopenia w/u.     #Hypertension  -C/w metoprolol 50mg TID, and Entresto 24/26 q12hr      #DM II  - FS qAC and qHS  - Start ISS if FS>180    #Chronic Pancytopenia  -outpatient BM biopsy scheduled    #BPH  - C/w flomax 0.4mg QD      Diet: DASH/ CC  DVT ppx: On therapeutic lovenox  Activity: Ambulate as tolerated  #Dispo: Telemetry    #Code Status: Full

## 2019-07-20 ENCOUNTER — TRANSCRIPTION ENCOUNTER (OUTPATIENT)
Age: 74
End: 2019-07-20

## 2019-07-20 LAB
ALBUMIN SERPL ELPH-MCNC: 2.9 G/DL — LOW (ref 3.5–5.2)
ALP SERPL-CCNC: 56 U/L — SIGNIFICANT CHANGE UP (ref 30–115)
ALT FLD-CCNC: 11 U/L — SIGNIFICANT CHANGE UP (ref 0–41)
ANION GAP SERPL CALC-SCNC: 12 MMOL/L — SIGNIFICANT CHANGE UP (ref 7–14)
AST SERPL-CCNC: 13 U/L — SIGNIFICANT CHANGE UP (ref 0–41)
BASOPHILS # BLD AUTO: 0 K/UL — SIGNIFICANT CHANGE UP (ref 0–0.2)
BASOPHILS # BLD AUTO: 0 K/UL — SIGNIFICANT CHANGE UP (ref 0–0.2)
BASOPHILS NFR BLD AUTO: 0 % — SIGNIFICANT CHANGE UP (ref 0–1)
BASOPHILS NFR BLD AUTO: 0 % — SIGNIFICANT CHANGE UP (ref 0–1)
BILIRUB SERPL-MCNC: 0.6 MG/DL — SIGNIFICANT CHANGE UP (ref 0.2–1.2)
BUN SERPL-MCNC: 18 MG/DL — SIGNIFICANT CHANGE UP (ref 10–20)
CALCIUM SERPL-MCNC: 8.4 MG/DL — LOW (ref 8.5–10.1)
CHLORIDE SERPL-SCNC: 105 MMOL/L — SIGNIFICANT CHANGE UP (ref 98–110)
CO2 SERPL-SCNC: 24 MMOL/L — SIGNIFICANT CHANGE UP (ref 17–32)
CREAT SERPL-MCNC: 0.9 MG/DL — SIGNIFICANT CHANGE UP (ref 0.7–1.5)
ELLIPTOCYTES BLD QL SMEAR: SIGNIFICANT CHANGE UP
EOSINOPHIL # BLD AUTO: 0.03 K/UL — SIGNIFICANT CHANGE UP (ref 0–0.7)
EOSINOPHIL # BLD AUTO: 0.04 K/UL — SIGNIFICANT CHANGE UP (ref 0–0.7)
EOSINOPHIL NFR BLD AUTO: 1.6 % — SIGNIFICANT CHANGE UP (ref 0–8)
EOSINOPHIL NFR BLD AUTO: 1.7 % — SIGNIFICANT CHANGE UP (ref 0–8)
GIANT PLATELETS BLD QL SMEAR: PRESENT — SIGNIFICANT CHANGE UP
GLUCOSE BLDC GLUCOMTR-MCNC: 114 MG/DL — HIGH (ref 70–99)
GLUCOSE BLDC GLUCOMTR-MCNC: 145 MG/DL — HIGH (ref 70–99)
GLUCOSE BLDC GLUCOMTR-MCNC: 295 MG/DL — HIGH (ref 70–99)
GLUCOSE SERPL-MCNC: 132 MG/DL — HIGH (ref 70–99)
HCT VFR BLD CALC: 24.1 % — LOW (ref 42–52)
HCT VFR BLD CALC: 26.4 % — LOW (ref 42–52)
HGB BLD-MCNC: 7.6 G/DL — LOW (ref 14–18)
HGB BLD-MCNC: 8.4 G/DL — LOW (ref 14–18)
IMM GRANULOCYTES NFR BLD AUTO: 0 % — LOW (ref 0.1–0.3)
IMM GRANULOCYTES NFR BLD AUTO: 0.4 % — HIGH (ref 0.1–0.3)
LYMPHOCYTES # BLD AUTO: 0.97 K/UL — LOW (ref 1.2–3.4)
LYMPHOCYTES # BLD AUTO: 1.14 K/UL — LOW (ref 1.2–3.4)
LYMPHOCYTES # BLD AUTO: 45.8 % — SIGNIFICANT CHANGE UP (ref 20.5–51.1)
LYMPHOCYTES # BLD AUTO: 53.6 % — HIGH (ref 20.5–51.1)
MACROCYTES BLD QL: SLIGHT — SIGNIFICANT CHANGE UP
MAGNESIUM SERPL-MCNC: 2 MG/DL — SIGNIFICANT CHANGE UP (ref 1.8–2.4)
MANUAL SMEAR VERIFICATION: SIGNIFICANT CHANGE UP
MCHC RBC-ENTMCNC: 29.9 PG — SIGNIFICANT CHANGE UP (ref 27–31)
MCHC RBC-ENTMCNC: 30.2 PG — SIGNIFICANT CHANGE UP (ref 27–31)
MCHC RBC-ENTMCNC: 31.5 G/DL — LOW (ref 32–37)
MCHC RBC-ENTMCNC: 31.8 G/DL — LOW (ref 32–37)
MCV RBC AUTO: 94.9 FL — HIGH (ref 80–94)
MCV RBC AUTO: 95 FL — HIGH (ref 80–94)
MONOCYTES # BLD AUTO: 0.37 K/UL — SIGNIFICANT CHANGE UP (ref 0.1–0.6)
MONOCYTES # BLD AUTO: 0.7 K/UL — HIGH (ref 0.1–0.6)
MONOCYTES NFR BLD AUTO: 20.4 % — HIGH (ref 1.7–9.3)
MONOCYTES NFR BLD AUTO: 28.1 % — HIGH (ref 1.7–9.3)
NEUTROPHILS # BLD AUTO: 0.44 K/UL — LOW (ref 1.4–6.5)
NEUTROPHILS # BLD AUTO: 0.6 K/UL — LOW (ref 1.4–6.5)
NEUTROPHILS NFR BLD AUTO: 24.1 % — LOW (ref 42.2–75.2)
NEUTROPHILS NFR BLD AUTO: 24.3 % — LOW (ref 42.2–75.2)
NRBC # BLD: 0 /100 WBCS — SIGNIFICANT CHANGE UP (ref 0–0)
NRBC # BLD: 1 /100 WBCS — HIGH (ref 0–0)
OVALOCYTES BLD QL SMEAR: SIGNIFICANT CHANGE UP
PHOSPHATE SERPL-MCNC: 3 MG/DL — SIGNIFICANT CHANGE UP (ref 2.1–4.9)
PLAT MORPH BLD: ABNORMAL
PLATELET # BLD AUTO: 45 K/UL — LOW (ref 130–400)
PLATELET # BLD AUTO: 58 K/UL — LOW (ref 130–400)
POTASSIUM SERPL-MCNC: 3.6 MMOL/L — SIGNIFICANT CHANGE UP (ref 3.5–5)
POTASSIUM SERPL-SCNC: 3.6 MMOL/L — SIGNIFICANT CHANGE UP (ref 3.5–5)
PROT SERPL-MCNC: 6.2 G/DL — SIGNIFICANT CHANGE UP (ref 6–8)
RBC # BLD: 2.54 M/UL — LOW (ref 4.7–6.1)
RBC # BLD: 2.78 M/UL — LOW (ref 4.7–6.1)
RBC # FLD: 17.8 % — HIGH (ref 11.5–14.5)
RBC # FLD: 18 % — HIGH (ref 11.5–14.5)
RBC BLD AUTO: ABNORMAL
SCHISTOCYTES BLD QL AUTO: SLIGHT — SIGNIFICANT CHANGE UP
SODIUM SERPL-SCNC: 141 MMOL/L — SIGNIFICANT CHANGE UP (ref 135–146)
WBC # BLD: 1.81 K/UL — LOW (ref 4.8–10.8)
WBC # BLD: 2.49 K/UL — LOW (ref 4.8–10.8)
WBC # FLD AUTO: 1.81 K/UL — LOW (ref 4.8–10.8)
WBC # FLD AUTO: 2.49 K/UL — LOW (ref 4.8–10.8)

## 2019-07-20 PROCEDURE — 99233 SBSQ HOSP IP/OBS HIGH 50: CPT

## 2019-07-20 PROCEDURE — 99231 SBSQ HOSP IP/OBS SF/LOW 25: CPT

## 2019-07-20 RX ORDER — METOPROLOL TARTRATE 50 MG
50 TABLET ORAL EVERY 6 HOURS
Refills: 0 | Status: DISCONTINUED | OUTPATIENT
Start: 2019-07-20 | End: 2019-07-20

## 2019-07-20 RX ORDER — METOPROLOL TARTRATE 50 MG
50 TABLET ORAL EVERY 6 HOURS
Refills: 0 | Status: COMPLETED | OUTPATIENT
Start: 2019-07-20 | End: 2019-07-21

## 2019-07-20 RX ORDER — METOPROLOL TARTRATE 50 MG
200 TABLET ORAL DAILY
Refills: 0 | Status: DISCONTINUED | OUTPATIENT
Start: 2019-07-21 | End: 2019-07-27

## 2019-07-20 RX ADMIN — Medication 2: at 12:22

## 2019-07-20 RX ADMIN — SPIRONOLACTONE 25 MILLIGRAM(S): 25 TABLET, FILM COATED ORAL at 05:58

## 2019-07-20 RX ADMIN — Medication 50 MILLIGRAM(S): at 12:30

## 2019-07-20 RX ADMIN — Medication 6 UNIT(S): at 07:48

## 2019-07-20 RX ADMIN — Medication 50 MILLIGRAM(S): at 05:58

## 2019-07-20 RX ADMIN — SACUBITRIL AND VALSARTAN 1 TABLET(S): 24; 26 TABLET, FILM COATED ORAL at 05:58

## 2019-07-20 RX ADMIN — Medication 2 MILLIGRAM(S): at 11:32

## 2019-07-20 RX ADMIN — CHLORHEXIDINE GLUCONATE 1 APPLICATION(S): 213 SOLUTION TOPICAL at 05:59

## 2019-07-20 RX ADMIN — PANTOPRAZOLE SODIUM 40 MILLIGRAM(S): 20 TABLET, DELAYED RELEASE ORAL at 05:59

## 2019-07-20 RX ADMIN — Medication 40 MILLIGRAM(S): at 05:58

## 2019-07-20 RX ADMIN — ENOXAPARIN SODIUM 75 MILLIGRAM(S): 100 INJECTION SUBCUTANEOUS at 17:09

## 2019-07-20 RX ADMIN — Medication 6 UNIT(S): at 12:23

## 2019-07-20 RX ADMIN — TAMSULOSIN HYDROCHLORIDE 0.4 MILLIGRAM(S): 0.4 CAPSULE ORAL at 21:49

## 2019-07-20 RX ADMIN — Medication 81 MILLIGRAM(S): at 11:32

## 2019-07-20 RX ADMIN — ENOXAPARIN SODIUM 75 MILLIGRAM(S): 100 INJECTION SUBCUTANEOUS at 05:58

## 2019-07-20 RX ADMIN — Medication 0.12 MILLIGRAM(S): at 05:58

## 2019-07-20 RX ADMIN — Medication 50 MILLIGRAM(S): at 17:08

## 2019-07-20 RX ADMIN — ATORVASTATIN CALCIUM 80 MILLIGRAM(S): 80 TABLET, FILM COATED ORAL at 21:49

## 2019-07-20 RX ADMIN — SACUBITRIL AND VALSARTAN 1 TABLET(S): 24; 26 TABLET, FILM COATED ORAL at 17:08

## 2019-07-20 RX ADMIN — INSULIN GLARGINE 18 UNIT(S): 100 INJECTION, SOLUTION SUBCUTANEOUS at 21:49

## 2019-07-20 NOTE — CONSULT NOTE ADULT - ASSESSMENT
74 y.o M with mild left nare epistaxis  on ASA  New onset A. Fib  Pancytopenia  Thrombocytopenia       Plan:  Cont. Surgicel fibrillar to left nare - reabsorbable  Monitor for respiratory distress  Keep nares well hydrated with topical Bacitracin and NS  Cont. curent medical management as per primary medical team   ENT will F/U

## 2019-07-20 NOTE — PROGRESS NOTE ADULT - ASSESSMENT
74y old Male with pancytopenia/thrombocytopenia, new onset Afib started on therapeutic Lovenox who developed gross hematuria, resolved. episode of mild left nare epistaxis.   Pt. seen and examined earlier today in NAD, voice no complain. No Mitchell. RBUS with PVR of 43 cc. prostatic calsifications.  pancytompenia/thrombocytopenia    Plan:  Monitor H/H  Monitor BUN/Cr   F/U Urine cytology  Will need cystoscopy as an outpatient.   Cont. aC as per Cardiology 74y old Male with pancytopenia/thrombocytopenia, new onset Afib started on therapeutic Lovenox who developed gross hematuria, resolved. episode of mild left nare epistaxis.   Pt. seen and examined earlier today in NAD, voice no complain. No Mitchell. RBUS with PVR of 43 cc. prostatic calsifications.  pancytompenia/thrombocytopenia    Plan:  Monitor H/H  Monitor BUN/Cr   F/U Urine cytology  Will need cystoscopy as an outpatient.   Cont. aC as per Cardiology    He is emptying well enough and he is voiding clear urine. However he will still need cystoscopy if he is a candidate for treatment if something is found. This can be done as an outpatient. If he is someone who can undergo treatment if something is how the bladder clearance arrange follow-up in our office at the discharge. Thank you

## 2019-07-20 NOTE — PROGRESS NOTE ADULT - SUBJECTIVE AND OBJECTIVE BOX
SHANE ASHTON  74y Male    CHIEF COMPLAINT:    Patient is a 74y old  Male who presents with a chief complaint of New onset Afib (2019 08:23)      INTERVAL HPI/OVERNIGHT EVENTS:    Patient seen and examined. No palpitations. No sob. Feels good    ROS: All other systems are negative.    Vital Signs:    T(F): 98.5 (19 @ 04:44), Max: 98.5 (19 @ 04:44)  HR: 87 (19 @ 05:57) (83 - 135)  BP: 126/51 (19 @ 05:57) (92/46 - 151/70)  RR: 18 (19 @ 04:44) (18 - 18)  SpO2: 97% (19 @ 20:55) (97% - 99%)  I&O's Summary    2019 07:01  -  2019 07:00  --------------------------------------------------------  IN: 360 mL / OUT: 1725 mL / NET: -1365 mL      Daily     Daily Weight in k.5 (2019 04:44)  CAPILLARY BLOOD GLUCOSE      POCT Blood Glucose.: 145 mg/dL (2019 07:43)  POCT Blood Glucose.: 100 mg/dL (2019 21:38)  POCT Blood Glucose.: 211 mg/dL (2019 16:30)  POCT Blood Glucose.: 317 mg/dL (2019 13:12)      PHYSICAL EXAM:    GENERAL:  NAD  SKIN: No rashes or lesions  HENT: Atraumatic Normocephalic. PERRL. Moist membranes.  NECK: Supple, No JVD. No lymphadenopathy.  PULMONARY: CTA B/L. No wheezing. No rales  CVS: Normal S1, S2. Rate and Rhythm are regular. No murmurs.  ABDOMEN/GI: Soft, Nontender, Nondistended; BS present  EXTREMITIES: Peripheral pulses intact. No edema B/L LE.  NEUROLOGIC:  No motor or sensory deficit.  PSYCH: Alert & oriented x 3    Consultant(s) Notes Reviewed:  [x ] YES  [ ] NO  Care Discussed with Consultants/Other Providers [ x] YES  [ ] NO    EKG reviewed  Telemetry reviewed    LABS:                        7.6    1.81  )-----------( 45       ( 2019 06:22 )             24.1   Hemoglobin: 7.6 g/dL ( @ 06:22)  Hemoglobin: 8.2 g/dL ( @ 04:55)  Hemoglobin: 8.0 g/dL ( @ 18:18)  Hemoglobin: 8.9 g/dL ( @ 12:02)  Hemoglobin: 8.4 g/dL ( @ 05:28)  Hemoglobin: 9.2 g/dL ( @ 11:52)        141  |  105  |  18  ----------------------------<  132<H>  3.6   |  24  |  0.9    Ca    8.4<L>      2019 06:22  Phos  3.0       Mg     2.0         TPro  6.2  /  Alb  2.9<L>  /  TBili  0.6  /  DBili  x   /  AST  13  /  ALT  11  /  AlkPhos  56  20      Serum Pro-Brain Natriuretic Peptide: 5408 pg/mL (19 @ 11:52)    Trop <0.01, CKMB --, CK --, 19 @ 05:28  Trop <0.01, CKMB --, CK --, 19 @ 00:39  Trop <0.01, CKMB 1.7, CK 39, 19 @ 20:16  Trop <0.01, CKMB --, CK --, 19 @ 11:52      Culture - Urine (collected 2019 15:30)  Source: .Urine Clean Catch (Midstream)  Final Report (2019 22:24):    <10,000 CFU/mL Normal Urogenital Carey        RADIOLOGY & ADDITIONAL TESTS:      Imaging or report Personally Reviewed:  [ ] YES  [ ] NO    Medications:  Standing  aspirin  chewable 81 milliGRAM(s) Oral daily  atorvastatin 80 milliGRAM(s) Oral at bedtime  chlorhexidine 4% Liquid 1 Application(s) Topical <User Schedule>  dextrose 5%. 1000 milliLiter(s) IV Continuous <Continuous>  dextrose 50% Injectable 12.5 Gram(s) IV Push once  diazepam    Tablet 2 milliGRAM(s) Oral daily  digoxin     Tablet 0.125 milliGRAM(s) Oral daily  enoxaparin Injectable 75 milliGRAM(s) SubCutaneous two times a day  furosemide    Tablet 40 milliGRAM(s) Oral daily  insulin glargine Injectable (LANTUS) 18 Unit(s) SubCutaneous at bedtime  insulin lispro (HumaLOG) corrective regimen sliding scale   SubCutaneous three times a day before meals  insulin lispro Injectable (HumaLOG) 6 Unit(s) SubCutaneous three times a day before meals  metoprolol tartrate 50 milliGRAM(s) Oral three times a day  pantoprazole    Tablet 40 milliGRAM(s) Oral before breakfast  sacubitril 24 mG/valsartan 26 mG 1 Tablet(s) Oral two times a day  spironolactone 25 milliGRAM(s) Oral daily  tamsulosin 0.4 milliGRAM(s) Oral at bedtime    PRN Meds  dextrose 40% Gel 15 Gram(s) Oral once PRN  glucagon  Injectable 1 milliGRAM(s) IntraMuscular once PRN      Case discussed with resident    Care discussed with pt/family SHANE ASHTON  74y Male    CHIEF COMPLAINT:    Patient is a 74y old  Male who presents with a chief complaint of New onset Afib (2019 08:23)      INTERVAL HPI/OVERNIGHT EVENTS:    Patient seen and examined. No palpitations. No sob. No more hematuria. Feels good    ROS: All other systems are negative.    Vital Signs:    T(F): 98.5 (19 @ 04:44), Max: 98.5 (19 @ 04:44)  HR: 87 (19 @ 05:57) (83 - 135)  BP: 126/51 (19 @ 05:57) (92/46 - 151/70)  RR: 18 (19 @ 04:44) (18 - 18)  SpO2: 97% (19 @ 20:55) (97% - 99%)  I&O's Summary    2019 07:01  -  2019 07:00  --------------------------------------------------------  IN: 360 mL / OUT: 1725 mL / NET: -1365 mL      Daily     Daily Weight in k.5 (2019 04:44)  CAPILLARY BLOOD GLUCOSE      POCT Blood Glucose.: 145 mg/dL (2019 07:43)  POCT Blood Glucose.: 100 mg/dL (2019 21:38)  POCT Blood Glucose.: 211 mg/dL (2019 16:30)  POCT Blood Glucose.: 317 mg/dL (2019 13:12)      PHYSICAL EXAM:    GENERAL:  NAD  SKIN: No rashes or lesions  HENT: Atraumatic Normocephalic. PERRL. Moist membranes.  NECK: Supple, No JVD. No lymphadenopathy.  PULMONARY: CTA B/L. No wheezing. No rales  CVS: Normal S1, S2. Rate and Rhythm are regular. No murmurs.  ABDOMEN/GI: Soft, Nontender, Nondistended; BS present  EXTREMITIES: Peripheral pulses intact. No edema B/L LE.  NEUROLOGIC:  No motor or sensory deficit.  PSYCH: Alert & oriented x 3    Consultant(s) Notes Reviewed:  [x ] YES  [ ] NO  Care Discussed with Consultants/Other Providers [ x] YES  [ ] NO    EKG reviewed  Telemetry reviewed    LABS:                        7.6    1.81  )-----------( 45       ( 2019 06:22 )             24.1   Hemoglobin: 7.6 g/dL ( @ 06:22)  Hemoglobin: 8.2 g/dL ( @ 04:55)  Hemoglobin: 8.0 g/dL ( @ 18:18)  Hemoglobin: 8.9 g/dL ( @ 12:02)  Hemoglobin: 8.4 g/dL ( @ 05:28)  Hemoglobin: 9.2 g/dL ( @ 11:52)        141  |  105  |  18  ----------------------------<  132<H>  3.6   |  24  |  0.9    Ca    8.4<L>      2019 06:22  Phos  3.0       Mg     2.0         TPro  6.2  /  Alb  2.9<L>  /  TBili  0.6  /  DBili  x   /  AST  13  /  ALT  11  /  AlkPhos  56        Serum Pro-Brain Natriuretic Peptide: 5408 pg/mL (19 @ 11:52)    Trop <0.01, CKMB --, CK --, 19 @ 05:28  Trop <0.01, CKMB --, CK --, 19 @ 00:39  Trop <0.01, CKMB 1.7, CK 39, 19 @ 20:16  Trop <0.01, CKMB --, CK --, 19 @ 11:52      Culture - Urine (collected 2019 15:30)  Source: .Urine Clean Catch (Midstream)  Final Report (2019 22:24):    <10,000 CFU/mL Normal Urogenital Carey        RADIOLOGY & ADDITIONAL TESTS:    < from: US Kidney and Bladder (19 @ 19:09) >    IMPRESSION:      Postvoid volume of approximately 43 cc.    Prostate calcifications.    < end of copied text >    Imaging or report Personally Reviewed:  [ ] YES  [ ] NO    Medications:  Standing  aspirin  chewable 81 milliGRAM(s) Oral daily  atorvastatin 80 milliGRAM(s) Oral at bedtime  chlorhexidine 4% Liquid 1 Application(s) Topical <User Schedule>  dextrose 5%. 1000 milliLiter(s) IV Continuous <Continuous>  dextrose 50% Injectable 12.5 Gram(s) IV Push once  diazepam    Tablet 2 milliGRAM(s) Oral daily  digoxin     Tablet 0.125 milliGRAM(s) Oral daily  enoxaparin Injectable 75 milliGRAM(s) SubCutaneous two times a day  furosemide    Tablet 40 milliGRAM(s) Oral daily  insulin glargine Injectable (LANTUS) 18 Unit(s) SubCutaneous at bedtime  insulin lispro (HumaLOG) corrective regimen sliding scale   SubCutaneous three times a day before meals  insulin lispro Injectable (HumaLOG) 6 Unit(s) SubCutaneous three times a day before meals  metoprolol tartrate 50 milliGRAM(s) Oral three times a day  pantoprazole    Tablet 40 milliGRAM(s) Oral before breakfast  sacubitril 24 mG/valsartan 26 mG 1 Tablet(s) Oral two times a day  spironolactone 25 milliGRAM(s) Oral daily  tamsulosin 0.4 milliGRAM(s) Oral at bedtime    PRN Meds  dextrose 40% Gel 15 Gram(s) Oral once PRN  glucagon  Injectable 1 milliGRAM(s) IntraMuscular once PRN      Case discussed with resident    Care discussed with pt/family

## 2019-07-20 NOTE — PROGRESS NOTE ADULT - SUBJECTIVE AND OBJECTIVE BOX
74y old Male with pancytopenia/thrombocytopenia, new onset Afib started on therapeutic Lovenox who developed gross hematuria, resolved. episode of mild left nare epistaxis.   Pt. seen and examined earlier today in NAD, voice no complain. No Mitchell. RBUS with PVR of 43 cc.       Vital Signs Last 24 Hrs  T(C): 36 (20 Jul 2019 20:00), Max: 37.6 (20 Jul 2019 14:02)  T(F): 96.8 (20 Jul 2019 20:00), Max: 99.6 (20 Jul 2019 14:02)  HR: 82 (20 Jul 2019 20:00) (82 - 101)  BP: 129/60 (20 Jul 2019 20:00) (92/46 - 133/63)  RR: 18 (20 Jul 2019 20:00) (18 - 18)  SpO2: 96% (20 Jul 2019 17:12) (96% - 96%)     PE:  General: WN/WD in [facility]  HEENT: NC/AT, EOMI  back: No CVAT B/L   abdomen: Soft, NT/ND  Extremities: PONCE x 4        LABS:                        8.4    2.49  )-----------( 58       ( 20 Jul 2019 11:36 )             26.4     07-20    141  |  105  |  18  ----------------------------<  132<H>  3.6   |  24  |  0.9    Ca    8.4<L>      20 Jul 2019 06:22  Phos  3.0     07-20  Mg     2.0     07-20    TPro  6.2  /  Alb  2.9<L>  /  TBili  0.6  /  DBili  x   /  AST  13  /  ALT  11  /  AlkPhos  56  07-20    Urinalysis (07.18.19 @ 15:30)    Glucose Qualitative, Urine: 100 mg/dL    Blood, Urine: Moderate    pH Urine: 5.5    Color: Light Yellow    Urine Appearance: Clear    Bilirubin: Negative    Ketone - Urine: Negative    Specific Gravity: 1.021    Protein, Urine: Trace    Urobilinogen: <2 mg/dL    Nitrite: Negative    Leukocyte Esterase Concentration: Negative      Culture - Urine (07.18.19 @ 15:30)    Specimen Source: .Urine Clean Catch (Midstream)    Culture Results:   <10,000 CFU/mL Normal Urogenital Carey    RADIOLOGY & ADDITIONAL STUDIES:     < from: US Kidney and Bladder (07.19.19 @ 19:09) >  EXAM:  US KIDNEYS AND BLADDER            PROCEDURE DATE:  07/19/2019            INTERPRETATION:  CLINICAL HISTORY: Hematuria    COMPARISON: CT abdomen and pelvis 10/24/2017.    PROCEDURE: Retroperitoneal Ultrasound was performed.    FINDINGS:    RIGHT KIDNEY:  size measuring 11.5 cm in length. No evidence of   hydronephrosis or calculus. Vascular flow is demonstrated at the hilum.    LEFT KIDNEY: , size measuring 12.3 cm in length. No evidence of   hydronephrosis or calculus. Vascular flow is demonstrated at the hilum.     URINARY BLADDER: Prevoid volume of approximately 136 cc.  No wall   thickening, debris or calculus is seen. Bilateral ureteral jets are   visualized. Postvoid volume is approximately 43 cc.    PROSTATE: Volume measures approximately 30 cc. Calcifications are seen.    IMPRESSION:      Postvoid volume of approximately 43 cc.    Prostate calcifications.    < end of copied text >

## 2019-07-20 NOTE — PROGRESS NOTE ADULT - ATTENDING COMMENTS
I personally saw and examined the patient, reviewed the chart and available data. I discussed the situation with the PA staff. I also reviewed and/or amended the note as necessary. I personally saw and examined the patient, reviewed the chart and available data. I discussed the situation with the patient, nursing and PA staff. I also reviewed and/or amended the note as necessary.    patient seen on the day in question. Note not reviewed and cosigned until now due to scheduling issues

## 2019-07-20 NOTE — DISCHARGE NOTE PROVIDER - HOSPITAL COURSE
74 year old male w/ past medical history of CAD s/p stent, non-obstructive cardiomyopathy with EF 26%, hypertension, hyperlipidemia, DM II presenting with chief complaint of L-sided chest pain and palpitations.        Pt reported L-sided squeezing chest pain that lasted 5 hours. The pain was non-radiating, 6/10 in intensity, and was associated with shortness of breath. He was recently hospitalized for new CHF with reduced ejection fraction, had cardiac cath which showed non-obstructive CAD, and was discharged on medical therapy and life vest for low EF.        In the ED, VS: /92  RR 18 T97.2  satting 100% on 2LNC. EKG showed Afib w/ tachycardia. LVH pattern.    Pt received Cardizem 10mgIVP x1, Metoprolol 100mg,and was started on cardizem drip. He was moved to the telemetry floor.        Cardiology was consulted, and stated that patient was not a candidate for long term anticoagulation due to significant pancytopenia. On 7/19, patient was noted to be in afib with RVR again, rate was controlled and patient was back into NSR. Urology was consulted for gross hematuria on 7/19, sonogram was performed and was otherwise normal. Patient's case was discussed with heme/onc, patient will follow up as outpatient for bone marrow biopsy. 74 year old male w/ past medical history of CAD s/p stent, non-obstructive cardiomyopathy with EF 26%, hypertension, hyperlipidemia, DM II presenting with chief complaint of L-sided chest pain and palpitations.        Pt reported L-sided squeezing chest pain that lasted 5 hours. The pain was non-radiating, 6/10 in intensity, and was associated with shortness of breath. He was recently hospitalized for new CHF with reduced ejection fraction, had cardiac cath which showed non-obstructive CAD, and was discharged on medical therapy and life vest for low EF.        In the ED, VS: /92  RR 18 T97.2  satting 100% on 2LNC. EKG showed Afib w/ tachycardia. LVH pattern.    Pt received Cardizem 10mgIVP x1, Metoprolol 100mg,and was started on cardizem drip. He was moved to the telemetry floor.        Cardiology was consulted, and stated that patient was not a candidate for long term anticoagulation due to significant pancytopenia. On 7/19, patient was noted to be in afib with RVR again, rate was controlled and patient was back into NSR. Urology was consulted for gross hematuria on 7/19, sonogram was performed and was otherwise normal. Patient's case was discussed with heme/onc, patient will follow up as outpatient for bone marrow biopsy. ENT consulted for epistaxis on 7/20, nasal packing performed. Patient to be discharged hemodynamically stable, and will follow up for BM biopsy and outpatient cystoscopy.

## 2019-07-20 NOTE — DISCHARGE NOTE PROVIDER - NSDCCPCAREPLAN_GEN_ALL_CORE_FT
PRINCIPAL DISCHARGE DIAGNOSIS  Diagnosis: New onset atrial fibrillation  Assessment and Plan of Treatment: You have atrial fibrillation. Your heart rate will be controlled with PRINCIPAL DISCHARGE DIAGNOSIS  Diagnosis: New onset atrial fibrillation  Assessment and Plan of Treatment: You have atrial fibrillation. Your heart rate will be controlled with Digoxin and Lopressor.      SECONDARY DISCHARGE DIAGNOSES  Diagnosis: Pancytopenia  Assessment and Plan of Treatment: Your blood cells are low (pancytopenia). Follow up as outpatient with heme/onc for bone marrow biopsy.    Diagnosis: HTN (hypertension)  Assessment and Plan of Treatment: You have high blood pressure. Continue to take Lopressor and Entresto and monitor blood pressure at home. PRINCIPAL DISCHARGE DIAGNOSIS  Diagnosis: New onset atrial fibrillation  Assessment and Plan of Treatment: You have atrial fibrillation. Your heart rate will be controlled with Digoxin and Lopressor.      SECONDARY DISCHARGE DIAGNOSES  Diagnosis: Hematuria  Assessment and Plan of Treatment: You had blood in your urine. Anticoagulation was discontinued, and renal/bladder ultrasound was unremarkable. Follow up as outpatient for cystoscopy.    Diagnosis: Pancytopenia  Assessment and Plan of Treatment: Your blood cells are low (pancytopenia). Follow up as outpatient with heme/onc for bone marrow biopsy.    Diagnosis: HTN (hypertension)  Assessment and Plan of Treatment: You have high blood pressure. Continue to take Lopressor and Entresto and monitor blood pressure at home.

## 2019-07-20 NOTE — PROGRESS NOTE ADULT - ASSESSMENT
74 year old male w/ past medical history of CAD s/p stent, non-obstructive cardiomyopathy with EF 26%, hypertension, hyperlipidemia, DM II presenting with chief complaint of L-sided chest pain and palpitations found to be in Afib w/ RVR.    # New onset Afib w/ RVR in the setting of non-obstructive cardiomyopathy w/ clean cath  - per cardiology recommendations: Cardizem drip was discontinued yesterday. Loading dose of digoxin (0.25mg x 4) was provided. Will keep patient on Digoxin 0.125mg PO QD. Will monitor renal function closely for signs of toxicity.   -Will c/w metoprolol 50mg PO TID.   -Will increase Lasix from 20mg to 40mg PO Daily   -C/w Entresto 24/26 q12hr, Aspirin, and high dose statin  -CHADSVASC: 5: Lovenox 75mg bid for anticoagulation  -Continue telemetry monitoring.    #Hematuria  -Patient now with multiple episodes of hematuria. Likely side-effect from Lovenox in the setting of chronic pancytopenia. Will continue to monitor. Per cardiology, he is not a candidate for long term anticoagulation. Heme/Onc was consulted yesterday. They agree with cardiology. Will continue with their plan with outpatient BM biopsy for further chronic pancytopenia w/u.     #Hypertension  -C/w metoprolol 50mg TID, and Entresto 24/26 q12hr      #DM II  - FS qAC and qHS  - Start ISS if FS>180    #Chronic Pancytopenia  -outpatient BM biopsy scheduled    #BPH  - C/w flomax 0.4mg QD      Diet: DASH/ CC  DVT ppx: On therapeutic lovenox  Activity: Ambulate as tolerated  #Dispo: Telemetry    #Code Status: Full 74 year old male w/ past medical history of CAD s/p stent, non-obstructive cardiomyopathy with EF 26%, hypertension, hyperlipidemia, DM II presenting with chief complaint of L-sided chest pain and palpitations found to be in Afib w/ RVR.    # New onset Afib w/ RVR in the setting of non-obstructive cardiomyopathy w/ clean cath  - per cardiology recommendations: Cardizem drip was discontinued yesterday. Loading dose of digoxin (0.25mg x 4) was provided. Will keep patient on Digoxin 0.125mg PO QD. Will monitor renal function closely for signs of toxicity.   -Will increase Metoprolol tartate to 50mg q6h today, then start Toprol XL 200qd tomorrow  -Will x/w Lasix 40mg qd  -C/w Entresto 24/26 q12hr, Aspirin, and high dose statin  -CHADSVASC: 5: Lovenox 75mg bid for anticoagulation  -Continue telemetry monitoring.    #Hematuria  -Patient now with multiple episodes of hematuria. Likely side-effect from Lovenox in the setting of chronic pancytopenia. Will continue to monitor. Per cardiology, he is not a candidate for long term anticoagulation. Heme/Onc was consulted yesterday. They agree with cardiology. Will continue with their plan with outpatient BM biopsy for further chronic pancytopenia w/u.     #Epistaxis  - ENT to consult for epistaxis in setting of pancytopenia    #Hypertension  -Metoprolol 50mg q6h today and then Toprol XL 200mg qd tomorrow and Entresto 24/26 q12hr      #DM II  - FS qAC and qHS  - Start ISS if FS>180    #Chronic Pancytopenia  -outpatient BM biopsy scheduled    #BPH  - C/w flomax 0.4mg QD      Diet: DASH/ CC  DVT ppx: On therapeutic lovenox  Activity: Ambulate as tolerated  #Dispo: Telemetry    #Code Status: Full

## 2019-07-20 NOTE — PROGRESS NOTE ADULT - SUBJECTIVE AND OBJECTIVE BOX
SHANE ASHTON  [facility]      Patient is a 74y old  Male who presents with a chief complaint of New onset Afib (20 Jul 2019 09:35)      HPI:  74 year old male w/ past medical history of CAD s/p stent, non-obstructive cardiomyopathy with EF 26%, hypertension, hyperlipidemia, DM II presenting with chief complaint of L-sided chest pain and palpitations.    Pt reports his symptoms starting at 5AM with L-sided squeezing chest pain that lasted 5 hours. The pain was non-radiating, 6/10 in intensity, and was associated with shortness of breath. He was recently hospitalized for new CHF with reduced ejection fraction, had cardiac cath which showed non-obstructive CAD, and was discharged on medical therapy and life vest for low EF.    In the ED, VS: /92  RR 18 T97.2  satting 100% on 2LNC. EKG showed Afib w/ tachycardia. LVH pattern.  Pt received Cardizem 10mgIVP x1, Metoprolol 100mg,and was started on cardizem drip. (17 Jul 2019 21:21)      -PMHx: GERD (gastroesophageal reflux disease) [Active]  Anxiety [Inactive]  CAD (coronary artery disease) [Active]  Diabetes [Active]  HTN (hypertension) [Active]    -PSHx:H/O colonoscopy  No significant past surgical history    -FAMILY HISTORY:  FH: hypertension      Interval events:  Patient seen and examined at bedside. He currently is c/o epistaxis x1hr. He denies any CP/SOB/headache/abd pain/fever/chills.     REVIEW OF SYSTEMS:  CONSTITUTIONAL: Epistaxis left nostril  RESPIRATORY: No cough, wheezing, chills or hemoptysis; No shortness of breath  CARDIOVASCULAR: No chest pain, palpitations, dizziness, or leg swelling  GASTROINTESTINAL: No abdominal or epigastric pain. No nausea, vomiting, or hematemesis; No diarrhea or constipation. No melena or hematochezia.  NEUROLOGICAL: No headaches  LYMPH NODES: No enlarged glands  MUSCULOSKELETAL: No joint pain or swelling; No muscle, back, or extremity pain      T(C): , Max: 37.6 (07-20-19 @ 14:02)  HR: 92 (07-20-19 @ 14:02)  BP: 112/59 (07-20-19 @ 14:02)  RR: 18 (07-20-19 @ 14:02)  SpO2: 97% (07-19-19 @ 20:55)  CAPILLARY BLOOD GLUCOSE  208 (20 Jul 2019 12:05)      POCT Blood Glucose.: 145 mg/dL (20 Jul 2019 07:43)  POCT Blood Glucose.: 100 mg/dL (19 Jul 2019 21:38)  POCT Blood Glucose.: 211 mg/dL (19 Jul 2019 16:30)    PHYSICAL EXAM:  GENERAL: NAD, lying in bed comfortably  HEAD:  Atraumatic, Normocephalic  EYES: EOMI, PERRLA, conjunctiva and sclera clear  ENT: Epistaxis left nostril, posterior  NECK: Supple, No JVD  CHEST/LUNG: Clear to auscultation bilaterally; No rales, rhonchi, wheezing, or rubs. Unlabored respirations  HEART: Regular rate and rhythm; No murmurs, rubs, or gallops  ABDOMEN: Bowel sounds present; Soft, Nontender, Nondistended. No hepatomegally  EXTREMITIES:  2+ Peripheral Pulses, brisk capillary refill. No clubbing, cyanosis, or edema  NERVOUS SYSTEM:  Alert & Oriented X3, speech clear. No deficits   MSK: FROM all 4 extremities, full and equal strength  SKIN: No rashes or lesions    LABS:          8.4  2.49  )-------(58          26.4  N=24.1  L=45.8  MCV=95.0          7.6  1.81  )-------(45          24.1  N=24.3  L=53.6  MCV=94.9    141|105|18  ------------------<132<H>  3.6|24|0.9  eGFR:83  Ca:8.4<L>            Microbiology:    Culture - Urine (collected 07-18-19 @ 15:30)  Source: .Urine Clean Catch (Midstream)  Final Report (07-19-19 @ 22:24):    <10,000 CFU/mL Normal Urogenital Carey        RADIOLOGY & ADDITIONAL TESTS:  < from: US Kidney and Bladder (07.19.19 @ 19:09) >  IMPRESSION:      Postvoid volume of approximately 43 cc.    Prostate calcifications.    < end of copied text >        Medications:  aspirin  chewable 81 milliGRAM(s) Oral daily  atorvastatin 80 milliGRAM(s) Oral at bedtime  chlorhexidine 4% Liquid 1 Application(s) Topical <User Schedule>  dextrose 40% Gel 15 Gram(s) Oral once PRN  dextrose 5%. 1000 milliLiter(s) IV Continuous <Continuous>  dextrose 50% Injectable 12.5 Gram(s) IV Push once  diazepam    Tablet 2 milliGRAM(s) Oral daily  digoxin     Tablet 0.125 milliGRAM(s) Oral daily  enoxaparin Injectable 75 milliGRAM(s) SubCutaneous two times a day  furosemide    Tablet 40 milliGRAM(s) Oral daily  glucagon  Injectable 1 milliGRAM(s) IntraMuscular once PRN  insulin glargine Injectable (LANTUS) 18 Unit(s) SubCutaneous at bedtime  insulin lispro (HumaLOG) corrective regimen sliding scale   SubCutaneous three times a day before meals  insulin lispro Injectable (HumaLOG) 6 Unit(s) SubCutaneous three times a day before meals  metoprolol tartrate 50 milliGRAM(s) Oral every 6 hours  pantoprazole    Tablet 40 milliGRAM(s) Oral before breakfast  sacubitril 24 mG/valsartan 26 mG 1 Tablet(s) Oral two times a day  spironolactone 25 milliGRAM(s) Oral daily  tamsulosin 0.4 milliGRAM(s) Oral at bedtime

## 2019-07-20 NOTE — DISCHARGE NOTE PROVIDER - NSDCFUSCHEDAPPT_GEN_ALL_CORE_FT
SHANE ASHTON ; 07/29/2019 ; [facility] SHANE ASHTON ; 07/29/2019 ; [facility] SHANE ASHTON ; 07/29/2019 ; [facility]

## 2019-07-20 NOTE — PROGRESS NOTE ADULT - ASSESSMENT
A 74 years old male presented with Lt sided squeezing type cp which started 5 AM lasting for 5 hrs and was associated with palpitations and sob.    CTA chest: No PE  CXR: NAPD  EKG: A. Fib @ 127/min. (Interpreted by me)  CE x 2 negative.   BNP: 5408        1. A. Fib with RVR  2. CP due to rapid A. Fib  3. CM  4. Non obst CAD  5. HTN  6. No CHF. H/O Ch. HFrEF  7. Pancytopenia / Thrombocytopenia  8. Hematuria          PLAN:    ·	In NSR now. Still tachycardiac. Will increase Metoprolol dose. On D/C Metoprolol succinate 200 mg po daily  ·	Cardiology eval noted. Pt with H/O epistaxis, now having hematuria. Having pancytopenia/Thrombocytopenia and significant anemia. Not a candidate for long term A/C as per cardiology.  ·	Cont Digoxin 0.125 mg po daily  ·	BP is stable. Will cont Lasix 40 mg po daily  ·	Urology eval noted. Recommended urine cytology. Urine cx is negative  ·	Case was D/W the Hem/Onc Dr. Santoro. Pt has appt with him in two weeks for bone marrow biopsy  ·	Cont ASA, Lipitor, Metoprolol and Spironolactone.   ·	Discussed goals of care with niece and nephew who are health care proxy. Undecided about DNR/DNI yet. Full code A 74 years old male presented with Lt sided squeezing type cp which started 5 AM lasting for 5 hrs and was associated with palpitations and sob.    CTA chest: No PE  CXR: NAPD  EKG: A. Fib @ 127/min. (Interpreted by me)  CE x 2 negative.   BNP: 5408        1. A. Fib with RVR  2. CP due to rapid A. Fib  3. CM  4. Non obst CAD  5. HTN  6. No CHF. H/O Ch. HFrEF  7. Pancytopenia / Thrombocytopenia  8. Hematuria          PLAN:    ·	Drop in Hb. Will repeat CBC next round  ·	In NSR now. Still tachycardiac. Will increase Metoprolol dose. On D/C Metoprolol succinate 200 mg po daily  ·	Cardiology eval noted. Pt with H/O epistaxis, now having hematuria. Having pancytopenia/Thrombocytopenia and significant anemia. Not a candidate for long term A/C as per cardiology.  ·	Cont Digoxin 0.125 mg po daily  ·	BP is stable. Will cont Lasix 40 mg po daily  ·	Urology eval noted. Recommended urine cytology. Urine cx is negative. Renal/Bladder US is unremarkable  ·	Case was D/W the Hem/Onc Dr. Santoro. Pt has appt with him in two weeks for bone marrow biopsy  ·	Cont ASA, Lipitor, Metoprolol and Spironolactone.   ·	Discussed goals of care with niece and nephew who are health care proxy. Undecided about DNR/DNI yet. Full code

## 2019-07-20 NOTE — DISCHARGE NOTE PROVIDER - CARE PROVIDER_API CALL
Víctor Santoro)  Hematology; Internal Medicine; Medical Oncology  [address] [place] [address]  Phone: [phone]  Fax: [phone]  Follow Up Time:

## 2019-07-20 NOTE — CONSULT NOTE ADULT - SUBJECTIVE AND OBJECTIVE BOX
Patient is a 74y old  Male who presents with a chief complaint of New onset Afib (20 Jul 2019 14:56)    HPI:  74 year old male w/ past medical history of CAD s/p stent, non-obstructive cardiomyopathy with EF 26%, hypertension, hyperlipidemia, DM II presenting with chief complaint of L-sided chest pain and palpitations. admitted to medicine with new onset of A.Fib pancytopenia, thrombocytopenia ENT called to evaluate for left nare epistaxis. Pt. seen and examined at bedside in NAD, mild bleeding from left narex 1 days, resolving, Pt. states to picking his nose.  Pt. denies respiratory distress, hemoptysis, fever, chills.        PAST MEDICAL & SURGICAL HISTORY:  GERD (gastroesophageal reflux disease)  CAD (coronary artery disease)  Diabetes  HTN (hypertension)  H/O colonoscopy: 10 yeras ago    FAMILY HISTORY:  FH: hypertension      MEDICATIONS  (STANDING):  aspirin  chewable 81 milliGRAM(s) Oral daily  atorvastatin 80 milliGRAM(s) Oral at bedtime  chlorhexidine 4% Liquid 1 Application(s) Topical <User Schedule>  dextrose 5%. 1000 milliLiter(s) (50 mL/Hr) IV Continuous <Continuous>  dextrose 50% Injectable 12.5 Gram(s) IV Push once  diazepam    Tablet 2 milliGRAM(s) Oral daily  digoxin     Tablet 0.125 milliGRAM(s) Oral daily  enoxaparin Injectable 75 milliGRAM(s) SubCutaneous two times a day  furosemide    Tablet 40 milliGRAM(s) Oral daily  insulin glargine Injectable (LANTUS) 18 Unit(s) SubCutaneous at bedtime  insulin lispro (HumaLOG) corrective regimen sliding scale   SubCutaneous three times a day before meals  insulin lispro Injectable (HumaLOG) 6 Unit(s) SubCutaneous three times a day before meals  metoprolol tartrate 50 milliGRAM(s) Oral every 6 hours  pantoprazole    Tablet 40 milliGRAM(s) Oral before breakfast  sacubitril 24 mG/valsartan 26 mG 1 Tablet(s) Oral two times a day  spironolactone 25 milliGRAM(s) Oral daily  tamsulosin 0.4 milliGRAM(s) Oral at bedtime    MEDICATIONS  (PRN):  dextrose 40% Gel 15 Gram(s) Oral once PRN Blood Glucose LESS THAN 70 milliGRAM(s)/deciliter  glucagon  Injectable 1 milliGRAM(s) IntraMuscular once PRN Glucose LESS THAN 70 milligrams/deciliter    Allergies    No Known Allergies    Intolerances        REVIEW OF SYSTEMS:    CONSTITUTIONAL: No fever, weight loss, or fatigue  EYES: No eye pain, visual disturbances, or discharge  ENMT:  No difficulty hearing, tinnitus, vertigo; No sinus or throat pain  NECK: No pain or stiffness  BREASTS: No pain, masses, or nipple discharge  RESPIRATORY: No cough, wheezing, chills or hemoptysis; No shortness of breath  CARDIOVASCULAR: No chest pain, palpitations, dizziness, or leg swelling  GASTROINTESTINAL: No abdominal or epigastric pain. No nausea, vomiting, or hematemesis; No diarrhea or constipation. No melena or hematochezia.  GENITOURINARY: No dysuria, frequency, hematuria, or incontinence  NEUROLOGICAL: No headaches, memory loss, loss of strength, numbness, or tremors  SKIN: No itching, burning, rashes, or lesions   LYMPH NODES: No enlarged glands  ENDOCRINE: No heat or cold intolerance; No hair loss  MUSCULOSKELETAL: No joint pain or swelling; No muscle, back, or extremity pain  PSYCHIATRIC: No depression, anxiety, mood swings, or difficulty sleeping  ENT as per HPI.                        8.4    2.49  )-----------( 58       ( 20 Jul 2019 11:36 )             26.4     07-20    141  |  105  |  18  ----------------------------<  132<H>  3.6   |  24  |  0.9    Ca    8.4<L>      20 Jul 2019 06:22  Phos  3.0     07-20  Mg     2.0     07-20    TPro  6.2  /  Alb  2.9<L>  /  TBili  0.6  /  DBili  x   /  AST  13  /  ALT  11  /  AlkPhos  56  07-20    Vital Signs Last 24 Hrs  T(C): 37.6 (20 Jul 2019 14:02), Max: 37.6 (20 Jul 2019 14:02)  T(F): 99.6 (20 Jul 2019 14:02), Max: 99.6 (20 Jul 2019 14:02)  HR: 92 (20 Jul 2019 14:02) (83 - 101)  BP: 112/59 (20 Jul 2019 14:02) (92/46 - 126/51)   RR: 18 (20 Jul 2019 14:02) (18 - 18)  SpO2: 97% (19 Jul 2019 20:55) (97% - 97%)      PHYSICAL EXAM:  Constitutional Normal: well nourished, well developed, non-dysmorphic, no acute distress    Psychiatric: age appropriate behavior, cooperative     External Nose:  Left nare old blood noted, no evidence of active bleeding. Right nare within normal limits. Surgicel fibrillar with topical Bacitracin applied to left nare.   		  Oral Cavity:   tongue midline/uvula midline , no lesions or ulcerations no bleeding over posterior pharynx     Neck: No palpable lymphadenopathy    Pulmonary: No Acute Distress.     Neurologic: awake and alert

## 2019-07-21 LAB
ALBUMIN SERPL ELPH-MCNC: 2.9 G/DL — LOW (ref 3.5–5.2)
ALP SERPL-CCNC: 56 U/L — SIGNIFICANT CHANGE UP (ref 30–115)
ALT FLD-CCNC: 15 U/L — SIGNIFICANT CHANGE UP (ref 0–41)
ANION GAP SERPL CALC-SCNC: 14 MMOL/L — SIGNIFICANT CHANGE UP (ref 7–14)
ANISOCYTOSIS BLD QL: SLIGHT — SIGNIFICANT CHANGE UP
AST SERPL-CCNC: 15 U/L — SIGNIFICANT CHANGE UP (ref 0–41)
BASOPHILS # BLD AUTO: 0 K/UL — SIGNIFICANT CHANGE UP (ref 0–0.2)
BASOPHILS NFR BLD AUTO: 0 % — SIGNIFICANT CHANGE UP (ref 0–1)
BILIRUB SERPL-MCNC: 0.5 MG/DL — SIGNIFICANT CHANGE UP (ref 0.2–1.2)
BUN SERPL-MCNC: 19 MG/DL — SIGNIFICANT CHANGE UP (ref 10–20)
CALCIUM SERPL-MCNC: 8.5 MG/DL — SIGNIFICANT CHANGE UP (ref 8.5–10.1)
CHLORIDE SERPL-SCNC: 103 MMOL/L — SIGNIFICANT CHANGE UP (ref 98–110)
CO2 SERPL-SCNC: 26 MMOL/L — SIGNIFICANT CHANGE UP (ref 17–32)
CREAT SERPL-MCNC: 1.1 MG/DL — SIGNIFICANT CHANGE UP (ref 0.7–1.5)
DACRYOCYTES BLD QL SMEAR: SLIGHT — SIGNIFICANT CHANGE UP
EOSINOPHIL # BLD AUTO: 0.02 K/UL — SIGNIFICANT CHANGE UP (ref 0–0.7)
EOSINOPHIL NFR BLD AUTO: 1.1 % — SIGNIFICANT CHANGE UP (ref 0–8)
GIANT PLATELETS BLD QL SMEAR: PRESENT — SIGNIFICANT CHANGE UP
GLUCOSE BLDC GLUCOMTR-MCNC: 161 MG/DL — HIGH (ref 70–99)
GLUCOSE BLDC GLUCOMTR-MCNC: 217 MG/DL — HIGH (ref 70–99)
GLUCOSE BLDC GLUCOMTR-MCNC: 80 MG/DL — SIGNIFICANT CHANGE UP (ref 70–99)
GLUCOSE SERPL-MCNC: 156 MG/DL — HIGH (ref 70–99)
HCT VFR BLD CALC: 23.5 % — LOW (ref 42–52)
HGB BLD-MCNC: 7.4 G/DL — LOW (ref 14–18)
LYMPHOCYTES # BLD AUTO: 0.91 K/UL — LOW (ref 1.2–3.4)
LYMPHOCYTES # BLD AUTO: 60.7 % — HIGH (ref 20.5–51.1)
MACROCYTES BLD QL: SLIGHT — SIGNIFICANT CHANGE UP
MAGNESIUM SERPL-MCNC: 1.7 MG/DL — LOW (ref 1.8–2.4)
MANUAL SMEAR VERIFICATION: SIGNIFICANT CHANGE UP
MCHC RBC-ENTMCNC: 29.8 PG — SIGNIFICANT CHANGE UP (ref 27–31)
MCHC RBC-ENTMCNC: 31.5 G/DL — LOW (ref 32–37)
MCV RBC AUTO: 94.8 FL — HIGH (ref 80–94)
MONOCYTES # BLD AUTO: 0.12 K/UL — SIGNIFICANT CHANGE UP (ref 0.1–0.6)
MONOCYTES NFR BLD AUTO: 7.9 % — SIGNIFICANT CHANGE UP (ref 1.7–9.3)
NEUTROPHILS # BLD AUTO: 0.42 K/UL — LOW (ref 1.4–6.5)
NEUTROPHILS NFR BLD AUTO: 28.1 % — LOW (ref 42.2–75.2)
OVALOCYTES BLD QL SMEAR: SIGNIFICANT CHANGE UP
PLAT MORPH BLD: ABNORMAL
PLATELET # BLD AUTO: 49 K/UL — LOW (ref 130–400)
POIKILOCYTOSIS BLD QL AUTO: SIGNIFICANT CHANGE UP
POTASSIUM SERPL-MCNC: 3.4 MMOL/L — LOW (ref 3.5–5)
POTASSIUM SERPL-SCNC: 3.4 MMOL/L — LOW (ref 3.5–5)
PROT SERPL-MCNC: 6.4 G/DL — SIGNIFICANT CHANGE UP (ref 6–8)
RBC # BLD: 2.48 M/UL — LOW (ref 4.7–6.1)
RBC # FLD: 17.8 % — HIGH (ref 11.5–14.5)
RBC BLD AUTO: ABNORMAL
SMUDGE CELLS # BLD: PRESENT — SIGNIFICANT CHANGE UP
SODIUM SERPL-SCNC: 143 MMOL/L — SIGNIFICANT CHANGE UP (ref 135–146)
VARIANT LYMPHS # BLD: 2.2 % — SIGNIFICANT CHANGE UP (ref 0–5)
WBC # BLD: 1.5 K/UL — LOW (ref 4.8–10.8)
WBC # FLD AUTO: 1.5 K/UL — LOW (ref 4.8–10.8)

## 2019-07-21 PROCEDURE — 99233 SBSQ HOSP IP/OBS HIGH 50: CPT

## 2019-07-21 RX ADMIN — Medication 200 MILLIGRAM(S): at 05:43

## 2019-07-21 RX ADMIN — PANTOPRAZOLE SODIUM 40 MILLIGRAM(S): 20 TABLET, DELAYED RELEASE ORAL at 07:57

## 2019-07-21 RX ADMIN — Medication 6 UNIT(S): at 07:52

## 2019-07-21 RX ADMIN — CHLORHEXIDINE GLUCONATE 1 APPLICATION(S): 213 SOLUTION TOPICAL at 05:58

## 2019-07-21 RX ADMIN — Medication 2 MILLIGRAM(S): at 12:40

## 2019-07-21 RX ADMIN — Medication 0.12 MILLIGRAM(S): at 05:44

## 2019-07-21 RX ADMIN — Medication 6 UNIT(S): at 12:41

## 2019-07-21 RX ADMIN — Medication 50 MILLIGRAM(S): at 00:00

## 2019-07-21 RX ADMIN — SACUBITRIL AND VALSARTAN 1 TABLET(S): 24; 26 TABLET, FILM COATED ORAL at 17:07

## 2019-07-21 RX ADMIN — Medication 2: at 12:42

## 2019-07-21 RX ADMIN — Medication 40 MILLIGRAM(S): at 05:42

## 2019-07-21 RX ADMIN — ENOXAPARIN SODIUM 75 MILLIGRAM(S): 100 INJECTION SUBCUTANEOUS at 05:44

## 2019-07-21 RX ADMIN — ENOXAPARIN SODIUM 75 MILLIGRAM(S): 100 INJECTION SUBCUTANEOUS at 17:07

## 2019-07-21 RX ADMIN — Medication 1: at 07:51

## 2019-07-21 RX ADMIN — TAMSULOSIN HYDROCHLORIDE 0.4 MILLIGRAM(S): 0.4 CAPSULE ORAL at 22:05

## 2019-07-21 RX ADMIN — INSULIN GLARGINE 18 UNIT(S): 100 INJECTION, SOLUTION SUBCUTANEOUS at 22:06

## 2019-07-21 RX ADMIN — SPIRONOLACTONE 25 MILLIGRAM(S): 25 TABLET, FILM COATED ORAL at 05:44

## 2019-07-21 RX ADMIN — Medication 81 MILLIGRAM(S): at 12:40

## 2019-07-21 RX ADMIN — SACUBITRIL AND VALSARTAN 1 TABLET(S): 24; 26 TABLET, FILM COATED ORAL at 05:42

## 2019-07-21 RX ADMIN — ATORVASTATIN CALCIUM 80 MILLIGRAM(S): 80 TABLET, FILM COATED ORAL at 22:05

## 2019-07-21 NOTE — PROGRESS NOTE ADULT - ASSESSMENT
A 74 years old male presented with Lt sided squeezing type cp which started 5 AM lasting for 5 hrs and was associated with palpitations and sob.    CTA chest: No PE  CXR: NAPD  EKG: A. Fib @ 127/min. (Interpreted by me)  CE x 2 negative.   BNP: 5408        1. A. Fib with RVR  2. CP due to rapid A. Fib  3. CM  4. Non obst CAD  5. HTN  6. No CHF. H/O Ch. HFrEF  7. Pancytopenia / Thrombocytopenia  8. Hematuria  9. Epistaxis          PLAN:    ·	S/P Lt. Nares surgical fibrillar by ENT  ·	Some drop in Hb from baseline. Follow CBC.  ·	In NSR now. Cont Metoprolol succinate 200 mg po daily  ·	Cardiology eval noted. Pt with H/O epistaxis, now having hematuria. Having pancytopenia/Thrombocytopenia and significant anemia. Not a candidate for long term A/C as per cardiology.  ·	Cont Digoxin 0.125 mg po daily  ·	BP is stable. Will cont Lasix 40 mg po daily  ·	Urology eval noted. Recommended urine cytology. Urine cx is negative. Renal/Bladder US is unremarkable. Out pt cystoscopy  ·	Case was D/W the Hem/Onc Dr. Santoro. Pt has appt with him in two weeks for bone marrow biopsy  ·	Cont ASA, Lipitor, Metoprolol and Spironolactone.   ·	Discussed goals of care with niece and nephew who are health care proxy. Undecided about DNR/DNI yet. Full code

## 2019-07-21 NOTE — PROGRESS NOTE PEDS - SUBJECTIVE AND OBJECTIVE BOX
Pt. seen and examined at bedside, eating breakfast comfortably, denies acute episodes of bleeding over night.         Vital Signs Last 24 Hrs  T(C): 36.9 (21 Jul 2019 05:47), Max: 37.6 (20 Jul 2019 14:02)  T(F): 98.4 (21 Jul 2019 05:47), Max: 99.6 (20 Jul 2019 14:02)  HR: 97 (21 Jul 2019 05:47) (82 - 101)  BP: 121/62 (21 Jul 2019 05:47) (104/61 - 133/63)  RR: 18 (21 Jul 2019 05:47) (18 - 18)  SpO2: 95% (21 Jul 2019 00:40) (95% - 96%)         PHYSICAL EXAM:  Constitutional Normal: well nourished, well developed, non-dysmorphic, no acute distress    Psychiatric: age appropriate behavior, cooperative     External Nose: left nare with packed Surgicel fibrillar, no evidence of active bleeding, Right nare wnl.    		  Oral Cavity:   tongue midline/uvula midline , no lesions or ulcerations no bleeding over posterior pharynx     Neck: No palpable lymphadenopathy    Pulmonary: No Acute Distress.             LABS:                        7.4    1.50  )-----------( 49       ( 21 Jul 2019 05:14 )             23.5     07-21    143  |  103  |  19  ----------------------------<  156<H>  3.4<L>   |  26  |  1.1    Ca    8.5      21 Jul 2019 05:14  Phos  3.0     07-20  Mg     1.7     07-21    TPro  6.4  /  Alb  2.9<L>  /  TBili  0.5  /  DBili  x   /  AST  15  /  ALT  15  /  AlkPhos  56  07-21

## 2019-07-21 NOTE — PROGRESS NOTE PEDS - ASSESSMENT
74 y.o M with left nare epistaxis controlled with Surgicel fibrillar   Pancytopenia  Thrombocytopenia       Plan:  Cont. Surgicel fibrillar to left nare - reabsorbable  Monitor for respiratory distress  Monitor labs  Replete Electrolytes  Monitor H/H. transfuse prn   Keep nares well hydrated with topical Bacitracin and NS  Cont. curent medical management as per primary medical team   ENT will F/U

## 2019-07-21 NOTE — PROGRESS NOTE ADULT - SUBJECTIVE AND OBJECTIVE BOX
SHANE ASHTON  74y Male    CHIEF COMPLAINT:    Patient is a 74y old  Male who presents with a chief complaint of New onset Afib (2019 21:32)      INTERVAL HPI/OVERNIGHT EVENTS:    Patient seen and examined. Started epistaxis from Lt. nares yesterday. S/P surgical fibrillar in Lt. Nares by ENT. No more active bleeding. No sob. No palpitations.    ROS: All other systems are negative.    Vital Signs:    T(F): 98.4 (19 @ 05:47), Max: 99.6 (19 @ 14:02)  HR: 97 (19 @ 05:47) (82 - 101)  BP: 121/62 (19 @ 05:47) (104/61 - 133/63)  RR: 18 (19 @ 05:47) (18 - 18)  SpO2: 95% (19 @ 00:40) (95% - 96%)  I&O's Summary    2019 07:01  -  2019 07:00  --------------------------------------------------------  IN: 0 mL / OUT: 1400 mL / NET: -1400 mL      Daily     Daily Weight in k (2019 05:47)  CAPILLARY BLOOD GLUCOSE  208 (2019 12:05)      POCT Blood Glucose.: 161 mg/dL (2019 07:40)  POCT Blood Glucose.: 295 mg/dL (2019 21:30)  POCT Blood Glucose.: 114 mg/dL (2019 17:55)      PHYSICAL EXAM:    GENERAL:  NAD  SKIN: No rashes or lesions  HENT: Atrumatic. Normocephalic. PERRL. Moist membranes.  NECK: Supple, No JVD. No lymphadenopathy.  PULMONARY: CTA B/L. No wheezing. No rales  CVS: Normal S1, S2. Rate and Rythm are regular. No murmurs.  ABDOMEN/GI: Soft, Nontender, Nondistended; BS present  EXTREMITIES: Peripheral pulses intact. No edema B/L LE.  NEUROLOGIC:  No motor or sensory deficit.  PSYCH: Alert & oriented x 3    Consultant(s) Notes Reviewed:  [x ] YES  [ ] NO  Care Discussed with Consultants/Other Providers [ x] YES  [ ] NO    EKG reviewed  Telemetry reviewed    LABS:                        7.4    1.50  )-----------( 49       ( 2019 05:14 )             23.5   Hemoglobin: 7.4 g/dL ( @ 05:14)  Hemoglobin: 8.4 g/dL ( @ 11:36)  Hemoglobin: 7.6 g/dL ( @ 06:22)  Hemoglobin: 8.2 g/dL ( @ 04:55)  Hemoglobin: 8.0 g/dL ( @ 18:18)  Hemoglobin: 8.9 g/dL ( @ 12:02)  Hemoglobin: 8.4 g/dL ( @ 05:28)  Hemoglobin: 9.2 g/dL ( @ 11:52)        143  |  103  |  19  ----------------------------<  156<H>  3.4<L>   |  26  |  1.1    Ca    8.5      2019 05:14  Phos  3.0       Mg     1.7         TPro  6.4  /  Alb  2.9<L>  /  TBili  0.5  /  DBili  x   /  AST  15  /  ALT  15  /  AlkPhos  56        Serum Pro-Brain Natriuretic Peptide: 5408 pg/mL (19 @ 11:52)        Culture - Urine (collected 2019 16:19)  Source: .Urine Clean Catch (Midstream)  Preliminary Report (2019 09:05):    >100,000 CFU/ml Gram positive organisms    Culture - Urine (collected 2019 15:30)  Source: .Urine Clean Catch (Midstream)  Final Report (2019 22:24):    <10,000 CFU/mL Normal Urogenital Carey        RADIOLOGY & ADDITIONAL TESTS:      Imaging or report Personally Reviewed:  [ ] YES  [ ] NO    Medications:  Standing  aspirin  chewable 81 milliGRAM(s) Oral daily  atorvastatin 80 milliGRAM(s) Oral at bedtime  chlorhexidine 4% Liquid 1 Application(s) Topical <User Schedule>  dextrose 5%. 1000 milliLiter(s) IV Continuous <Continuous>  dextrose 50% Injectable 12.5 Gram(s) IV Push once  diazepam    Tablet 2 milliGRAM(s) Oral daily  digoxin     Tablet 0.125 milliGRAM(s) Oral daily  enoxaparin Injectable 75 milliGRAM(s) SubCutaneous two times a day  furosemide    Tablet 40 milliGRAM(s) Oral daily  insulin glargine Injectable (LANTUS) 18 Unit(s) SubCutaneous at bedtime  insulin lispro (HumaLOG) corrective regimen sliding scale   SubCutaneous three times a day before meals  insulin lispro Injectable (HumaLOG) 6 Unit(s) SubCutaneous three times a day before meals  metoprolol succinate  milliGRAM(s) Oral daily  pantoprazole    Tablet 40 milliGRAM(s) Oral before breakfast  sacubitril 24 mG/valsartan 26 mG 1 Tablet(s) Oral two times a day  spironolactone 25 milliGRAM(s) Oral daily  tamsulosin 0.4 milliGRAM(s) Oral at bedtime    PRN Meds  dextrose 40% Gel 15 Gram(s) Oral once PRN  glucagon  Injectable 1 milliGRAM(s) IntraMuscular once PRN      Case discussed with resident    Care discussed with pt/family

## 2019-07-22 ENCOUNTER — APPOINTMENT (OUTPATIENT)
Dept: HEMATOLOGY ONCOLOGY | Facility: CLINIC | Age: 74
End: 2019-07-22

## 2019-07-22 LAB
-  AMPICILLIN: SIGNIFICANT CHANGE UP
-  CIPROFLOXACIN: SIGNIFICANT CHANGE UP
-  LEVOFLOXACIN: SIGNIFICANT CHANGE UP
-  NITROFURANTOIN: SIGNIFICANT CHANGE UP
-  TETRACYCLINE: SIGNIFICANT CHANGE UP
-  VANCOMYCIN: SIGNIFICANT CHANGE UP
ALBUMIN SERPL ELPH-MCNC: 3.4 G/DL — LOW (ref 3.5–5.2)
ALP SERPL-CCNC: 78 U/L — SIGNIFICANT CHANGE UP (ref 30–115)
ALT FLD-CCNC: 27 U/L — SIGNIFICANT CHANGE UP (ref 0–41)
ANION GAP SERPL CALC-SCNC: 14 MMOL/L — SIGNIFICANT CHANGE UP (ref 7–14)
AST SERPL-CCNC: 31 U/L — SIGNIFICANT CHANGE UP (ref 0–41)
BASOPHILS # BLD AUTO: 0 K/UL — SIGNIFICANT CHANGE UP (ref 0–0.2)
BASOPHILS NFR BLD AUTO: 0 % — SIGNIFICANT CHANGE UP (ref 0–1)
BILIRUB SERPL-MCNC: 0.8 MG/DL — SIGNIFICANT CHANGE UP (ref 0.2–1.2)
BUN SERPL-MCNC: 15 MG/DL — SIGNIFICANT CHANGE UP (ref 10–20)
CALCIUM SERPL-MCNC: 8.7 MG/DL — SIGNIFICANT CHANGE UP (ref 8.5–10.1)
CHLORIDE SERPL-SCNC: 100 MMOL/L — SIGNIFICANT CHANGE UP (ref 98–110)
CO2 SERPL-SCNC: 27 MMOL/L — SIGNIFICANT CHANGE UP (ref 17–32)
CREAT SERPL-MCNC: 0.9 MG/DL — SIGNIFICANT CHANGE UP (ref 0.7–1.5)
CULTURE RESULTS: SIGNIFICANT CHANGE UP
EOSINOPHIL # BLD AUTO: 0.07 K/UL — SIGNIFICANT CHANGE UP (ref 0–0.7)
EOSINOPHIL NFR BLD AUTO: 2.5 % — SIGNIFICANT CHANGE UP (ref 0–8)
GLUCOSE BLDC GLUCOMTR-MCNC: 107 MG/DL — HIGH (ref 70–99)
GLUCOSE BLDC GLUCOMTR-MCNC: 119 MG/DL — HIGH (ref 70–99)
GLUCOSE BLDC GLUCOMTR-MCNC: 129 MG/DL — HIGH (ref 70–99)
GLUCOSE BLDC GLUCOMTR-MCNC: 160 MG/DL — HIGH (ref 70–99)
GLUCOSE BLDC GLUCOMTR-MCNC: 208 MG/DL — HIGH (ref 70–99)
GLUCOSE BLDC GLUCOMTR-MCNC: 240 MG/DL — HIGH (ref 70–99)
GLUCOSE SERPL-MCNC: 120 MG/DL — HIGH (ref 70–99)
HCT VFR BLD CALC: 28.8 % — LOW (ref 42–52)
HGB BLD-MCNC: 9.3 G/DL — LOW (ref 14–18)
IMM GRANULOCYTES NFR BLD AUTO: 0.7 % — HIGH (ref 0.1–0.3)
LYMPHOCYTES # BLD AUTO: 0.97 K/UL — LOW (ref 1.2–3.4)
LYMPHOCYTES # BLD AUTO: 34.6 % — SIGNIFICANT CHANGE UP (ref 20.5–51.1)
MAGNESIUM SERPL-MCNC: 1.6 MG/DL — LOW (ref 1.8–2.4)
MCHC RBC-ENTMCNC: 30.5 PG — SIGNIFICANT CHANGE UP (ref 27–31)
MCHC RBC-ENTMCNC: 32.3 G/DL — SIGNIFICANT CHANGE UP (ref 32–37)
MCV RBC AUTO: 94.4 FL — HIGH (ref 80–94)
METHOD TYPE: SIGNIFICANT CHANGE UP
MONOCYTES # BLD AUTO: 0.68 K/UL — HIGH (ref 0.1–0.6)
MONOCYTES NFR BLD AUTO: 24.3 % — HIGH (ref 1.7–9.3)
NEUTROPHILS # BLD AUTO: 1.06 K/UL — LOW (ref 1.4–6.5)
NEUTROPHILS NFR BLD AUTO: 37.9 % — LOW (ref 42.2–75.2)
NRBC # BLD: 0 /100 WBCS — SIGNIFICANT CHANGE UP (ref 0–0)
ORGANISM # SPEC MICROSCOPIC CNT: SIGNIFICANT CHANGE UP
ORGANISM # SPEC MICROSCOPIC CNT: SIGNIFICANT CHANGE UP
PLATELET # BLD AUTO: 63 K/UL — LOW (ref 130–400)
POTASSIUM SERPL-MCNC: 3.4 MMOL/L — LOW (ref 3.5–5)
POTASSIUM SERPL-SCNC: 3.4 MMOL/L — LOW (ref 3.5–5)
PROT SERPL-MCNC: 7.3 G/DL — SIGNIFICANT CHANGE UP (ref 6–8)
RBC # BLD: 3.05 M/UL — LOW (ref 4.7–6.1)
RBC # FLD: 17.8 % — HIGH (ref 11.5–14.5)
SODIUM SERPL-SCNC: 141 MMOL/L — SIGNIFICANT CHANGE UP (ref 135–146)
SPECIMEN SOURCE: SIGNIFICANT CHANGE UP
WBC # BLD: 2.8 K/UL — LOW (ref 4.8–10.8)
WBC # FLD AUTO: 2.8 K/UL — LOW (ref 4.8–10.8)

## 2019-07-22 PROCEDURE — 99233 SBSQ HOSP IP/OBS HIGH 50: CPT

## 2019-07-22 PROCEDURE — 99222 1ST HOSP IP/OBS MODERATE 55: CPT

## 2019-07-22 PROCEDURE — 71045 X-RAY EXAM CHEST 1 VIEW: CPT | Mod: 26

## 2019-07-22 RX ORDER — ENOXAPARIN SODIUM 100 MG/ML
40 INJECTION SUBCUTANEOUS DAILY
Refills: 0 | Status: DISCONTINUED | OUTPATIENT
Start: 2019-07-22 | End: 2019-07-25

## 2019-07-22 RX ORDER — MAGNESIUM SULFATE 500 MG/ML
2 VIAL (ML) INJECTION ONCE
Refills: 0 | Status: COMPLETED | OUTPATIENT
Start: 2019-07-22 | End: 2019-07-22

## 2019-07-22 RX ORDER — ACETAMINOPHEN 500 MG
650 TABLET ORAL ONCE
Refills: 0 | Status: COMPLETED | OUTPATIENT
Start: 2019-07-22 | End: 2019-07-22

## 2019-07-22 RX ORDER — DIGOXIN 250 MCG
1 TABLET ORAL
Qty: 0 | Refills: 0 | DISCHARGE
Start: 2019-07-22

## 2019-07-22 RX ORDER — METOPROLOL TARTRATE 50 MG
1 TABLET ORAL
Qty: 0 | Refills: 0 | DISCHARGE
Start: 2019-07-22

## 2019-07-22 RX ORDER — AMPICILLIN SODIUM AND SULBACTAM SODIUM 250; 125 MG/ML; MG/ML
3 INJECTION, POWDER, FOR SUSPENSION INTRAMUSCULAR; INTRAVENOUS EVERY 6 HOURS
Refills: 0 | Status: DISCONTINUED | OUTPATIENT
Start: 2019-07-22 | End: 2019-07-24

## 2019-07-22 RX ORDER — POTASSIUM CHLORIDE 20 MEQ
40 PACKET (EA) ORAL ONCE
Refills: 0 | Status: COMPLETED | OUTPATIENT
Start: 2019-07-22 | End: 2019-07-22

## 2019-07-22 RX ADMIN — TAMSULOSIN HYDROCHLORIDE 0.4 MILLIGRAM(S): 0.4 CAPSULE ORAL at 21:31

## 2019-07-22 RX ADMIN — INSULIN GLARGINE 18 UNIT(S): 100 INJECTION, SOLUTION SUBCUTANEOUS at 21:31

## 2019-07-22 RX ADMIN — SACUBITRIL AND VALSARTAN 1 TABLET(S): 24; 26 TABLET, FILM COATED ORAL at 17:44

## 2019-07-22 RX ADMIN — CHLORHEXIDINE GLUCONATE 1 APPLICATION(S): 213 SOLUTION TOPICAL at 05:14

## 2019-07-22 RX ADMIN — ENOXAPARIN SODIUM 75 MILLIGRAM(S): 100 INJECTION SUBCUTANEOUS at 05:13

## 2019-07-22 RX ADMIN — Medication 650 MILLIGRAM(S): at 15:25

## 2019-07-22 RX ADMIN — ATORVASTATIN CALCIUM 80 MILLIGRAM(S): 80 TABLET, FILM COATED ORAL at 21:31

## 2019-07-22 RX ADMIN — SACUBITRIL AND VALSARTAN 1 TABLET(S): 24; 26 TABLET, FILM COATED ORAL at 05:13

## 2019-07-22 RX ADMIN — Medication 25 GRAM(S): at 14:33

## 2019-07-22 RX ADMIN — Medication 6 UNIT(S): at 16:30

## 2019-07-22 RX ADMIN — Medication 650 MILLIGRAM(S): at 21:30

## 2019-07-22 RX ADMIN — SPIRONOLACTONE 25 MILLIGRAM(S): 25 TABLET, FILM COATED ORAL at 05:14

## 2019-07-22 RX ADMIN — Medication 40 MILLIGRAM(S): at 05:14

## 2019-07-22 RX ADMIN — Medication 650 MILLIGRAM(S): at 23:25

## 2019-07-22 RX ADMIN — Medication 0.12 MILLIGRAM(S): at 05:14

## 2019-07-22 RX ADMIN — Medication 6 UNIT(S): at 11:43

## 2019-07-22 RX ADMIN — Medication 2 MILLIGRAM(S): at 11:47

## 2019-07-22 RX ADMIN — PANTOPRAZOLE SODIUM 40 MILLIGRAM(S): 20 TABLET, DELAYED RELEASE ORAL at 07:04

## 2019-07-22 RX ADMIN — Medication 200 MILLIGRAM(S): at 05:13

## 2019-07-22 RX ADMIN — AMPICILLIN SODIUM AND SULBACTAM SODIUM 200 GRAM(S): 250; 125 INJECTION, POWDER, FOR SUSPENSION INTRAMUSCULAR; INTRAVENOUS at 23:39

## 2019-07-22 RX ADMIN — Medication 6 UNIT(S): at 07:44

## 2019-07-22 RX ADMIN — Medication 650 MILLIGRAM(S): at 14:33

## 2019-07-22 RX ADMIN — Medication 40 MILLIEQUIVALENT(S): at 14:33

## 2019-07-22 RX ADMIN — Medication 81 MILLIGRAM(S): at 11:44

## 2019-07-22 RX ADMIN — AMPICILLIN SODIUM AND SULBACTAM SODIUM 200 GRAM(S): 250; 125 INJECTION, POWDER, FOR SUSPENSION INTRAMUSCULAR; INTRAVENOUS at 16:24

## 2019-07-22 NOTE — MEDICAL STUDENT PROGRESS NOTE(EDUCATION) - SUBJECTIVE AND OBJECTIVE BOX
SUBJECTIVE:    Patient is a 74 year old male w/ past medical history of CAD s/p stent, non-obstructive cardiomyopathy with EF 26%, hypertension, hyperlipidemia, DM II presenting with chief complaint of L-sided chest pain and palpitations.    Pt reports his symptoms starting at 5AM with L-sided squeezing chest pain that lasted 5 hours. The pain was non-radiating, 6/10 in intensity, and was associated with shortness of breath. He was recently hospitalized for new CHF with reduced ejection fraction, had cardiac cath which showed non-obstructive CAD, and was discharged on medical therapy and life vest for low EF.    In the ED, VS: /92  RR 18 T97.2  satting 100% on 2LNC. EKG showed Afib w/ tachycardia. LVH pattern.  Pt received Cardizem 10mgIVP x1, Metoprolol 100mg,and was started on cardizem drip. (17 Jul 2019 21:21)    He is currently admitted to medicine with the primary diagnosis of New onset atrial fibrillation. Today is hospital day 5d. This morning he is resting comfortably in bed and reports no new issues or overnight events.    INTERVAL EVENTS:     PAST MEDICAL & SURGICAL HISTORY  GERD (gastroesophageal reflux disease)  CAD (coronary artery disease)  Diabetes  HTN (hypertension)  H/O colonoscopy: 10 yeras ago      ALLERGIES:  No Known Allergies    MEDICATIONS:  STANDING MEDICATIONS  ampicillin/sulbactam  IVPB 3 Gram(s) IV Intermittent every 6 hours  aspirin  chewable 81 milliGRAM(s) Oral daily  atorvastatin 80 milliGRAM(s) Oral at bedtime  chlorhexidine 4% Liquid 1 Application(s) Topical <User Schedule>  dextrose 5%. 1000 milliLiter(s) IV Continuous <Continuous>  dextrose 50% Injectable 12.5 Gram(s) IV Push once  diazepam    Tablet 2 milliGRAM(s) Oral daily  digoxin     Tablet 0.125 milliGRAM(s) Oral daily  enoxaparin Injectable 40 milliGRAM(s) SubCutaneous daily  furosemide    Tablet 40 milliGRAM(s) Oral daily  insulin glargine Injectable (LANTUS) 18 Unit(s) SubCutaneous at bedtime  insulin lispro (HumaLOG) corrective regimen sliding scale   SubCutaneous three times a day before meals  insulin lispro Injectable (HumaLOG) 6 Unit(s) SubCutaneous three times a day before meals  metoprolol succinate  milliGRAM(s) Oral daily  pantoprazole    Tablet 40 milliGRAM(s) Oral before breakfast  sacubitril 24 mG/valsartan 26 mG 1 Tablet(s) Oral two times a day  spironolactone 25 milliGRAM(s) Oral daily  tamsulosin 0.4 milliGRAM(s) Oral at bedtime    PRN MEDICATIONS  dextrose 40% Gel 15 Gram(s) Oral once PRN  glucagon  Injectable 1 milliGRAM(s) IntraMuscular once PRN    VITALS:   T(F): 101.5  HR: 87  BP: 125/58  RR: 18  SpO2: 96%    LABS:                        9.3    2.80  )-----------( 63       ( 22 Jul 2019 11:15 )             28.8     07-22    141  |  100  |  15  ----------------------------<  120<H>  3.4<L>   |  27  |  0.9    Ca    8.7      22 Jul 2019 11:15  Mg     1.6     07-22    TPro  7.3  /  Alb  3.4<L>  /  TBili  0.8  /  DBili  x   /  AST  31  /  ALT  27  /  AlkPhos  78  07-22              Culture - Urine (collected 19 Jul 2019 16:19)  Source: .Urine Clean Catch (Midstream)  Final Report (22 Jul 2019 14:12):    >100,000 CFU/ml Enterococcus faecalis  Organism: Enterococcus faecalis (22 Jul 2019 14:12)  Organism: Enterococcus faecalis (22 Jul 2019 14:12)          RADIOLOGY:    PHYSICAL EXAM:  GEN: No acute distress  HEENT: left nare with packed Surgicel fibrillar, no evidence of active bleeding, Right nare wnl.    PULM/CHEST: Clear to auscultation bilaterally, no rales, rhonchi or wheezes   CVS: Regular rate and rhythm, S1-S2, no murmurs  ABD: Soft, non-tender, non-distended, +BS  EXT: No edema  NEURO: AAOx3    Mitchell Catheter:

## 2019-07-22 NOTE — PROGRESS NOTE ADULT - SUBJECTIVE AND OBJECTIVE BOX
SHANE ASHTON  74y Male    CHIEF COMPLAINT:    Patient is a 74y old  Male who presents with a chief complaint of New onset Afib (2019 09:48)      INTERVAL HPI/OVERNIGHT EVENTS:    Patient seen and examined. No more epistaxis. No palpitations.     ROS: All other systems are negative.    Vital Signs:    T(F): 99.5 (19 @ 05:46), Max: 99.5 (19 @ 05:46)  HR: 85 (19 @ 05:46) (85 - 97)  BP: 121/58 (19 @ 05:46) (121/58 - 150/68)  RR: 18 (19 @ 05:46) (17 - 18)  SpO2: 96% (19 @ 20:17) (96% - 96%)  I&O's Summary    2019 07:  -  2019 07:00  --------------------------------------------------------  IN: 0 mL / OUT: 250 mL / NET: -250 mL    2019 07:01  -  2019 10:54  --------------------------------------------------------  IN: 0 mL / OUT: 400 mL / NET: -400 mL      Daily     Daily Weight in k (2019 05:46)  CAPILLARY BLOOD GLUCOSE  240 (2019 11:32)      POCT Blood Glucose.: 107 mg/dL (2019 07:24)  POCT Blood Glucose.: 217 mg/dL (2019 21:30)  POCT Blood Glucose.: 80 mg/dL (2019 16:35)  POCT Blood Glucose.: 240 mg/dL (2019 11:14)      PHYSICAL EXAM:    GENERAL:  NAD  SKIN: No rashes or lesions  HENT: Atraumatic Normocephalic. PERRL. Moist membranes.  NECK: Supple, No JVD. No lymphadenopathy.  PULMONARY: CTA B/L. No wheezing. No rales  CVS: Normal S1, S2. Rate and Rhythm are regular. No murmurs.  ABDOMEN/GI: Soft, Nontender, Nondistended; BS present  EXTREMITIES: Peripheral pulses intact. No edema B/L LE.  NEUROLOGIC:  No motor or sensory deficit.  PSYCH: Alert & oriented x 3    Consultant(s) Notes Reviewed:  [x ] YES  [ ] NO  Care Discussed with Consultants/Other Providers [ x] YES  [ ] NO    EKG reviewed  Telemetry reviewed    LABS:                        7.4    1.50  )-----------( 49       ( 2019 05:14 )             23.5     07-    143  |  103  |  19  ----------------------------<  156<H>  3.4<L>   |  26  |  1.1    Ca    8.5      2019 05:14  Mg     1.7     -    TPro  6.4  /  Alb  2.9<L>  /  TBili  0.5  /  DBili  x   /  AST  15  /  ALT  15  /  AlkPhos  56  -      Serum Pro-Brain Natriuretic Peptide: 5408 pg/mL (19 @ 11:52)        Culture - Urine (collected 2019 16:19)  Source: .Urine Clean Catch (Midstream)  Preliminary Report (2019 09:05):    >100,000 CFU/ml Gram positive organisms        RADIOLOGY & ADDITIONAL TESTS:      Imaging or report Personally Reviewed:  [ ] YES  [ ] NO    Medications:  Standing  aspirin  chewable 81 milliGRAM(s) Oral daily  atorvastatin 80 milliGRAM(s) Oral at bedtime  chlorhexidine 4% Liquid 1 Application(s) Topical <User Schedule>  dextrose 5%. 1000 milliLiter(s) IV Continuous <Continuous>  dextrose 50% Injectable 12.5 Gram(s) IV Push once  diazepam    Tablet 2 milliGRAM(s) Oral daily  digoxin     Tablet 0.125 milliGRAM(s) Oral daily  furosemide    Tablet 40 milliGRAM(s) Oral daily  insulin glargine Injectable (LANTUS) 18 Unit(s) SubCutaneous at bedtime  insulin lispro (HumaLOG) corrective regimen sliding scale   SubCutaneous three times a day before meals  insulin lispro Injectable (HumaLOG) 6 Unit(s) SubCutaneous three times a day before meals  metoprolol succinate  milliGRAM(s) Oral daily  pantoprazole    Tablet 40 milliGRAM(s) Oral before breakfast  sacubitril 24 mG/valsartan 26 mG 1 Tablet(s) Oral two times a day  spironolactone 25 milliGRAM(s) Oral daily  tamsulosin 0.4 milliGRAM(s) Oral at bedtime    PRN Meds  dextrose 40% Gel 15 Gram(s) Oral once PRN  glucagon  Injectable 1 milliGRAM(s) IntraMuscular once PRN      Case discussed with resident    Care discussed with pt/family

## 2019-07-22 NOTE — MEDICAL STUDENT PROGRESS NOTE(EDUCATION) - NS MD HP STUD ASPLAN PLAN FT
#Afib with RVR  -In NSR now. No overnight tele events. Will continue with Metoprolol succinate 200 mg po daily  -Cardiology evaluation noted. Patient with history of chronic pancytopenia. He is not a candidate for long term A/C as per cardiology.  -d/c therapeutic Lovenox today.     #non-obstructive cardiomyopathy with EF 26%:  -BP is stable. Will cont Lasix 40 mg po daily    #HTN  -c/w ASA, Lipitor, Metoprolol and Spironolactone.    #Epistaxis  -S/P Lt. Nares surgical fibrillar reabsorbable by ENT. No more epistaxis. Some drop in Hb from baseline. Follow CBC.    #Hematuria  -Urology evaluation noted. Renal/Bladder US is unremarkable.They would like to peform and outpatient cystoscopy.   -Urine cytology reviewed. It showed Enterococcus faecalis growth. Patient now febrile    #Hypokalemia and Hypomagnesia   -K+ of 3.4 and Mg+ of 1.6 . Will replete     #Fever  -    #Chronic Pancytopenia  - Pt has appt with Hem/Onc Dr. Santoro in two weeks for bone marrow biopsy. However, family and patient would like to have BM biopsy done during admissions if possible. Will discuss with heme/onc. #Afib with RVR  -In NSR now. No overnight tele events. Will continue with Metoprolol succinate 200 mg po daily  -Cardiology evaluation noted. Patient with history of chronic pancytopenia. He is not a candidate for long term A/C as per cardiology.  -d/c therapeutic Lovenox today.     #non-obstructive cardiomyopathy with EF 26%:  -BP is stable. Will cont Lasix 40 mg po daily    #HTN  -c/w ASA, Lipitor, Metoprolol and Spironolactone.    #Epistaxis  -S/P Lt. Nares surgical fibrillar reabsorbable by ENT. No more epistaxis. Some drop in Hb from baseline. Follow CBC.    #Hematuria  -Urology evaluation noted. Renal/Bladder US is unremarkable.They would like to peform and outpatient cystoscopy.   -Urine cytology reviewed. It showed Enterococcus faecalis growth. Patient now febrile    #Fever  -T 101.5F  -Urine cytology showing Enterococcus faecalis growth. Likely source of fever. Will treat with Unasyn and Tylenol for now. Will send for blood culture and repeat urine culture. Will Check CXR for other possible cause of fever.     #Hypokalemia and Hypomagnesia   -K+ of 3.4 and Mg+ of 1.6 . Will replete     #Chronic Pancytopenia  - Pt has appt with Hem/Onc Dr. Santoro in two weeks for bone marrow biopsy. However, family and patient would like to have BM biopsy done during admissions if possible. Will discuss with heme/onc. #Afib with RVR  -In NSR now. No overnight tele events. Will continue with Metoprolol succinate 200 mg po daily and Cont Digoxin 0.125 mg po daily  -Cardiology evaluation noted. Patient with history of chronic pancytopenia. He is not a candidate for long term A/C as per cardiology.  -d/c therapeutic Lovenox today.     #non-obstructive cardiomyopathy with EF 26%:  -BP is stable. Will cont Lasix 40 mg po daily    #HTN  -c/w ASA, Lipitor, Metoprolol and Spironolactone.    #Epistaxis  -S/P Lt. Nares surgical fibrillar reabsorbable by ENT. No more epistaxis. Some drop in Hb from baseline. Follow CBC.    #Hematuria  -Urology evaluation noted. Renal/Bladder US is unremarkable.They would like to peform and outpatient cystoscopy.   -Urine cytology reviewed. It showed Enterococcus faecalis growth. Patient now febrile    #Fever  -T 101.5F  -Urine cytology showing Enterococcus faecalis growth. Likely source of fever. Will treat with Unasyn and Tylenol for now. Will send for blood culture and repeat urine culture. Will Check CXR for other possible cause of fever.     #Hypokalemia and Hypomagnesia   -K+ of 3.4 and Mg+ of 1.6 . Will replete     #Chronic Pancytopenia  - Pt has appt with Hem/Onc Dr. Santoro in two weeks for bone marrow biopsy. However, family and patient would like to have BM biopsy done during admissions if possible. Will discuss with heme/onc.

## 2019-07-22 NOTE — CHART NOTE - NSCHARTNOTEFT_GEN_A_CORE
<<<RESIDENT DISCHARGE NOTE>>>     SHANE ASHTON  MRN-[id number]    VITAL SIGNS:  T(F): 99.5 (07-22-19 @ 05:46), Max: 99.5 (07-22-19 @ 05:46)  HR: 85 (07-22-19 @ 05:46)  BP: 121/58 (07-22-19 @ 05:46)  SpO2: 96% (07-21-19 @ 20:17)    GENERAL:  NAD  SKIN: No rashes or lesions  HENT: Atraumatic Normocephalic. PERRL. Moist membranes.  NECK: Supple, No JVD. No lymphadenopathy.  PULMONARY: CTA B/L. No wheezing. No rales  CVS: Normal S1, S2. Rate and Rhythm are regular. No murmurs.  ABDOMEN/GI: Soft, Nontender, Nondistended; BS present  EXTREMITIES: Peripheral pulses intact. No edema B/L LE.  NEUROLOGIC:  No motor or sensory deficit.  PSYCH: Alert & oriented x 3    TEST RESULTS:                        9.3    2.80  )-----------( 63       ( 22 Jul 2019 11:15 )             28.8       07-21    143  |  103  |  19  ----------------------------<  156<H>  3.4<L>   |  26  |  1.1    Ca    8.5      21 Jul 2019 05:14  Mg     1.7     07-21    TPro  6.4  /  Alb  2.9<L>  /  TBili  0.5  /  DBili  x   /  AST  15  /  ALT  15  /  AlkPhos  56  07-21      FINAL DISCHARGE INTERVIEW:  Resident(s) Present: (Name:Dr. Koroma_), RN Present: (Name:  ___________)    DISCHARGE MEDICATION RECONCILIATION  reviewed with Attending (Name:Dr. Johnson_)    DISPOSITION:   [  ] Home,    [  ] Home with Visiting Nursing Services,   [   x ]  SNF/ NH,    [   ] Acute Rehab (4A),   [   ] Other (Specify:_________)

## 2019-07-22 NOTE — MEDICAL STUDENT PROGRESS NOTE(EDUCATION) - NS MD HP STUD ASPLAN ASSES FT
74 year old male w/ past medical history of CAD s/p stent, non-obstructive cardiomyopathy with EF 26%, hypertension, hyperlipidemia, DM II presenting with chief complaint of L-sided chest pain and palpitations found to be in Afib w/ RVR

## 2019-07-22 NOTE — PROGRESS NOTE ADULT - ASSESSMENT
A 74 years old male presented with Lt sided squeezing type cp which started 5 AM lasting for 5 hrs and was associated with palpitations and sob.    CTA chest: No PE  CXR: NAPD  EKG: A. Fib @ 127/min. (Interpreted by me)  CE x 2 negative.   BNP: 5408        1. A. Fib with RVR  2. CP due to rapid A. Fib  3. CM  4. Non obst CAD  5. HTN  6. No CHF. H/O Ch. HFrEF  7. Pancytopenia / Thrombocytopenia  8. Hematuria  9. Epistaxis          PLAN:    ·	S/P Lt. Nares surgical fibrillar reabsorbable by ENT. No more epistaxis.  ·	Some drop in Hb from baseline. Follow CBC.  ·	In NSR now. Cont Metoprolol succinate 200 mg po daily  ·	Cardiology eval noted. Pt with H/O epistaxis, now having hematuria. Having pancytopenia/Thrombocytopenia and significant anemia. Not a candidate for long term A/C as per cardiology.  ·	Cont Digoxin 0.125 mg po daily  ·	BP is stable. Will cont Lasix 40 mg po daily  ·	Urology eval noted. Recommended urine cytology. Renal/Bladder US is unremarkable. Out pt cystoscopy  ·	Urine cx is growing gram +ve cocci. Check the final cx report. Likely contamination. Pt is asymptomatic.   ·	Case was D/W the Hem/Onc Dr. Santoro. Pt has appt with him in two weeks for bone marrow biopsy  ·	Cont ASA, Lipitor, Metoprolol and Spironolactone.   ·	Discussed goals of care with niece and nephew who are health care proxy. Undecided about DNR/DNI yet. Full code  ·	D/C home today    * Med rec reviewed. Plan of care D/W the pt. Time spent 40 minutes.

## 2019-07-23 ENCOUNTER — RESULT REVIEW (OUTPATIENT)
Age: 74
End: 2019-07-23

## 2019-07-23 LAB
ALBUMIN SERPL ELPH-MCNC: 2.9 G/DL — LOW (ref 3.5–5.2)
ALP SERPL-CCNC: 72 U/L — SIGNIFICANT CHANGE UP (ref 30–115)
ALT FLD-CCNC: 33 U/L — SIGNIFICANT CHANGE UP (ref 0–41)
ANION GAP SERPL CALC-SCNC: 11 MMOL/L — SIGNIFICANT CHANGE UP (ref 7–14)
APTT BLD: 26.4 SEC — LOW (ref 27–39.2)
AST SERPL-CCNC: 40 U/L — SIGNIFICANT CHANGE UP (ref 0–41)
BASOPHILS # BLD AUTO: 0 K/UL — SIGNIFICANT CHANGE UP (ref 0–0.2)
BASOPHILS NFR BLD AUTO: 0 % — SIGNIFICANT CHANGE UP (ref 0–1)
BILIRUB SERPL-MCNC: 0.8 MG/DL — SIGNIFICANT CHANGE UP (ref 0.2–1.2)
BUN SERPL-MCNC: 19 MG/DL — SIGNIFICANT CHANGE UP (ref 10–20)
CALCIUM SERPL-MCNC: 8.2 MG/DL — LOW (ref 8.5–10.1)
CHLORIDE SERPL-SCNC: 100 MMOL/L — SIGNIFICANT CHANGE UP (ref 98–110)
CO2 SERPL-SCNC: 28 MMOL/L — SIGNIFICANT CHANGE UP (ref 17–32)
CREAT SERPL-MCNC: 1.2 MG/DL — SIGNIFICANT CHANGE UP (ref 0.7–1.5)
EOSINOPHIL # BLD AUTO: 0.02 K/UL — SIGNIFICANT CHANGE UP (ref 0–0.7)
EOSINOPHIL NFR BLD AUTO: 1.1 % — SIGNIFICANT CHANGE UP (ref 0–8)
GLUCOSE BLDC GLUCOMTR-MCNC: 112 MG/DL — HIGH (ref 70–99)
GLUCOSE BLDC GLUCOMTR-MCNC: 123 MG/DL — HIGH (ref 70–99)
GLUCOSE BLDC GLUCOMTR-MCNC: 172 MG/DL — HIGH (ref 70–99)
GLUCOSE BLDC GLUCOMTR-MCNC: 315 MG/DL — HIGH (ref 70–99)
GLUCOSE SERPL-MCNC: 109 MG/DL — HIGH (ref 70–99)
HCT VFR BLD CALC: 22.8 % — LOW (ref 42–52)
HGB BLD-MCNC: 7.2 G/DL — LOW (ref 14–18)
IMM GRANULOCYTES NFR BLD AUTO: 1.1 % — HIGH (ref 0.1–0.3)
INR BLD: 1.16 RATIO — SIGNIFICANT CHANGE UP (ref 0.65–1.3)
LYMPHOCYTES # BLD AUTO: 0.63 K/UL — LOW (ref 1.2–3.4)
LYMPHOCYTES # BLD AUTO: 33.7 % — SIGNIFICANT CHANGE UP (ref 20.5–51.1)
MAGNESIUM SERPL-MCNC: 2.1 MG/DL — SIGNIFICANT CHANGE UP (ref 1.8–2.4)
MCHC RBC-ENTMCNC: 30.1 PG — SIGNIFICANT CHANGE UP (ref 27–31)
MCHC RBC-ENTMCNC: 31.6 G/DL — LOW (ref 32–37)
MCV RBC AUTO: 95.4 FL — HIGH (ref 80–94)
MONOCYTES # BLD AUTO: 0.42 K/UL — SIGNIFICANT CHANGE UP (ref 0.1–0.6)
MONOCYTES NFR BLD AUTO: 22.5 % — HIGH (ref 1.7–9.3)
NEUTROPHILS # BLD AUTO: 0.78 K/UL — LOW (ref 1.4–6.5)
NEUTROPHILS NFR BLD AUTO: 41.6 % — LOW (ref 42.2–75.2)
NRBC # BLD: 2 /100 WBCS — HIGH (ref 0–0)
PLATELET # BLD AUTO: 56 K/UL — LOW (ref 130–400)
POTASSIUM SERPL-MCNC: 3.6 MMOL/L — SIGNIFICANT CHANGE UP (ref 3.5–5)
POTASSIUM SERPL-SCNC: 3.6 MMOL/L — SIGNIFICANT CHANGE UP (ref 3.5–5)
PROT SERPL-MCNC: 6.3 G/DL — SIGNIFICANT CHANGE UP (ref 6–8)
PROTHROM AB SERPL-ACNC: 13.3 SEC — HIGH (ref 9.95–12.87)
RBC # BLD: 2.39 M/UL — LOW (ref 4.7–6.1)
RBC # FLD: 18.2 % — HIGH (ref 11.5–14.5)
SODIUM SERPL-SCNC: 139 MMOL/L — SIGNIFICANT CHANGE UP (ref 135–146)
WBC # BLD: 1.87 K/UL — LOW (ref 4.8–10.8)
WBC # FLD AUTO: 1.87 K/UL — LOW (ref 4.8–10.8)

## 2019-07-23 PROCEDURE — 88313 SPECIAL STAINS GROUP 2: CPT | Mod: 26

## 2019-07-23 PROCEDURE — 99233 SBSQ HOSP IP/OBS HIGH 50: CPT

## 2019-07-23 PROCEDURE — 88311 DECALCIFY TISSUE: CPT | Mod: 26

## 2019-07-23 PROCEDURE — 88305 TISSUE EXAM BY PATHOLOGIST: CPT | Mod: 26

## 2019-07-23 PROCEDURE — 88341 IMHCHEM/IMCYTCHM EA ADD ANTB: CPT | Mod: 26

## 2019-07-23 PROCEDURE — 93971 EXTREMITY STUDY: CPT | Mod: 26,LT

## 2019-07-23 PROCEDURE — 88342 IMHCHEM/IMCYTCHM 1ST ANTB: CPT | Mod: 26,59

## 2019-07-23 RX ORDER — VANCOMYCIN HCL 1 G
1500 VIAL (EA) INTRAVENOUS ONCE
Refills: 0 | Status: COMPLETED | OUTPATIENT
Start: 2019-07-23 | End: 2019-07-23

## 2019-07-23 RX ORDER — SENNA PLUS 8.6 MG/1
2 TABLET ORAL AT BEDTIME
Refills: 0 | Status: DISCONTINUED | OUTPATIENT
Start: 2019-07-23 | End: 2019-07-27

## 2019-07-23 RX ORDER — ACETAMINOPHEN 500 MG
650 TABLET ORAL EVERY 6 HOURS
Refills: 0 | Status: DISCONTINUED | OUTPATIENT
Start: 2019-07-23 | End: 2019-07-27

## 2019-07-23 RX ORDER — VANCOMYCIN HCL 1 G
1000 VIAL (EA) INTRAVENOUS EVERY 12 HOURS
Refills: 0 | Status: DISCONTINUED | OUTPATIENT
Start: 2019-07-23 | End: 2019-07-25

## 2019-07-23 RX ORDER — DOCUSATE SODIUM 100 MG
100 CAPSULE ORAL
Refills: 0 | Status: DISCONTINUED | OUTPATIENT
Start: 2019-07-23 | End: 2019-07-27

## 2019-07-23 RX ADMIN — Medication 100 MILLIGRAM(S): at 17:45

## 2019-07-23 RX ADMIN — SACUBITRIL AND VALSARTAN 1 TABLET(S): 24; 26 TABLET, FILM COATED ORAL at 17:45

## 2019-07-23 RX ADMIN — CHLORHEXIDINE GLUCONATE 1 APPLICATION(S): 213 SOLUTION TOPICAL at 06:19

## 2019-07-23 RX ADMIN — SENNA PLUS 2 TABLET(S): 8.6 TABLET ORAL at 21:35

## 2019-07-23 RX ADMIN — Medication 650 MILLIGRAM(S): at 10:00

## 2019-07-23 RX ADMIN — Medication 650 MILLIGRAM(S): at 21:36

## 2019-07-23 RX ADMIN — TAMSULOSIN HYDROCHLORIDE 0.4 MILLIGRAM(S): 0.4 CAPSULE ORAL at 21:35

## 2019-07-23 RX ADMIN — AMPICILLIN SODIUM AND SULBACTAM SODIUM 200 GRAM(S): 250; 125 INJECTION, POWDER, FOR SUSPENSION INTRAMUSCULAR; INTRAVENOUS at 17:45

## 2019-07-23 RX ADMIN — AMPICILLIN SODIUM AND SULBACTAM SODIUM 200 GRAM(S): 250; 125 INJECTION, POWDER, FOR SUSPENSION INTRAMUSCULAR; INTRAVENOUS at 05:44

## 2019-07-23 RX ADMIN — SACUBITRIL AND VALSARTAN 1 TABLET(S): 24; 26 TABLET, FILM COATED ORAL at 06:18

## 2019-07-23 RX ADMIN — PANTOPRAZOLE SODIUM 40 MILLIGRAM(S): 20 TABLET, DELAYED RELEASE ORAL at 06:18

## 2019-07-23 RX ADMIN — Medication 6 UNIT(S): at 11:40

## 2019-07-23 RX ADMIN — Medication 6 UNIT(S): at 17:44

## 2019-07-23 RX ADMIN — Medication 650 MILLIGRAM(S): at 08:46

## 2019-07-23 RX ADMIN — Medication 100 MILLIGRAM(S): at 08:46

## 2019-07-23 RX ADMIN — Medication 300 MILLIGRAM(S): at 17:52

## 2019-07-23 RX ADMIN — ATORVASTATIN CALCIUM 80 MILLIGRAM(S): 80 TABLET, FILM COATED ORAL at 21:36

## 2019-07-23 RX ADMIN — INSULIN GLARGINE 18 UNIT(S): 100 INJECTION, SOLUTION SUBCUTANEOUS at 21:34

## 2019-07-23 RX ADMIN — SPIRONOLACTONE 25 MILLIGRAM(S): 25 TABLET, FILM COATED ORAL at 06:18

## 2019-07-23 RX ADMIN — Medication 81 MILLIGRAM(S): at 11:41

## 2019-07-23 RX ADMIN — Medication 0.12 MILLIGRAM(S): at 06:18

## 2019-07-23 RX ADMIN — ENOXAPARIN SODIUM 40 MILLIGRAM(S): 100 INJECTION SUBCUTANEOUS at 14:07

## 2019-07-23 RX ADMIN — Medication 2 MILLIGRAM(S): at 11:43

## 2019-07-23 RX ADMIN — Medication 6 UNIT(S): at 08:38

## 2019-07-23 RX ADMIN — AMPICILLIN SODIUM AND SULBACTAM SODIUM 200 GRAM(S): 250; 125 INJECTION, POWDER, FOR SUSPENSION INTRAMUSCULAR; INTRAVENOUS at 11:44

## 2019-07-23 RX ADMIN — Medication 200 MILLIGRAM(S): at 06:18

## 2019-07-23 NOTE — PROGRESS NOTE ADULT - ASSESSMENT
A 74 years old male presented with Lt sided squeezing type cp which started 5 AM lasting for 5 hrs and was associated with palpitations and sob.    CTA chest: No PE  CXR: NAPD  EKG: A. Fib @ 127/min. (Interpreted by me)  CE x 2 negative.   BNP: 5408        1. A. Fib with RVR  2. CP due to rapid A. Fib  3. CM  4. Non obst CAD  5. HTN  6. No CHF. H/O Ch. HFrEF  7. Pancytopenia / Thrombocytopenia  8. Hematuria  9. Epistaxis          PLAN:    ·	S/P Lt. Nares surgical fibrillar reabsorbable by ENT. No more epistaxis.  ·	Some drop in Hb from baseline. Follow CBC.  ·	In NSR now. Cont Metoprolol succinate 200 mg po daily  ·	Cardiology eval noted. Pt with H/O epistaxis, now having hematuria. Having pancytopenia/Thrombocytopenia and significant anemia. Not a candidate for long term A/C as per cardiology.  ·	Cont Digoxin 0.125 mg po daily  ·	BP is stable. Will cont Lasix 40 mg po daily  ·	Urology eval noted. Recommended urine cytology. Renal/Bladder US is unremarkable. Out pt cystoscopy  ·	Urine cx is growing gram +ve cocci. Check the final cx report. Likely contamination. Pt is asymptomatic.   ·	Case was D/W the Hem/Onc Dr. Santoro. Pt has appt with him in two weeks for bone marrow biopsy  ·	Cont ASA, Lipitor, Metoprolol and Spironolactone.   ·	Discussed goals of care with niece and nephew who are health care proxy. Undecided about DNR/DNI yet. Full code  ·	D/C home today    * Med rec reviewed. Plan of care D/W the pt. Time spent 40 minutes. A 74 years old male presented with Lt sided squeezing type cp which started 5 AM lasting for 5 hrs and was associated with palpitations and sob.    CTA chest: No PE  CXR: NAPD  EKG: A. Fib @ 127/min. (Interpreted by me)  CE x 2 negative.   BNP: 5408        1. A. Fib with RVR  2. CP due to rapid A. Fib  3. CM  4. Non obst CAD  5. HTN  6. No CHF. H/O Ch. HFrEF  7. Pancytopenia / Thrombocytopenia  8. Hematuria  9. Epistaxis  10. Lt Arm cellulitis/Thrombophlebitis  11. UTI (Enterococcus)          PLAN:    ·	Spiking temp. Urine growing enterococcus. Also having thrombophlebitis of Lt. Antecubital fossa  ·	Started him on IV Vanco and Unasyn. Repeat blood and urine cxs.  ·	ID eval  ·	Venous doppler of Lt arm  ·	S/P Lt. Nares surgical fibrillar reabsorbable by ENT. No more epistaxis.  ·	Some drop in Hb from baseline. Follow CBC.  ·	In NSR now. Cont Metoprolol succinate 200 mg po daily  ·	Cardiology eval noted. Pt with H/O epistaxis, now having hematuria. Having pancytopenia/Thrombocytopenia and significant anemia. Not a candidate for long term A/C as per cardiology.  ·	Cont Digoxin 0.125 mg po daily. Check Dig level  ·	BP is stable. Will cont Lasix 40 mg po daily  ·	Urology eval noted. Recommended urine cytology. Renal/Bladder US is unremarkable. Out pt cystoscopy  ·	Bone marrow biopsy done today  ·	Cont ASA, Lipitor, Metoprolol and Spironolactone.   ·	Discussed goals of care with niece and nephew who are health care proxy. Pt is now DNR/DNI. Do not want Hospice care.       #Progress Note Handoff  Pending (specify):  Consults_________, Tests________, Test Results_______, Other___Fever______  Family discussion:  Disposition: Home___/SNF___/Other________/Unknown at this time________

## 2019-07-23 NOTE — MEDICAL STUDENT PROGRESS NOTE(EDUCATION) - NS MD HP STUD ASPLAN PLAN FT
#Afib with RVR  -In NSR now. No overnight tele events. Will continue with Metoprolol succinate 200 mg po daily and Cont Digoxin 0.125 mg po daily  -Cardiology evaluation noted. Patient with history of chronic pancytopenia. He is not a candidate for long term A/C as per cardiology.  -d/c therapeutic Lovenox today.     #non-obstructive cardiomyopathy with EF 26%:  -BP is stable. Will cont Lasix 40 mg po daily    #HTN  -c/w ASA, Lipitor, Metoprolol and Spironolactone.    #Epistaxis  -S/P Lt. Nares surgical fibrillar reabsorbable by ENT. No more epistaxis. Some drop in Hb from baseline. Follow CBC.    #Hematuria  -Urology evaluation noted. Renal/Bladder US is unremarkable.They would like to peform and outpatient cystoscopy.   -Urine cytology reviewed. It showed Enterococcus faecalis growth. Patient now febrile    #Fever  -T 101.5F  -Urine cytology showing Enterococcus faecalis growth. Likely source of fever. Will treat with Unasyn and Tylenol for now. Will send for blood culture and repeat urine culture. Will Check CXR for other possible cause of fever.     #Hypokalemia and Hypomagnesia   -K+ of 3.4 and Mg+ of 1.6 . Will replete     #Chronic Pancytopenia  - Pt has appt with Hem/Onc Dr. Santoro in two weeks for bone marrow biopsy. However, family and patient would like to have BM biopsy done during admissions if possible. Will discuss with heme/onc. #Afib with RVR  -Still in NSR. No overnight tele events. c/w Metoprolol succinate 200 mg po daily and Digoxin 0.125 mg po daily  -Cardiology evaluation noted. Patient with history of chronic pancytopenia. He is not a candidate for long term A/C as per cardiology.    #non-obstructive cardiomyopathy with EF 26%:  -BP is stable. Will cont Lasix 40 mg po daily    #HTN  -c/w ASA, Lipitor, Metoprolol and Spironolactone.    #Fever  -Tmax of 101.5F yesterday. T 100.3 this morning.   -Urine culture showing >100,000 CFU/ml Enterococcus faecalis. Likely source of fever. Patient has no ocampo in place. Could be from urinary retention given history of BPH. Will continue to treat with Unasyn and Tylenol for now. Blood culture pending. CXR was negative  -Plan for Bladder US to r/o urinary retention  -Will send for repeat UA.    #Hematuria  -Urology evaluation noted. Renal/Bladder US is unremarkable.They would like to perform and outpatient cystoscopy.   -Urine cytology reviewed. It showed Enterococcus faecalis growth. Patient still febrile    #Epistaxis  -S/P Lt. Nares surgical fibrillar reabsorbable by ENT. No more epistaxis.    #Chronic Pancytopenia  -Hb low at 7.2 now from 9.3 yesterday. Will transfuse 1 unit once he is no longer febrile. Will give 20mg Lasix IVP after transfusion.   -Heme/Onc consulted. Discussed possibility of having BM biopsy done during admission. They agreed. Will plan for BM today    #constipation  -will start on Colace and Senna      #Hypokalemia and Hypomagnesia   -No resolved. K+ of 3.6 and Mg+ of 2.1

## 2019-07-23 NOTE — MEDICAL STUDENT PROGRESS NOTE(EDUCATION) - NS MD HP STUD ASPLAN ASSES FT
74 year old male w/ past medical history of CAD s/p stent, non-obstructive cardiomyopathy with EF 26%, hypertension, hyperlipidemia, DM II, chronic pancytopenia presenting with chief complaint of L-sided chest pain and palpitations found to be in Afib w/ RVR

## 2019-07-23 NOTE — PROGRESS NOTE ADULT - SUBJECTIVE AND OBJECTIVE BOX
SHANE ASHTON  74y Male    CHIEF COMPLAINT:    Patient is a 74y old  Male who presents with a chief complaint of New onset Afib (2019 10:53)      INTERVAL HPI/OVERNIGHT EVENTS:    Patient seen and examined. Events noted. Spiking temp since yesterday. Urine is +ve. Also having Lt arm redness and swelling.    ROS: All other systems are negative.    Vital Signs:    T(F): 97.4 (19 @ 13:39), Max: 101.5 (19 @ 20:37)  HR: 80 (19 @ 13:39) (80 - 108)  BP: 106/53 (19 @ 13:39) (104/51 - 140/62)  RR: 18 (19 @ 13:39) (18 - 18)  SpO2: 96% (19 @ 23:20) (96% - 96%)  I&O's Summary    2019 07:  -  2019 07:00  --------------------------------------------------------  IN: 0 mL / OUT: 1100 mL / NET: -1100 mL    2019 07:01  -  2019 16:03  --------------------------------------------------------  IN: 300 mL / OUT: 140 mL / NET: 160 mL      Daily     Daily Weight in k.2 (2019 05:31)  CAPILLARY BLOOD GLUCOSE      POCT Blood Glucose.: 123 mg/dL (2019 11:22)  POCT Blood Glucose.: 112 mg/dL (2019 07:15)  POCT Blood Glucose.: 160 mg/dL (2019 20:56)  POCT Blood Glucose.: 129 mg/dL (2019 16:28)      PHYSICAL EXAM:    GENERAL:  NAD  SKIN: No rashes or lesions  HENT: Atrumatic. Normocephalic. PERRL. Moist membranes.  NECK: Supple, No JVD. No lymphadenopathy.  PULMONARY: CTA B/L. No wheezing. No rales  CVS: Normal S1, S2. Rate and Rythm are regular. No murmurs.  ABDOMEN/GI: Soft, Nontender, Nondistended; BS present  EXTREMITIES: Peripheral pulses intact. No edema B/L LE.  NEUROLOGIC:  No motor or sensory deficit.  PSYCH: Alert & oriented x 3    Consultant(s) Notes Reviewed:  [x ] YES  [ ] NO  Care Discussed with Consultants/Other Providers [ x] YES  [ ] NO    EKG reviewed  Telemetry reviewed    LABS:                        7.2    1.87  )-----------( 56       ( 2019 06:34 )             22.8         139  |  100  |  19  ----------------------------<  109<H>  3.6   |  28  |  1.2    Ca    8.2<L>      2019 06:34  Mg     2.1         TPro  6.3  /  Alb  2.9<L>  /  TBili  0.8  /  DBili  x   /  AST  40  /  ALT  33  /  AlkPhos  72  07-    PT/INR - ( 2019 12:42 )   PT: 13.30 sec;   INR: 1.16 ratio         PTT - ( 2019 12:42 )  PTT:26.4 sec  Serum Pro-Brain Natriuretic Peptide: 5408 pg/mL (19 @ 11:52)          RADIOLOGY & ADDITIONAL TESTS:      Imaging or report Personally Reviewed:  [ ] YES  [ ] NO    Medications:  Standing  ampicillin/sulbactam  IVPB 3 Gram(s) IV Intermittent every 6 hours  aspirin  chewable 81 milliGRAM(s) Oral daily  atorvastatin 80 milliGRAM(s) Oral at bedtime  chlorhexidine 4% Liquid 1 Application(s) Topical <User Schedule>  dextrose 5%. 1000 milliLiter(s) IV Continuous <Continuous>  dextrose 50% Injectable 12.5 Gram(s) IV Push once  diazepam    Tablet 2 milliGRAM(s) Oral daily  digoxin     Tablet 0.125 milliGRAM(s) Oral daily  docusate sodium 100 milliGRAM(s) Oral two times a day  enoxaparin Injectable 40 milliGRAM(s) SubCutaneous daily  furosemide    Tablet 40 milliGRAM(s) Oral daily  insulin glargine Injectable (LANTUS) 18 Unit(s) SubCutaneous at bedtime  insulin lispro (HumaLOG) corrective regimen sliding scale   SubCutaneous three times a day before meals  insulin lispro Injectable (HumaLOG) 6 Unit(s) SubCutaneous three times a day before meals  metoprolol succinate  milliGRAM(s) Oral daily  pantoprazole    Tablet 40 milliGRAM(s) Oral before breakfast  sacubitril 24 mG/valsartan 26 mG 1 Tablet(s) Oral two times a day  senna 2 Tablet(s) Oral at bedtime  spironolactone 25 milliGRAM(s) Oral daily  tamsulosin 0.4 milliGRAM(s) Oral at bedtime    PRN Meds  acetaminophen   Tablet .. 650 milliGRAM(s) Oral every 6 hours PRN  dextrose 40% Gel 15 Gram(s) Oral once PRN  glucagon  Injectable 1 milliGRAM(s) IntraMuscular once PRN      Case discussed with resident    Care discussed with pt/family SHANE ASHTON  74y Male    CHIEF COMPLAINT:    Patient is a 74y old  Male who presents with a chief complaint of New onset Afib (2019 10:53)      INTERVAL HPI/OVERNIGHT EVENTS:    Patient seen and examined. Events noted. Spiking temp since yesterday. Urine is +ve. Also having Lt. Antecubital fossa swelling and redness. No more epistaxis.    ROS: All other systems are negative.    Vital Signs:    T(F): 97.4 (19 @ 13:39), Max: 101.5 (19 @ 20:37)  HR: 80 (19 @ 13:39) (80 - 108)  BP: 106/53 (19 @ 13:39) (104/51 - 140/62)  RR: 18 (19 @ 13:39) (18 - 18)  SpO2: 96% (19 @ 23:20) (96% - 96%)  I&O's Summary    2019 07:  -  2019 07:00  --------------------------------------------------------  IN: 0 mL / OUT: 1100 mL / NET: -1100 mL    2019 07:01  -  2019 16:03  --------------------------------------------------------  IN: 300 mL / OUT: 140 mL / NET: 160 mL      Daily     Daily Weight in k.2 (2019 05:31)  CAPILLARY BLOOD GLUCOSE      POCT Blood Glucose.: 123 mg/dL (2019 11:22)  POCT Blood Glucose.: 112 mg/dL (2019 07:15)  POCT Blood Glucose.: 160 mg/dL (2019 20:56)  POCT Blood Glucose.: 129 mg/dL (2019 16:28)      PHYSICAL EXAM:    GENERAL:  NAD  SKIN: Redness in the Lt. Antecubital fossa  HENT: Atraumatic Normocephalic. PERRL. Moist membranes.  NECK: Supple, No JVD. No lymphadenopathy.  PULMONARY: CTA B/L. No wheezing. No rales  CVS: Normal S1, S2. Rate and Rhythm are regular. No murmurs.  ABDOMEN/GI: Soft, Nontender, Nondistended; BS present  EXTREMITIES: Peripheral pulses intact. No edema B/L LE. Swelling and redness of the Lt. Antecubital fossa.   NEUROLOGIC:  No motor or sensory deficit.  PSYCH: Alert & oriented x 3    Consultant(s) Notes Reviewed:  [x ] YES  [ ] NO  Care Discussed with Consultants/Other Providers [ x] YES  [ ] NO    EKG reviewed  Telemetry reviewed    LABS:                        7.2    1.87  )-----------( 56       ( 2019 06:34 )             22.8   Hemoglobin: 7.2 g/dL ( @ 06:34)  Hemoglobin: 9.3 g/dL ( @ 11:15)  Hemoglobin: 7.4 g/dL ( @ 05:14)  Hemoglobin: 8.4 g/dL ( @ 11:36)  Hemoglobin: 7.6 g/dL ( @ 06:22)  Hemoglobin: 8.2 g/dL ( @ 04:55)  Hemoglobin: 8.0 g/dL ( @ 18:18)        139  |  100  |  19  ----------------------------<  109<H>  3.6   |  28  |  1.2    Ca    8.2<L>      2019 06:34  Mg     2.1         TPro  6.3  /  Alb  2.9<L>  /  TBili  0.8  /  DBili  x   /  AST  40  /  ALT  33  /  AlkPhos  72      PT/INR - ( 2019 12:42 )   PT: 13.30 sec;   INR: 1.16 ratio         PTT - ( 2019 12:42 )  PTT:26.4 sec  Serum Pro-Brain Natriuretic Peptide: 5408 pg/mL (19 @ 11:52)          RADIOLOGY & ADDITIONAL TESTS:      Imaging or report Personally Reviewed:  [ ] YES  [ ] NO    Medications:  Standing  ampicillin/sulbactam  IVPB 3 Gram(s) IV Intermittent every 6 hours  aspirin  chewable 81 milliGRAM(s) Oral daily  atorvastatin 80 milliGRAM(s) Oral at bedtime  chlorhexidine 4% Liquid 1 Application(s) Topical <User Schedule>  dextrose 5%. 1000 milliLiter(s) IV Continuous <Continuous>  dextrose 50% Injectable 12.5 Gram(s) IV Push once  diazepam    Tablet 2 milliGRAM(s) Oral daily  digoxin     Tablet 0.125 milliGRAM(s) Oral daily  docusate sodium 100 milliGRAM(s) Oral two times a day  enoxaparin Injectable 40 milliGRAM(s) SubCutaneous daily  furosemide    Tablet 40 milliGRAM(s) Oral daily  insulin glargine Injectable (LANTUS) 18 Unit(s) SubCutaneous at bedtime  insulin lispro (HumaLOG) corrective regimen sliding scale   SubCutaneous three times a day before meals  insulin lispro Injectable (HumaLOG) 6 Unit(s) SubCutaneous three times a day before meals  metoprolol succinate  milliGRAM(s) Oral daily  pantoprazole    Tablet 40 milliGRAM(s) Oral before breakfast  sacubitril 24 mG/valsartan 26 mG 1 Tablet(s) Oral two times a day  senna 2 Tablet(s) Oral at bedtime  spironolactone 25 milliGRAM(s) Oral daily  tamsulosin 0.4 milliGRAM(s) Oral at bedtime    PRN Meds  acetaminophen   Tablet .. 650 milliGRAM(s) Oral every 6 hours PRN  dextrose 40% Gel 15 Gram(s) Oral once PRN  glucagon  Injectable 1 milliGRAM(s) IntraMuscular once PRN      Case discussed with resident    Care discussed with pt/family

## 2019-07-23 NOTE — PROCEDURE NOTE - GENERAL PROCEDURE DETAILS
After informed consent, Under sterile conditions BM aspiration and biopsy was done and sent to lab for testing

## 2019-07-23 NOTE — MEDICAL STUDENT PROGRESS NOTE(EDUCATION) - SUBJECTIVE AND OBJECTIVE BOX
SUBJECTIVE:    Patient is a 74 year old male w/ past medical history of CAD s/p stent, non-obstructive cardiomyopathy with EF 26%, hypertension, hyperlipidemia, DM II presenting with chief complaint of L-sided chest pain and palpitations.    Pt reports his symptoms starting at 5AM with L-sided squeezing chest pain that lasted 5 hours. The pain was non-radiating, 6/10 in intensity, and was associated with shortness of breath. He was recently hospitalized for new CHF with reduced ejection fraction, had cardiac cath which showed non-obstructive CAD, and was discharged on medical therapy and life vest for low EF.    In the ED, VS: /92  RR 18 T97.2  satting 100% on 2LNC. EKG showed Afib w/ tachycardia. LVH pattern.  Pt received Cardizem 10mgIVP x1, Metoprolol 100mg,and was started on cardizem drip. (17 Jul 2019 21:21)    He is currently admitted to medicine with the primary diagnosis of New onset atrial fibrillation. Today is hospital day 6d. This morning he is resting comfortably in bed. He denies any chills, weakness, and lethargy. However, he continues to be febrile since yesterday afternoon w a Tmax of 101.5F. Current Temp 100.3 F. He denies any abdominal pain, n/v, dysuria, hematuria, and flank pain. He denies any chest pain, palpitations, SOB, LE swelling, and lightheadedness. He does however report constipation. Overnight Telemetry showed sinus anjelica with a HR 37. Currently in NSR with HR 82.     INTERVAL EVENTS:     PAST MEDICAL & SURGICAL HISTORY  GERD (gastroesophageal reflux disease)  CAD (coronary artery disease)  Diabetes  HTN (hypertension)  H/O colonoscopy: 10 yeras ago      ALLERGIES:  No Known Allergies    MEDICATIONS:  STANDING MEDICATIONS  ampicillin/sulbactam  IVPB 3 Gram(s) IV Intermittent every 6 hours  aspirin  chewable 81 milliGRAM(s) Oral daily  atorvastatin 80 milliGRAM(s) Oral at bedtime  chlorhexidine 4% Liquid 1 Application(s) Topical <User Schedule>  dextrose 5%. 1000 milliLiter(s) IV Continuous <Continuous>  dextrose 50% Injectable 12.5 Gram(s) IV Push once  diazepam    Tablet 2 milliGRAM(s) Oral daily  digoxin     Tablet 0.125 milliGRAM(s) Oral daily  docusate sodium 100 milliGRAM(s) Oral two times a day  enoxaparin Injectable 40 milliGRAM(s) SubCutaneous daily  furosemide    Tablet 40 milliGRAM(s) Oral daily  insulin glargine Injectable (LANTUS) 18 Unit(s) SubCutaneous at bedtime  insulin lispro (HumaLOG) corrective regimen sliding scale   SubCutaneous three times a day before meals  insulin lispro Injectable (HumaLOG) 6 Unit(s) SubCutaneous three times a day before meals  metoprolol succinate  milliGRAM(s) Oral daily  pantoprazole    Tablet 40 milliGRAM(s) Oral before breakfast  sacubitril 24 mG/valsartan 26 mG 1 Tablet(s) Oral two times a day  senna 2 Tablet(s) Oral at bedtime  spironolactone 25 milliGRAM(s) Oral daily  tamsulosin 0.4 milliGRAM(s) Oral at bedtime    PRN MEDICATIONS  acetaminophen   Tablet .. 650 milliGRAM(s) Oral every 6 hours PRN  dextrose 40% Gel 15 Gram(s) Oral once PRN  glucagon  Injectable 1 milliGRAM(s) IntraMuscular once PRN    VITALS:   T(F): 100.3  HR: 82  BP: 104/51  RR: 18  SpO2: 96%    LABS:                        7.2    1.87  )-----------( 56       ( 23 Jul 2019 06:34 )             22.8     07-23    139  |  100  |  19  ----------------------------<  109<H>  3.6   |  28  |  1.2    Ca    8.2<L>      23 Jul 2019 06:34  Mg     2.1     07-23    TPro  6.3  /  Alb  2.9<L>  /  TBili  0.8  /  DBili  x   /  AST  40  /  ALT  33  /  AlkPhos  72  07-23                  RADIOLOGY:  EXAM:  XR CHEST PORTABLE URGENT 1V            PROCEDURE DATE:  07/22/2019            INTERPRETATION:  Clinical History / Reason for exam: Fever and shortness   of breath    Comparison : Chest radiograph July 17, 2019.    Technique/Positioning: AP.    Findings:    Support devices: Monitoring devices obscure portions of the lung   parenchyma.    Cardiac/mediastinum/hilum: Unremarkable.    Lung parenchyma/Pleura: Within normal limits.    Skeleton/soft tissues: Stable.    Impression:      No radiographic evidence of acute cardiopulmonary disease.        EXAM:  US KIDNEYS AND BLADDER            PROCEDURE DATE:  07/19/2019            INTERPRETATION:  CLINICAL HISTORY: Hematuria    COMPARISON: CT abdomen and pelvis 10/24/2017.    PROCEDURE: Retroperitoneal Ultrasound was performed.    FINDINGS:    RIGHT KIDNEY:  size measuring 11.5 cm in length. No evidence of   hydronephrosis or calculus. Vascular flow is demonstrated at the hilum.    LEFT KIDNEY: , size measuring 12.3 cm in length. No evidence of   hydronephrosis or calculus. Vascular flow is demonstrated at the hilum.     URINARY BLADDER: Prevoid volume of approximately 136 cc.  No wall   thickening, debris or calculus is seen. Bilateral ureteral jets are   visualized. Postvoid volume is approximately 43 cc.    PROSTATE: Volume measures approximately 30 cc. Calcifications are seen.    IMPRESSION:      Postvoid volume of approximately 43 cc.    Prostate calcifications.    PHYSICAL EXAM:  GEN: No acute distress  PULM/CHEST: Clear to auscultation bilaterally, no rales, rhonchi or wheezes   CVS: Regular rate and rhythm, S1-S2, no murmurs  ABD: Soft, non-tender, non-distended, +BS  EXT: No edema  NEURO: AAOx3    Mitchell Catheter: SUBJECTIVE:    Patient is a 74 year old male w/ past medical history of CAD s/p stent, non-obstructive cardiomyopathy with EF 26%, hypertension, hyperlipidemia, DM II, and chronic pancytopenia presenting with chief complaint of L-sided chest pain and palpitations.    Pt reports his symptoms starting at 5AM with L-sided squeezing chest pain that lasted 5 hours. The pain was non-radiating, 6/10 in intensity, and was associated with shortness of breath. He was recently hospitalized for new CHF with reduced ejection fraction, had cardiac cath which showed non-obstructive CAD, and was discharged on medical therapy and life vest for low EF.    In the ED, VS: /92  RR 18 T97.2  satting 100% on 2LNC. EKG showed Afib w/ tachycardia. LVH pattern.  Pt received Cardizem 10mgIVP x1, Metoprolol 100mg,and was started on cardizem drip. (17 Jul 2019 21:21)    He is currently admitted to medicine with the primary diagnosis of New onset atrial fibrillation. Today is hospital day 6d. This morning he is resting comfortably in bed. He denies any chills, weakness, and lethargy. However, he continues to be febrile since yesterday afternoon w a Tmax of 101.5F. Current Temp 100.3 F. He denies any abdominal pain, n/v, dysuria, hematuria, and flank pain. He denies any chest pain, palpitations, SOB, LE swelling, and lightheadedness. He does however report constipation. Currently in NSR with HR 82.     INTERVAL EVENTS:     PAST MEDICAL & SURGICAL HISTORY  GERD (gastroesophageal reflux disease)  CAD (coronary artery disease)  Diabetes  HTN (hypertension)  H/O colonoscopy: 10 yeras ago      ALLERGIES:  No Known Allergies    MEDICATIONS:  STANDING MEDICATIONS  ampicillin/sulbactam  IVPB 3 Gram(s) IV Intermittent every 6 hours  aspirin  chewable 81 milliGRAM(s) Oral daily  atorvastatin 80 milliGRAM(s) Oral at bedtime  chlorhexidine 4% Liquid 1 Application(s) Topical <User Schedule>  dextrose 5%. 1000 milliLiter(s) IV Continuous <Continuous>  dextrose 50% Injectable 12.5 Gram(s) IV Push once  diazepam    Tablet 2 milliGRAM(s) Oral daily  digoxin     Tablet 0.125 milliGRAM(s) Oral daily  docusate sodium 100 milliGRAM(s) Oral two times a day  enoxaparin Injectable 40 milliGRAM(s) SubCutaneous daily  furosemide    Tablet 40 milliGRAM(s) Oral daily  insulin glargine Injectable (LANTUS) 18 Unit(s) SubCutaneous at bedtime  insulin lispro (HumaLOG) corrective regimen sliding scale   SubCutaneous three times a day before meals  insulin lispro Injectable (HumaLOG) 6 Unit(s) SubCutaneous three times a day before meals  metoprolol succinate  milliGRAM(s) Oral daily  pantoprazole    Tablet 40 milliGRAM(s) Oral before breakfast  sacubitril 24 mG/valsartan 26 mG 1 Tablet(s) Oral two times a day  senna 2 Tablet(s) Oral at bedtime  spironolactone 25 milliGRAM(s) Oral daily  tamsulosin 0.4 milliGRAM(s) Oral at bedtime    PRN MEDICATIONS  acetaminophen   Tablet .. 650 milliGRAM(s) Oral every 6 hours PRN  dextrose 40% Gel 15 Gram(s) Oral once PRN  glucagon  Injectable 1 milliGRAM(s) IntraMuscular once PRN    VITALS:   T(F): 100.3  HR: 82  BP: 104/51  RR: 18  SpO2: 96%    LABS:                        7.2    1.87  )-----------( 56       ( 23 Jul 2019 06:34 )             22.8     07-23    139  |  100  |  19  ----------------------------<  109<H>  3.6   |  28  |  1.2    Ca    8.2<L>      23 Jul 2019 06:34  Mg     2.1     07-23    TPro  6.3  /  Alb  2.9<L>  /  TBili  0.8  /  DBili  x   /  AST  40  /  ALT  33  /  AlkPhos  72  07-23                  RADIOLOGY:  EXAM:  XR CHEST PORTABLE URGENT 1V            PROCEDURE DATE:  07/22/2019            INTERPRETATION:  Clinical History / Reason for exam: Fever and shortness   of breath    Comparison : Chest radiograph July 17, 2019.    Technique/Positioning: AP.    Findings:    Support devices: Monitoring devices obscure portions of the lung   parenchyma.    Cardiac/mediastinum/hilum: Unremarkable.    Lung parenchyma/Pleura: Within normal limits.    Skeleton/soft tissues: Stable.    Impression:      No radiographic evidence of acute cardiopulmonary disease.        EXAM:  US KIDNEYS AND BLADDER            PROCEDURE DATE:  07/19/2019            INTERPRETATION:  CLINICAL HISTORY: Hematuria    COMPARISON: CT abdomen and pelvis 10/24/2017.    PROCEDURE: Retroperitoneal Ultrasound was performed.    FINDINGS:    RIGHT KIDNEY:  size measuring 11.5 cm in length. No evidence of   hydronephrosis or calculus. Vascular flow is demonstrated at the hilum.    LEFT KIDNEY: , size measuring 12.3 cm in length. No evidence of   hydronephrosis or calculus. Vascular flow is demonstrated at the hilum.     URINARY BLADDER: Prevoid volume of approximately 136 cc.  No wall   thickening, debris or calculus is seen. Bilateral ureteral jets are   visualized. Postvoid volume is approximately 43 cc.    PROSTATE: Volume measures approximately 30 cc. Calcifications are seen.    IMPRESSION:      Postvoid volume of approximately 43 cc.    Prostate calcifications.    PHYSICAL EXAM:  GEN: No acute distress  PULM/CHEST: Clear to auscultation bilaterally, no rales, rhonchi or wheezes   CVS: Regular rate and rhythm, S1-S2, no murmurs  ABD: Soft, non-tender, non-distended, +BS  EXT: No edema  NEURO: AAOx3    Mitchell Catheter: SUBJECTIVE:    Patient is a 74 year old male w/ past medical history of CAD s/p stent, non-obstructive cardiomyopathy with EF 26%, hypertension, hyperlipidemia, DM II, and chronic pancytopenia presenting with chief complaint of L-sided chest pain and palpitations.    Pt reports his symptoms starting at 5AM with L-sided squeezing chest pain that lasted 5 hours. The pain was non-radiating, 6/10 in intensity, and was associated with shortness of breath. He was recently hospitalized for new CHF with reduced ejection fraction, had cardiac cath which showed non-obstructive CAD, and was discharged on medical therapy and life vest for low EF.    In the ED, VS: /92  RR 18 T97.2  satting 100% on 2LNC. EKG showed Afib w/ tachycardia. LVH pattern.  Pt received Cardizem 10mgIVP x1, Metoprolol 100mg,and was started on cardizem drip. (17 Jul 2019 21:21)    He is currently admitted to medicine with the primary diagnosis of New onset atrial fibrillation. Today is hospital day 6d. This morning he is resting comfortably in bed. He denies any chills, weakness, and lethargy. However, he continues to be febrile since yesterday afternoon w a Tmax of 101.5F. Current Temp 100.3 F. He denies any abdominal pain, n/v, dysuria, hematuria, and flank pain. He denies any chest pain, palpitations, SOB, LE swelling, and lightheadedness. He does however report constipation. Currently in NSR with HR 82.     INTERVAL EVENTS:     PAST MEDICAL & SURGICAL HISTORY  GERD (gastroesophageal reflux disease)  CAD (coronary artery disease)  Diabetes  HTN (hypertension)  H/O colonoscopy: 10 yeras ago      ALLERGIES:  No Known Allergies    MEDICATIONS:  STANDING MEDICATIONS  ampicillin/sulbactam  IVPB 3 Gram(s) IV Intermittent every 6 hours  aspirin  chewable 81 milliGRAM(s) Oral daily  atorvastatin 80 milliGRAM(s) Oral at bedtime  chlorhexidine 4% Liquid 1 Application(s) Topical <User Schedule>  dextrose 5%. 1000 milliLiter(s) IV Continuous <Continuous>  dextrose 50% Injectable 12.5 Gram(s) IV Push once  diazepam    Tablet 2 milliGRAM(s) Oral daily  digoxin     Tablet 0.125 milliGRAM(s) Oral daily  docusate sodium 100 milliGRAM(s) Oral two times a day  enoxaparin Injectable 40 milliGRAM(s) SubCutaneous daily  furosemide    Tablet 40 milliGRAM(s) Oral daily  insulin glargine Injectable (LANTUS) 18 Unit(s) SubCutaneous at bedtime  insulin lispro (HumaLOG) corrective regimen sliding scale   SubCutaneous three times a day before meals  insulin lispro Injectable (HumaLOG) 6 Unit(s) SubCutaneous three times a day before meals  metoprolol succinate  milliGRAM(s) Oral daily  pantoprazole    Tablet 40 milliGRAM(s) Oral before breakfast  sacubitril 24 mG/valsartan 26 mG 1 Tablet(s) Oral two times a day  senna 2 Tablet(s) Oral at bedtime  spironolactone 25 milliGRAM(s) Oral daily  tamsulosin 0.4 milliGRAM(s) Oral at bedtime    PRN MEDICATIONS  acetaminophen   Tablet .. 650 milliGRAM(s) Oral every 6 hours PRN  dextrose 40% Gel 15 Gram(s) Oral once PRN  glucagon  Injectable 1 milliGRAM(s) IntraMuscular once PRN    VITALS:   T(F): 100.3  HR: 82  BP: 104/51  RR: 18  SpO2: 96%    LABS:                        7.2    1.87  )-----------( 56       ( 23 Jul 2019 06:34 )             22.8     07-23    139  |  100  |  19  ----------------------------<  109<H>  3.6   |  28  |  1.2    Ca    8.2<L>      23 Jul 2019 06:34  Mg     2.1     07-23    TPro  6.3  /  Alb  2.9<L>  /  TBili  0.8  /  DBili  x   /  AST  40  /  ALT  33  /  AlkPhos  72  07-23      Culture - Urine (collected 19 Jul 2019 16:19)  Source: .Urine Clean Catch (Midstream)  Final Report (22 Jul 2019 14:12):    >100,000 CFU/ml Enterococcus faecalis  Organism: Enterococcus faecalis (22 Jul 2019 14:12)  Organism: Enterococcus faecalis (22 Jul 2019 14:12)              RADIOLOGY:  EXAM:  XR CHEST PORTABLE URGENT 1V            PROCEDURE DATE:  07/22/2019            INTERPRETATION:  Clinical History / Reason for exam: Fever and shortness   of breath    Comparison : Chest radiograph July 17, 2019.    Technique/Positioning: AP.    Findings:    Support devices: Monitoring devices obscure portions of the lung   parenchyma.    Cardiac/mediastinum/hilum: Unremarkable.    Lung parenchyma/Pleura: Within normal limits.    Skeleton/soft tissues: Stable.    Impression:      No radiographic evidence of acute cardiopulmonary disease.        EXAM:  US KIDNEYS AND BLADDER            PROCEDURE DATE:  07/19/2019            INTERPRETATION:  CLINICAL HISTORY: Hematuria    COMPARISON: CT abdomen and pelvis 10/24/2017.    PROCEDURE: Retroperitoneal Ultrasound was performed.    FINDINGS:    RIGHT KIDNEY:  size measuring 11.5 cm in length. No evidence of   hydronephrosis or calculus. Vascular flow is demonstrated at the hilum.    LEFT KIDNEY: , size measuring 12.3 cm in length. No evidence of   hydronephrosis or calculus. Vascular flow is demonstrated at the hilum.     URINARY BLADDER: Prevoid volume of approximately 136 cc.  No wall   thickening, debris or calculus is seen. Bilateral ureteral jets are   visualized. Postvoid volume is approximately 43 cc.    PROSTATE: Volume measures approximately 30 cc. Calcifications are seen.    IMPRESSION:      Postvoid volume of approximately 43 cc.    Prostate calcifications.    PHYSICAL EXAM:  GEN: No acute distress  HEENT: left nare with packed Surgicel fibrillar, no evidence of active bleeding, Right nare wnl.   PULM/CHEST: Clear to auscultation bilaterally, no rales, rhonchi or wheezes   CVS: Regular rate and rhythm, S1-S2, no murmurs  ABD: Soft, non-tender, non-distended, +BS, no CVA tenderness  EXT: No edema  NEURO: AAOx3    Mitchell Catheter:

## 2019-07-24 LAB
ALBUMIN SERPL ELPH-MCNC: 2.7 G/DL — LOW (ref 3.5–5.2)
ALP SERPL-CCNC: 78 U/L — SIGNIFICANT CHANGE UP (ref 30–115)
ALT FLD-CCNC: 35 U/L — SIGNIFICANT CHANGE UP (ref 0–41)
ANION GAP SERPL CALC-SCNC: 13 MMOL/L — SIGNIFICANT CHANGE UP (ref 7–14)
APPEARANCE UR: ABNORMAL
AST SERPL-CCNC: 37 U/L — SIGNIFICANT CHANGE UP (ref 0–41)
BASOPHILS # BLD AUTO: 0 K/UL — SIGNIFICANT CHANGE UP (ref 0–0.2)
BASOPHILS NFR BLD AUTO: 0 % — SIGNIFICANT CHANGE UP (ref 0–1)
BILIRUB SERPL-MCNC: 0.7 MG/DL — SIGNIFICANT CHANGE UP (ref 0.2–1.2)
BILIRUB UR-MCNC: ABNORMAL
BLD GP AB SCN SERPL QL: SIGNIFICANT CHANGE UP
BUN SERPL-MCNC: 21 MG/DL — HIGH (ref 10–20)
CALCIUM SERPL-MCNC: 8.3 MG/DL — LOW (ref 8.5–10.1)
CHLORIDE SERPL-SCNC: 98 MMOL/L — SIGNIFICANT CHANGE UP (ref 98–110)
CO2 SERPL-SCNC: 26 MMOL/L — SIGNIFICANT CHANGE UP (ref 17–32)
COLOR SPEC: YELLOW — SIGNIFICANT CHANGE UP
CREAT SERPL-MCNC: 1.1 MG/DL — SIGNIFICANT CHANGE UP (ref 0.7–1.5)
DIFF PNL FLD: NEGATIVE — SIGNIFICANT CHANGE UP
EOSINOPHIL # BLD AUTO: 0.01 K/UL — SIGNIFICANT CHANGE UP (ref 0–0.7)
EOSINOPHIL NFR BLD AUTO: 0.5 % — SIGNIFICANT CHANGE UP (ref 0–8)
GLUCOSE BLDC GLUCOMTR-MCNC: 163 MG/DL — HIGH (ref 70–99)
GLUCOSE BLDC GLUCOMTR-MCNC: 169 MG/DL — HIGH (ref 70–99)
GLUCOSE BLDC GLUCOMTR-MCNC: 225 MG/DL — HIGH (ref 70–99)
GLUCOSE BLDC GLUCOMTR-MCNC: 245 MG/DL — HIGH (ref 70–99)
GLUCOSE SERPL-MCNC: 233 MG/DL — HIGH (ref 70–99)
GLUCOSE UR QL: 250
HCT VFR BLD CALC: 23.5 % — LOW (ref 42–52)
HGB BLD-MCNC: 7.4 G/DL — LOW (ref 14–18)
IMM GRANULOCYTES NFR BLD AUTO: 1.1 % — HIGH (ref 0.1–0.3)
KETONES UR-MCNC: NEGATIVE — SIGNIFICANT CHANGE UP
LEUKOCYTE ESTERASE UR-ACNC: NEGATIVE — SIGNIFICANT CHANGE UP
LYMPHOCYTES # BLD AUTO: 0.69 K/UL — LOW (ref 1.2–3.4)
LYMPHOCYTES # BLD AUTO: 36.3 % — SIGNIFICANT CHANGE UP (ref 20.5–51.1)
MAGNESIUM SERPL-MCNC: 1.8 MG/DL — SIGNIFICANT CHANGE UP (ref 1.8–2.4)
MCHC RBC-ENTMCNC: 29.8 PG — SIGNIFICANT CHANGE UP (ref 27–31)
MCHC RBC-ENTMCNC: 31.5 G/DL — LOW (ref 32–37)
MCV RBC AUTO: 94.8 FL — HIGH (ref 80–94)
MONOCYTES # BLD AUTO: 0.59 K/UL — SIGNIFICANT CHANGE UP (ref 0.1–0.6)
MONOCYTES NFR BLD AUTO: 31.1 % — HIGH (ref 1.7–9.3)
NEUTROPHILS # BLD AUTO: 0.59 K/UL — LOW (ref 1.4–6.5)
NEUTROPHILS NFR BLD AUTO: 31 % — LOW (ref 42.2–75.2)
NITRITE UR-MCNC: NEGATIVE — SIGNIFICANT CHANGE UP
NRBC # BLD: 2 /100 WBCS — HIGH (ref 0–0)
PH UR: 6 — SIGNIFICANT CHANGE UP (ref 5–8)
PLATELET # BLD AUTO: 44 K/UL — LOW (ref 130–400)
POTASSIUM SERPL-MCNC: 3.6 MMOL/L — SIGNIFICANT CHANGE UP (ref 3.5–5)
POTASSIUM SERPL-SCNC: 3.6 MMOL/L — SIGNIFICANT CHANGE UP (ref 3.5–5)
PROT SERPL-MCNC: 6.5 G/DL — SIGNIFICANT CHANGE UP (ref 6–8)
PROT UR-MCNC: 30
RBC # BLD: 2.48 M/UL — LOW (ref 4.7–6.1)
RBC # FLD: 18 % — HIGH (ref 11.5–14.5)
SODIUM SERPL-SCNC: 137 MMOL/L — SIGNIFICANT CHANGE UP (ref 135–146)
SP GR SPEC: 1.02 — SIGNIFICANT CHANGE UP (ref 1.01–1.03)
UROBILINOGEN FLD QL: 1 (ref 0.2–0.2)
WBC # BLD: 1.9 K/UL — LOW (ref 4.8–10.8)
WBC # FLD AUTO: 1.9 K/UL — LOW (ref 4.8–10.8)
WBC UR QL: SIGNIFICANT CHANGE UP /HPF (ref 0–5)

## 2019-07-24 PROCEDURE — 99233 SBSQ HOSP IP/OBS HIGH 50: CPT

## 2019-07-24 RX ADMIN — SACUBITRIL AND VALSARTAN 1 TABLET(S): 24; 26 TABLET, FILM COATED ORAL at 06:18

## 2019-07-24 RX ADMIN — Medication 6 UNIT(S): at 11:59

## 2019-07-24 RX ADMIN — Medication 81 MILLIGRAM(S): at 11:59

## 2019-07-24 RX ADMIN — CHLORHEXIDINE GLUCONATE 1 APPLICATION(S): 213 SOLUTION TOPICAL at 06:18

## 2019-07-24 RX ADMIN — Medication 2: at 12:00

## 2019-07-24 RX ADMIN — Medication 1: at 17:13

## 2019-07-24 RX ADMIN — TAMSULOSIN HYDROCHLORIDE 0.4 MILLIGRAM(S): 0.4 CAPSULE ORAL at 21:39

## 2019-07-24 RX ADMIN — ATORVASTATIN CALCIUM 80 MILLIGRAM(S): 80 TABLET, FILM COATED ORAL at 21:39

## 2019-07-24 RX ADMIN — Medication 2: at 08:46

## 2019-07-24 RX ADMIN — SPIRONOLACTONE 25 MILLIGRAM(S): 25 TABLET, FILM COATED ORAL at 06:18

## 2019-07-24 RX ADMIN — INSULIN GLARGINE 18 UNIT(S): 100 INJECTION, SOLUTION SUBCUTANEOUS at 21:38

## 2019-07-24 RX ADMIN — AMPICILLIN SODIUM AND SULBACTAM SODIUM 200 GRAM(S): 250; 125 INJECTION, POWDER, FOR SUSPENSION INTRAMUSCULAR; INTRAVENOUS at 05:21

## 2019-07-24 RX ADMIN — Medication 250 MILLIGRAM(S): at 06:19

## 2019-07-24 RX ADMIN — PANTOPRAZOLE SODIUM 40 MILLIGRAM(S): 20 TABLET, DELAYED RELEASE ORAL at 06:18

## 2019-07-24 RX ADMIN — SACUBITRIL AND VALSARTAN 1 TABLET(S): 24; 26 TABLET, FILM COATED ORAL at 17:10

## 2019-07-24 RX ADMIN — Medication 100 MILLIGRAM(S): at 06:18

## 2019-07-24 RX ADMIN — Medication 250 MILLIGRAM(S): at 17:12

## 2019-07-24 RX ADMIN — Medication 6 UNIT(S): at 17:12

## 2019-07-24 RX ADMIN — SENNA PLUS 2 TABLET(S): 8.6 TABLET ORAL at 21:39

## 2019-07-24 RX ADMIN — AMPICILLIN SODIUM AND SULBACTAM SODIUM 200 GRAM(S): 250; 125 INJECTION, POWDER, FOR SUSPENSION INTRAMUSCULAR; INTRAVENOUS at 00:36

## 2019-07-24 RX ADMIN — Medication 40 MILLIGRAM(S): at 06:18

## 2019-07-24 RX ADMIN — Medication 0.12 MILLIGRAM(S): at 06:18

## 2019-07-24 RX ADMIN — Medication 100 MILLIGRAM(S): at 17:10

## 2019-07-24 RX ADMIN — ENOXAPARIN SODIUM 40 MILLIGRAM(S): 100 INJECTION SUBCUTANEOUS at 12:00

## 2019-07-24 RX ADMIN — Medication 2 MILLIGRAM(S): at 12:03

## 2019-07-24 RX ADMIN — Medication 650 MILLIGRAM(S): at 02:40

## 2019-07-24 RX ADMIN — Medication 200 MILLIGRAM(S): at 06:18

## 2019-07-24 RX ADMIN — Medication 6 UNIT(S): at 08:46

## 2019-07-24 NOTE — CONSULT NOTE ADULT - SUBJECTIVE AND OBJECTIVE BOX
SHANE ASHTON  74y, Male  Allergy: No Known Allergies      HPI:  74 year old male w/ past medical history of CAD s/p stent, non-obstructive cardiomyopathy with EF 26%, hypertension, hyperlipidemia, DM II presenting with chief complaint of L-sided chest pain and palpitations.    Pt reports his symptoms starting at 5AM with L-sided squeezing chest pain that lasted 5 hours. The pain was non-radiating, 6/10 in intensity, and was associated with shortness of breath. He was recently hospitalized for new CHF with reduced ejection fraction, had cardiac cath which showed non-obstructive CAD, and was discharged on medical therapy and life vest for low EF.    ID called for treatment of superficial phlebitis LUE    FAMILY HISTORY:  FH: hypertension    PAST MEDICAL & SURGICAL HISTORY:  GERD (gastroesophageal reflux disease)  CAD (coronary artery disease)  Diabetes  HTN (hypertension)  H/O colonoscopy: 10 yeras ago        VITALS:  T(F): 98.6, Max: 102.1 (07-23-19 @ 20:34)  HR: 86  BP: 109/57  RR: 18Vital Signs Last 24 Hrs  T(C): 37 (24 Jul 2019 05:13), Max: 38.9 (23 Jul 2019 20:34)  T(F): 98.6 (24 Jul 2019 05:13), Max: 102.1 (23 Jul 2019 20:34)  HR: 86 (24 Jul 2019 05:13) (80 - 86)  BP: 109/57 (24 Jul 2019 05:13) (106/53 - 119/59)  BP(mean): --  RR: 18 (24 Jul 2019 05:13) (18 - 18)  SpO2: 95% (23 Jul 2019 18:04) (95% - 95%)    TESTS & MEASUREMENTS:                        7.2    1.87  )-----------( 56       ( 23 Jul 2019 06:34 )             22.8     07-23    139  |  100  |  19  ----------------------------<  109<H>  3.6   |  28  |  1.2    Ca    8.2<L>      23 Jul 2019 06:34  Mg     2.1     07-23    TPro  6.3  /  Alb  2.9<L>  /  TBili  0.8  /  DBili  x   /  AST  40  /  ALT  33  /  AlkPhos  72  07-23    LIVER FUNCTIONS - ( 23 Jul 2019 06:34 )  Alb: 2.9 g/dL / Pro: 6.3 g/dL / ALK PHOS: 72 U/L / ALT: 33 U/L / AST: 40 U/L / GGT: x             Culture - Blood (collected 07-22-19 @ 17:04)  Source: .Blood None  Preliminary Report (07-24-19 @ 03:01):    No growth to date.    Culture - Urine (collected 07-19-19 @ 16:19)  Source: .Urine Clean Catch (Midstream)  Final Report (07-22-19 @ 14:12):    >100,000 CFU/ml Enterococcus faecalis  Organism: Enterococcus faecalis (07-22-19 @ 14:12)  Organism: Enterococcus faecalis (07-22-19 @ 14:12)      -  Ampicillin: S <=2 Predicts results to ampicillin/sulbactam, amoxacillin-clavulanate and  piperacillin-tazobactam.      -  Ciprofloxacin: S <=1      -  Levofloxacin: S <=1      -  Nitrofurantoin: S <=32 Should not be used to treat pyelonephritis.      -  Tetra/Doxy: R >8      -  Vancomycin: S 2      Method Type: KARELY    Culture - Urine (collected 07-18-19 @ 15:30)  Source: .Urine Clean Catch (Midstream)  Final Report (07-19-19 @ 22:24):    <10,000 CFU/mL Normal Urogenital Carey            RADIOLOGY & ADDITIONAL TESTS:    ANTIBIOTICS:  ampicillin/sulbactam  IVPB 3 Gram(s) IV Intermittent every 6 hours  vancomycin  IVPB 1000 milliGRAM(s) IV Intermittent every 12 hours

## 2019-07-24 NOTE — DIETITIAN INITIAL EVALUATION ADULT. - OTHER INFO
P/w: new onset Afib. A. Fib with RVR: cardio following. S/p bone marrow biopsy. HTN: BP stable. Urology eval noted. Recommended urine cytology. Renal/Bladder US is unremarkable. Out pt cystoscopy.

## 2019-07-24 NOTE — PROGRESS NOTE ADULT - ASSESSMENT
A 74 years old male presented with Lt sided squeezing type cp which started 5 AM lasting for 5 hrs and was associated with palpitations and sob.    CTA chest: No PE  CXR: NAPD  EKG: A. Fib @ 127/min. (Interpreted by me)  CE x 2 negative.   BNP: 5408        1. A. Fib with RVR  2. CP due to rapid A. Fib  3. CM  4. Non obst CAD  5. HTN  6. No CHF. H/O Ch. HFrEF  7. Pancytopenia / Thrombocytopenia  8. Hematuria  9. Epistaxis  10. Lt Arm cellulitis/Thrombophlebitis  11. UTI (Enterococcus)          PLAN:    ·	ID F/U noted. Recommended to D/C Unasyn and cont IV Vanco  ·	Blood cx is negative  ·	Urine growing enterococcus. Also having thrombophlebitis of Lt. Antecubital fossa  ·	Venous doppler of Lt arm  ·	S/P Lt. Nares surgical fibrillar reabsorbable by ENT. No more epistaxis.  ·	Some drop in Hb from baseline. Follow CBC.  ·	In NSR now. Cont Metoprolol succinate 200 mg po daily  ·	Cardiology eval noted. Pt with H/O epistaxis, now having hematuria. Having pancytopenia/Thrombocytopenia and significant anemia. Not a candidate for long term A/C as per cardiology.  ·	Cont Digoxin 0.125 mg po daily. Check Dig level  ·	BP is stable. Will cont Lasix 40 mg po daily  ·	Urology eval noted. Recommended urine cytology. Renal/Bladder US is unremarkable. Out pt cystoscopy  ·	Bone marrow biopsy done today  ·	Cont ASA, Lipitor, Metoprolol and Spironolactone.   ·	Discussed goals of care with niece and nephew who are health care proxy. Pt is now DNR/DNI. Do not want Hospice care.       #Progress Note Handoff  Pending (specify):  Consults_________, Tests________, Test Results_______, Other___Fever______  Family discussion:  Disposition: Home___/SNF___/Other________/Unknown at this time________

## 2019-07-24 NOTE — CONSULT NOTE ADULT - ASSESSMENT
IMPRESSION:  Left antecubital fossa with superficial non suppurative thrombophlebitis of left basilic vein  High probability that an abscess will evolve with time  No sepsis  BCx 7/22 NG  No pyelonephritis    RECOMMENDATIONS:  Warm compressors to site  Vascular surgery evaluation of site  D/c Unasyn  Vancomycin 1 gm iv q12h

## 2019-07-24 NOTE — PROGRESS NOTE ADULT - SUBJECTIVE AND OBJECTIVE BOX
SHANE ASHTON  74y Male    CHIEF COMPLAINT:    Patient is a 74y old  Male who presents with a chief complaint of New onset Afib (2019 06:43)      INTERVAL HPI/OVERNIGHT EVENTS:    Patient seen and examined. Still spiking temp. T. Max 102.1. No cough. No sob. C/O redness and swelling of Lt. Antecubital fossa.     ROS: All other systems are negative.    Vital Signs:    T(F): 98.6 (19 @ 05:13), Max: 102.1 (19 @ 20:34)  HR: 86 (19 @ 05:13) (80 - 86)  BP: 109/57 (19 @ 05:13) (106/53 - 119/59)  RR: 18 (19 @ 05:13) (18 - 18)  SpO2: 95% (19 @ 18:04) (95% - 95%)  I&O's Summary    2019 07:01  -  2019 07:00  --------------------------------------------------------  IN: 300 mL / OUT: 240 mL / NET: 60 mL      Daily Height in cm: 177.8 (2019 09:13)    Daily Weight in k (2019 05:13)  CAPILLARY BLOOD GLUCOSE      POCT Blood Glucose.: 225 mg/dL (2019 11:30)  POCT Blood Glucose.: 245 mg/dL (2019 08:01)  POCT Blood Glucose.: 315 mg/dL (2019 20:48)  POCT Blood Glucose.: 172 mg/dL (2019 16:41)      PHYSICAL EXAM:    GENERAL:  NAD  SKIN: No rashes or lesions  HENT: Atraumatic Normocephalic. PERRL. Moist membranes.  NECK: Supple, No JVD. No lymphadenopathy.  PULMONARY: CTA B/L. No wheezing. No rales  CVS: Normal S1, S2. Rate and Rhythm are regular. No murmurs.  ABDOMEN/GI: Soft, Nontender, Nondistended; BS present  EXTREMITIES: Peripheral pulses intact. No edema B/L LE. Redness & swelling on Lt. Antecubital fossa.   NEUROLOGIC:  No motor or sensory deficit.  PSYCH: Alert & oriented x 3    Consultant(s) Notes Reviewed:  [x ] YES  [ ] NO  Care Discussed with Consultants/Other Providers [ x] YES  [ ] NO    EKG reviewed  Telemetry reviewed    LABS:                        7.4    1.90  )-----------( 44       ( 2019 05:37 )             23.5   Hemoglobin: 7.4 g/dL ( @ 05:37)  Hemoglobin: 7.2 g/dL ( @ 06:34)  Hemoglobin: 9.3 g/dL ( @ 11:15)  Hemoglobin: 7.4 g/dL ( @ 05:14)  Hemoglobin: 8.4 g/dL ( @ 11:36)  Hemoglobin: 7.6 g/dL ( @ 06:22)        137  |  98  |  21<H>  ----------------------------<  233<H>  3.6   |  26  |  1.1    Ca    8.3<L>      2019 05:37  Mg     1.8         TPro  6.5  /  Alb  2.7<L>  /  TBili  0.7  /  DBili  x   /  AST  37  /  ALT  35  /  AlkPhos  78      PT/INR - ( 2019 12:42 )   PT: 13.30 sec;   INR: 1.16 ratio         PTT - ( 2019 12:42 )  PTT:26.4 sec  Serum Pro-Brain Natriuretic Peptide: 5408 pg/mL (19 @ 11:52)        Culture - Blood (collected 2019 17:04)  Source: .Blood None  Preliminary Report (2019 03:01):    No growth to date.        RADIOLOGY & ADDITIONAL TESTS:      Imaging or report Personally Reviewed:  [ ] YES  [ ] NO    Medications:  Standing  aspirin  chewable 81 milliGRAM(s) Oral daily  atorvastatin 80 milliGRAM(s) Oral at bedtime  chlorhexidine 4% Liquid 1 Application(s) Topical <User Schedule>  dextrose 5%. 1000 milliLiter(s) IV Continuous <Continuous>  dextrose 50% Injectable 12.5 Gram(s) IV Push once  diazepam    Tablet 2 milliGRAM(s) Oral daily  digoxin     Tablet 0.125 milliGRAM(s) Oral daily  docusate sodium 100 milliGRAM(s) Oral two times a day  enoxaparin Injectable 40 milliGRAM(s) SubCutaneous daily  furosemide    Tablet 40 milliGRAM(s) Oral daily  insulin glargine Injectable (LANTUS) 18 Unit(s) SubCutaneous at bedtime  insulin lispro (HumaLOG) corrective regimen sliding scale   SubCutaneous three times a day before meals  insulin lispro Injectable (HumaLOG) 6 Unit(s) SubCutaneous three times a day before meals  metoprolol succinate  milliGRAM(s) Oral daily  pantoprazole    Tablet 40 milliGRAM(s) Oral before breakfast  sacubitril 24 mG/valsartan 26 mG 1 Tablet(s) Oral two times a day  senna 2 Tablet(s) Oral at bedtime  spironolactone 25 milliGRAM(s) Oral daily  tamsulosin 0.4 milliGRAM(s) Oral at bedtime  vancomycin  IVPB 1000 milliGRAM(s) IV Intermittent every 12 hours    PRN Meds  acetaminophen   Tablet .. 650 milliGRAM(s) Oral every 6 hours PRN  dextrose 40% Gel 15 Gram(s) Oral once PRN  glucagon  Injectable 1 milliGRAM(s) IntraMuscular once PRN      Case discussed with resident    Care discussed with pt/family

## 2019-07-24 NOTE — MEDICAL STUDENT PROGRESS NOTE(EDUCATION) - NS MD HP STUD ASPLAN ASSES FT
74 year old male w/ past medical history of CAD s/p stent, non-obstructive cardiomyopathy with EF 26%, hypertension, hyperlipidemia, DM II, chronic pancytopenia presenting with chief complaint of L-sided chest pain and palpitations found to be in Afib w/ RVR, now rate controlled, but patient now with intermittent fevers and new left antecubital superficial basilic vein thrombophlebitis.

## 2019-07-24 NOTE — MEDICAL STUDENT PROGRESS NOTE(EDUCATION) - NS MD HP STUD ASPLAN PLAN FT
#Fever  -Tmax of 102.1 last night. T 98.6 this morning.   -LUE vein duplex confirms superficial thrombophlebitis of the Lt basilic vein. This is more likely the source of fever given repeat UA is negative and patient has no CVA tenderness. Bladder scan was negative. He is not retaining urine.   -Prelim blood cultures were negative. Will send for repeat today  -ID consulted. Recommended to d/c Unasyn and c/w Vancomycin 1gm IV q12h. Additionally recommended warm compresses to site and evaluation by surgery for possible abscess drainage. Surgical consult placed and antibiotics were adjusted per ID recommendations.     #Afib with RVR  -Still in NSR. Few PVCs noted on overnight tele. OK to d/c tele today per Dr. Castro recommendations. C/w Metoprolol succinate 200 mg po daily and Digoxin 0.125 mg po daily  -Cardiology evaluation noted. Patient with history of chronic pancytopenia. He is not a candidate for long term A/C as per cardiology.    #non-obstructive cardiomyopathy with EF 26%:  -BP is stable. Will cont Lasix 40 mg po daily    #HTN  -c/w ASA, Lipitor, Metoprolol and Spironolactone.    #Chronic Pancytopenia  -Hb low at 7.4 today. It was at 7.2 yesterday but 9.3 two days ago. He was not transfused yesterday. Will transfuse 1 unit today as long as he remains afebrile. Will give 20mg Lasix IVP after transfusion.   -BM biopsy done yesterday. Will follow up with heme/onc concerning results and further impressions/recommendations.    #Hematuria  -Urology evaluation noted. Renal/Bladder US is unremarkable.They would like to perform and outpatient cystoscopy.   -Hematuria has resolved. UA negative.     #Epistaxis  -S/P Lt. Nares surgical fibrillar reabsorbable by ENT. No more epistaxis.    #constipation  -c/w Colace and Senna

## 2019-07-24 NOTE — MEDICAL STUDENT PROGRESS NOTE(EDUCATION) - SUBJECTIVE AND OBJECTIVE BOX
SUBJECTIVE:    74 year old male w/ past medical history of CAD s/p stent, non-obstructive cardiomyopathy with EF 26%, hypertension, hyperlipidemia, DM II, and chronic pancytopenia presenting with chief complaint of L-sided chest pain and palpitations.    Pt reports his symptoms starting at 5AM with L-sided squeezing chest pain that lasted 5 hours. The pain was non-radiating, 6/10 in intensity, and was associated with shortness of breath. He was recently hospitalized for new CHF with reduced ejection fraction, had cardiac cath which showed non-obstructive CAD, and was discharged on medical therapy and life vest for low EF.    In the ED, VS: /92  RR 18 T97.2  satting 100% on 2LNC. EKG showed Afib w/ tachycardia. LVH pattern.  Pt received Cardizem 10mgIVP x1, Metoprolol 100mg,and was started on cardizem drip. (2019 21:21)    He is currently admitted to medicine with the primary diagnosis of New onset atrial fibrillation.      Today is hospital day 7d. This morning he is resting comfortably in bed and reports no new issues or overnight events. He denies any chest pains, palpitations, lightheadedness, and SOB. He reports no n/v/d oe weakness. He has still not had a bowel movement. Yesterday afternoon, the patient was noted to have superficial thrombophlebitis in the Lt antecubital region and continued to spike fevers. Tmax of 102.1F last night but currently afebrile at 98.6. LUE venous duplex was order to confirm and patient was placed on IV vanco + c/w IV unasyn. ID consult was placed.    INTERVAL EVENTS:     PAST MEDICAL & SURGICAL HISTORY  GERD (gastroesophageal reflux disease)  CAD (coronary artery disease)  Diabetes  HTN (hypertension)  H/O colonoscopy: 10 yeras ago      ALLERGIES:  No Known Allergies    MEDICATIONS:  STANDING MEDICATIONS  aspirin  chewable 81 milliGRAM(s) Oral daily  atorvastatin 80 milliGRAM(s) Oral at bedtime  chlorhexidine 4% Liquid 1 Application(s) Topical <User Schedule>  dextrose 5%. 1000 milliLiter(s) IV Continuous <Continuous>  dextrose 50% Injectable 12.5 Gram(s) IV Push once  diazepam    Tablet 2 milliGRAM(s) Oral daily  digoxin     Tablet 0.125 milliGRAM(s) Oral daily  docusate sodium 100 milliGRAM(s) Oral two times a day  enoxaparin Injectable 40 milliGRAM(s) SubCutaneous daily  furosemide    Tablet 40 milliGRAM(s) Oral daily  insulin glargine Injectable (LANTUS) 18 Unit(s) SubCutaneous at bedtime  insulin lispro (HumaLOG) corrective regimen sliding scale   SubCutaneous three times a day before meals  insulin lispro Injectable (HumaLOG) 6 Unit(s) SubCutaneous three times a day before meals  metoprolol succinate  milliGRAM(s) Oral daily  pantoprazole    Tablet 40 milliGRAM(s) Oral before breakfast  sacubitril 24 mG/valsartan 26 mG 1 Tablet(s) Oral two times a day  senna 2 Tablet(s) Oral at bedtime  spironolactone 25 milliGRAM(s) Oral daily  tamsulosin 0.4 milliGRAM(s) Oral at bedtime  vancomycin  IVPB 1000 milliGRAM(s) IV Intermittent every 12 hours    PRN MEDICATIONS  acetaminophen   Tablet .. 650 milliGRAM(s) Oral every 6 hours PRN  dextrose 40% Gel 15 Gram(s) Oral once PRN  glucagon  Injectable 1 milliGRAM(s) IntraMuscular once PRN    VITALS:   T(F): 98.6  HR: 86  BP: 109/57  RR: 18  SpO2: 95%    LABS:                        7.4    1.90  )-----------( 44       ( 2019 05:37 )             23.5     07-    137  |  98  |  21<H>  ----------------------------<  233<H>  3.6   |  26  |  1.1    Ca    8.3<L>      2019 05:37  Mg     1.8         TPro  6.5  /  Alb  2.7<L>  /  TBili  0.7  /  DBili  x   /  AST  37  /  ALT  35  /  AlkPhos  78      PT/INR - ( 2019 12:42 )   PT: 13.30 sec;   INR: 1.16 ratio         PTT - ( 2019 12:42 )  PTT:26.4 sec  Urinalysis Basic - ( 2019 08:29 )    Color: Yellow / Appearance: Cloudy / S.025 / pH: x  Gluc: x / Ketone: Negative  / Bili: Small / Urobili: 1.0   Blood: x / Protein: 30 / Nitrite: Negative   Leuk Esterase: Negative / RBC: x / WBC 1-3 /HPF   Sq Epi: x / Non Sq Epi: x / Bacteria: x          Culture - Urine (collected 2019 16:19)  Source: .Urine Clean Catch (Midstream)  Final Report (2019 14:12):    >100,000 CFU/ml Enterococcus faecalis  Organism: Enterococcus faecalis (2019 14:12)  Organism: Enterococcus faecalis (2019 14:12)    Culture - Blood (collected 2019 17:04)  Source: .Blood None  Preliminary Report (2019 03:01):    No growth to date.          RADIOLOGY:    EXAM:  DUPLEX EXT VEINS UPPER LT            PROCEDURE DATE:  2019          ******PRELIMINARY REPORT******    ******PRELIMINARY REPORT******          INTERPRETATION:  Clinical History / Reason for exam: The patient is a   74-year-old male with pain and swelling left upper extremity.  The study   was performed to evaluate for deep venous thrombosis.    The left internal jugular, subclavian, axillary, brachial, radial and   ulnar veins were visualized and were free of thrombus.  The veins were   compressible with presence of spontaneous flow, augmentation with distal   compression and phasicity    The left cephalic vein was not visualized.  The left basilic vein was thrombosed.    Impression:    Superficial thrombophlebitis noted in the left basilic vein.    PHYSICAL EXAM:  GEN: No acute distress  HEENT: left nare no evidence of active bleeding, Right nare wnl.   PULM/CHEST: Clear to auscultation bilaterally, no rales, rhonchi or wheezes   CVS: Regular rate and rhythm, S1-S2, systolic crescendo-decrescendo murmur at the aortic window with radiation to the bilateral carotids   ABD: Soft, non-tender, non-distended, +BS, no CVA tenderness  EXT: LUE antecubital region with superficial thrombophlebitis   NEURO: AAOx3    Mitchell Catheter:

## 2019-07-24 NOTE — DIETITIAN INITIAL EVALUATION ADULT. - DIET TYPE
Pt. had a very recent admit at [facility] with good PO intake. Prefers soft foods- tolerating regular texture/consistency of hospital diet order. Pt. resides at assisted living facility, reports usually fair appetite and PO intake, diabetic diet. No supplement use. Stable weight trends as compared to previous admit.  NKFA.

## 2019-07-25 LAB
ALBUMIN SERPL ELPH-MCNC: 2.5 G/DL — LOW (ref 3.5–5.2)
ALP SERPL-CCNC: 68 U/L — SIGNIFICANT CHANGE UP (ref 30–115)
ALT FLD-CCNC: 29 U/L — SIGNIFICANT CHANGE UP (ref 0–41)
ANION GAP SERPL CALC-SCNC: 10 MMOL/L — SIGNIFICANT CHANGE UP (ref 7–14)
AST SERPL-CCNC: 29 U/L — SIGNIFICANT CHANGE UP (ref 0–41)
BASOPHILS # BLD AUTO: 0 K/UL — SIGNIFICANT CHANGE UP (ref 0–0.2)
BASOPHILS # BLD AUTO: 0.01 K/UL — SIGNIFICANT CHANGE UP (ref 0–0.2)
BASOPHILS NFR BLD AUTO: 0 % — SIGNIFICANT CHANGE UP (ref 0–1)
BASOPHILS NFR BLD AUTO: 0.5 % — SIGNIFICANT CHANGE UP (ref 0–1)
BILIRUB SERPL-MCNC: 0.7 MG/DL — SIGNIFICANT CHANGE UP (ref 0.2–1.2)
BLD GP AB SCN SERPL QL: SIGNIFICANT CHANGE UP
BUN SERPL-MCNC: 18 MG/DL — SIGNIFICANT CHANGE UP (ref 10–20)
CALCIUM SERPL-MCNC: 8 MG/DL — LOW (ref 8.5–10.1)
CHLORIDE SERPL-SCNC: 100 MMOL/L — SIGNIFICANT CHANGE UP (ref 98–110)
CO2 SERPL-SCNC: 28 MMOL/L — SIGNIFICANT CHANGE UP (ref 17–32)
CREAT SERPL-MCNC: 1.1 MG/DL — SIGNIFICANT CHANGE UP (ref 0.7–1.5)
EOSINOPHIL # BLD AUTO: 0 K/UL — SIGNIFICANT CHANGE UP (ref 0–0.7)
EOSINOPHIL # BLD AUTO: 0.02 K/UL — SIGNIFICANT CHANGE UP (ref 0–0.7)
EOSINOPHIL NFR BLD AUTO: 0 % — SIGNIFICANT CHANGE UP (ref 0–8)
EOSINOPHIL NFR BLD AUTO: 1.1 % — SIGNIFICANT CHANGE UP (ref 0–8)
GLUCOSE BLDC GLUCOMTR-MCNC: 128 MG/DL — HIGH (ref 70–99)
GLUCOSE BLDC GLUCOMTR-MCNC: 159 MG/DL — HIGH (ref 70–99)
GLUCOSE BLDC GLUCOMTR-MCNC: 191 MG/DL — HIGH (ref 70–99)
GLUCOSE BLDC GLUCOMTR-MCNC: 191 MG/DL — HIGH (ref 70–99)
GLUCOSE SERPL-MCNC: 120 MG/DL — HIGH (ref 70–99)
HCT VFR BLD CALC: 20.7 % — LOW (ref 42–52)
HCT VFR BLD CALC: 24.2 % — LOW (ref 42–52)
HGB BLD-MCNC: 6.6 G/DL — CRITICAL LOW (ref 14–18)
HGB BLD-MCNC: 7.8 G/DL — LOW (ref 14–18)
IMM GRANULOCYTES NFR BLD AUTO: 1.1 % — HIGH (ref 0.1–0.3)
LYMPHOCYTES # BLD AUTO: 0.61 K/UL — LOW (ref 1.2–3.4)
LYMPHOCYTES # BLD AUTO: 0.77 K/UL — LOW (ref 1.2–3.4)
LYMPHOCYTES # BLD AUTO: 30.1 % — SIGNIFICANT CHANGE UP (ref 20.5–51.1)
LYMPHOCYTES # BLD AUTO: 40.5 % — SIGNIFICANT CHANGE UP (ref 20.5–51.1)
MAGNESIUM SERPL-MCNC: 1.8 MG/DL — SIGNIFICANT CHANGE UP (ref 1.8–2.4)
MCHC RBC-ENTMCNC: 29.8 PG — SIGNIFICANT CHANGE UP (ref 27–31)
MCHC RBC-ENTMCNC: 30 PG — SIGNIFICANT CHANGE UP (ref 27–31)
MCHC RBC-ENTMCNC: 31.9 G/DL — LOW (ref 32–37)
MCHC RBC-ENTMCNC: 32.2 G/DL — SIGNIFICANT CHANGE UP (ref 32–37)
MCV RBC AUTO: 92.4 FL — SIGNIFICANT CHANGE UP (ref 80–94)
MCV RBC AUTO: 94.1 FL — HIGH (ref 80–94)
MONOCYTES # BLD AUTO: 0.35 K/UL — SIGNIFICANT CHANGE UP (ref 0.1–0.6)
MONOCYTES # BLD AUTO: 0.61 K/UL — HIGH (ref 0.1–0.6)
MONOCYTES NFR BLD AUTO: 17.5 % — HIGH (ref 1.7–9.3)
MONOCYTES NFR BLD AUTO: 32.1 % — HIGH (ref 1.7–9.3)
NEUTROPHILS # BLD AUTO: 0.47 K/UL — LOW (ref 1.4–6.5)
NEUTROPHILS # BLD AUTO: 1.06 K/UL — LOW (ref 1.4–6.5)
NEUTROPHILS NFR BLD AUTO: 24.7 % — LOW (ref 42.2–75.2)
NEUTROPHILS NFR BLD AUTO: 50.8 % — SIGNIFICANT CHANGE UP (ref 42.2–75.2)
NRBC # BLD: 2 /100 WBCS — HIGH (ref 0–0)
PLATELET # BLD AUTO: 36 K/UL — LOW (ref 130–400)
PLATELET # BLD AUTO: 42 K/UL — LOW (ref 130–400)
POTASSIUM SERPL-MCNC: 3.3 MMOL/L — LOW (ref 3.5–5)
POTASSIUM SERPL-SCNC: 3.3 MMOL/L — LOW (ref 3.5–5)
PROT SERPL-MCNC: 5.7 G/DL — LOW (ref 6–8)
RBC # BLD: 2.2 M/UL — LOW (ref 4.7–6.1)
RBC # BLD: 2.62 M/UL — LOW (ref 4.7–6.1)
RBC # FLD: 17.6 % — HIGH (ref 11.5–14.5)
RBC # FLD: 18 % — HIGH (ref 11.5–14.5)
SODIUM SERPL-SCNC: 138 MMOL/L — SIGNIFICANT CHANGE UP (ref 135–146)
WBC # BLD: 1.9 K/UL — LOW (ref 4.8–10.8)
WBC # BLD: 2.02 K/UL — LOW (ref 4.8–10.8)
WBC # FLD AUTO: 1.9 K/UL — LOW (ref 4.8–10.8)
WBC # FLD AUTO: 2.02 K/UL — LOW (ref 4.8–10.8)

## 2019-07-25 PROCEDURE — 99233 SBSQ HOSP IP/OBS HIGH 50: CPT

## 2019-07-25 PROCEDURE — 99222 1ST HOSP IP/OBS MODERATE 55: CPT

## 2019-07-25 RX ORDER — CEFAZOLIN SODIUM 1 G
VIAL (EA) INJECTION
Refills: 0 | Status: DISCONTINUED | OUTPATIENT
Start: 2019-07-25 | End: 2019-07-26

## 2019-07-25 RX ORDER — POTASSIUM CHLORIDE 20 MEQ
40 PACKET (EA) ORAL EVERY 4 HOURS
Refills: 0 | Status: COMPLETED | OUTPATIENT
Start: 2019-07-25 | End: 2019-07-25

## 2019-07-25 RX ORDER — CEFAZOLIN SODIUM 1 G
1000 VIAL (EA) INJECTION EVERY 8 HOURS
Refills: 0 | Status: DISCONTINUED | OUTPATIENT
Start: 2019-07-25 | End: 2019-07-26

## 2019-07-25 RX ORDER — CEFAZOLIN SODIUM 1 G
1000 VIAL (EA) INJECTION ONCE
Refills: 0 | Status: COMPLETED | OUTPATIENT
Start: 2019-07-25 | End: 2019-07-25

## 2019-07-25 RX ADMIN — Medication 200 MILLIGRAM(S): at 06:26

## 2019-07-25 RX ADMIN — INSULIN GLARGINE 18 UNIT(S): 100 INJECTION, SOLUTION SUBCUTANEOUS at 21:15

## 2019-07-25 RX ADMIN — Medication 2 MILLIGRAM(S): at 12:51

## 2019-07-25 RX ADMIN — Medication 0.12 MILLIGRAM(S): at 06:26

## 2019-07-25 RX ADMIN — Medication 6 UNIT(S): at 12:49

## 2019-07-25 RX ADMIN — Medication 1: at 07:56

## 2019-07-25 RX ADMIN — SACUBITRIL AND VALSARTAN 1 TABLET(S): 24; 26 TABLET, FILM COATED ORAL at 06:26

## 2019-07-25 RX ADMIN — Medication 250 MILLIGRAM(S): at 06:26

## 2019-07-25 RX ADMIN — SACUBITRIL AND VALSARTAN 1 TABLET(S): 24; 26 TABLET, FILM COATED ORAL at 18:01

## 2019-07-25 RX ADMIN — ATORVASTATIN CALCIUM 80 MILLIGRAM(S): 80 TABLET, FILM COATED ORAL at 21:15

## 2019-07-25 RX ADMIN — SPIRONOLACTONE 25 MILLIGRAM(S): 25 TABLET, FILM COATED ORAL at 06:27

## 2019-07-25 RX ADMIN — PANTOPRAZOLE SODIUM 40 MILLIGRAM(S): 20 TABLET, DELAYED RELEASE ORAL at 06:26

## 2019-07-25 RX ADMIN — Medication 81 MILLIGRAM(S): at 12:50

## 2019-07-25 RX ADMIN — Medication 100 MILLIGRAM(S): at 18:03

## 2019-07-25 RX ADMIN — Medication 100 MILLIGRAM(S): at 21:15

## 2019-07-25 RX ADMIN — Medication 40 MILLIEQUIVALENT(S): at 12:48

## 2019-07-25 RX ADMIN — Medication 1: at 12:49

## 2019-07-25 RX ADMIN — Medication 40 MILLIGRAM(S): at 06:26

## 2019-07-25 RX ADMIN — Medication 100 MILLIGRAM(S): at 06:26

## 2019-07-25 RX ADMIN — Medication 100 MILLIGRAM(S): at 14:57

## 2019-07-25 RX ADMIN — Medication 40 MILLIEQUIVALENT(S): at 14:57

## 2019-07-25 RX ADMIN — Medication 6 UNIT(S): at 18:03

## 2019-07-25 RX ADMIN — SENNA PLUS 2 TABLET(S): 8.6 TABLET ORAL at 21:15

## 2019-07-25 RX ADMIN — Medication 6 UNIT(S): at 07:56

## 2019-07-25 RX ADMIN — TAMSULOSIN HYDROCHLORIDE 0.4 MILLIGRAM(S): 0.4 CAPSULE ORAL at 21:15

## 2019-07-25 RX ADMIN — CHLORHEXIDINE GLUCONATE 1 APPLICATION(S): 213 SOLUTION TOPICAL at 06:27

## 2019-07-25 RX ADMIN — Medication 100 MILLIGRAM(S): at 12:48

## 2019-07-25 NOTE — PROGRESS NOTE ADULT - SUBJECTIVE AND OBJECTIVE BOX
DAISHA, SHANE  74y, Male      OVERNIGHT EVENTS:    no fevers, no pain left antecubital fossa  no  complaints    VITALS:  T(F): 99.9, Max: 99.9 (19 @ 13:32)  HR: 81  BP: 108/55  RR: 18Vital Signs Last 24 Hrs  T(C): 37.7 (2019 05:01), Max: 37.7 (2019 13:32)  T(F): 99.9 (2019 05:01), Max: 99.9 (2019 13:32)  HR: 81 (2019 05:01) (81 - 89)  BP: 108/55 (2019 05:01) (108/55 - 140/66)  BP(mean): --  RR: 18 (2019 05:01) (18 - 18)  SpO2: --    TESTS & MEASUREMENTS:                        7.4    1.90  )-----------( 44       ( 2019 05:37 )             23.5         137  |  98  |  21<H>  ----------------------------<  233<H>  3.6   |  26  |  1.1    Ca    8.3<L>      2019 05:37  Mg     1.8         TPro  6.5  /  Alb  2.7<L>  /  TBili  0.7  /  DBili  x   /  AST  37  /  ALT  35  /  AlkPhos  78      LIVER FUNCTIONS - ( 2019 05:37 )  Alb: 2.7 g/dL / Pro: 6.5 g/dL / ALK PHOS: 78 U/L / ALT: 35 U/L / AST: 37 U/L / GGT: x             Culture - Blood (collected 19 @ 06:34)  Source: .Blood None  Preliminary Report (19 @ 14:01):    No growth to date.    Culture - Blood (collected 19 @ 06:34)  Source: .Blood None  Preliminary Report (19 @ 14:01):    No growth to date.    Culture - Blood (collected 19 @ 17:04)  Source: .Blood None  Preliminary Report (07-24-19 @ 03:01):    No growth to date.    Culture - Urine (collected 19 @ 16:19)  Source: .Urine Clean Catch (Midstream)  Final Report (19 @ 14:12):    >100,000 CFU/ml Enterococcus faecalis  Organism: Enterococcus faecalis (19 @ 14:12)  Organism: Enterococcus faecalis (19 @ 14:12)      -  Ampicillin: S <=2 Predicts results to ampicillin/sulbactam, amoxacillin-clavulanate and  piperacillin-tazobactam.      -  Ciprofloxacin: S <=1      -  Levofloxacin: S <=1      -  Nitrofurantoin: S <=32 Should not be used to treat pyelonephritis.      -  Tetra/Doxy: R >8      -  Vancomycin: S 2      Method Type: KARELY    Culture - Urine (collected 19 @ 15:30)  Source: .Urine Clean Catch (Midstream)  Final Report (19 @ 22:24):    <10,000 CFU/mL Normal Urogenital Carey      Urinalysis Basic - ( 2019 08:29 )    Color: Yellow / Appearance: Cloudy / S.025 / pH: x  Gluc: x / Ketone: Negative  / Bili: Small / Urobili: 1.0   Blood: x / Protein: 30 / Nitrite: Negative   Leuk Esterase: Negative / RBC: x / WBC 1-3 /HPF   Sq Epi: x / Non Sq Epi: x / Bacteria: x          RADIOLOGY & ADDITIONAL TESTS:    ANTIBIOTICS:  vancomycin  IVPB 1000 milliGRAM(s) IV Intermittent every 12 hours

## 2019-07-25 NOTE — CONSULT NOTE ADULT - SUBJECTIVE AND OBJECTIVE BOX
VASCULAR SURGERY CONSULT NOTE      HPI:  74 year old male w/ past medical history of CAD s/p stent, non-obstructive cardiomyopathy with EF 26%, hypertension, hyperlipidemia, DM II presenting with chief complaint of L-sided chest pain and palpitations.    Pt reports his symptoms starting at 5AM with L-sided squeezing chest pain that lasted 5 hours. The pain was non-radiating, 6/10 in intensity, and was associated with shortness of breath. He was recently hospitalized for new CHF with reduced ejection fraction, had cardiac cath which showed non-obstructive CAD, and was discharged on medical therapy and life vest for low EF.    In the ED, VS: /92  RR 18 T97.2  satting 100% on 2LNC. EKG showed Afib w/ tachycardia. LVH pattern.  Pt received Cardizem 10mgIVP x1, Metoprolol 100mg,and was started on cardizem drip. (2019 21:21)        PAST MEDICAL & SURGICAL HISTORY:  GERD (gastroesophageal reflux disease)  CAD (coronary artery disease)  Diabetes  HTN (hypertension)  H/O colonoscopy: 10 yeras ago    No Known Allergies    Home Medications:  digoxin 125 mcg (0.125 mg) oral tablet: 1 tab(s) orally once a day (2019 11:08)  famotidine 40 mg oral tablet: 1 tab(s) orally once a day (at bedtime) (2019 21:37)  glimepiride 4 mg oral tablet: 1 tab(s) orally once a day (2019 21:37)  metFORMIN 1000 mg oral tablet: 1 tab(s) orally 2 times a day (2019 21:37)  metoprolol succinate 200 mg oral tablet, extended release: 1 tab(s) orally once a day (2019 11:08)  tamsulosin 0.4 mg oral capsule: 1 cap(s) orally once a day (at bedtime) (2019 21:37)    No permtinent family history of PVD    REVIEW OF SYSTEMS:  GENERAL:                                         negative  SKIN:                                                 see HPI  OPTHALMOLOGIC:                          negative  ENMT:                                               negative  RESPIRATORY AND THORAX:        negative  CARDIOVASCULAR:                         see HPI  GASTROINTESTINAL:                       negative  NEPHROLOGY:                                  negative  MUSCULOSKELETAL:                       negative  NEUROLOGIC:                                   negative  PSYCHIATRIC:                                    negative  HEMATOLOGY/LYMPHATICS:         negative  ENDOCRINE:                                     see HPI  ALLERGIC/IMMUNOLOGIC:            negative    12 point ROS otherwise normal except as stated in HPI    PHYSICAL EXAM  Vital Signs Last 24 Hrs  T(C): 37.7 (2019 05:01), Max: 37.7 (2019 13:32)  T(F): 99.9 (2019 05:01), Max: 99.9 (2019 13:32)  HR: 81 (2019 05:01) (81 - 89)  BP: 108/55 (2019 05:01) (108/55 - 140/66)  BP(mean): --  RR: 18 (2019 05:01) (18 - 18)  SpO2: --    Appearance: Normal	  HEENT:   Normal oral mucosa, PERRL, EOMI	  Neck: Supple, - JVD;   Cardiovascular: Normal S1 S2, No JVD, No murmurs,   Respiratory: Lungs clear to auscultation, No Rales, Rhonchi, Wheezing	  Gastrointestinal:  Soft, Non-tender, positive BS	  Skin: No rashes, No ecchymoses, No cyanosis  Left upper extremity: edema, erythema AC fossa, + induration, no fluctuation. palp radial pulse  Extremities: Normal range of motion, No clubbing, cyanosis or edema  Neurologic: Non-focal  Psychiatry: A & O x 3, Mood & affect appropriate          MEDICATIONS:   MEDICATIONS  (STANDING):  aspirin  chewable 81 milliGRAM(s) Oral daily  atorvastatin 80 milliGRAM(s) Oral at bedtime  ceFAZolin   IVPB 1000 milliGRAM(s) IV Intermittent once  ceFAZolin   IVPB 1000 milliGRAM(s) IV Intermittent every 8 hours  ceFAZolin   IVPB      chlorhexidine 4% Liquid 1 Application(s) Topical <User Schedule>  dextrose 5%. 1000 milliLiter(s) (50 mL/Hr) IV Continuous <Continuous>  dextrose 50% Injectable 12.5 Gram(s) IV Push once  diazepam    Tablet 2 milliGRAM(s) Oral daily  digoxin     Tablet 0.125 milliGRAM(s) Oral daily  docusate sodium 100 milliGRAM(s) Oral two times a day  furosemide    Tablet 40 milliGRAM(s) Oral daily  insulin glargine Injectable (LANTUS) 18 Unit(s) SubCutaneous at bedtime  insulin lispro (HumaLOG) corrective regimen sliding scale   SubCutaneous three times a day before meals  insulin lispro Injectable (HumaLOG) 6 Unit(s) SubCutaneous three times a day before meals  metoprolol succinate  milliGRAM(s) Oral daily  pantoprazole    Tablet 40 milliGRAM(s) Oral before breakfast  potassium chloride   Powder 40 milliEquivalent(s) Oral every 4 hours  sacubitril 24 mG/valsartan 26 mG 1 Tablet(s) Oral two times a day  senna 2 Tablet(s) Oral at bedtime  spironolactone 25 milliGRAM(s) Oral daily  tamsulosin 0.4 milliGRAM(s) Oral at bedtime    MEDICATIONS  (PRN):  acetaminophen   Tablet .. 650 milliGRAM(s) Oral every 6 hours PRN Temp greater or equal to 38C (100.4F), Moderate Pain (4 - 6)  dextrose 40% Gel 15 Gram(s) Oral once PRN Blood Glucose LESS THAN 70 milliGRAM(s)/deciliter  glucagon  Injectable 1 milliGRAM(s) IntraMuscular once PRN Glucose LESS THAN 70 milligrams/deciliter      LAB/STUDIES:                        6.6    1.90  )-----------( 36       ( 2019 05:19 )             20.7     07-    138  |  100  |  18  ----------------------------<  120<H>  3.3<L>   |  28  |  1.1    Ca    8.0<L>      2019 05:19  Mg     1.8         TPro  5.7<L>  /  Alb  2.5<L>  /  TBili  0.7  /  DBili  x   /  AST  29  /  ALT  29  /  AlkPhos  68  0725    PT/INR - ( 2019 12:42 )   PT: 13.30 sec;   INR: 1.16 ratio         PTT - ( 2019 12:42 )  PTT:26.4 sec  LIVER FUNCTIONS - ( 2019 05:19 )  Alb: 2.5 g/dL / Pro: 5.7 g/dL / ALK PHOS: 68 U/L / ALT: 29 U/L / AST: 29 U/L / GGT: x             Urinalysis Basic - ( 2019 08:29 )    Color: Yellow / Appearance: Cloudy / S.025 / pH: x  Gluc: x / Ketone: Negative  / Bili: Small / Urobili: 1.0   Blood: x / Protein: 30 / Nitrite: Negative   Leuk Esterase: Negative / RBC: x / WBC 1-3 /HPF   Sq Epi: x / Non Sq Epi: x / Bacteria: x      Culture - Blood (collected 2019 06:34)  Source: .Blood None  Preliminary Report (2019 14:01):    No growth to date.    Culture - Blood (collected 2019 06:34)  Source: .Blood None  Preliminary Report (2019 14:01):    No growth to date.    Culture - Blood (collected 2019 17:04)  Source: .Blood None  Preliminary Report (2019 03:01):    No growth to date.        IMAGING:    < from: [facility] Duplex Upper Ext Vein Scan, Left (19 @ 16:06) >  Superficial thrombophlebitis noted in the left basilic vein.

## 2019-07-25 NOTE — PROGRESS NOTE ADULT - ASSESSMENT
73 y/o male with L nare epistaxis - Mild bleed resolved and likely not the cause of his drop in H&H  - Monitor H&H transfuse prn  - keep nares well hydrated with bacitracin and saline sprays  - cont current management  - recall prn

## 2019-07-25 NOTE — CONSULT NOTE ADULT - ASSESSMENT
74 year old male w/ past medical history of CAD s/p stent, non-obstructive cardiomyopathy with EF 26%, hypertension, hyperlipidemia, DM II presenting with chief complaint of L-sided chest pain and palpitations. Admitted for rapid AFib.   Vascular surgery called to evaluate left arm cellulitis and basilic thrombophlebitis related to peripheral IV.    Recs:  - arm elevation, warm compresses  - continue Abx    Will re-evaluate tomorrow    SPECTRA [id number] 74 year old male w/ past medical history of CAD s/p stent, non-obstructive cardiomyopathy with EF 26%, hypertension, hyperlipidemia, DM II presenting with chief complaint of L-sided chest pain and palpitations. Admitted for rapid AFib.   Vascular surgery called to evaluate left arm cellulitis and basilic thrombophlebitis related to peripheral IV.    Recs:  - arm elevation, warm compresses  - continue Abx    Will re-evaluate    SPECTRA [id number]

## 2019-07-25 NOTE — PROGRESS NOTE ADULT - ASSESSMENT
· Assessment		  IMPRESSION:  Left antecubital fossa with superficial non suppurative thrombophlebitis of left basilic vein  High probability that an abscess will evolve with time  No sepsis  BCx 7/22, 23  NG  UCx E fecalis  Asymptomatic bacteruria  No pyelonephritis    RECOMMENDATIONS:  Warm compressors to site  VASCULAR SURGICAL EVALUATION OF LEFT ANTECUBITAL FOSSA  D/c Vancomycin   ANCEF 1 GM IV Q8H

## 2019-07-25 NOTE — PROGRESS NOTE ADULT - SUBJECTIVE AND OBJECTIVE BOX
SHANE ASHTON  [facility] 014 A ([facility] T[facility])      Patient is a 74y old  Male who presents with a chief complaint of New onset Afib (25 Jul 2019 10:58)      HPI:  74 year old male w/ past medical history of CAD s/p stent, non-obstructive cardiomyopathy with EF 26%, hypertension, hyperlipidemia, DM II presenting with chief complaint of L-sided chest pain and palpitations.    Pt reports his symptoms starting at 5AM with L-sided squeezing chest pain that lasted 5 hours. The pain was non-radiating, 6/10 in intensity, and was associated with shortness of breath. He was recently hospitalized for new CHF with reduced ejection fraction, had cardiac cath which showed non-obstructive CAD, and was discharged on medical therapy and life vest for low EF.    In the ED, VS: /92  RR 18 T97.2  satting 100% on 2LNC. EKG showed Afib w/ tachycardia. LVH pattern.  Pt received Cardizem 10mgIVP x1, Metoprolol 100mg,and was started on cardizem drip. (17 Jul 2019 21:21)    Interval Events:  Patient seen and examined at bedside. Over last 24 hours, patient was started on Unasyn and Vancomycin for left cubital fossa superficial thrombophlebitis. He has no current complaints. Patient also noted to have episode of epistaxis this morning from left nare. Patient denies any fever/chills/SOB/CP/palpitations. Vascular surgery consulted for superficial thrombophlebitis.     -PMHx: GERD (gastroesophageal reflux disease) [Active]  Anxiety [Inactive]  CAD (coronary artery disease) [Active]  Diabetes [Active]  HTN (hypertension) [Active]    -PSHx:H/O colonoscopy  No significant past surgical history    -FAMILY HISTORY:  FH: hypertension        REVIEW OF SYSTEMS:  CONSTITUTIONAL: Dried blood from left nare  RESPIRATORY: No cough, wheezing, chills or hemoptysis; No shortness of breath  CARDIOVASCULAR: No chest pain, palpitations, dizziness, or leg swelling  GASTROINTESTINAL: No abdominal or epigastric pain. No nausea, vomiting, or hematemesis; No diarrhea or constipation. No melena or hematochezia.  NEUROLOGICAL: No headaches  LYMPH NODES: No enlarged glands  MUSCULOSKELETAL: No joint pain or swelling; No muscle, back, or extremity pain      T(C): , Max: 37.7 (07-25-19 @ 05:01)  HR: 87 (07-25-19 @ 13:38)  BP: 145/65 (07-25-19 @ 13:38)  RR: 20 (07-25-19 @ 13:38)  SpO2: --  CAPILLARY BLOOD GLUCOSE      POCT Blood Glucose.: 191 mg/dL (25 Jul 2019 11:08)  POCT Blood Glucose.: 159 mg/dL (25 Jul 2019 07:26)  POCT Blood Glucose.: 163 mg/dL (24 Jul 2019 21:18)  POCT Blood Glucose.: 169 mg/dL (24 Jul 2019 16:48)    GENERAL:  NAD  SKIN: Tender, erythematous, indurated area in left cubital fossa.   HENT: Atraumatic Normocephalic. PERRL. Dried blood coming from left nare.  NECK: Supple, No JVD. No lymphadenopathy.  PULMONARY: CTA B/L. No wheezing. No rales  CVS: Normal S1, S2. Rate and Rhythm are regular. No murmurs.  ABDOMEN/GI: Soft, Nontender, Nondistended; BS present  EXTREMITIES: Peripheral pulses intact. No edema B/L LE.  NEUROLOGIC:  No motor or sensory deficit.  PSYCH: Alert & oriented x 3    LABS:          6.6  1.90  )-------(36          20.7  N=24.7  L=40.5  MCV=94.1    138|100|18  ------------------<120<H>  3.3<L>|28|1.1  eGFR:65  Ca:8.0<L>            Microbiology:    Culture - Blood (collected 07-23-19 @ 06:34)  Source: .Blood None  Preliminary Report (07-24-19 @ 14:01):    No growth to date.    Culture - Blood (collected 07-23-19 @ 06:34)  Source: .Blood None  Preliminary Report (07-24-19 @ 14:01):    No growth to date.    Culture - Blood (collected 07-22-19 @ 17:04)  Source: .Blood None  Preliminary Report (07-24-19 @ 03:01):    No growth to date.    Culture - Urine (collected 07-19-19 @ 16:19)  Source: .Urine Clean Catch (Midstream)  Final Report (07-22-19 @ 14:12):    >100,000 CFU/ml Enterococcus faecalis  Organism: Enterococcus faecalis (07-22-19 @ 14:12)  Organism: Enterococcus faecalis (07-22-19 @ 14:12)      -  Ampicillin: S <=2 Predicts results to ampicillin/sulbactam, amoxacillin-clavulanate and  piperacillin-tazobactam.      -  Ciprofloxacin: S <=1      -  Levofloxacin: S <=1      -  Nitrofurantoin: S <=32 Should not be used to treat pyelonephritis.      -  Tetra/Doxy: R >8      -  Vancomycin: S 2      Method Type: KARELY    Culture - Urine (collected 07-18-19 @ 15:30)  Source: .Urine Clean Catch (Midstream)  Final Report (07-19-19 @ 22:24):    <10,000 CFU/mL Normal Urogenital Carey        RADIOLOGY & ADDITIONAL TESTS:  < from: [facility] Duplex Upper Ext Vein Scan, Left (07.23.19 @ 16:06) >      Impression:    Superficial thrombophlebitis noted in the left basilic vein.    < end of copied text >        Medications:  acetaminophen   Tablet .. 650 milliGRAM(s) Oral every 6 hours PRN  aspirin  chewable 81 milliGRAM(s) Oral daily  atorvastatin 80 milliGRAM(s) Oral at bedtime  ceFAZolin   IVPB 1000 milliGRAM(s) IV Intermittent every 8 hours  ceFAZolin   IVPB      chlorhexidine 4% Liquid 1 Application(s) Topical <User Schedule>  dextrose 40% Gel 15 Gram(s) Oral once PRN  dextrose 5%. 1000 milliLiter(s) IV Continuous <Continuous>  dextrose 50% Injectable 12.5 Gram(s) IV Push once  diazepam    Tablet 2 milliGRAM(s) Oral daily  digoxin     Tablet 0.125 milliGRAM(s) Oral daily  docusate sodium 100 milliGRAM(s) Oral two times a day  furosemide    Tablet 40 milliGRAM(s) Oral daily  glucagon  Injectable 1 milliGRAM(s) IntraMuscular once PRN  insulin glargine Injectable (LANTUS) 18 Unit(s) SubCutaneous at bedtime  insulin lispro (HumaLOG) corrective regimen sliding scale   SubCutaneous three times a day before meals  insulin lispro Injectable (HumaLOG) 6 Unit(s) SubCutaneous three times a day before meals  metoprolol succinate  milliGRAM(s) Oral daily  pantoprazole    Tablet 40 milliGRAM(s) Oral before breakfast  potassium chloride   Powder 40 milliEquivalent(s) Oral every 4 hours  sacubitril 24 mG/valsartan 26 mG 1 Tablet(s) Oral two times a day  senna 2 Tablet(s) Oral at bedtime  spironolactone 25 milliGRAM(s) Oral daily  tamsulosin 0.4 milliGRAM(s) Oral at bedtime

## 2019-07-25 NOTE — PROGRESS NOTE ADULT - ASSESSMENT
A 74 years old male presented with Lt sided squeezing type cp which started 5 AM lasting for 5 hrs and was associated with palpitations and sob.    CTA chest: No PE  CXR: NAPD  EKG: A. Fib @ 127/min. (Interpreted by me)  CE x 2 negative.   BNP: 5408        1. A. Fib with RVR  2. CP due to rapid A. Fib  3. CM  4. Non obst CAD  5. HTN  6. No CHF. H/O Ch. HFrEF  7. Pancytopenia / Thrombocytopenia  8. Hematuria  9. Epistaxis  10. Lt Arm cellulitis/Thrombophlebitis  11. UTI (Enterococcus)          PLAN:    ·	ID F/U noted. Recommended to D/C Unasyn and cont IV Vanco  ·	Blood cx is negative  ·	Urine growing enterococcus. Also having thrombophlebitis of Lt. Antecubital fossa  ·	Venous doppler of Lt arm  ·	S/P Lt. Nares surgical fibrillar reabsorbable by ENT. No more epistaxis.  ·	Some drop in Hb from baseline. Follow CBC.  ·	In NSR now. Cont Metoprolol succinate 200 mg po daily  ·	Cardiology eval noted. Pt with H/O epistaxis, now having hematuria. Having pancytopenia/Thrombocytopenia and significant anemia. Not a candidate for long term A/C as per cardiology.  ·	Cont Digoxin 0.125 mg po daily. Check Dig level  ·	BP is stable. Will cont Lasix 40 mg po daily  ·	Urology eval noted. Recommended urine cytology. Renal/Bladder US is unremarkable. Out pt cystoscopy  ·	Bone marrow biopsy done today  ·	Cont ASA, Lipitor, Metoprolol and Spironolactone.   ·	Discussed goals of care with niece and nephew who are health care proxy. Pt is now DNR/DNI. Do not want Hospice care.       #Progress Note Handoff  Pending (specify):  Consults_________, Tests________, Test Results_______, Other___Fever______  Family discussion:  Disposition: Home___/SNF___/Other________/Unknown at this time________ A 74 years old male presented with Lt sided squeezing type cp which started 5 AM lasting for 5 hrs and was associated with palpitations and sob.    CTA chest: No PE  CXR: NAPD  EKG: A. Fib @ 127/min. (Interpreted by me)  CE x 2 negative.   BNP: 5408        1. A. Fib with RVR on admission - NSR now  2. CP due to rapid A. Fib  3. CM  4. Non obst CAD  5. HTN  6. No CHF. H/O Ch. HFrEF  7. Pancytopenia / Thrombocytopenia  8. Hematuria  9. Epistaxis  10. Lt Arm cellulitis/Thrombophlebitis  11. UTI (Enterococcus)  12. Ac. blood loss anemia          PLAN:    ·	ID F/U noted. Recommended to cont IV Vanco & vascular eval. As per vascular no intervention for now  ·	Warm compresses to Lt. Arm  ·	Blood cx is negative  ·	Urine +ve for enterococcus.   ·	Venous doppler of Lt arm showed thrombophlebitis of Lt. Cephalic vein  ·	Please recall ENT for recurrent epistaxis.  ·	Drop in H/H. Transfuse one unit of PRBC'S and follow CBC  ·	In NSR now. Cont Metoprolol succinate 200 mg po daily  ·	Cardiology eval noted. Pt with H/O epistaxis, now having hematuria. Having pancytopenia/Thrombocytopenia and significant anemia. Not a candidate for long term A/C as per cardiology.  ·	Cont Digoxin 0.125 mg po daily. Check Dig level  ·	BP is stable. Will cont Lasix 40 mg po daily  ·	Urology eval noted. Recommended urine cytology. Renal/Bladder US is unremarkable. Out pt cystoscopy  ·	Check bone marrow biopsy report.   ·	Cont ASA, Lipitor, Metoprolol and Spironolactone.   ·	Discussed goals of care with niece and nephew who are health care proxy. Pt is now DNR/DNI. Do not want Hospice care.       #Progress Note Handoff  Pending (specify):  Consults_________, Tests________, Test Results_______, Other___On IV Vanco for thrombophlebitis______  Family discussion: With Niece who is health car proxy   Disposition: Home___/SNF___/Other________/Unknown at this time________

## 2019-07-25 NOTE — PROGRESS NOTE ADULT - SUBJECTIVE AND OBJECTIVE BOX
ENT DAILY PROGRESS NOTE    Overnight events/Interval HPI: HPI:  74 year old male w/ history of L nare epistaxis. Pt known to our service and we were recalled for evaluation of an acute epistaxis this morning. On examination pt resting comfortably and states that it was a scmall bleed that resolved on its own. He states he was picking his nose when he noticed it happen and says he understands its a bad habit which is the main reason for his anterior L nare epistaxis but continues to traumatize that area. No rebleeds since and denies any fevers, chills, N/V, SOB/difficulty breathing.             Allergies    No Known Allergies    Intolerances        MEDICATIONS:  Antiinfectives:   ceFAZolin   IVPB 1000 milliGRAM(s) IV Intermittent every 8 hours  ceFAZolin   IVPB        IV fluids:  dextrose 5%. 1000 milliLiter(s) IV Continuous <Continuous>  potassium chloride   Powder 40 milliEquivalent(s) Oral every 4 hours    Hematologic/Anticoagulation:  aspirin  chewable 81 milliGRAM(s) Oral daily    Pain medications/Neuro:  acetaminophen   Tablet .. 650 milliGRAM(s) Oral every 6 hours PRN  diazepam    Tablet 2 milliGRAM(s) Oral daily    Endocrine Medications:   atorvastatin 80 milliGRAM(s) Oral at bedtime  dextrose 40% Gel 15 Gram(s) Oral once PRN  dextrose 50% Injectable 12.5 Gram(s) IV Push once  glucagon  Injectable 1 milliGRAM(s) IntraMuscular once PRN  insulin glargine Injectable (LANTUS) 18 Unit(s) SubCutaneous at bedtime  insulin lispro (HumaLOG) corrective regimen sliding scale   SubCutaneous three times a day before meals  insulin lispro Injectable (HumaLOG) 6 Unit(s) SubCutaneous three times a day before meals    All other standing medications:   chlorhexidine 4% Liquid 1 Application(s) Topical <User Schedule>  digoxin     Tablet 0.125 milliGRAM(s) Oral daily  docusate sodium 100 milliGRAM(s) Oral two times a day  furosemide    Tablet 40 milliGRAM(s) Oral daily  metoprolol succinate  milliGRAM(s) Oral daily  pantoprazole    Tablet 40 milliGRAM(s) Oral before breakfast  sacubitril 24 mG/valsartan 26 mG 1 Tablet(s) Oral two times a day  senna 2 Tablet(s) Oral at bedtime  spironolactone 25 milliGRAM(s) Oral daily  tamsulosin 0.4 milliGRAM(s) Oral at bedtime    All other PRN medications:      Vital Signs Last 24 Hrs  T(C): 35.8 (25 Jul 2019 13:38), Max: 37.7 (25 Jul 2019 05:01)  T(F): 96.4 (25 Jul 2019 13:38), Max: 99.9 (25 Jul 2019 05:01)  HR: 87 (25 Jul 2019 13:38) (81 - 87)  BP: 145/65 (25 Jul 2019 13:38) (108/55 - 145/65)  BP(mean): --  RR: 20 (25 Jul 2019 13:38) (18 - 20)  SpO2: --      07-24 @ 07:01  -  07-25 @ 07:00  --------------------------------------------------------  IN:  Total IN: 0 mL    OUT:    Voided: 500 mL  Total OUT: 500 mL    Total NET: -500 mL      07-25 @ 07:01  -  07-25 @ 14:49  --------------------------------------------------------  IN:    Oral Fluid: 450 mL  Total IN: 450 mL    OUT:    Voided: 400 mL  Total OUT: 400 mL    Total NET: 50 mL            PHYSICAL EXAM:    ENT EXAM-   Constitutional: NAD, awake/alert   Head:  normocephalic, atraumatic.   Nose:  No active bleed noted, + dry blood to L nare, + surgicell absorbable packing in place in L nare.   Neck:  Trachea midline.  Thyroid, parotid and submandibular glands normal.  Lymph:  No cervical adenopathy.    LABS:  CBC-                        6.6    1.90  )-----------( 36       ( 25 Jul 2019 05:19 )             20.7     BMP/CMP-  25 Jul 2019 05:19    138    |  100    |  18     ----------------------------<  120    3.3     |  28     |  1.1      Ca    8.0        25 Jul 2019 05:19  Mg     1.8       25 Jul 2019 05:19    TPro  5.7    /  Alb  2.5    /  TBili  0.7    /  DBili  x      /  AST  29     /  ALT  29     /  AlkPhos  68     25 Jul 2019 05:19    Coagulation Studies-    Endocrine Panel-  Calcium, Total Serum: 8.0 mg/dL (07-25 @ 05:19)

## 2019-07-25 NOTE — CONSULT NOTE ADULT - ATTENDING COMMENTS
Left basilic thrombophlebitis related to peripheral IV. Has induration and localized erythema in antecubital fossa, no fluctuance. Recommend:    - IV abx  - Arm elevation  - NSAID's  - Will keep an eye
I personally saw and examined the patient, reviewed the chart and available data. I discussed the situation with the patient, nursing and PA staff. I also reviewed and/or amended the note as necessary.    patient seen on the day in question. Note not reviewed and cosigned until now due to scheduling issues

## 2019-07-25 NOTE — CONSULT NOTE ADULT - CONSULT REASON
afib, tachycardia
hematuria
left nare epistaxis
Left AC fossa thrombophlebitis
Management of cellulitis LUE

## 2019-07-25 NOTE — PROGRESS NOTE ADULT - ASSESSMENT
74 year old male w/ past medical history of CAD s/p stent, non-obstructive cardiomyopathy with EF 26%, hypertension, hyperlipidemia, DM II, chronic pancytopenia presenting with chief complaint of L-sided chest pain and palpitations found to be in Afib w/ RVR, now rate controlled, but patient now with intermittent fevers and new left antecubital superficial basilic vein thrombophlebitis.    #Left superficial thrombophlebitis  -LUE vein duplex confirms superficial thrombophlebitis of the Lt basilic vein.   -Prelim blood cultures were negative. Pending repeat   -ID consulted. Recommended to d/c Vanc and start Unasyn 1gm IV q8h.   -Vascular surgery consult appreciated; recommended warm compress and to c/w ABX    #Afib with RVR  -Still in NSR; patient d/c off telemetry   -C/w Metoprolol succinate 200 mg po daily and Digoxin 0.125 mg po daily  -Cardiology evaluation noted. Patient with history of chronic pancytopenia. He is not a candidate for long term A/C as per cardiology.    #non-obstructive cardiomyopathy with EF 26%:  -BP is stable. Will cont Lasix 40 mg po daily    #HTN  -c/w ASA, Lipitor, Metoprolol and Spironolactone.    #Chronic Pancytopenia  -Hb low at 6.6 today. It was at 7.4 yesterday. He will receive 1 unit PRBC transfused today.   -s/p BM biopsy. Will follow up with heme/onc concerning results and further impressions/recommendations.    #Hematuria  -Urology evaluation noted. Renal/Bladder US is unremarkable.They would like to perform and outpatient cystoscopy.   -Hematuria has resolved. UA negative.     #Epistaxis  -S/P Lt. Nares surgical fibrillar reabsorbable by ENT. Patient had episode again last night, ENT consulted, will evaluate    #constipation  -c/w Colace and Senna

## 2019-07-25 NOTE — PROGRESS NOTE ADULT - SUBJECTIVE AND OBJECTIVE BOX
SHANE ASHTON  74y Male    CHIEF COMPLAINT:    Patient is a 74y old  Male who presents with a chief complaint of New onset Afib (2019 06:11)      INTERVAL HPI/OVERNIGHT EVENTS:    Patient seen and examined. Having epistaxis from Lt nares again. No fever. No sob.     ROS: All other systems are negative.    Vital Signs:    T(F): 99.9 (19 @ 05:01), Max: 99.9 (19 @ 13:32)  HR: 81 (19 @ 05:01) (81 - 89)  BP: 108/55 (19 @ 05:01) (108/55 - 140/66)  RR: 18 (19 @ 05:01) (18 - 18)  SpO2: --  I&O's Summary    2019 07:  -  2019 07:00  --------------------------------------------------------  IN: 0 mL / OUT: 500 mL / NET: -500 mL    2019 07:  -  2019 10:58  --------------------------------------------------------  IN: 450 mL / OUT: 400 mL / NET: 50 mL      Daily     Daily Weight in k (2019 05:01)  CAPILLARY BLOOD GLUCOSE      POCT Blood Glucose.: 159 mg/dL (2019 07:26)  POCT Blood Glucose.: 163 mg/dL (2019 21:18)  POCT Blood Glucose.: 169 mg/dL (2019 16:48)  POCT Blood Glucose.: 225 mg/dL (2019 11:30)      PHYSICAL EXAM:    GENERAL:  NAD  SKIN: No rashes or lesions  HENT: Atraumatic Normocephalic. PERRL. Moist membranes.  NECK: Supple, No JVD. No lymphadenopathy.  PULMONARY: CTA B/L. No wheezing. No rales  CVS: Normal S1, S2. Rate and Rhythm are IRIR. No murmurs.  ABDOMEN/GI: Soft, Nontender, Nondistended; BS present  EXTREMITIES: Peripheral pulses intact. No edema B/L LE.  NEUROLOGIC:  No motor or sensory deficit.  PSYCH: Alert & oriented x 3    Consultant(s) Notes Reviewed:  [x ] YES  [ ] NO  Care Discussed with Consultants/Other Providers [ x] YES  [ ] NO    EKG reviewed  Telemetry reviewed    LABS:                        6.6    1.90  )-----------( 36       ( 2019 05:19 )             20.7     07-25    138  |  100  |  18  ----------------------------<  120<H>  3.3<L>   |  28  |  1.1    Ca    8.0<L>      2019 05:19  Mg     1.8     25    TPro  5.7<L>  /  Alb  2.5<L>  /  TBili  0.7  /  DBili  x   /  AST  29  /  ALT  29  /  AlkPhos  68  07-25    PT/INR - ( 2019 12:42 )   PT: 13.30 sec;   INR: 1.16 ratio         PTT - ( 2019 12:42 )  PTT:26.4 sec        Culture - Blood (collected 2019 06:34)  Source: .Blood None  Preliminary Report (2019 14:01):    No growth to date.    Culture - Blood (collected 2019 06:34)  Source: .Blood None  Preliminary Report (2019 14:01):    No growth to date.    Culture - Blood (collected 2019 17:04)  Source: .Blood None  Preliminary Report (2019 03:01):    No growth to date.        RADIOLOGY & ADDITIONAL TESTS:      Imaging or report Personally Reviewed:  [ ] YES  [ ] NO    Medications:  Standing  aspirin  chewable 81 milliGRAM(s) Oral daily  atorvastatin 80 milliGRAM(s) Oral at bedtime  ceFAZolin   IVPB 1000 milliGRAM(s) IV Intermittent once  ceFAZolin   IVPB 1000 milliGRAM(s) IV Intermittent every 8 hours  ceFAZolin   IVPB      chlorhexidine 4% Liquid 1 Application(s) Topical <User Schedule>  dextrose 5%. 1000 milliLiter(s) IV Continuous <Continuous>  dextrose 50% Injectable 12.5 Gram(s) IV Push once  diazepam    Tablet 2 milliGRAM(s) Oral daily  digoxin     Tablet 0.125 milliGRAM(s) Oral daily  docusate sodium 100 milliGRAM(s) Oral two times a day  furosemide    Tablet 40 milliGRAM(s) Oral daily  insulin glargine Injectable (LANTUS) 18 Unit(s) SubCutaneous at bedtime  insulin lispro (HumaLOG) corrective regimen sliding scale   SubCutaneous three times a day before meals  insulin lispro Injectable (HumaLOG) 6 Unit(s) SubCutaneous three times a day before meals  metoprolol succinate  milliGRAM(s) Oral daily  pantoprazole    Tablet 40 milliGRAM(s) Oral before breakfast  potassium chloride   Powder 40 milliEquivalent(s) Oral every 4 hours  sacubitril 24 mG/valsartan 26 mG 1 Tablet(s) Oral two times a day  senna 2 Tablet(s) Oral at bedtime  spironolactone 25 milliGRAM(s) Oral daily  tamsulosin 0.4 milliGRAM(s) Oral at bedtime    PRN Meds  acetaminophen   Tablet .. 650 milliGRAM(s) Oral every 6 hours PRN  dextrose 40% Gel 15 Gram(s) Oral once PRN  glucagon  Injectable 1 milliGRAM(s) IntraMuscular once PRN      Case discussed with resident    Care discussed with pt/family SHANE ASHTON  74y Male    CHIEF COMPLAINT:    Patient is a 74y old  Male who presents with a chief complaint of New onset Afib (2019 06:11)      INTERVAL HPI/OVERNIGHT EVENTS:    Patient seen and examined. Having epistaxis from Lt nares again. No fever. No sob. Drop in H/H    ROS: All other systems are negative.    Vital Signs:    T(F): 99.9 (19 @ 05:01), Max: 99.9 (19 @ 13:32)  HR: 81 (19 @ 05:01) (81 - 89)  BP: 108/55 (19 @ 05:01) (108/55 - 140/66)  RR: 18 (19 @ 05:01) (18 - 18)  SpO2: --  I&O's Summary    2019 07:  -  2019 07:00  --------------------------------------------------------  IN: 0 mL / OUT: 500 mL / NET: -500 mL    2019 07:  -  2019 10:58  --------------------------------------------------------  IN: 450 mL / OUT: 400 mL / NET: 50 mL      Daily     Daily Weight in k (2019 05:01)  CAPILLARY BLOOD GLUCOSE      POCT Blood Glucose.: 159 mg/dL (2019 07:26)  POCT Blood Glucose.: 163 mg/dL (2019 21:18)  POCT Blood Glucose.: 169 mg/dL (2019 16:48)  POCT Blood Glucose.: 225 mg/dL (2019 11:30)      PHYSICAL EXAM:    GENERAL:  NAD  SKIN: No rashes or lesions  HENT: Atraumatic Normocephalic. PERRL. Moist membranes.  NECK: Supple, No JVD. No lymphadenopathy.  PULMONARY: CTA B/L. No wheezing. No rales  CVS: Normal S1, S2. Rate and Rhythm are regular. No murmurs.  ABDOMEN/GI: Soft, Nontender, Nondistended; BS present  EXTREMITIES: Peripheral pulses intact. No edema B/L LE.  NEUROLOGIC:  No motor or sensory deficit.  PSYCH: Alert & oriented x 3    Consultant(s) Notes Reviewed:  [x ] YES  [ ] NO  Care Discussed with Consultants/Other Providers [ x] YES  [ ] NO    EKG reviewed  Telemetry reviewed    LABS:                        6.6    1.90  )-----------( 36       ( 2019 05:19 )             20.7     07-25    138  |  100  |  18  ----------------------------<  120<H>  3.3<L>   |  28  |  1.1    Ca    8.0<L>      2019 05:19  Mg     1.8         TPro  5.7<L>  /  Alb  2.5<L>  /  TBili  0.7  /  DBili  x   /  AST  29  /  ALT  29  /  AlkPhos  68  07-25    PT/INR - ( 2019 12:42 )   PT: 13.30 sec;   INR: 1.16 ratio         PTT - ( 2019 12:42 )  PTT:26.4 sec        Culture - Blood (collected 2019 06:34)  Source: .Blood None  Preliminary Report (2019 14:01):    No growth to date.    Culture - Blood (collected 2019 06:34)  Source: .Blood None  Preliminary Report (2019 14:01):    No growth to date.    Culture - Blood (collected 2019 17:04)  Source: .Blood None  Preliminary Report (2019 03:01):    No growth to date.        RADIOLOGY & ADDITIONAL TESTS:      Imaging or report Personally Reviewed:  [ ] YES  [ ] NO    Medications:  Standing  aspirin  chewable 81 milliGRAM(s) Oral daily  atorvastatin 80 milliGRAM(s) Oral at bedtime  ceFAZolin   IVPB 1000 milliGRAM(s) IV Intermittent once  ceFAZolin   IVPB 1000 milliGRAM(s) IV Intermittent every 8 hours  ceFAZolin   IVPB      chlorhexidine 4% Liquid 1 Application(s) Topical <User Schedule>  dextrose 5%. 1000 milliLiter(s) IV Continuous <Continuous>  dextrose 50% Injectable 12.5 Gram(s) IV Push once  diazepam    Tablet 2 milliGRAM(s) Oral daily  digoxin     Tablet 0.125 milliGRAM(s) Oral daily  docusate sodium 100 milliGRAM(s) Oral two times a day  furosemide    Tablet 40 milliGRAM(s) Oral daily  insulin glargine Injectable (LANTUS) 18 Unit(s) SubCutaneous at bedtime  insulin lispro (HumaLOG) corrective regimen sliding scale   SubCutaneous three times a day before meals  insulin lispro Injectable (HumaLOG) 6 Unit(s) SubCutaneous three times a day before meals  metoprolol succinate  milliGRAM(s) Oral daily  pantoprazole    Tablet 40 milliGRAM(s) Oral before breakfast  potassium chloride   Powder 40 milliEquivalent(s) Oral every 4 hours  sacubitril 24 mG/valsartan 26 mG 1 Tablet(s) Oral two times a day  senna 2 Tablet(s) Oral at bedtime  spironolactone 25 milliGRAM(s) Oral daily  tamsulosin 0.4 milliGRAM(s) Oral at bedtime    PRN Meds  acetaminophen   Tablet .. 650 milliGRAM(s) Oral every 6 hours PRN  dextrose 40% Gel 15 Gram(s) Oral once PRN  glucagon  Injectable 1 milliGRAM(s) IntraMuscular once PRN      Case discussed with resident    Care discussed with pt/family

## 2019-07-26 LAB
ALBUMIN SERPL ELPH-MCNC: 2.6 G/DL — LOW (ref 3.5–5.2)
ALP SERPL-CCNC: 76 U/L — SIGNIFICANT CHANGE UP (ref 30–115)
ALT FLD-CCNC: 24 U/L — SIGNIFICANT CHANGE UP (ref 0–41)
ANION GAP SERPL CALC-SCNC: 12 MMOL/L — SIGNIFICANT CHANGE UP (ref 7–14)
AST SERPL-CCNC: 27 U/L — SIGNIFICANT CHANGE UP (ref 0–41)
BASOPHILS # BLD AUTO: 0.01 K/UL — SIGNIFICANT CHANGE UP (ref 0–0.2)
BASOPHILS NFR BLD AUTO: 0.5 % — SIGNIFICANT CHANGE UP (ref 0–1)
BILIRUB SERPL-MCNC: 0.6 MG/DL — SIGNIFICANT CHANGE UP (ref 0.2–1.2)
BUN SERPL-MCNC: 17 MG/DL — SIGNIFICANT CHANGE UP (ref 10–20)
CALCIUM SERPL-MCNC: 8.2 MG/DL — LOW (ref 8.5–10.1)
CHLORIDE SERPL-SCNC: 104 MMOL/L — SIGNIFICANT CHANGE UP (ref 98–110)
CO2 SERPL-SCNC: 27 MMOL/L — SIGNIFICANT CHANGE UP (ref 17–32)
CREAT SERPL-MCNC: 1.1 MG/DL — SIGNIFICANT CHANGE UP (ref 0.7–1.5)
DIGOXIN SERPL-MCNC: 0.9 NG/ML — SIGNIFICANT CHANGE UP (ref 0.8–2)
EOSINOPHIL # BLD AUTO: 0.02 K/UL — SIGNIFICANT CHANGE UP (ref 0–0.7)
EOSINOPHIL NFR BLD AUTO: 1.1 % — SIGNIFICANT CHANGE UP (ref 0–8)
GLUCOSE BLDC GLUCOMTR-MCNC: 124 MG/DL — HIGH (ref 70–99)
GLUCOSE BLDC GLUCOMTR-MCNC: 151 MG/DL — HIGH (ref 70–99)
GLUCOSE BLDC GLUCOMTR-MCNC: 154 MG/DL — HIGH (ref 70–99)
GLUCOSE BLDC GLUCOMTR-MCNC: 287 MG/DL — HIGH (ref 70–99)
GLUCOSE SERPL-MCNC: 128 MG/DL — HIGH (ref 70–99)
HCT VFR BLD CALC: 24.1 % — LOW (ref 42–52)
HGB BLD-MCNC: 7.8 G/DL — LOW (ref 14–18)
IMM GRANULOCYTES NFR BLD AUTO: 1.1 % — HIGH (ref 0.1–0.3)
LYMPHOCYTES # BLD AUTO: 0.66 K/UL — LOW (ref 1.2–3.4)
LYMPHOCYTES # BLD AUTO: 35.3 % — SIGNIFICANT CHANGE UP (ref 20.5–51.1)
MAGNESIUM SERPL-MCNC: 2 MG/DL — SIGNIFICANT CHANGE UP (ref 1.8–2.4)
MCHC RBC-ENTMCNC: 29.8 PG — SIGNIFICANT CHANGE UP (ref 27–31)
MCHC RBC-ENTMCNC: 32.4 G/DL — SIGNIFICANT CHANGE UP (ref 32–37)
MCV RBC AUTO: 92 FL — SIGNIFICANT CHANGE UP (ref 80–94)
MONOCYTES # BLD AUTO: 0.61 K/UL — HIGH (ref 0.1–0.6)
MONOCYTES NFR BLD AUTO: 32.6 % — HIGH (ref 1.7–9.3)
NEUTROPHILS # BLD AUTO: 0.55 K/UL — LOW (ref 1.4–6.5)
NEUTROPHILS NFR BLD AUTO: 29.4 % — LOW (ref 42.2–75.2)
NRBC # BLD: 3 /100 WBCS — HIGH (ref 0–0)
PLATELET # BLD AUTO: 40 K/UL — LOW (ref 130–400)
POTASSIUM SERPL-MCNC: 3.4 MMOL/L — LOW (ref 3.5–5)
POTASSIUM SERPL-SCNC: 3.4 MMOL/L — LOW (ref 3.5–5)
PROT SERPL-MCNC: 6 G/DL — SIGNIFICANT CHANGE UP (ref 6–8)
RBC # BLD: 2.62 M/UL — LOW (ref 4.7–6.1)
RBC # FLD: 18.3 % — HIGH (ref 11.5–14.5)
SODIUM SERPL-SCNC: 143 MMOL/L — SIGNIFICANT CHANGE UP (ref 135–146)
WBC # BLD: 1.87 K/UL — LOW (ref 4.8–10.8)
WBC # FLD AUTO: 1.87 K/UL — LOW (ref 4.8–10.8)

## 2019-07-26 PROCEDURE — 99233 SBSQ HOSP IP/OBS HIGH 50: CPT

## 2019-07-26 RX ORDER — CEPHALEXIN 500 MG
1 CAPSULE ORAL
Qty: 15 | Refills: 0
Start: 2019-07-26 | End: 2019-07-30

## 2019-07-26 RX ORDER — POTASSIUM CHLORIDE 20 MEQ
40 PACKET (EA) ORAL ONCE
Refills: 0 | Status: COMPLETED | OUTPATIENT
Start: 2019-07-26 | End: 2019-07-26

## 2019-07-26 RX ORDER — CEPHALEXIN 500 MG
500 CAPSULE ORAL THREE TIMES A DAY
Refills: 0 | Status: DISCONTINUED | OUTPATIENT
Start: 2019-07-26 | End: 2019-07-27

## 2019-07-26 RX ADMIN — SACUBITRIL AND VALSARTAN 1 TABLET(S): 24; 26 TABLET, FILM COATED ORAL at 17:15

## 2019-07-26 RX ADMIN — Medication 6 UNIT(S): at 17:14

## 2019-07-26 RX ADMIN — INSULIN GLARGINE 18 UNIT(S): 100 INJECTION, SOLUTION SUBCUTANEOUS at 22:11

## 2019-07-26 RX ADMIN — PANTOPRAZOLE SODIUM 40 MILLIGRAM(S): 20 TABLET, DELAYED RELEASE ORAL at 05:43

## 2019-07-26 RX ADMIN — Medication 100 MILLIGRAM(S): at 05:44

## 2019-07-26 RX ADMIN — Medication 2 MILLIGRAM(S): at 12:03

## 2019-07-26 RX ADMIN — Medication 100 MILLIGRAM(S): at 05:43

## 2019-07-26 RX ADMIN — Medication 0.12 MILLIGRAM(S): at 05:43

## 2019-07-26 RX ADMIN — Medication 1: at 12:02

## 2019-07-26 RX ADMIN — Medication 500 MILLIGRAM(S): at 22:12

## 2019-07-26 RX ADMIN — Medication 3: at 17:14

## 2019-07-26 RX ADMIN — Medication 40 MILLIGRAM(S): at 05:44

## 2019-07-26 RX ADMIN — Medication 200 MILLIGRAM(S): at 05:44

## 2019-07-26 RX ADMIN — TAMSULOSIN HYDROCHLORIDE 0.4 MILLIGRAM(S): 0.4 CAPSULE ORAL at 22:11

## 2019-07-26 RX ADMIN — Medication 6 UNIT(S): at 12:02

## 2019-07-26 RX ADMIN — SENNA PLUS 2 TABLET(S): 8.6 TABLET ORAL at 22:11

## 2019-07-26 RX ADMIN — Medication 100 MILLIGRAM(S): at 17:15

## 2019-07-26 RX ADMIN — Medication 6 UNIT(S): at 08:05

## 2019-07-26 RX ADMIN — Medication 40 MILLIEQUIVALENT(S): at 12:03

## 2019-07-26 RX ADMIN — SPIRONOLACTONE 25 MILLIGRAM(S): 25 TABLET, FILM COATED ORAL at 05:44

## 2019-07-26 RX ADMIN — Medication 500 MILLIGRAM(S): at 13:36

## 2019-07-26 RX ADMIN — ATORVASTATIN CALCIUM 80 MILLIGRAM(S): 80 TABLET, FILM COATED ORAL at 22:11

## 2019-07-26 RX ADMIN — Medication 81 MILLIGRAM(S): at 12:02

## 2019-07-26 RX ADMIN — SACUBITRIL AND VALSARTAN 1 TABLET(S): 24; 26 TABLET, FILM COATED ORAL at 05:44

## 2019-07-26 NOTE — PROGRESS NOTE ADULT - ASSESSMENT
A 74 years old male presented with Lt sided squeezing type cp which started 5 AM lasting for 5 hrs and was associated with palpitations and sob.    CTA chest: No PE  CXR: NAPD  EKG: A. Fib @ 127/min. (Interpreted by me)  CE x 2 negative.   BNP: 5408        1. A. Fib with RVR on admission - NSR now  2. CP due to rapid A. Fib  3. CM  4. Non obst CAD  5. HTN  6. No CHF. H/O Ch. HFrEF  7. Pancytopenia / Thrombocytopenia  8. Hematuria  9. Epistaxis  10. Lt Arm cellulitis/Thrombophlebitis  11. UTI (Enterococcus)  12. Ac. blood loss anemia          PLAN:    ·	Care D/W the ID. Recommended to switch him Keflex 500 mg po q 8h for 5 more days.  ·	Warm compresses to Lt. Arm  ·	Blood cx is negative  ·	Urine +ve for enterococcus.   ·	Venous doppler of Lt arm showed thrombophlebitis of Lt. Cephalic vein  ·	ENT F/U noted. Apply Bacitracin and saline to Lt. Nares.   ·	Drop in H/H. Transfuse one unit of PRBC'S and follow CBC  ·	In NSR now. Cont Metoprolol succinate 200 mg po daily  ·	Cardiology eval noted. Pt with H/O epistaxis, now having hematuria. Having pancytopenia/Thrombocytopenia and significant anemia. Not a candidate for long term A/C as per cardiology.  ·	Cont Digoxin 0.125 mg po daily. Check Dig level  ·	BP is stable. Will cont Lasix 40 mg po daily  ·	Renal/Bladder US is unremarkable. Out pt cystoscopy and F/U with Urology  ·	Cont ASA, Lipitor, Metoprolol and Spironolactone.   ·	Discussed goals of care with niece and nephew who are health care proxy. Pt is now DNR/DNI. Do not want Hospice care.   ·	S/P bone marrow biopsy for pancytopenia. F/U with hematology Dr. Santoro. ]  ·	Can D/C him back to Assisted Living      * Med rec reviewed. Plan of care D/W the pt. Time spent 34 minutes.

## 2019-07-26 NOTE — PROGRESS NOTE ADULT - SUBJECTIVE AND OBJECTIVE BOX
NATALI ASHTONPH  74y, Male      OVERNIGHT EVENTS:    no fevers, has no pain left antecubital fossa    VITALS:  T(F): 98.6, Max: 99.2 (07-25-19 @ 23:08)  HR: 77  BP: 105/55  RR: 17Vital Signs Last 24 Hrs  T(C): 37 (26 Jul 2019 05:51), Max: 37.3 (25 Jul 2019 23:08)  T(F): 98.6 (26 Jul 2019 05:51), Max: 99.2 (25 Jul 2019 23:08)  HR: 77 (26 Jul 2019 05:51) (75 - 88)  BP: 105/55 (26 Jul 2019 05:51) (105/55 - 145/65)  BP(mean): --  RR: 17 (26 Jul 2019 05:51) (17 - 20)  SpO2: --    TESTS & MEASUREMENTS:                        7.8    1.87  )-----------( 40       ( 26 Jul 2019 05:58 )             24.1     07-26    143  |  104  |  17  ----------------------------<  128<H>  3.4<L>   |  27  |  1.1    Ca    8.2<L>      26 Jul 2019 05:58  Mg     2.0     07-26    TPro  6.0  /  Alb  2.6<L>  /  TBili  0.6  /  DBili  x   /  AST  27  /  ALT  24  /  AlkPhos  76  07-26    LIVER FUNCTIONS - ( 26 Jul 2019 05:58 )  Alb: 2.6 g/dL / Pro: 6.0 g/dL / ALK PHOS: 76 U/L / ALT: 24 U/L / AST: 27 U/L / GGT: x             Culture - Blood (collected 07-24-19 @ 05:37)  Source: .Blood None  Preliminary Report (07-25-19 @ 14:01):    No growth to date.    Culture - Blood (collected 07-23-19 @ 06:34)  Source: .Blood None  Preliminary Report (07-24-19 @ 14:01):    No growth to date.    Culture - Blood (collected 07-23-19 @ 06:34)  Source: .Blood None  Preliminary Report (07-24-19 @ 14:01):    No growth to date.    Culture - Blood (collected 07-22-19 @ 17:04)  Source: .Blood None  Preliminary Report (07-24-19 @ 03:01):    No growth to date.    Culture - Urine (collected 07-19-19 @ 16:19)  Source: .Urine Clean Catch (Midstream)  Final Report (07-22-19 @ 14:12):    >100,000 CFU/ml Enterococcus faecalis  Organism: Enterococcus faecalis (07-22-19 @ 14:12)  Organism: Enterococcus faecalis (07-22-19 @ 14:12)      -  Ampicillin: S <=2 Predicts results to ampicillin/sulbactam, amoxacillin-clavulanate and  piperacillin-tazobactam.      -  Ciprofloxacin: S <=1      -  Levofloxacin: S <=1      -  Nitrofurantoin: S <=32 Should not be used to treat pyelonephritis.      -  Tetra/Doxy: R >8      -  Vancomycin: S 2      Method Type: KARELY            RADIOLOGY & ADDITIONAL TESTS:    ANTIBIOTICS:  ceFAZolin   IVPB 1000 milliGRAM(s) IV Intermittent every 8 hours  ceFAZolin   IVPB

## 2019-07-26 NOTE — PROGRESS NOTE ADULT - SUBJECTIVE AND OBJECTIVE BOX
SHANE ASHTON  74y Male    CHIEF COMPLAINT:    Patient is a 74y old  Male who presents with a chief complaint of New onset Afib (26 Jul 2019 08:39)      INTERVAL HPI/OVERNIGHT EVENTS:    Patient seen and examined. No more epistaxis. No pain in Lt. Antecubital fossa. No  more fever.    ROS: All other systems are negative.    Vital Signs:    T(F): 98.6 (07-26-19 @ 05:51), Max: 99.2 (07-25-19 @ 23:08)  HR: 77 (07-26-19 @ 05:51) (75 - 88)  BP: 105/55 (07-26-19 @ 05:51) (105/55 - 145/65)  RR: 17 (07-26-19 @ 05:51) (17 - 20)  SpO2: --  I&O's Summary    25 Jul 2019 07:01  -  26 Jul 2019 07:00  --------------------------------------------------------  IN: 450 mL / OUT: 1400 mL / NET: -950 mL      Daily     Daily   CAPILLARY BLOOD GLUCOSE      POCT Blood Glucose.: 124 mg/dL (26 Jul 2019 08:03)  POCT Blood Glucose.: 191 mg/dL (25 Jul 2019 21:11)  POCT Blood Glucose.: 128 mg/dL (25 Jul 2019 16:50)  POCT Blood Glucose.: 191 mg/dL (25 Jul 2019 11:08)      PHYSICAL EXAM:    GENERAL:  NAD  SKIN: No rashes or lesions  HENT: Atraumatic Normocephalic. PERRL. Moist membranes.  NECK: Supple, No JVD. No lymphadenopathy.  PULMONARY: CTA B/L. No wheezing. No rales  CVS: Normal S1, S2. Rate and Rhythm are regular. No murmurs.  ABDOMEN/GI: Soft, Nontender, Nondistended; BS present  EXTREMITIES: Peripheral pulses intact. No edema B/L LE.  NEUROLOGIC:  No motor or sensory deficit.  PSYCH: Alert & oriented x 3    Consultant(s) Notes Reviewed:  [x ] YES  [ ] NO  Care Discussed with Consultants/Other Providers [ x] YES  [ ] NO    EKG reviewed  Telemetry reviewed    LABS:                        7.8    1.87  )-----------( 40       ( 26 Jul 2019 05:58 )             24.1     07-26    143  |  104  |  17  ----------------------------<  128<H>  3.4<L>   |  27  |  1.1    Ca    8.2<L>      26 Jul 2019 05:58  Mg     2.0     07-26    TPro  6.0  /  Alb  2.6<L>  /  TBili  0.6  /  DBili  x   /  AST  27  /  ALT  24  /  AlkPhos  76  07-26            Culture - Blood (collected 24 Jul 2019 05:37)  Source: .Blood None  Preliminary Report (25 Jul 2019 14:01):    No growth to date.        RADIOLOGY & ADDITIONAL TESTS:      Imaging or report Personally Reviewed:  [ ] YES  [ ] NO    Medications:  Standing  aspirin  chewable 81 milliGRAM(s) Oral daily  atorvastatin 80 milliGRAM(s) Oral at bedtime  ceFAZolin   IVPB 1000 milliGRAM(s) IV Intermittent every 8 hours  ceFAZolin   IVPB      chlorhexidine 4% Liquid 1 Application(s) Topical <User Schedule>  dextrose 5%. 1000 milliLiter(s) IV Continuous <Continuous>  dextrose 50% Injectable 12.5 Gram(s) IV Push once  diazepam    Tablet 2 milliGRAM(s) Oral daily  digoxin     Tablet 0.125 milliGRAM(s) Oral daily  docusate sodium 100 milliGRAM(s) Oral two times a day  furosemide    Tablet 40 milliGRAM(s) Oral daily  insulin glargine Injectable (LANTUS) 18 Unit(s) SubCutaneous at bedtime  insulin lispro (HumaLOG) corrective regimen sliding scale   SubCutaneous three times a day before meals  insulin lispro Injectable (HumaLOG) 6 Unit(s) SubCutaneous three times a day before meals  metoprolol succinate  milliGRAM(s) Oral daily  pantoprazole    Tablet 40 milliGRAM(s) Oral before breakfast  potassium chloride    Tablet ER 40 milliEquivalent(s) Oral once  sacubitril 24 mG/valsartan 26 mG 1 Tablet(s) Oral two times a day  senna 2 Tablet(s) Oral at bedtime  spironolactone 25 milliGRAM(s) Oral daily  tamsulosin 0.4 milliGRAM(s) Oral at bedtime    PRN Meds  acetaminophen   Tablet .. 650 milliGRAM(s) Oral every 6 hours PRN  dextrose 40% Gel 15 Gram(s) Oral once PRN  glucagon  Injectable 1 milliGRAM(s) IntraMuscular once PRN      Case discussed with resident    Care discussed with pt/family

## 2019-07-26 NOTE — PROGRESS NOTE ADULT - SUBJECTIVE AND OBJECTIVE BOX
SUBJECTIVE:    Patient is a 74y old Male who presents with a chief complaint of New onset Afib (26 Jul 2019 11:01)    Currently admitted to medicine with the primary diagnosis of New onset atrial fibrillation     Today is hospital day 9d. This morning he is resting comfortably in bed and reports no new issues or overnight events.     PAST MEDICAL & SURGICAL HISTORY  GERD (gastroesophageal reflux disease)  CAD (coronary artery disease)  Diabetes  HTN (hypertension)  H/O colonoscopy: 10 yeras ago    SOCIAL HISTORY:  Negative for smoking/alcohol/drug use.     ALLERGIES:  No Known Allergies    MEDICATIONS:  STANDING MEDICATIONS  aspirin  chewable 81 milliGRAM(s) Oral daily  atorvastatin 80 milliGRAM(s) Oral at bedtime  ceFAZolin   IVPB 1000 milliGRAM(s) IV Intermittent every 8 hours  ceFAZolin   IVPB      chlorhexidine 4% Liquid 1 Application(s) Topical <User Schedule>  dextrose 5%. 1000 milliLiter(s) IV Continuous <Continuous>  dextrose 50% Injectable 12.5 Gram(s) IV Push once  diazepam    Tablet 2 milliGRAM(s) Oral daily  digoxin     Tablet 0.125 milliGRAM(s) Oral daily  docusate sodium 100 milliGRAM(s) Oral two times a day  furosemide    Tablet 40 milliGRAM(s) Oral daily  insulin glargine Injectable (LANTUS) 18 Unit(s) SubCutaneous at bedtime  insulin lispro (HumaLOG) corrective regimen sliding scale   SubCutaneous three times a day before meals  insulin lispro Injectable (HumaLOG) 6 Unit(s) SubCutaneous three times a day before meals  metoprolol succinate  milliGRAM(s) Oral daily  pantoprazole    Tablet 40 milliGRAM(s) Oral before breakfast  potassium chloride    Tablet ER 40 milliEquivalent(s) Oral once  sacubitril 24 mG/valsartan 26 mG 1 Tablet(s) Oral two times a day  senna 2 Tablet(s) Oral at bedtime  spironolactone 25 milliGRAM(s) Oral daily  tamsulosin 0.4 milliGRAM(s) Oral at bedtime    PRN MEDICATIONS  acetaminophen   Tablet .. 650 milliGRAM(s) Oral every 6 hours PRN  dextrose 40% Gel 15 Gram(s) Oral once PRN  glucagon  Injectable 1 milliGRAM(s) IntraMuscular once PRN    VITALS:   T(F): 98.6  HR: 77  BP: 105/55  RR: 17  SpO2: --    LABS:                        7.8    1.87  )-----------( 40       ( 26 Jul 2019 05:58 )             24.1     07-26    143  |  104  |  17  ----------------------------<  128<H>  3.4<L>   |  27  |  1.1    Ca    8.2<L>      26 Jul 2019 05:58  Mg     2.0     07-26    TPro  6.0  /  Alb  2.6<L>  /  TBili  0.6  /  DBili  x   /  AST  27  /  ALT  24  /  AlkPhos  76  07-26    Culture - Blood (collected 24 Jul 2019 05:37)  Source: .Blood None  Preliminary Report (25 Jul 2019 14:01):    No growth to date.      RADIOLOGY:    PHYSICAL EXAM:  GEN: No acute distress  LUNGS: Clear to auscultation bilaterally   HEART: S1/S2 present. RRR.   ABD: Soft, non-tender, non-distended. Bowel sounds present  EXT: superficial localized area noted with erythema left antecubital fossa  NEURO: AAOX3    [id number] AND PLAN:    74 year old male w/ past medical history of CAD s/p stent, non-obstructive cardiomyopathy with EF 26%, hypertension, hyperlipidemia, DM II, chronic pancytopenia presenting with chief complaint of L-sided chest pain and palpitations found to be in Afib w/ RVR, now rate controlled, but patient now with intermittent fevers and new left antecubital superficial basilic vein thrombophlebitis.    #Left superficial thrombophlebitis  -LUE vein duplex confirms superficial thrombophlebitis of the Lt basilic vein.   -Prelim blood cultures were negative. Pending repeat   -ID on board: Keflex q8h for 5 days; abscess will evolve with time  -Vascular surgery consult appreciated; recommended warm compress, arm elevation and to c/w ABX, NSAIDs  -blood clx negative x2    # Asx bacteriuria  - Urine clx + enterococcus fecalis  - Blcx negative x2    #Afib with RVR  -Still in NSR; patient d/c off telemetry   -C/w Metoprolol succinate 200 mg po daily and Digoxin 0.125 mg po daily  -Cardiology evaluation noted. Patient with history of chronic pancytopenia. He is not a candidate for long term A/C as per cardiology.    #non-obstructive cardiomyopathy with EF 26%:  -BP is stable. Will cont Lasix 40 mg po daily    #HTN  -c/w ASA, Lipitor, Metoprolol and Spironolactone.    #Chronic Pancytopenia  -Hb low at 6.6 today. It was at 7.4 yesterday. He will receive 1 unit PRBC transfused today.   -s/p BM biopsy. Will follow up with heme/onc concerning results and further impressions/recommendations.  - PT, PTT, B12, folate WNL  - (H)  - Ferritin 452  -f/u methylmalonic acid  - Peripheral smear : anisocystosis, poikilocystosis, low PLT  - US abdomen: no enlarged spleen/liver  - Follow up OP apt 7/29/2019    #Hematuria  -Urology evaluation noted. Renal/Bladder US is unremarkable. They would like to perform and outpatient cystoscopy.   -Hematuria has resolved. UA negative.     #Epistaxis  -S/P Lt. Nares surgical fibrillar reabsorbable by ENT. Patient had episode again last night, ENT consulted, will evaluate  - ENT on board: picking nose seen on 7/25/19    #constipation  -c/w Colace and Senna SUBJECTIVE:    Patient is a 74y old Male who presents with a chief complaint of New onset Afib (26 Jul 2019 11:01)    Currently admitted to medicine with the primary diagnosis of New onset atrial fibrillation     Today is hospital day 9d. This morning he is resting comfortably in bed and reports no new issues or overnight events.     PAST MEDICAL & SURGICAL HISTORY  GERD (gastroesophageal reflux disease)  CAD (coronary artery disease)  Diabetes  HTN (hypertension)  H/O colonoscopy: 10 yeras ago    SOCIAL HISTORY:  Negative for smoking/alcohol/drug use.     ALLERGIES:  No Known Allergies    MEDICATIONS:  STANDING MEDICATIONS  aspirin  chewable 81 milliGRAM(s) Oral daily  atorvastatin 80 milliGRAM(s) Oral at bedtime  ceFAZolin   IVPB 1000 milliGRAM(s) IV Intermittent every 8 hours  ceFAZolin   IVPB      chlorhexidine 4% Liquid 1 Application(s) Topical <User Schedule>  dextrose 5%. 1000 milliLiter(s) IV Continuous <Continuous>  dextrose 50% Injectable 12.5 Gram(s) IV Push once  diazepam    Tablet 2 milliGRAM(s) Oral daily  digoxin     Tablet 0.125 milliGRAM(s) Oral daily  docusate sodium 100 milliGRAM(s) Oral two times a day  furosemide    Tablet 40 milliGRAM(s) Oral daily  insulin glargine Injectable (LANTUS) 18 Unit(s) SubCutaneous at bedtime  insulin lispro (HumaLOG) corrective regimen sliding scale   SubCutaneous three times a day before meals  insulin lispro Injectable (HumaLOG) 6 Unit(s) SubCutaneous three times a day before meals  metoprolol succinate  milliGRAM(s) Oral daily  pantoprazole    Tablet 40 milliGRAM(s) Oral before breakfast  potassium chloride    Tablet ER 40 milliEquivalent(s) Oral once  sacubitril 24 mG/valsartan 26 mG 1 Tablet(s) Oral two times a day  senna 2 Tablet(s) Oral at bedtime  spironolactone 25 milliGRAM(s) Oral daily  tamsulosin 0.4 milliGRAM(s) Oral at bedtime    PRN MEDICATIONS  acetaminophen   Tablet .. 650 milliGRAM(s) Oral every 6 hours PRN  dextrose 40% Gel 15 Gram(s) Oral once PRN  glucagon  Injectable 1 milliGRAM(s) IntraMuscular once PRN    VITALS:   T(F): 98.6  HR: 77  BP: 105/55  RR: 17  SpO2: --    LABS:                        7.8    1.87  )-----------( 40       ( 26 Jul 2019 05:58 )             24.1     07-26    143  |  104  |  17  ----------------------------<  128<H>  3.4<L>   |  27  |  1.1    Ca    8.2<L>      26 Jul 2019 05:58  Mg     2.0     07-26    TPro  6.0  /  Alb  2.6<L>  /  TBili  0.6  /  DBili  x   /  AST  27  /  ALT  24  /  AlkPhos  76  07-26    Culture - Blood (collected 24 Jul 2019 05:37)  Source: .Blood None  Preliminary Report (25 Jul 2019 14:01):    No growth to date.      RADIOLOGY:    PHYSICAL EXAM:  GEN: No acute distress  LUNGS: Clear to auscultation bilaterally   HEART: S1/S2 present. RRR.   ABD: Soft, non-tender, non-distended. Bowel sounds present  EXT: superficial localized area noted with erythema left antecubital fossa  NEURO: AAOX3    [id number] AND PLAN:    74 year old male w/ past medical history of CAD s/p stent, non-obstructive cardiomyopathy with EF 26%, hypertension, hyperlipidemia, DM II, chronic pancytopenia presenting with chief complaint of L-sided chest pain and palpitations found to be in Afib w/ RVR, now rate controlled, but patient now with intermittent fevers and new left antecubital superficial basilic vein thrombophlebitis.    #Left superficial thrombophlebitis  -LUE vein duplex confirms superficial thrombophlebitis of the Lt basilic vein.   -Prelim blood cultures were negative. Pending repeat   -ID on board: Keflex q8h for 5 days; abscess will evolve with time  -Vascular surgery consult appreciated; recommended warm compress, arm elevation and to c/w ABX, NSAIDs  -blood clx negative x2    # Asx bacteriuria  - Urine clx + enterococcus fecalis  - Blcx negative x2    #Afib with RVR  -Still in NSR; patient d/c off telemetry   -C/w Metoprolol succinate 200 mg po daily and Digoxin 0.125 mg po daily  -Cardiology evaluation noted. Patient with history of chronic pancytopenia. He is not a candidate for long term A/C as per cardiology.    #non-obstructive cardiomyopathy with EF 26%:  -BP is stable. Will cont Lasix 40 mg po daily    #HTN  -c/w ASA, Lipitor, Metoprolol and Spironolactone.    #Chronic Pancytopenia  -Hb 7.8 today.  - s/p BM biopsy. Will follow up with heme/onc concerning results and further impressions/recommendations.  - PT, PTT, B12, folate WNL  - (H)  - Ferritin 452  -f/u methylmalonic acid  - Peripheral smear : anisocystosis, poikilocystosis, low PLT  - US abdomen: no enlarged spleen/liver  - Follow up OP apt 7/29/2019    #Hematuria  -Urology evaluation noted. Renal/Bladder US is unremarkable. They would like to perform and outpatient cystoscopy.   -Hematuria has resolved. UA negative.     #Epistaxis  -S/P Lt. Nares surgical fibrillar reabsorbable by ENT. Patient had episode again last night, ENT consulted, will evaluate  - ENT on board: picking nose seen on 7/25/19    #constipation  -c/w Colace and Senna

## 2019-07-27 VITALS
HEART RATE: 80 BPM | SYSTOLIC BLOOD PRESSURE: 111 MMHG | DIASTOLIC BLOOD PRESSURE: 53 MMHG | TEMPERATURE: 97 F | RESPIRATION RATE: 18 BRPM

## 2019-07-27 LAB
ANION GAP SERPL CALC-SCNC: 10 MMOL/L — SIGNIFICANT CHANGE UP (ref 7–14)
BASOPHILS # BLD AUTO: 0 K/UL — SIGNIFICANT CHANGE UP (ref 0–0.2)
BASOPHILS NFR BLD AUTO: 0 % — SIGNIFICANT CHANGE UP (ref 0–1)
BUN SERPL-MCNC: 16 MG/DL — SIGNIFICANT CHANGE UP (ref 10–20)
CALCIUM SERPL-MCNC: 8.1 MG/DL — LOW (ref 8.5–10.1)
CHLORIDE SERPL-SCNC: 103 MMOL/L — SIGNIFICANT CHANGE UP (ref 98–110)
CO2 SERPL-SCNC: 28 MMOL/L — SIGNIFICANT CHANGE UP (ref 17–32)
CREAT SERPL-MCNC: 0.9 MG/DL — SIGNIFICANT CHANGE UP (ref 0.7–1.5)
EOSINOPHIL # BLD AUTO: 0.06 K/UL — SIGNIFICANT CHANGE UP (ref 0–0.7)
EOSINOPHIL NFR BLD AUTO: 2.7 % — SIGNIFICANT CHANGE UP (ref 0–8)
GLUCOSE BLDC GLUCOMTR-MCNC: 143 MG/DL — HIGH (ref 70–99)
GLUCOSE BLDC GLUCOMTR-MCNC: 236 MG/DL — HIGH (ref 70–99)
GLUCOSE BLDC GLUCOMTR-MCNC: 280 MG/DL — HIGH (ref 70–99)
GLUCOSE SERPL-MCNC: 119 MG/DL — HIGH (ref 70–99)
HCT VFR BLD CALC: 24.2 % — LOW (ref 42–52)
HGB BLD-MCNC: 7.7 G/DL — LOW (ref 14–18)
IMM GRANULOCYTES NFR BLD AUTO: 0.9 % — HIGH (ref 0.1–0.3)
LYMPHOCYTES # BLD AUTO: 0.85 K/UL — LOW (ref 1.2–3.4)
LYMPHOCYTES # BLD AUTO: 38.5 % — SIGNIFICANT CHANGE UP (ref 20.5–51.1)
MCHC RBC-ENTMCNC: 29.7 PG — SIGNIFICANT CHANGE UP (ref 27–31)
MCHC RBC-ENTMCNC: 31.8 G/DL — LOW (ref 32–37)
MCV RBC AUTO: 93.4 FL — SIGNIFICANT CHANGE UP (ref 80–94)
MONOCYTES # BLD AUTO: 0.67 K/UL — HIGH (ref 0.1–0.6)
MONOCYTES NFR BLD AUTO: 30.3 % — HIGH (ref 1.7–9.3)
NEUTROPHILS # BLD AUTO: 0.61 K/UL — LOW (ref 1.4–6.5)
NEUTROPHILS NFR BLD AUTO: 27.6 % — LOW (ref 42.2–75.2)
NRBC # BLD: 0 /100 WBCS — SIGNIFICANT CHANGE UP (ref 0–0)
PLATELET # BLD AUTO: 52 K/UL — LOW (ref 130–400)
POTASSIUM SERPL-MCNC: 3.5 MMOL/L — SIGNIFICANT CHANGE UP (ref 3.5–5)
POTASSIUM SERPL-SCNC: 3.5 MMOL/L — SIGNIFICANT CHANGE UP (ref 3.5–5)
RBC # BLD: 2.59 M/UL — LOW (ref 4.7–6.1)
RBC # FLD: 17.9 % — HIGH (ref 11.5–14.5)
SODIUM SERPL-SCNC: 141 MMOL/L — SIGNIFICANT CHANGE UP (ref 135–146)
WBC # BLD: 2.21 K/UL — LOW (ref 4.8–10.8)
WBC # FLD AUTO: 2.21 K/UL — LOW (ref 4.8–10.8)

## 2019-07-27 PROCEDURE — 99239 HOSP IP/OBS DSCHRG MGMT >30: CPT

## 2019-07-27 RX ORDER — CEPHALEXIN 500 MG
1 CAPSULE ORAL
Qty: 15 | Refills: 0
Start: 2019-07-27 | End: 2019-07-31

## 2019-07-27 RX ORDER — DIGOXIN 250 MCG
1 TABLET ORAL
Qty: 30 | Refills: 0
Start: 2019-07-27 | End: 2019-08-25

## 2019-07-27 RX ADMIN — Medication 200 MILLIGRAM(S): at 05:14

## 2019-07-27 RX ADMIN — Medication 100 MILLIGRAM(S): at 05:14

## 2019-07-27 RX ADMIN — Medication 6 UNIT(S): at 11:48

## 2019-07-27 RX ADMIN — Medication 81 MILLIGRAM(S): at 11:49

## 2019-07-27 RX ADMIN — Medication 3: at 09:44

## 2019-07-27 RX ADMIN — PANTOPRAZOLE SODIUM 40 MILLIGRAM(S): 20 TABLET, DELAYED RELEASE ORAL at 05:14

## 2019-07-27 RX ADMIN — Medication 500 MILLIGRAM(S): at 14:33

## 2019-07-27 RX ADMIN — Medication 6 UNIT(S): at 09:43

## 2019-07-27 RX ADMIN — SPIRONOLACTONE 25 MILLIGRAM(S): 25 TABLET, FILM COATED ORAL at 05:14

## 2019-07-27 RX ADMIN — SACUBITRIL AND VALSARTAN 1 TABLET(S): 24; 26 TABLET, FILM COATED ORAL at 05:14

## 2019-07-27 RX ADMIN — Medication 2: at 11:49

## 2019-07-27 RX ADMIN — Medication 40 MILLIGRAM(S): at 05:14

## 2019-07-27 RX ADMIN — Medication 500 MILLIGRAM(S): at 05:14

## 2019-07-27 RX ADMIN — Medication 0.12 MILLIGRAM(S): at 05:14

## 2019-07-27 NOTE — PROGRESS NOTE ADULT - ASSESSMENT
74 year old male w/ past medical history of CAD s/p stent, non-obstructive cardiomyopathy with EF 26%, hypertension, hyperlipidemia, DM II, chronic pancytopenia presenting with chief complaint of L-sided chest pain and palpitations found to be in Afib w/ RVR, now rate controlled and stable for discharge back to long term.     # Left superficial thrombophlebitis  -Keflex on discharge     # Asx bacteriuria  - Urine clx + enterococcus fecalis  - Blcx negative x2    # Afib with RVR - controlled   -C/w Metoprolol succinate 200 mg po daily and Digoxin 0.125 mg po daily  -Cardiology evaluation noted. Patient with history of chronic pancytopenia. He is not a candidate for long term A/C as per cardiology.    # non-obstructive ischemic cardiomyopathy with EF 26%:  -c/w ASA, Statin, ARNI, BB, MRA     # HTN  -c/w ASA, Lipitor, Metoprolol and Spironolactone.    # Chronic Pancytopenia  - s/p BM biopsy. Follow up with heme/onc concerning results and further impressions/recommendations.  - Follow up OP apt 7/29/2019    # Hematuria - resolved   -Urology evaluation noted. Renal/Bladder US is unremarkable. They would like to perform and outpatient cystoscopy.   -Hematuria has resolved. UA negative.     # Epistaxis - resolved   -S/P Lt. Nares surgical fibrillar reabsorbable by ENT  -stable     # constipation  -c/w Colace and Senna      Discharge back to long term.

## 2019-07-27 NOTE — PROGRESS NOTE ADULT - PROVIDER SPECIALTY LIST ADULT
ENT
Hospitalist
Infectious Disease
Internal Medicine
Urology
Hospitalist
Hospitalist
Infectious Disease
Cardiology

## 2019-07-27 NOTE — PROGRESS NOTE ADULT - SUBJECTIVE AND OBJECTIVE BOX
Pt seen and examined at bedside. No complaints. Stable for discharge.     VITAL SIGNS (Last 24 hrs):  T(C): 37.2 (07-27-19 @ 05:20), Max: 37.2 (07-27-19 @ 05:20)  HR: 81 (07-27-19 @ 05:20) (69 - 82)  BP: 138/62 (07-27-19 @ 05:20) (105/60 - 144/67)  RR: 18 (07-27-19 @ 05:20) (17 - 18)  SpO2: 98% (07-27-19 @ 09:47) (98% - 98%)  Wt(kg): --  Daily     Daily     I&O's Summary      PHYSICAL EXAM:  GENERAL: NAD, elderly   HEAD:  Atraumatic, Normocephalic  EYES: conjunctiva and sclera clear  NECK: Supple, No JVD  CHEST/LUNG: Clear to auscultation bilaterally; No wheeze +lifevest   HEART: Regular rate and rhythm; No murmurs, rubs, or gallops  ABDOMEN: Soft, Nontender, Nondistended; Bowel sounds present  EXTREMITIES:  2+ Peripheral Pulses, No clubbing, cyanosis, or edema  SKIN: No rashes or lesions    Labs Reviewed  Spoke to patient in regards to abnormal labs.    CBC Full  -  ( 27 Jul 2019 05:35 )  WBC Count : 2.21 K/uL  Hemoglobin : 7.7 g/dL  Hematocrit : 24.2 %  Platelet Count - Automated : 52 K/uL  Mean Cell Volume : 93.4 fL  Mean Cell Hemoglobin : 29.7 pg  Mean Cell Hemoglobin Concentration : 31.8 g/dL  Auto Neutrophil # : 0.61 K/uL  Auto Lymphocyte # : 0.85 K/uL  Auto Monocyte # : 0.67 K/uL  Auto Eosinophil # : 0.06 K/uL  Auto Basophil # : 0.00 K/uL  Auto Neutrophil % : 27.6 %  Auto Lymphocyte % : 38.5 %  Auto Monocyte % : 30.3 %  Auto Eosinophil % : 2.7 %  Auto Basophil % : 0.0 %    BMP:    07-27 @ 05:35    Blood Urea Nitrogen - 16  Calcium - 8.1  Carbond Dioxide - 28  Chloride - 103  Creatinine - 0.9  Glucose - 119  Potassium - 3.5  Sodium - 141      Hemoglobin A1c -     Urine Culture:  07-24 @ 05:37 Urine culture: --    Culture Results:   No growth to date.  Method Type: --  Organism: --  Organism Identification: --  Specimen Source: .Blood None  07-23 @ 06:34 Urine culture: --    Culture Results:   No growth to date.  Method Type: --  Organism: --  Organism Identification: --  Specimen Source: .Blood None  07-22 @ 17:04 Urine culture: --    Culture Results:   No growth to date.  Method Type: --  Organism: --  Organism Identification: --  Specimen Source: .Blood None       MEDICATIONS  (STANDING):  aspirin  chewable 81 milliGRAM(s) Oral daily  atorvastatin 80 milliGRAM(s) Oral at bedtime  cephalexin Suspension 50 mG/mL 500 milliGRAM(s) Oral three times a day  chlorhexidine 4% Liquid 1 Application(s) Topical <User Schedule>  dextrose 5%. 1000 milliLiter(s) (50 mL/Hr) IV Continuous <Continuous>  dextrose 50% Injectable 12.5 Gram(s) IV Push once  digoxin     Tablet 0.125 milliGRAM(s) Oral daily  docusate sodium 100 milliGRAM(s) Oral two times a day  furosemide    Tablet 40 milliGRAM(s) Oral daily  insulin glargine Injectable (LANTUS) 18 Unit(s) SubCutaneous at bedtime  insulin lispro (HumaLOG) corrective regimen sliding scale   SubCutaneous three times a day before meals  insulin lispro Injectable (HumaLOG) 6 Unit(s) SubCutaneous three times a day before meals  metoprolol succinate  milliGRAM(s) Oral daily  pantoprazole    Tablet 40 milliGRAM(s) Oral before breakfast  sacubitril 24 mG/valsartan 26 mG 1 Tablet(s) Oral two times a day  senna 2 Tablet(s) Oral at bedtime  spironolactone 25 milliGRAM(s) Oral daily  tamsulosin 0.4 milliGRAM(s) Oral at bedtime    MEDICATIONS  (PRN):  acetaminophen   Tablet .. 650 milliGRAM(s) Oral every 6 hours PRN Temp greater or equal to 38C (100.4F), Moderate Pain (4 - 6)  dextrose 40% Gel 15 Gram(s) Oral once PRN Blood Glucose LESS THAN 70 milliGRAM(s)/deciliter  glucagon  Injectable 1 milliGRAM(s) IntraMuscular once PRN Glucose LESS THAN 70 milligrams/deciliter

## 2019-07-27 NOTE — PROGRESS NOTE ADULT - REASON FOR ADMISSION
New onset Afib

## 2019-07-28 LAB
CULTURE RESULTS: SIGNIFICANT CHANGE UP
SPECIMEN SOURCE: SIGNIFICANT CHANGE UP

## 2019-07-28 PROCEDURE — 99231 SBSQ HOSP IP/OBS SF/LOW 25: CPT

## 2019-07-29 ENCOUNTER — INBOUND DOCUMENT (OUTPATIENT)
Age: 74
End: 2019-07-29

## 2019-07-29 LAB
CHROM ANALY INTERPHASE BLD FISH-IMP: SIGNIFICANT CHANGE UP
CULTURE RESULTS: SIGNIFICANT CHANGE UP
SPECIMEN SOURCE: SIGNIFICANT CHANGE UP
TM INTERPRETATION: SIGNIFICANT CHANGE UP

## 2019-07-31 LAB — HEMATOPATHOLOGY REPORT: SIGNIFICANT CHANGE UP

## 2019-08-02 DIAGNOSIS — I50.22 CHRONIC SYSTOLIC (CONGESTIVE) HEART FAILURE: ICD-10-CM

## 2019-08-02 DIAGNOSIS — K21.9 GASTRO-ESOPHAGEAL REFLUX DISEASE WITHOUT ESOPHAGITIS: ICD-10-CM

## 2019-08-02 DIAGNOSIS — R04.0 EPISTAXIS: ICD-10-CM

## 2019-08-02 DIAGNOSIS — R31.9 HEMATURIA, UNSPECIFIED: ICD-10-CM

## 2019-08-02 DIAGNOSIS — K59.00 CONSTIPATION, UNSPECIFIED: ICD-10-CM

## 2019-08-02 DIAGNOSIS — F41.9 ANXIETY DISORDER, UNSPECIFIED: ICD-10-CM

## 2019-08-02 DIAGNOSIS — Z95.1 PRESENCE OF AORTOCORONARY BYPASS GRAFT: ICD-10-CM

## 2019-08-02 DIAGNOSIS — I80.8 PHLEBITIS AND THROMBOPHLEBITIS OF OTHER SITES: ICD-10-CM

## 2019-08-02 DIAGNOSIS — E11.9 TYPE 2 DIABETES MELLITUS WITHOUT COMPLICATIONS: ICD-10-CM

## 2019-08-02 DIAGNOSIS — I11.0 HYPERTENSIVE HEART DISEASE WITH HEART FAILURE: ICD-10-CM

## 2019-08-02 DIAGNOSIS — D61.818 OTHER PANCYTOPENIA: ICD-10-CM

## 2019-08-02 DIAGNOSIS — L03.114 CELLULITIS OF LEFT UPPER LIMB: ICD-10-CM

## 2019-08-02 DIAGNOSIS — I42.9 CARDIOMYOPATHY, UNSPECIFIED: ICD-10-CM

## 2019-08-02 DIAGNOSIS — I25.10 ATHEROSCLEROTIC HEART DISEASE OF NATIVE CORONARY ARTERY WITHOUT ANGINA PECTORIS: ICD-10-CM

## 2019-08-02 DIAGNOSIS — I48.91 UNSPECIFIED ATRIAL FIBRILLATION: ICD-10-CM

## 2019-08-02 DIAGNOSIS — R31.0 GROSS HEMATURIA: ICD-10-CM

## 2019-08-02 DIAGNOSIS — N39.0 URINARY TRACT INFECTION, SITE NOT SPECIFIED: ICD-10-CM

## 2019-08-02 DIAGNOSIS — R07.9 CHEST PAIN, UNSPECIFIED: ICD-10-CM

## 2019-08-07 LAB — CHROM ANALY OVERALL INTERP SPEC-IMP: SIGNIFICANT CHANGE UP

## 2019-08-20 ENCOUNTER — LABORATORY RESULT (OUTPATIENT)
Age: 74
End: 2019-08-20

## 2019-08-20 ENCOUNTER — APPOINTMENT (OUTPATIENT)
Dept: HEMATOLOGY ONCOLOGY | Facility: CLINIC | Age: 74
End: 2019-08-20
Payer: MEDICARE

## 2019-08-20 VITALS
SYSTOLIC BLOOD PRESSURE: 148 MMHG | HEIGHT: 70 IN | WEIGHT: 172 LBS | BODY MASS INDEX: 24.62 KG/M2 | DIASTOLIC BLOOD PRESSURE: 71 MMHG | HEART RATE: 107 BPM | TEMPERATURE: 98.3 F

## 2019-08-20 LAB
HCT VFR BLD CALC: 26.8 %
HGB BLD-MCNC: 8.5 G/DL
MCHC RBC-ENTMCNC: 29.6 PG
MCHC RBC-ENTMCNC: 31.7 G/DL
MCV RBC AUTO: 93.4 FL
PLATELET # BLD AUTO: 60 K/UL
PMV BLD: NORMAL
RBC # BLD: 2.87 M/UL
RBC # FLD: 17.6 %
RETICS # AUTO: 2.7 %
RETICS AGGREG/RBC NFR: 77.8 K/UL
WBC # FLD AUTO: 2.55 K/UL

## 2019-08-20 PROCEDURE — 99215 OFFICE O/P EST HI 40 MIN: CPT

## 2019-08-20 NOTE — PHYSICAL EXAM
[Ambulatory and capable of all self care but unable to carry out any work activities] : Status 2- Ambulatory and capable of all self care but unable to carry out any work activities. Up and about more than 50% of waking hours [Normal] : affect appropriate [de-identified] : But somewhat decreased breath sounds especially at the bases. [de-identified] : Eyeglasses noted [de-identified] : Arthritic changes [de-identified] : Moderately tachycardic with occasional extrasystoles (no obvious irregularly irregular rate) with grade I-II/VI systolic murmur. [de-identified] : But looks pale. [de-identified] : Using a walker for ambulation.

## 2019-08-20 NOTE — REASON FOR VISIT
[Myelodysplastic syndrome] : myelodysplastic syndrome [Initial Consultation] : an initial consultation for [Family Member] : family member

## 2019-08-20 NOTE — HISTORY OF PRESENT ILLNESS
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 No bone marrow studies were performed at that time. Eventually he stopped going to the hematologist and was followed by his primary care physician.\par So far, he has been transfused only once at his recent hospital admission because of a hemoglobin below 7.\par He is denying any bleeding at this time. No fevers or anything else suggesting infection. No headache, nausea or vomiting. He feels very tired.\par  [Disease:__________________________] : Disease: [unfilled]

## 2019-08-20 NOTE — CONSULT LETTER
[Dear  ___] : Dear ~HIEU, [Consult Letter:] : I had the pleasure of evaluating your patient, [unfilled]. [Sincerely,] : Sincerely, [Please see my note below.] : Please see my note below. [Consult Closing:] : Thank you very much for allowing me to participate in the care of this patient.  If you have any questions, please do not hesitate to contact me. [FreeTextEntry2] : Dr. LISA Arreguin [FreeTextEntry3] : Dr. MAYLIN Santoro

## 2019-08-20 NOTE — REVIEW OF SYSTEMS
[Recent Change In Weight] : ~T recent weight change [Fatigue] : fatigue [SOB on Exertion] : shortness of breath during exertion [Shortness Of Breath] : no shortness of breath [Joint Stiffness] : joint stiffness [Joint Pain] : joint pain [Difficulty Walking] : difficulty walking [Insomnia] : insomnia [Easy Bleeding] : a tendency for easy bleeding [Muscle Weakness] : muscle weakness [Negative] : Psychiatric [FreeTextEntry2] : Lost about 20 lbs over a period of 5 months. [FreeTextEntry3] : Cataract. not operated [de-identified] : Some nose bleed

## 2019-08-20 NOTE — RESULTS/DATA
[FreeTextEntry1] : Patient's most recent blood from July 27 was reviewed. Platelets were 52 K, Hgb 7.7, WBC count 2.21, with an MCV at 92.

## 2019-08-21 LAB
ALBUMIN SERPL ELPH-MCNC: 4.2 G/DL
ALP BLD-CCNC: 82 U/L
ALT SERPL-CCNC: 13 U/L
ANION GAP SERPL CALC-SCNC: 18 MMOL/L
AST SERPL-CCNC: 16 U/L
BILIRUB SERPL-MCNC: 0.7 MG/DL
BUN SERPL-MCNC: 14 MG/DL
CALCIUM SERPL-MCNC: 8.4 MG/DL
CHLORIDE SERPL-SCNC: 97 MMOL/L
CO2 SERPL-SCNC: 30 MMOL/L
CREAT SERPL-MCNC: 0.9 MG/DL
GLUCOSE SERPL-MCNC: 112 MG/DL
LDH SERPL-CCNC: 267 U/L
POTASSIUM SERPL-SCNC: 2.9 MMOL/L
PROT SERPL-MCNC: 7.7 G/DL
SODIUM SERPL-SCNC: 145 MMOL/L

## 2019-09-09 ENCOUNTER — LABORATORY RESULT (OUTPATIENT)
Age: 74
End: 2019-09-09

## 2019-09-09 ENCOUNTER — OUTPATIENT (OUTPATIENT)
Dept: OUTPATIENT SERVICES | Facility: HOSPITAL | Age: 74
LOS: 1 days | Discharge: HOME | End: 2019-09-09

## 2019-09-09 ENCOUNTER — APPOINTMENT (OUTPATIENT)
Dept: CARDIOLOGY | Facility: CLINIC | Age: 74
End: 2019-09-09

## 2019-09-09 DIAGNOSIS — Z98.890 OTHER SPECIFIED POSTPROCEDURAL STATES: Chronic | ICD-10-CM

## 2019-09-09 DIAGNOSIS — D46.9 MYELODYSPLASTIC SYNDROME, UNSPECIFIED: ICD-10-CM

## 2019-09-16 ENCOUNTER — RX RENEWAL (OUTPATIENT)
Age: 74
End: 2019-09-16

## 2019-09-16 RX ORDER — POTASSIUM CHLORIDE 1500 MG/1
20 TABLET, EXTENDED RELEASE ORAL
Qty: 28 | Refills: 0 | Status: COMPLETED | COMMUNITY
Start: 2019-08-22 | End: 2019-09-30

## 2019-10-01 ENCOUNTER — OUTPATIENT (OUTPATIENT)
Dept: OUTPATIENT SERVICES | Facility: HOSPITAL | Age: 74
LOS: 1 days | Discharge: HOME | End: 2019-10-01

## 2019-10-01 DIAGNOSIS — Z98.890 OTHER SPECIFIED POSTPROCEDURAL STATES: Chronic | ICD-10-CM

## 2019-10-01 DIAGNOSIS — D64.9 ANEMIA, UNSPECIFIED: ICD-10-CM

## 2019-10-01 DIAGNOSIS — I50.1 LEFT VENTRICULAR FAILURE, UNSPECIFIED: ICD-10-CM

## 2019-10-01 DIAGNOSIS — I42.9 CARDIOMYOPATHY, UNSPECIFIED: ICD-10-CM

## 2019-10-10 ENCOUNTER — LABORATORY RESULT (OUTPATIENT)
Age: 74
End: 2019-10-10

## 2019-10-10 ENCOUNTER — OUTPATIENT (OUTPATIENT)
Dept: OUTPATIENT SERVICES | Facility: HOSPITAL | Age: 74
LOS: 1 days | Discharge: HOME | End: 2019-10-10

## 2019-10-10 DIAGNOSIS — Z98.890 OTHER SPECIFIED POSTPROCEDURAL STATES: Chronic | ICD-10-CM

## 2019-10-10 DIAGNOSIS — I50.1 LEFT VENTRICULAR FAILURE, UNSPECIFIED: ICD-10-CM

## 2019-10-10 DIAGNOSIS — I42.9 CARDIOMYOPATHY, UNSPECIFIED: ICD-10-CM

## 2019-10-10 DIAGNOSIS — E87.6 HYPOKALEMIA: ICD-10-CM

## 2019-10-10 DIAGNOSIS — D64.9 ANEMIA, UNSPECIFIED: ICD-10-CM

## 2019-10-16 ENCOUNTER — OUTPATIENT (OUTPATIENT)
Dept: OUTPATIENT SERVICES | Facility: HOSPITAL | Age: 74
LOS: 1 days | Discharge: HOME | End: 2019-10-16

## 2019-10-16 DIAGNOSIS — Z98.890 OTHER SPECIFIED POSTPROCEDURAL STATES: Chronic | ICD-10-CM

## 2019-10-16 DIAGNOSIS — D64.9 ANEMIA, UNSPECIFIED: ICD-10-CM

## 2019-10-18 ENCOUNTER — APPOINTMENT (OUTPATIENT)
Dept: CARDIOLOGY | Facility: CLINIC | Age: 74
End: 2019-10-18
Payer: MEDICARE

## 2019-10-18 VITALS — SYSTOLIC BLOOD PRESSURE: 130 MMHG | DIASTOLIC BLOOD PRESSURE: 81 MMHG

## 2019-10-18 DIAGNOSIS — I42.0 DILATED CARDIOMYOPATHY: ICD-10-CM

## 2019-10-18 DIAGNOSIS — I25.5 ISCHEMIC CARDIOMYOPATHY: ICD-10-CM

## 2019-10-18 DIAGNOSIS — I10 ESSENTIAL (PRIMARY) HYPERTENSION: ICD-10-CM

## 2019-10-18 DIAGNOSIS — I25.10 ATHEROSCLEROTIC HEART DISEASE OF NATIVE CORONARY ARTERY W/OUT ANGINA PECTORIS: ICD-10-CM

## 2019-10-18 PROCEDURE — 93000 ELECTROCARDIOGRAM COMPLETE: CPT

## 2019-10-18 PROCEDURE — 99214 OFFICE O/P EST MOD 30 MIN: CPT

## 2019-10-18 RX ORDER — DIAZEPAM 2 MG/1
2 TABLET ORAL
Refills: 0 | Status: COMPLETED | COMMUNITY
End: 2019-10-18

## 2019-10-18 RX ORDER — FUROSEMIDE 40 MG/1
40 TABLET ORAL
Refills: 0 | Status: ACTIVE | COMMUNITY

## 2019-10-18 RX ORDER — ATORVASTATIN CALCIUM 20 MG/1
20 TABLET, FILM COATED ORAL
Refills: 0 | Status: ACTIVE | COMMUNITY

## 2019-10-18 RX ORDER — METOPROLOL SUCCINATE 200 MG/1
200 TABLET, EXTENDED RELEASE ORAL
Refills: 0 | Status: ACTIVE | COMMUNITY

## 2019-10-18 RX ORDER — FAMOTIDINE 40 MG/1
40 TABLET, FILM COATED ORAL
Refills: 0 | Status: ACTIVE | COMMUNITY

## 2019-10-18 RX ORDER — DIGOXIN 125 UG/1
125 TABLET ORAL
Refills: 0 | Status: DISCONTINUED | COMMUNITY
End: 2019-10-18

## 2019-10-18 RX ORDER — CLOPIDOGREL BISULFATE 75 MG/1
75 TABLET, FILM COATED ORAL
Refills: 0 | Status: COMPLETED | COMMUNITY
End: 2019-10-18

## 2019-10-18 RX ORDER — ENALAPRIL MALEATE 5 MG/1
5 TABLET ORAL
Refills: 0 | Status: COMPLETED | COMMUNITY
End: 2019-10-18

## 2019-10-18 RX ORDER — SPIRONOLACTONE 25 MG/1
25 TABLET ORAL
Refills: 0 | Status: ACTIVE | COMMUNITY

## 2019-10-18 RX ORDER — TAMSULOSIN HYDROCHLORIDE 0.4 MG/1
0.4 CAPSULE ORAL
Refills: 0 | Status: ACTIVE | COMMUNITY

## 2019-10-18 NOTE — PHYSICAL EXAM
[Eyelids - No Xanthelasma] : the eyelids demonstrated no xanthelasmas [Normal Conjunctiva] : the conjunctiva exhibited no abnormalities [Normal Oral Mucosa] : normal oral mucosa [No Oral Cyanosis] : no oral cyanosis [No Oral Pallor] : no oral pallor [Normal Jugular Venous A Waves Present] : normal jugular venous A waves present [Normal Jugular Venous V Waves Present] : normal jugular venous V waves present [Heart Rate And Rhythm] : heart rate and rhythm were normal [Heart Sounds] : normal S1 and S2 [No Jugular Venous Vega A Waves] : no jugular venous vega A waves [Murmurs] : no murmurs present [Exaggerated Use Of Accessory Muscles For Inspiration] : no accessory muscle use [Auscultation Breath Sounds / Voice Sounds] : lungs were clear to auscultation bilaterally [Respiration, Rhythm And Depth] : normal respiratory rhythm and effort [Abdomen Soft] : soft [Abdomen Tenderness] : non-tender [Abdomen Mass (___ Cm)] : no abdominal mass palpated [Abnormal Walk] : normal gait [Gait - Sufficient For Exercise Testing] : the gait was sufficient for exercise testing [Cyanosis, Localized] : no localized cyanosis [Nail Clubbing] : no clubbing of the fingernails [] : no ischemic changes [Petechial Hemorrhages (___cm)] : no petechial hemorrhages

## 2019-10-19 PROBLEM — I25.5 CARDIOMYOPATHY, ISCHEMIC: Status: ACTIVE | Noted: 2019-10-19

## 2019-10-19 PROBLEM — I10 HIGH BLOOD PRESSURE: Status: ACTIVE | Noted: 2018-02-20

## 2019-10-19 PROBLEM — I42.0 CARDIOMYOPATHY, DILATED: Status: ACTIVE | Noted: 2019-10-19

## 2019-10-19 PROBLEM — I25.10 CAD IN NATIVE ARTERY: Status: ACTIVE | Noted: 2019-10-19

## 2019-10-19 NOTE — ASSESSMENT
[FreeTextEntry1] : Patient with history of ischemic and nonischemic cardiomyopathy, hypertension, ejection fraction less than 35%. Patient referred to the office to further evaluation vice the implant.\par \par Patient had been placed in appropriate heart failure medications. A repeat echocardiogram three months after diagnosis of cardiomyopathy show injection of 40% and a significant improvement in LV function. Therefore at this time patient does not require ICD implantation. Patient does not require further utilization of life vest.\par \par Recommend continue HF premedications and stopping digoxin

## 2019-10-19 NOTE — HISTORY OF PRESENT ILLNESS
[FreeTextEntry1] : 74 year old male w/ past medical history of CAD s/p stent, non-obstructive cardiomyopathy with EF 26%, hypertension, hyperlipidemia, DM II cathed in July cardiac cath which showed non-obstructive CAD discharged on medical therapy and life vest for low EF. \par Patient comes to office for further evaluation of HF and possible ICD implant\par \par \par echo 10/1/19 - EF 40% mod AS, mod MR\par

## 2019-10-21 ENCOUNTER — APPOINTMENT (OUTPATIENT)
Dept: HEMATOLOGY ONCOLOGY | Facility: CLINIC | Age: 74
End: 2019-10-21
Payer: MEDICARE

## 2019-10-21 ENCOUNTER — OUTPATIENT (OUTPATIENT)
Dept: OUTPATIENT SERVICES | Facility: HOSPITAL | Age: 74
LOS: 1 days | Discharge: HOME | End: 2019-10-21
Payer: MEDICARE

## 2019-10-21 VITALS
SYSTOLIC BLOOD PRESSURE: 135 MMHG | HEIGHT: 70 IN | RESPIRATION RATE: 14 BRPM | DIASTOLIC BLOOD PRESSURE: 72 MMHG | TEMPERATURE: 98.7 F | HEART RATE: 96 BPM | BODY MASS INDEX: 24.34 KG/M2 | WEIGHT: 170 LBS

## 2019-10-21 DIAGNOSIS — Z98.890 OTHER SPECIFIED POSTPROCEDURAL STATES: Chronic | ICD-10-CM

## 2019-10-21 DIAGNOSIS — D46.9 MYELODYSPLASTIC SYNDROME, UNSPECIFIED: ICD-10-CM

## 2019-10-21 PROCEDURE — 99213 OFFICE O/P EST LOW 20 MIN: CPT

## 2019-10-21 RX ORDER — INFLUENZA VIRUS VACCINE 15; 15; 15; 15 UG/.5ML; UG/.5ML; UG/.5ML; UG/.5ML
0.5 SUSPENSION INTRAMUSCULAR ONCE
Refills: 0 | Status: COMPLETED | OUTPATIENT
Start: 2019-10-21 | End: 2019-10-21

## 2019-10-21 RX ADMIN — INFLUENZA VIRUS VACCINE 0.5 MILLILITER(S): 15; 15; 15; 15 SUSPENSION INTRAMUSCULAR at 16:53

## 2019-10-21 NOTE — REASON FOR VISIT
[Myelodysplastic syndrome] : myelodysplastic syndrome [Family Member] : family member [Follow-Up Visit] : a follow-up visit for

## 2019-10-22 NOTE — ASSESSMENT
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 \par However we discussed that he may need further transfusions. If the transfusion requirement goes above 1 unit a month, we should start Procrit. Eventually, the disease may transform into acute leukemia in which case hypomethylating agents will be options.\par - Will continue close observation with CBC every 2 weeks. Will arrange for home draws. \par - He was encouraged to tell if he experiences SOB at rest or on exertion, chest pain. \par \par All their questions answered.\par Patient seen and examined with Dr. Santoro who agreed with the above.\par

## 2019-10-22 NOTE — PHYSICAL EXAM
[Ambulatory and capable of all self care but unable to carry out any work activities] : Status 2- Ambulatory and capable of all self care but unable to carry out any work activities. Up and about more than 50% of waking hours [Normal] : affect appropriate [de-identified] : Eyeglasses noted [de-identified] : Moderately tachycardic with occasional extrasystoles (no obvious irregularly irregular rate) with grade I-II/VI systolic murmur. [de-identified] : But somewhat decreased breath sounds especially at the bases. [de-identified] : Arthritic changes [de-identified] : Using a walker for ambulation. [de-identified] : But looks pale.

## 2019-10-22 NOTE — REVIEW OF SYSTEMS
[Fatigue] : fatigue [Recent Change In Weight] : ~T recent weight change [SOB on Exertion] : shortness of breath during exertion [Joint Pain] : joint pain [Joint Stiffness] : joint stiffness [Difficulty Walking] : difficulty walking [Insomnia] : insomnia [Muscle Weakness] : muscle weakness [Easy Bleeding] : a tendency for easy bleeding [Negative] : Integumentary [Shortness Of Breath] : no shortness of breath [FreeTextEntry2] : Lost about 20 lbs over a period of 5 months. [FreeTextEntry3] : Cataract. not operated [de-identified] : Some nose bleed

## 2019-10-22 NOTE — HISTORY OF PRESENT ILLNESS
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 No bone marrow studies were performed at that time. Eventually he stopped going to the hematologist and was followed by his primary care physician.\par So far, he has been transfused only once at his recent hospital admission because of a hemoglobin below 7.\par He is denying any bleeding at this time. No fevers or anything else suggesting infection. No headache, nausea or vomiting. He feels very tired.\par  [de-identified] : 10/21/2019\par Patient is here for a follow up visit. He feels the same. No new complaints. Holter monitor was taken off and he was told his EF had improved and he would not need AICD or life vest. \par He feels tired but he is denying SOB on exertion. \par CBC from 10/16/2019 shows WBC count of 2.38 and Hb if 7.5 and platelets of 61.

## 2019-10-23 DIAGNOSIS — Z23 ENCOUNTER FOR IMMUNIZATION: ICD-10-CM

## 2019-10-28 ENCOUNTER — LABORATORY RESULT (OUTPATIENT)
Age: 74
End: 2019-10-28

## 2019-10-28 ENCOUNTER — OUTPATIENT (OUTPATIENT)
Dept: OUTPATIENT SERVICES | Facility: HOSPITAL | Age: 74
LOS: 1 days | Discharge: HOME | End: 2019-10-28

## 2019-10-28 DIAGNOSIS — I42.9 CARDIOMYOPATHY, UNSPECIFIED: ICD-10-CM

## 2019-10-28 DIAGNOSIS — D64.9 ANEMIA, UNSPECIFIED: ICD-10-CM

## 2019-10-28 DIAGNOSIS — Z98.890 OTHER SPECIFIED POSTPROCEDURAL STATES: Chronic | ICD-10-CM

## 2019-10-28 DIAGNOSIS — I50.1 LEFT VENTRICULAR FAILURE, UNSPECIFIED: ICD-10-CM

## 2019-11-11 ENCOUNTER — LABORATORY RESULT (OUTPATIENT)
Age: 74
End: 2019-11-11

## 2019-11-11 ENCOUNTER — OUTPATIENT (OUTPATIENT)
Dept: OUTPATIENT SERVICES | Facility: HOSPITAL | Age: 74
LOS: 1 days | Discharge: HOME | End: 2019-11-11

## 2019-11-11 DIAGNOSIS — D64.9 ANEMIA, UNSPECIFIED: ICD-10-CM

## 2019-11-11 DIAGNOSIS — I42.9 CARDIOMYOPATHY, UNSPECIFIED: ICD-10-CM

## 2019-11-11 DIAGNOSIS — Z98.890 OTHER SPECIFIED POSTPROCEDURAL STATES: Chronic | ICD-10-CM

## 2019-11-11 DIAGNOSIS — I50.1 LEFT VENTRICULAR FAILURE, UNSPECIFIED: ICD-10-CM

## 2019-11-20 ENCOUNTER — TRANSCRIPTION ENCOUNTER (OUTPATIENT)
Age: 74
End: 2019-11-20

## 2019-11-25 ENCOUNTER — OUTPATIENT (OUTPATIENT)
Dept: OUTPATIENT SERVICES | Facility: HOSPITAL | Age: 74
LOS: 1 days | Discharge: HOME | End: 2019-11-25

## 2019-11-25 ENCOUNTER — INPATIENT (INPATIENT)
Facility: HOSPITAL | Age: 74
LOS: 3 days | Discharge: HOME HEALTH | End: 2019-11-29
Attending: INTERNAL MEDICINE | Admitting: INTERNAL MEDICINE
Payer: MEDICARE

## 2019-11-25 VITALS
SYSTOLIC BLOOD PRESSURE: 138 MMHG | OXYGEN SATURATION: 96 % | WEIGHT: 175.05 LBS | TEMPERATURE: 98 F | HEART RATE: 71 BPM | DIASTOLIC BLOOD PRESSURE: 61 MMHG | RESPIRATION RATE: 18 BRPM

## 2019-11-25 DIAGNOSIS — I50.1 LEFT VENTRICULAR FAILURE, UNSPECIFIED: ICD-10-CM

## 2019-11-25 DIAGNOSIS — Z98.890 OTHER SPECIFIED POSTPROCEDURAL STATES: Chronic | ICD-10-CM

## 2019-11-25 DIAGNOSIS — I42.9 CARDIOMYOPATHY, UNSPECIFIED: ICD-10-CM

## 2019-11-25 DIAGNOSIS — D64.9 ANEMIA, UNSPECIFIED: ICD-10-CM

## 2019-11-25 LAB
ALBUMIN SERPL ELPH-MCNC: 3.7 G/DL — SIGNIFICANT CHANGE UP (ref 3.5–5.2)
ALP SERPL-CCNC: 89 U/L — SIGNIFICANT CHANGE UP (ref 30–115)
ALT FLD-CCNC: 58 U/L — HIGH (ref 0–41)
ANION GAP SERPL CALC-SCNC: 23 MMOL/L — HIGH (ref 7–14)
APPEARANCE UR: ABNORMAL
AST SERPL-CCNC: 86 U/L — HIGH (ref 0–41)
BASE EXCESS BLDV CALC-SCNC: -4.9 MMOL/L — LOW (ref -2–2)
BASOPHILS # BLD AUTO: 0.02 K/UL — SIGNIFICANT CHANGE UP (ref 0–0.2)
BASOPHILS NFR BLD AUTO: 0.5 % — SIGNIFICANT CHANGE UP (ref 0–1)
BILIRUB SERPL-MCNC: 1.8 MG/DL — HIGH (ref 0.2–1.2)
BILIRUB UR-MCNC: NEGATIVE — SIGNIFICANT CHANGE UP
BLD GP AB SCN SERPL QL: SIGNIFICANT CHANGE UP
BUN SERPL-MCNC: 29 MG/DL — HIGH (ref 10–20)
CA-I SERPL-SCNC: 1.16 MMOL/L — SIGNIFICANT CHANGE UP (ref 1.12–1.3)
CALCIUM SERPL-MCNC: 8.8 MG/DL — SIGNIFICANT CHANGE UP (ref 8.5–10.1)
CHLORIDE SERPL-SCNC: 97 MMOL/L — LOW (ref 98–110)
CO2 SERPL-SCNC: 19 MMOL/L — SIGNIFICANT CHANGE UP (ref 17–32)
COLOR SPEC: YELLOW — SIGNIFICANT CHANGE UP
CREAT SERPL-MCNC: 1.3 MG/DL — SIGNIFICANT CHANGE UP (ref 0.7–1.5)
DIFF PNL FLD: SIGNIFICANT CHANGE UP
EOSINOPHIL # BLD AUTO: 0 K/UL — SIGNIFICANT CHANGE UP (ref 0–0.7)
EOSINOPHIL NFR BLD AUTO: 0 % — SIGNIFICANT CHANGE UP (ref 0–8)
GAS PNL BLDV: 140 MMOL/L — SIGNIFICANT CHANGE UP (ref 136–145)
GAS PNL BLDV: SIGNIFICANT CHANGE UP
GLUCOSE SERPL-MCNC: 300 MG/DL — HIGH (ref 70–99)
GLUCOSE UR QL: ABNORMAL
HCO3 BLDV-SCNC: 21 MMOL/L — LOW (ref 22–29)
HCT VFR BLD CALC: 26.8 % — LOW (ref 42–52)
HCT VFR BLDA CALC: 24.3 % — LOW (ref 34–44)
HGB BLD CALC-MCNC: 7.9 G/DL — LOW (ref 14–18)
HGB BLD-MCNC: 8.2 G/DL — LOW (ref 14–18)
IMM GRANULOCYTES NFR BLD AUTO: 1.7 % — HIGH (ref 0.1–0.3)
KETONES UR-MCNC: SIGNIFICANT CHANGE UP
LACTATE BLDV-MCNC: 7.1 MMOL/L — HIGH (ref 0.5–1.6)
LACTATE SERPL-SCNC: 2.8 MMOL/L — HIGH (ref 0.5–2.2)
LACTATE SERPL-SCNC: 7.8 MMOL/L — CRITICAL HIGH (ref 0.5–2.2)
LEUKOCYTE ESTERASE UR-ACNC: NEGATIVE — SIGNIFICANT CHANGE UP
LYMPHOCYTES # BLD AUTO: 1.53 K/UL — SIGNIFICANT CHANGE UP (ref 1.2–3.4)
LYMPHOCYTES # BLD AUTO: 37.7 % — SIGNIFICANT CHANGE UP (ref 20.5–51.1)
MAGNESIUM SERPL-MCNC: 1.7 MG/DL — LOW (ref 1.8–2.4)
MCHC RBC-ENTMCNC: 29.8 PG — SIGNIFICANT CHANGE UP (ref 27–31)
MCHC RBC-ENTMCNC: 30.6 G/DL — LOW (ref 32–37)
MCV RBC AUTO: 97.5 FL — HIGH (ref 80–94)
MONOCYTES # BLD AUTO: 0.47 K/UL — SIGNIFICANT CHANGE UP (ref 0.1–0.6)
MONOCYTES NFR BLD AUTO: 11.6 % — HIGH (ref 1.7–9.3)
NEUTROPHILS # BLD AUTO: 1.97 K/UL — SIGNIFICANT CHANGE UP (ref 1.4–6.5)
NEUTROPHILS NFR BLD AUTO: 48.5 % — SIGNIFICANT CHANGE UP (ref 42.2–75.2)
NITRITE UR-MCNC: NEGATIVE — SIGNIFICANT CHANGE UP
NRBC # BLD: 7 /100 WBCS — HIGH (ref 0–0)
NT-PROBNP SERPL-SCNC: HIGH PG/ML (ref 0–300)
PCO2 BLDV: 41 MMHG — SIGNIFICANT CHANGE UP (ref 41–51)
PH BLDV: 7.31 — SIGNIFICANT CHANGE UP (ref 7.26–7.43)
PH UR: 5.5 — SIGNIFICANT CHANGE UP (ref 5–8)
PLATELET # BLD AUTO: 98 K/UL — LOW (ref 130–400)
PO2 BLDV: 49 MMHG — HIGH (ref 20–40)
POTASSIUM BLDV-SCNC: 4.4 MMOL/L — SIGNIFICANT CHANGE UP (ref 3.3–5.6)
POTASSIUM SERPL-MCNC: 4.6 MMOL/L — SIGNIFICANT CHANGE UP (ref 3.5–5)
POTASSIUM SERPL-SCNC: 4.6 MMOL/L — SIGNIFICANT CHANGE UP (ref 3.5–5)
PROT SERPL-MCNC: 7.8 G/DL — SIGNIFICANT CHANGE UP (ref 6–8)
PROT UR-MCNC: ABNORMAL
RBC # BLD: 2.75 M/UL — LOW (ref 4.7–6.1)
RBC # FLD: 19 % — HIGH (ref 11.5–14.5)
SAO2 % BLDV: 74 % — SIGNIFICANT CHANGE UP
SODIUM SERPL-SCNC: 139 MMOL/L — SIGNIFICANT CHANGE UP (ref 135–146)
SP GR SPEC: 1.02 — SIGNIFICANT CHANGE UP (ref 1.01–1.02)
TROPONIN T SERPL-MCNC: 0.02 NG/ML — HIGH
TROPONIN T SERPL-MCNC: 0.03 NG/ML — CRITICAL HIGH
UROBILINOGEN FLD QL: ABNORMAL
WBC # BLD: 4.06 K/UL — LOW (ref 4.8–10.8)
WBC # FLD AUTO: 4.06 K/UL — LOW (ref 4.8–10.8)

## 2019-11-25 PROCEDURE — 71045 X-RAY EXAM CHEST 1 VIEW: CPT | Mod: 26

## 2019-11-25 PROCEDURE — 93010 ELECTROCARDIOGRAM REPORT: CPT

## 2019-11-25 PROCEDURE — 99291 CRITICAL CARE FIRST HOUR: CPT

## 2019-11-25 RX ORDER — TAMSULOSIN HYDROCHLORIDE 0.4 MG/1
0.4 CAPSULE ORAL AT BEDTIME
Refills: 0 | Status: DISCONTINUED | OUTPATIENT
Start: 2019-11-25 | End: 2019-11-27

## 2019-11-25 RX ORDER — FUROSEMIDE 40 MG
20 TABLET ORAL ONCE
Refills: 0 | Status: COMPLETED | OUTPATIENT
Start: 2019-11-25 | End: 2019-11-25

## 2019-11-25 RX ORDER — ATORVASTATIN CALCIUM 80 MG/1
20 TABLET, FILM COATED ORAL AT BEDTIME
Refills: 0 | Status: DISCONTINUED | OUTPATIENT
Start: 2019-11-25 | End: 2019-11-29

## 2019-11-25 RX ORDER — PANTOPRAZOLE SODIUM 20 MG/1
40 TABLET, DELAYED RELEASE ORAL
Refills: 0 | Status: DISCONTINUED | OUTPATIENT
Start: 2019-11-25 | End: 2019-11-25

## 2019-11-25 RX ORDER — MAGNESIUM SULFATE 500 MG/ML
2 VIAL (ML) INJECTION ONCE
Refills: 0 | Status: COMPLETED | OUTPATIENT
Start: 2019-11-25 | End: 2019-11-25

## 2019-11-25 RX ORDER — FUROSEMIDE 40 MG
60 TABLET ORAL EVERY 12 HOURS
Refills: 0 | Status: DISCONTINUED | OUTPATIENT
Start: 2019-11-25 | End: 2019-11-26

## 2019-11-25 RX ORDER — METOPROLOL TARTRATE 50 MG
200 TABLET ORAL DAILY
Refills: 0 | Status: DISCONTINUED | OUTPATIENT
Start: 2019-11-25 | End: 2019-11-29

## 2019-11-25 RX ORDER — DIGOXIN 250 MCG
0.12 TABLET ORAL DAILY
Refills: 0 | Status: DISCONTINUED | OUTPATIENT
Start: 2019-11-25 | End: 2019-11-29

## 2019-11-25 RX ORDER — FAMOTIDINE 10 MG/ML
20 INJECTION INTRAVENOUS
Refills: 0 | Status: DISCONTINUED | OUTPATIENT
Start: 2019-11-25 | End: 2019-11-29

## 2019-11-25 RX ORDER — ASPIRIN/CALCIUM CARB/MAGNESIUM 324 MG
81 TABLET ORAL DAILY
Refills: 0 | Status: DISCONTINUED | OUTPATIENT
Start: 2019-11-25 | End: 2019-11-29

## 2019-11-25 RX ORDER — CHLORHEXIDINE GLUCONATE 213 G/1000ML
1 SOLUTION TOPICAL
Refills: 0 | Status: DISCONTINUED | OUTPATIENT
Start: 2019-11-25 | End: 2019-11-29

## 2019-11-25 RX ORDER — SACUBITRIL AND VALSARTAN 24; 26 MG/1; MG/1
1 TABLET, FILM COATED ORAL
Refills: 0 | Status: DISCONTINUED | OUTPATIENT
Start: 2019-11-25 | End: 2019-11-25

## 2019-11-25 RX ORDER — SPIRONOLACTONE 25 MG/1
25 TABLET, FILM COATED ORAL DAILY
Refills: 0 | Status: DISCONTINUED | OUTPATIENT
Start: 2019-11-25 | End: 2019-11-29

## 2019-11-25 RX ADMIN — Medication 50 GRAM(S): at 20:29

## 2019-11-25 RX ADMIN — Medication 104 MILLIGRAM(S): at 18:36

## 2019-11-25 NOTE — ED PROVIDER NOTE - CARE PLAN
Principal Discharge DX:	Dyspnea on exertion Principal Discharge DX:	Dyspnea on exertion  Secondary Diagnosis:	Respiratory failure

## 2019-11-25 NOTE — H&P ADULT - NSHPPHYSICALEXAM_GEN_ALL_CORE
GEN: No acute distress  LUNGS: Clear to auscultation bilaterally   HEART: S1/S2 present. RRR.   ABD: Soft, non-tender, non-distended. Bowel sounds present  EXT: NC/NC/NE/2+PP/PONCE/Skin Intact.   NEURO: AAOX3    Intravenous access: Peripheral access  NG tube: None  Mitchell Catheter: None GEN: No acute distress, NC/AT, Skin and scleral pallor  HEENT: Right cervical LAD, mobile, around 1 cm in biggest diameter. no thyromegaly. No other LAD  LUNGS: Decrease lung sounds in the bases, no wheezing, no crackles appreciated   HEART: S1/S2 present. RRR. Diastolic murmur  ABD: Soft, non-tender, non-distended. Bowel sounds present  EXT: No LE edema. Left hand erythematous nodule with central pustule.  NEURO: AAOX3, moves all extremities. Moving head left and right all the time and rolling tongue.    Intravenous access: Peripheral access  NG tube: None  Mitchell Catheter: None

## 2019-11-25 NOTE — H&P ADULT - NSHPLABSRESULTS_GEN_ALL_CORE
8.2    4.06  )-----------( 98       ( 25 Nov 2019 12:39 )             26.8       11-25    139  |  97<L>  |  29<H>  ----------------------------<  300<H>  4.6   |  19  |  1.3    Ca    8.8      25 Nov 2019 12:39  Mg     1.7     11-25    TPro  7.8  /  Alb  3.7  /  TBili  1.8<H>  /  DBili  x   /  AST  86<H>  /  ALT  58<H>  /  AlkPhos  89  11-25          ABG - ( 25 Nov 2019 14:30 )  pH, Arterial: 7.44  pH, Blood: x     /  pCO2: 33    /  pO2: 117   / HCO3: 22    / Base Excess: -1.5  /  SaO2: 98                          Lactate Trend  11-25 @ 14:38 Lactate:7.8       CARDIAC MARKERS ( 25 Nov 2019 12:39 )  x     / 0.03 ng/mL / x     / x     / x            CAPILLARY BLOOD GLUCOSE

## 2019-11-25 NOTE — ED PROVIDER NOTE - NS ED ROS FT
Constitutional: (-) fever (-) vomiting  Eyes/ENT: (-) eye discharge, (-) runny nose  Cardiovascular: (-) chest pain, (-) syncope  Respiratory: (-) cough, (+) shortness of breath  Gastrointestinal: (-) vomiting, (-) diarrhea, (-) abdominal pain  : (-) dysuria   Musculoskeletal: (-) neck pain, (-) back pain, (-) joint pain  Integumentary: (-) rash, (-) edema  Neurological: (-) headache, (-)loc  Allergic/Immunologic: (-) pruritus  Endocrine: No history of thyroid disease or diabetes.

## 2019-11-25 NOTE — ED ADULT NURSE NOTE - PMH
Afib    CAD (coronary artery disease)    Diabetes    GERD (gastroesophageal reflux disease)    HTN (hypertension)

## 2019-11-25 NOTE — ED PROVIDER NOTE - ATTENDING CONTRIBUTION TO CARE
75 yo M chf, dm, cad, was on life vest. took it off. now with SOB, TORRES. + collapsed from weakness.  vs, tachypnea, diaphoretic, pallor, ill appearing, pale conj, anicteric, dry MM, + JVD, + Rales bilat, Tachy, equal radial pulses bilat, abd soft/nt/nd, no cva tend. no calves tend, no edema, no fnd. + left wrist abscess.  bipap, labs, imaging. reassess.

## 2019-11-25 NOTE — H&P ADULT - NSHPSOCIALHISTORY_GEN_ALL_CORE
Non-smoker  No alcohol use  No illicit drug use - Non-smoker  - No alcohol use  - No illicit drug use  - From [facility] assisted living, has an aid

## 2019-11-25 NOTE — ED PROVIDER NOTE - PMH
CAD (coronary artery disease)    Diabetes    GERD (gastroesophageal reflux disease)    HTN (hypertension)

## 2019-11-25 NOTE — H&P ADULT - HISTORY OF PRESENT ILLNESS
74 year old male w/ past medical history of CAD s/p stent, non-obstructive cardiomyopathy with EF 26%, hypertension, hyperlipidemia, DM II, Afib, MDS wit deletion 20q presents for SOB 74 year old male w/ past medical history of CAD s/p stent, non-obstructive cardiomyopathy with EF 26%, hypertension, hyperlipidemia, DM II, Afib, MDS with deletion 20q presents for SOB. He says that his SOB has been there for weeks but has been progressing from being only at exertion to occurring even at rest. He denies chest pain, he is compliant with his medications including lasix but says that he might have been eating salty foot and not a strict diet recently. He was on a life vest but it was discontinued by Dr. Potts because per patient, he did not need it anymore and that the medications were working. His last echo was in July 2019 and it showed an EF of 26% with valvular disease. He denies fever/chills, abdominal pain or leg swelling. He has nocturia but no burning in urine. He has not been gaining weight and his appetite is unchanged.  In ED, VS wnl, CXR showing bilateral pulmonary vascular congestion with trace bilateral pleural effusions. BNP 92993 and troponin 0.03. Hb 8.2.  He is s/p lasix 20 mg IV 74 year old male w/ past medical history of CAD s/p stent, non-obstructive cardiomyopathy with EF 26%, hypertension, hyperlipidemia, DM II, Afib, MDS with deletion 20q presents for SOB. He says that his SOB has been there for weeks but has been progressing from being only at exertion to occurring even at rest. He denies chest pain, he is compliant with his medications including lasix but says that he might have been eating salty foot and not a strict diet recently. He was on a life vest but it was discontinued by Dr. Potts because per patient, he did not need it anymore and that the medications were working ; he had an echo with Dr. Castro 1 month ago and it showed an EF of 40%. His previous echo was in July 2019 and it showed an EF of 26% with valvular disease. He denies fever/chills, abdominal pain or leg swelling. He has nocturia but no burning in urine. He has not been gaining weight and his appetite is unchanged.  In ED, VS wnl, CXR showing bilateral pulmonary vascular congestion with trace bilateral pleural effusions. BNP 55747 and troponin 0.03. Hb 8.2.  He is s/p lasix 20 mg IV

## 2019-11-25 NOTE — ED PROVIDER NOTE - OBJECTIVE STATEMENT
74M with pmh of HTN, HLD, DM, CAD with stents, CHF with low EF, and myelodysplastic syndrome not on any chemo presents with dyspnea on exertion since 2 days ago, improved with rest. PT denies any CP, LE pain or swelling. He denies fever, chills, cough, n/v, abd pain or recent travel. He does note that he has an abscess over the L wrist that is being treated with bactrim.  H/O: Maude  Cards: Jayesh  PMD: William

## 2019-11-25 NOTE — H&P ADULT - ASSESSMENT
74 year old male w/ past medical history of CAD s/p stent, non-obstructive cardiomyopathy with EF 26%, hypertension, hyperlipidemia, DM II, Afib, MDS with deletion 20q admitted for acute decompensated heart failure    # Acute decompensated heart failure - systolic heart failure:  - SOB due to fluid overload in the lungs, evidence of left heart failure ; cause is probably diet related( excess salt)  - S/p Lasix 20 mg IV in ED, will c/w Lasix 60 mg IV q12h and monitor I&O  - Keep I<O ; daily weights, no need for ocampo, patient urinating freely  - Consult cardiology ( Dr. samuels)  - Use BIPAP at night ( 40%, 12/6)    # Afib on AC:  - Currently NSR  - C/w Metoprolol and digoxin    # Systolic heart failure - last echo in July 2019:  - F/up 2D echo  - C/w Entresto    # MDS with 20q deletion conferring a good cytogenetic prognosis and good overall prognosis:  - Last transfusion was in July 2019  - Hb currently 8.2, will give 1U of PRBC and repeat CBC in am ( seeing his extensive history of systolic CHF), anemia can decompensate heart failure.  - Already got lasix 20 mg IV and will get 60 mg IV q12h, no need to give lasix with transfusion  - Keep active type and screen  - F/up heme/onc consult. Patient was to be monitored q2 weeks with CBC and decision to treat vs observation based on that.   - IPSS-R 2.5 conferring a low risk. Recommendations will be to keep following up CBC regularly.    # DMII:  - Hold oral antidiabetics  - Monitor FS qac/hs and start insulin accordingly    # HTN:  -    # BPH:  - c/w flomax 0.4 mg qd    # DVT ppx:  # GI ppx: C/w Famotidine 20 mg qd PO  # Diet: Carb consistent dash with 1.2 L fluid restriction  # Activity: OOBTC  # Dispo: From [facility] living, f/up PT/rehab  # Code status: FULL 74 year old male w/ past medical history of CAD s/p stent, non-obstructive cardiomyopathy with EF 26%, hypertension, hyperlipidemia, DM II, Afib, MDS with deletion 20q admitted for acute decompensated heart failure    # Acute decompensated heart failure - systolic heart failure:  - SOB due to fluid overload in the lungs, evidence of left heart failure ; cause is probably diet related( excess salt)  - S/p Lasix 20 mg IV in ED, will c/w Lasix 60 mg IV q12h and monitor I&O  - Keep I<O ; daily weights, no need for ocampo, patient urinating freely  - Consult cardiology ( Dr. 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  - Already got lasix 20 mg IV and will get 60 mg IV q12h, no need to give lasix with transfusion  - Keep active type and screen  - F/up heme/onc consult. Patient was to be monitored q2 weeks with CBC and decision to treat vs observation based on that.   - IPSS-R 2.5 conferring a low risk. Recommendations will be to keep following up CBC regularly.    # DMII:  - Hold oral antidiabetics  - Monitor FS qac/hs and start insulin accordingly    # HTN - stable  - Patient not on any BP meds currently    # BPH:  - c/w flomax 0.4 mg qd    # DVT ppx: Lovenox 40 mg qd  # GI ppx: C/w Famotidine 20 mg qd PO  # Diet: Carb consistent dash with 1.2 L fluid restriction  # Activity: OOBTC  # Dispo: From [facility] assisted living, f/up PT/rehab  # Code status: FULL 74 year old male w/ past medical history of CAD s/p stent, non-obstructive cardiomyopathy with EF 26%, hypertension, hyperlipidemia, DM II, Afib, MDS with deletion 20q admitted for acute decompensated heart failure    # Acute decompensated heart failure - systolic heart failure:  - SOB due to fluid overload in the lungs, evidence of left heart failure ; cause is probably diet related( excess salt)  - S/p Lasix 20 mg IV in ED, will c/w Lasix 60 mg IV q12h and monitor I&O  - Keep I<O ; daily weights, no need for ocapmo, patient urinating freely  - Consult cardiology ( Dr. samuels)  - Use BIPAP at night ( 40%, 12/6)    # Paroxysmal Afib not on AC:  - Currently NSR  - C/w Metoprolol and digoxin    # Systolic heart failure - last echo in July 2019:  - F/up 2D echo ; last echo 1 month ago showing EF of 40%  - Holding Entresto 24/26 qd because of KELSIE, spironolactone 25 mg qd    # High anion gap with elevated lactate to 7, no acidosis:  - Repeat lactate at 20:00  - Patient clinically not septic, HD stable, c/w diuresis.    # KELSIE:  - Baseline Cr 0.9 , currently 1.3  - Hold entresto and other nephrotoxic drugs  - Probably cardio-renal syndrome, will c/w lasix and recheck BMP in am    # MDS with 20q deletion conferring a good cytogenetic prognosis and good overall prognosis:  - Last transfusion was in July 2019  - Hb currently 8.2, will give 1U of PRBC and repeat CBC in am ( seeing his extensive history of systolic CHF), anemia can decompensate heart failure.  - Already got lasix 20 mg IV and will get 60 mg IV q12h, no need to give lasix with transfusion  - Keep active type and screen  - F/up heme/onc consult. Patient was to be monitored q2 weeks with CBC and decision to treat vs observation based on that.   - IPSS-R 2.5 conferring a low risk. Recommendations will be to keep following up CBC regularly.    # DMII:  - Hold oral antidiabetics  - Monitor FS qac/hs and start insulin accordingly    # HTN - stable  - Patient not on any BP meds currently    # BPH:  - c/w flomax 0.4 mg qd    # DVT ppx: Lovenox 40 mg qd  # GI ppx: C/w Famotidine 20 mg qd PO  # Diet: Carb consistent dash with 1.2 L fluid restriction  # Activity: OOBTC  # Dispo: From [facility] living, f/up PT/rehab  # Code status: FULL 74 year old male w/ past medical history of CAD s/p stent, non-obstructive cardiomyopathy with EF 26%, hypertension, hyperlipidemia, DM II, Afib, MDS with deletion 20q admitted for acute decompensated heart failure    # Acute decompensated heart failure - systolic heart failure:  - SOB due to fluid overload in the lungs, evidence of left heart failure ; cause is probably diet related( excess salt)  - S/p Lasix 20 mg IV in ED, will c/w Lasix 60 mg IV q12h and monitor I&O  - Keep I<O ; daily weights, no need for ocampo, patient urinating freely  - Consult cardiology ( Dr. samuels)  - Use BIPAP at night ( 40%, 12/6)    # Paroxysmal Afib not on AC:  - Currently NSR  - C/w Metoprolol and digoxin    # Systolic heart failure - last echo in July 2019:  - F/up 2D echo ; last echo 1 month ago showing EF of 40%  - Holding Entresto 24/26 qd because of KELSIE, spironolactone 25 mg qd    # High anion gap with elevated lactate to 7, no acidosis:  - PROBABLY FROM METFORMIN, patient not known to have CKD though. Hold metformin.  - Repeat lactate at 20:00, will likely need to be discharged on different medication  - Patient clinically not septic, HD stable, c/w diuresis.    # KELSIE:  - Baseline Cr 0.9 , currently 1.3  - Hold entresto and other nephrotoxic drugs  - Probably cardio-renal syndrome, will c/w lasix and recheck BMP in am    # MDS with 20q deletion conferring a good cytogenetic prognosis and good overall prognosis:  - Last transfusion was in July 2019  - Hb currently 8.2, will give 1U of PRBC and repeat CBC in am ( seeing his extensive history of systolic CHF), anemia can decompensate heart failure.  - Already got lasix 20 mg IV and will get 60 mg IV q12h, no need to give lasix with transfusion  - Keep active type and screen  - F/up heme/onc consult. Patient was to be monitored q2 weeks with CBC and decision to treat vs observation based on that.   - IPSS-R 2.5 conferring a low risk. Recommendations will be to keep following up CBC regularly.    # DMII:  - Hold oral antidiabetics  - Monitor FS qac/hs and start insulin accordingly    # HTN - stable  - Patient not on any BP meds currently    # BPH:  - c/w flomax 0.4 mg qd    # DVT ppx: Lovenox 40 mg qd  # GI ppx: C/w Famotidine 20 mg qd PO  # Diet: Carb consistent dash with 1.2 L fluid restriction  # Activity: OOBTC  # Dispo: From [facility] living, f/up PT/rehab  # Code status: FULL

## 2019-11-25 NOTE — H&P ADULT - NSICDXPASTMEDICALHX_GEN_ALL_CORE_FT
PAST MEDICAL HISTORY:  Afib     CAD (coronary artery disease)     Diabetes     GERD (gastroesophageal reflux disease)     HTN (hypertension)

## 2019-11-25 NOTE — ED PROVIDER NOTE - PROGRESS NOTE DETAILS
Authored by Dr. Wilhelm: diaphoretic, tachypnea. + rales. placed on bipap. iv came out. new one placed. pending blood gas. Authored by Dr. Wilhelm: improving on bipap. lact reviewed. will be repeated. likely due to prolonged turnequette with initial iv. SC: Spoke with heme/onc who state that patient can be monitored for H&H, no need for transfusion at this time. Spoke with Dr. Mcconnell who states that patient can be admitted for IV lasix and monitoring of H&H Authored by Dr. Wilhelm: pt much improved on bipap.  repeat ABG.  no leukocytosis, afebrile, hx and physical and imaging c/w CHF.  elev lactate noted. likely beta adrenergic stress. needs inpt admission. repeat ABG no acidosis 7.4, lactate down to 4.  no hypercarbia

## 2019-11-25 NOTE — H&P ADULT - ATTENDING COMMENTS
PT SEEN and examined indep I have read and agree with above exam and poa,    sob/pearson   prog worsening,  cxr milad  elevated bnp  family at bedside  feels better after diuresis    acute decomp syst chf  afib  peter  lactic acidosis  mds    continue iv lasix  fu lytes  hold entresto  onc fu  cardio eval  rvp panel  case d/w avery olrenz

## 2019-11-25 NOTE — ED PROVIDER NOTE - CLINICAL SUMMARY MEDICAL DECISION MAKING FREE TEXT BOX
respiratory failure, came in distress. rales on exam, pt much improved on bipap.  repeat ABG.  no leukocytosis, afebrile, hx and physical and imaging c/w CHF.  elev lactate noted. likely beta adrenergic stress. needs inpt admission.

## 2019-11-25 NOTE — ED PROVIDER NOTE - PHYSICAL EXAMINATION
CONSTITUTIONAL: Well-developed; well-nourished; in no acute distress.   SKIN: warm, dry  HEAD: Normocephalic; atraumatic.  EYES: PERRL, EOMI, no conjunctival erythema  ENT: No nasal discharge; airway clear.  NECK: Supple; non tender.  CARD: S1, S2 normal; no murmurs, gallops, or rubs. Regular rate and rhythm.   RESP: No wheezes, rales or rhonchi.  ABD: soft ntnd  EXT: Normal ROM.  No clubbing, cyanosis or edema. Abscess over dorsum of L wrist. measuring approximately 1 cm in diameter.  LYMPH: No acute cervical adenopathy.  NEURO: Alert, oriented, grossly unremarkable  PSYCH: Cooperative, appropriate.

## 2019-11-26 DIAGNOSIS — Z02.9 ENCOUNTER FOR ADMINISTRATIVE EXAMINATIONS, UNSPECIFIED: ICD-10-CM

## 2019-11-26 LAB
ANION GAP SERPL CALC-SCNC: 17 MMOL/L — HIGH (ref 7–14)
BACTERIA # UR AUTO: NEGATIVE — SIGNIFICANT CHANGE UP
BASOPHILS # BLD AUTO: 0.01 K/UL — SIGNIFICANT CHANGE UP (ref 0–0.2)
BASOPHILS NFR BLD AUTO: 0.5 % — SIGNIFICANT CHANGE UP (ref 0–1)
BUN SERPL-MCNC: 37 MG/DL — HIGH (ref 10–20)
CALCIUM SERPL-MCNC: 8 MG/DL — LOW (ref 8.5–10.1)
CHLORIDE SERPL-SCNC: 102 MMOL/L — SIGNIFICANT CHANGE UP (ref 98–110)
CO2 SERPL-SCNC: 22 MMOL/L — SIGNIFICANT CHANGE UP (ref 17–32)
CREAT SERPL-MCNC: 1.2 MG/DL — SIGNIFICANT CHANGE UP (ref 0.7–1.5)
EOSINOPHIL # BLD AUTO: 0 K/UL — SIGNIFICANT CHANGE UP (ref 0–0.7)
EOSINOPHIL NFR BLD AUTO: 0 % — SIGNIFICANT CHANGE UP (ref 0–8)
EPI CELLS # UR: 2 /HPF — SIGNIFICANT CHANGE UP (ref 0–5)
GLUCOSE BLDC GLUCOMTR-MCNC: 259 MG/DL — HIGH (ref 70–99)
GLUCOSE BLDC GLUCOMTR-MCNC: 274 MG/DL — HIGH (ref 70–99)
GLUCOSE BLDC GLUCOMTR-MCNC: 294 MG/DL — HIGH (ref 70–99)
GLUCOSE SERPL-MCNC: 260 MG/DL — HIGH (ref 70–99)
GRAN CASTS # UR COMP ASSIST: SIGNIFICANT CHANGE UP /LPF
HCT VFR BLD CALC: 26.8 % — LOW (ref 42–52)
HGB BLD-MCNC: 8.5 G/DL — LOW (ref 14–18)
HYALINE CASTS # UR AUTO: 41 /LPF — HIGH (ref 0–7)
IMM GRANULOCYTES NFR BLD AUTO: 1.4 % — HIGH (ref 0.1–0.3)
LYMPHOCYTES # BLD AUTO: 0.69 K/UL — LOW (ref 1.2–3.4)
LYMPHOCYTES # BLD AUTO: 32.4 % — SIGNIFICANT CHANGE UP (ref 20.5–51.1)
MAGNESIUM SERPL-MCNC: 2 MG/DL — SIGNIFICANT CHANGE UP (ref 1.8–2.4)
MCHC RBC-ENTMCNC: 29.2 PG — SIGNIFICANT CHANGE UP (ref 27–31)
MCHC RBC-ENTMCNC: 31.7 G/DL — LOW (ref 32–37)
MCV RBC AUTO: 92.1 FL — SIGNIFICANT CHANGE UP (ref 80–94)
MONOCYTES # BLD AUTO: 0.4 K/UL — SIGNIFICANT CHANGE UP (ref 0.1–0.6)
MONOCYTES NFR BLD AUTO: 18.8 % — HIGH (ref 1.7–9.3)
NEUTROPHILS # BLD AUTO: 1 K/UL — LOW (ref 1.4–6.5)
NEUTROPHILS NFR BLD AUTO: 46.9 % — SIGNIFICANT CHANGE UP (ref 42.2–75.2)
NRBC # BLD: 9 /100 WBCS — HIGH (ref 0–0)
PLATELET # BLD AUTO: 86 K/UL — LOW (ref 130–400)
POTASSIUM SERPL-MCNC: 3.8 MMOL/L — SIGNIFICANT CHANGE UP (ref 3.5–5)
POTASSIUM SERPL-SCNC: 3.8 MMOL/L — SIGNIFICANT CHANGE UP (ref 3.5–5)
RBC # BLD: 2.91 M/UL — LOW (ref 4.7–6.1)
RBC # FLD: 19.6 % — HIGH (ref 11.5–14.5)
RBC CASTS # UR COMP ASSIST: 2 /HPF — SIGNIFICANT CHANGE UP (ref 0–4)
SODIUM SERPL-SCNC: 141 MMOL/L — SIGNIFICANT CHANGE UP (ref 135–146)
TROPONIN T SERPL-MCNC: 0.03 NG/ML — CRITICAL HIGH
WBC # BLD: 2.13 K/UL — LOW (ref 4.8–10.8)
WBC # FLD AUTO: 2.13 K/UL — LOW (ref 4.8–10.8)
WBC UR QL: 3 /HPF — SIGNIFICANT CHANGE UP (ref 0–5)

## 2019-11-26 PROCEDURE — 99233 SBSQ HOSP IP/OBS HIGH 50: CPT

## 2019-11-26 PROCEDURE — 93306 TTE W/DOPPLER COMPLETE: CPT | Mod: 26

## 2019-11-26 PROCEDURE — 71045 X-RAY EXAM CHEST 1 VIEW: CPT | Mod: 26

## 2019-11-26 PROCEDURE — 93010 ELECTROCARDIOGRAM REPORT: CPT

## 2019-11-26 RX ORDER — FUROSEMIDE 40 MG
40 TABLET ORAL DAILY
Refills: 0 | Status: COMPLETED | OUTPATIENT
Start: 2019-11-26 | End: 2019-11-28

## 2019-11-26 RX ORDER — SODIUM CHLORIDE 9 MG/ML
1000 INJECTION, SOLUTION INTRAVENOUS
Refills: 0 | Status: DISCONTINUED | OUTPATIENT
Start: 2019-11-26 | End: 2019-11-29

## 2019-11-26 RX ORDER — DEXTROSE 50 % IN WATER 50 %
15 SYRINGE (ML) INTRAVENOUS ONCE
Refills: 0 | Status: DISCONTINUED | OUTPATIENT
Start: 2019-11-26 | End: 2019-11-29

## 2019-11-26 RX ORDER — GLUCAGON INJECTION, SOLUTION 0.5 MG/.1ML
1 INJECTION, SOLUTION SUBCUTANEOUS ONCE
Refills: 0 | Status: DISCONTINUED | OUTPATIENT
Start: 2019-11-26 | End: 2019-11-29

## 2019-11-26 RX ORDER — FUROSEMIDE 40 MG
40 TABLET ORAL DAILY
Refills: 0 | Status: DISCONTINUED | OUTPATIENT
Start: 2019-11-29 | End: 2019-11-29

## 2019-11-26 RX ORDER — FUROSEMIDE 40 MG
60 TABLET ORAL ONCE
Refills: 0 | Status: COMPLETED | OUTPATIENT
Start: 2019-11-26 | End: 2019-11-26

## 2019-11-26 RX ORDER — METOPROLOL TARTRATE 50 MG
5 TABLET ORAL ONCE
Refills: 0 | Status: COMPLETED | OUTPATIENT
Start: 2019-11-26 | End: 2019-11-26

## 2019-11-26 RX ORDER — INSULIN GLARGINE 100 [IU]/ML
10 INJECTION, SOLUTION SUBCUTANEOUS AT BEDTIME
Refills: 0 | Status: DISCONTINUED | OUTPATIENT
Start: 2019-11-26 | End: 2019-11-29

## 2019-11-26 RX ORDER — DEXTROSE 50 % IN WATER 50 %
25 SYRINGE (ML) INTRAVENOUS ONCE
Refills: 0 | Status: DISCONTINUED | OUTPATIENT
Start: 2019-11-26 | End: 2019-11-29

## 2019-11-26 RX ORDER — DEXTROSE 50 % IN WATER 50 %
12.5 SYRINGE (ML) INTRAVENOUS ONCE
Refills: 0 | Status: DISCONTINUED | OUTPATIENT
Start: 2019-11-26 | End: 2019-11-29

## 2019-11-26 RX ORDER — INSULIN LISPRO 100/ML
VIAL (ML) SUBCUTANEOUS
Refills: 0 | Status: DISCONTINUED | OUTPATIENT
Start: 2019-11-26 | End: 2019-11-29

## 2019-11-26 RX ORDER — INSULIN LISPRO 100/ML
5 VIAL (ML) SUBCUTANEOUS
Refills: 0 | Status: DISCONTINUED | OUTPATIENT
Start: 2019-11-26 | End: 2019-11-29

## 2019-11-26 RX ADMIN — Medication 60 MILLIGRAM(S): at 12:08

## 2019-11-26 RX ADMIN — Medication 81 MILLIGRAM(S): at 12:08

## 2019-11-26 RX ADMIN — SPIRONOLACTONE 25 MILLIGRAM(S): 25 TABLET, FILM COATED ORAL at 07:25

## 2019-11-26 RX ADMIN — Medication 0.12 MILLIGRAM(S): at 07:25

## 2019-11-26 RX ADMIN — Medication 5 MILLIGRAM(S): at 05:45

## 2019-11-26 RX ADMIN — INSULIN GLARGINE 10 UNIT(S): 100 INJECTION, SOLUTION SUBCUTANEOUS at 21:46

## 2019-11-26 RX ADMIN — Medication 200 MILLIGRAM(S): at 04:27

## 2019-11-26 RX ADMIN — FAMOTIDINE 20 MILLIGRAM(S): 10 INJECTION INTRAVENOUS at 20:36

## 2019-11-26 RX ADMIN — ATORVASTATIN CALCIUM 20 MILLIGRAM(S): 80 TABLET, FILM COATED ORAL at 21:45

## 2019-11-26 RX ADMIN — FAMOTIDINE 20 MILLIGRAM(S): 10 INJECTION INTRAVENOUS at 07:25

## 2019-11-26 RX ADMIN — Medication 60 MILLIGRAM(S): at 01:58

## 2019-11-26 RX ADMIN — TAMSULOSIN HYDROCHLORIDE 0.4 MILLIGRAM(S): 0.4 CAPSULE ORAL at 21:45

## 2019-11-26 NOTE — PROGRESS NOTE ADULT - ASSESSMENT
# Acute decompensated heart failure - systolic heart failure:  - ECHO shows EF of 25-30 percent  - c/w Lasix 60 mg IV q12h and monitor I&O  - Keep I<O ; daily weights, no need for ocampo, patient urinating freely  - Consult cardiology ( Dr. samuels)  - Use BIPAP at night ( 40%, 12/6)    #Sinus tachycardia  - patient breathing better after diuresis - reports breathing well - low suspicion for PE  - had fever of 100.4 - hold tylenol to trend fever curve - possible infectious etiology of sinus tachycardia  - UA negative, no cough/sputum, diarrhea, abdominal pain - will order blood culture, repeat CXR today  - Hgb above baseline     # Paroxysmal Afib not on AC:  - Currently in sinus  - C/w Metoprolol and digoxin    # Systolic heart failure - last echo in July 2019:  - F/up 2D echo ; last echo 1 month ago showing EF of 40% - now 25 to 30 percent  - Holding Entresto 24/26 qd because of KELSIE, spironolactone 25 mg qd    # High anion gap with elevated lactate to 7, lactate now improved to 2.8  - most likely from metformin, patient not known to have CKD though. Hold metformin.  - Patient clinically not septic, HD stable, c/w diuresis.  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 and good overall prognosis:  - Received 1 PRBC, this admission  - Keep active type and screen  - Heme/Onc consult pending - most likely no further intervention - cell lines are at baseline  - IPSS-R 2.5 conferring a low risk    # DMII:  - Hold oral antidiabetics  - Monitor FS qac/hs and start insulin accordingly    # HTN - stable  - Patient not on any BP meds currently    # BPH:  - c/w flomax 0.4 mg qd    # DVT ppx: Lovenox 40 mg qd  # GI ppx: C/w Famotidine 20 mg qd PO  # Diet: Carb consistent dash with 1.2 L fluid restriction  # Activity: OOBTC  # Dispo: From [facility] assisted living, f/up PT/rehab  # Code status: FULL

## 2019-11-26 NOTE — CONSULT NOTE ADULT - SUBJECTIVE AND OBJECTIVE BOX
HPI:  74 year old male w/ past medical history of CAD s/p stent, non-obstructive cardiomyopathy with EF 26%, hypertension, hyperlipidemia, DM II, Afib, MDS with deletion 20q presents for SOB. He says that his SOB has been there for weeks but has been progressing from being only at exertion to occurring even at rest. He denies chest pain, he is compliant with his medications including lasix but says that he might have been eating salty foot and not a strict diet recently. He was on a life vest but it was discontinued by Dr. Potts because per patient, he did not need it anymore and that the medications were working ; he had an echo with Dr. Castro 1 month ago and it showed an EF of 40%. His previous echo was in 2019 and it showed an EF of 26% with valvular disease. He denies fever/chills, abdominal pain or leg swelling. He has nocturia but no burning in urine. He has not been gaining weight and his appetite is unchanged.  In ED, VS wnl, CXR showing bilateral pulmonary vascular congestion with trace bilateral pleural effusions. BNP 59886 and troponin 0.03. Hb 8.2.  He is s/p lasix 20 mg IV (2019 17:49)      PAST MEDICAL & SURGICAL HISTORY:  Afib  GERD (gastroesophageal reflux disease)  CAD (coronary artery disease)  Diabetes  HTN (hypertension)  H/O colonoscopy: 10 yeras ago      Hospital Course:    TODAY'S SUBJECTIVE & REVIEW OF SYMPTOMS:     Constitutional WNL   Cardio WNL   Resp sob   GI WNL  Heme WNL  Endo WNL  Skin WNL  MSK WNL  Neuro WNL  Cognitive WNL  Psych WNL      MEDICATIONS  (STANDING):  aspirin  chewable 81 milliGRAM(s) Oral daily  atorvastatin 20 milliGRAM(s) Oral at bedtime  chlorhexidine 4% Liquid 1 Application(s) Topical <User Schedule>  dextrose 5%. 1000 milliLiter(s) (50 mL/Hr) IV Continuous <Continuous>  dextrose 50% Injectable 12.5 Gram(s) IV Push once  dextrose 50% Injectable 25 Gram(s) IV Push once  dextrose 50% Injectable 25 Gram(s) IV Push once  digoxin     Tablet 0.125 milliGRAM(s) Oral daily  famotidine    Tablet 20 milliGRAM(s) Oral two times a day  furosemide   Injectable 60 milliGRAM(s) IV Push every 12 hours  insulin glargine Injectable (LANTUS) 10 Unit(s) SubCutaneous at bedtime  insulin lispro (HumaLOG) corrective regimen sliding scale   SubCutaneous three times a day before meals  insulin lispro Injectable (HumaLOG) 5 Unit(s) SubCutaneous three times a day before meals  metoprolol succinate  milliGRAM(s) Oral daily  spironolactone 25 milliGRAM(s) Oral daily  tamsulosin Oral Tab/Cap - Peds 0.4 milliGRAM(s) Oral at bedtime    MEDICATIONS  (PRN):  dextrose 40% Gel 15 Gram(s) Oral once PRN Blood Glucose LESS THAN 70 milliGRAM(s)/deciliter  glucagon  Injectable 1 milliGRAM(s) IntraMuscular once PRN Glucose LESS THAN 70 milligrams/deciliter      FAMILY HISTORY:  FH: hypertension      Allergies    No Known Allergies    Intolerances        SOCIAL HISTORY:    [  ] Etoh  [  ] Smoking  [  ] Substance abuse     Home Environment:  [  ] Home Alone  [  ] Lives with Family  [  ] Home Health Aid    Dwelling:  [  ] Apartment  [  ] Private House  [  ] Adult Home  [x  ] Skilled Nursing Facility      [  ] Short Term  [  ] Long Term  [  ] Stairs       Elevator [  ]    FUNCTIONAL STATUS PTA: (Check all that apply)  Ambulation: [ x  ]Independent    [  ] Dependent     [  ] Non-Ambulatory  Assistive Device: [  ] SA Cane  [  ]  Q Cane  [x  ] Walker  [  ]  Wheelchair  ADL : [ x ] Independent  [  ]  Dependent       Vital Signs Last 24 Hrs  T(C): 38 (2019 10:42), Max: 38 (2019 10:42)  T(F): 100.4 (2019 10:42), Max: 100.4 (2019 10:42)  HR: 120 (2019 10:42) (88 - 121)  BP: 140/78 (2019 08:03) (132/82 - 153/85)  BP(mean): --  RR: 18 (2019 08:03) (18 - 23)  SpO2: 94% (2019 08:03) (90% - 100%)      PHYSICAL EXAM: Alert & Oriented X3  GENERAL: NAD  HEAD:  Atraumatic, Normocephalic  CHEST/LUNG: Clear   HEART: S1S2+  ABDOMEN: Soft, Nontender  EXTREMITIES:  no calf tenderness    NERVOUS SYSTEM:  Cranial Nerves 2-12 intact [  ] Abnormal  [  ]  ROM: WFL all extremities [ x ]  Abnormal [  ]  Motor Strength: WFL all extremities  [  ]  Abnormal [ x ]4/5 all ext  Sensation: intact to light touch [x  ] Abnormal [  ]  Reflexes: Symmetric [  ]  Abnormal [  ]    FUNCTIONAL STATUS:  Bed Mobility: Independent [  ]  Supervision [  ]  Needs Assistance [ x ]  N/A [  ]  Transfers: Independent [  ]  Supervision [  ]  Needs Assistance [x  ]  N/A [  ]   Ambulation: Independent [  ]  Supervision [  ]  Needs Assistance [  ]  N/A [  ]  ADL: Independent [  ] Requires Assistance [  ] N/A [  ]      LABS:                        8.5    2.13  )-----------( 86       ( 2019 09:14 )             26.8         141  |  102  |  37<H>  ----------------------------<  260<H>  3.8   |  22  |  1.2    Ca    8.0<L>      2019 09:14  Mg     2.0         TPro  7.8  /  Alb  3.7  /  TBili  1.8<H>  /  DBili  x   /  AST  86<H>  /  ALT  58<H>  /  AlkPhos  89        Urinalysis Basic - ( 2019 23:30 )    Color: Yellow / Appearance: Slightly Turbid / S.022 / pH: x  Gluc: x / Ketone: Trace  / Bili: Negative / Urobili: 3 mg/dL   Blood: x / Protein: 100 mg/dL / Nitrite: Negative   Leuk Esterase: Negative / RBC: 2 /HPF / WBC 3 /HPF   Sq Epi: x / Non Sq Epi: 2 /HPF / Bacteria: Negative        RADIOLOGY & ADDITIONAL STUDIES:    Assesment:

## 2019-11-26 NOTE — ED ADULT NURSE REASSESSMENT NOTE - NS ED NURSE REASSESS COMMENT FT1
Dr. Mckeon assessed pt at bedside. HR sinus tachy (114-116). As per MD, no interventions at this time and will continue to monitor.

## 2019-11-26 NOTE — ED ADULT NURSE REASSESSMENT NOTE - NS ED NURSE REASSESS COMMENT FT1
Pt resting comfortably in bed at this time. 4L NC maintained, sp02 94%. Pt received lopressor ER and IV lopressor this AM, but HR continues to be tachycardic. Denies any discomfort/pain. Dr. cMkeon made aware of HR, as per MD will come to assess pt.

## 2019-11-26 NOTE — CONSULT NOTE ADULT - ATTENDING COMMENTS
The patient was seen and examined by myself too. I agree with Dr. Castorena's (Hem-Onc fellow) note above. Situation discussed with her, the patient's care giver.  I also responded to the niece's (who seems to be making the medical decisions for the patient) phone call upon the request of the patient but all I got was an answering machine. Message left.

## 2019-11-26 NOTE — CONSULT NOTE ADULT - ASSESSMENT
74 year old male w/ past medical history of CAD s/p stent, non-obstructive cardiomyopathy with EF 26%, hypertension, hyperlipidemia, DM II, Afib, MDS with deletion 20q admitted for acute decompensated heart failure      1. MDS with 20q deletion(good cytogenetics) with IPSS score of 3- low risk MDS with good prognosis   - Last transfusion  was in July 2019(prior to this admission Received 1 unit overnight   Hgb, platelets and WBC are all closer to his baseline  Iron studies, B12, folate- were normal- July 2019   If the transfusion requirement goes above 1 unit a month, we will consider starting Procrit.   No need for any acute management at this time, can continue with observation  Monitor CBC. Given his CHF, Keep Hb>8        2.  Acute decompensated heart failure - systolic heart failure- 2/2 to ?dietary non compliance    c/w Lasix 60 mg IV q12h and monitor I&O  On Aldactone and Digoxin  Plan as per primary team and cardiology 74 year old male w/ past medical history of CAD s/p stent, non-obstructive cardiomyopathy with EF 26%, hypertension, hyperlipidemia, DM II, Afib, MDS with deletion 20q admitted for acute decompensated heart failure      1. MDS with 20q deletion(good cytogenetics) with IPSS score of 3- low risk MDS with good prognosis   - Last transfusion  was in July 2019(prior to this admission) Received 1 unit PRBC overnight   Hgb, platelets and WBC are all closer to his baseline  Iron studies, B12, folate- were normal- July 2019   If the transfusion requirement goes above 1 unit a month, we will consider starting Procrit.   No need for any acute management at this time, can continue with observation  Monitor CBC. Given his CHF, Keep Hb>8        2.  Acute decompensated heart failure - systolic heart failure- 2/2 to ?dietary non compliance    c/w Lasix 60 mg IV q12h and monitor I&O  On Aldactone and Digoxin  Plan as per primary team and cardiology     He can follow up Dr Santoro outpatient for monitoring and further management 74 year old male w/ past medical history of CAD s/p stent, non-obstructive cardiomyopathy with EF 26%, hypertension, hyperlipidemia, DM II, Afib, MDS with deletion 20q admitted for acute decompensated heart failure      1. MDS with 20q deletion (good cytogenetics) with IPSS score of 3- signifying low risk MDS with good prognosis.   - Last transfusion  was in July 2019 (prior to this admission). Here, he received 1 unit PRBC overnight.  Hgb, platelets and WBC are all close to his baseline.  Iron studies, B12, folate- were normal- July 2019.  If the transfusion requirement goes above 1 unit a month, we will consider starting Procrit. Vidaza will also be recommended if there is progressive deterioration of the hemogram.   No need for any acute management at this time from a hematological standpoint. May continue with observation.  Monitor CBC. Given his CHF, Keep Hb>8.  Will be very cautious in terms of aggressive treatment given his age and underlying medical problems.        2.  Acute decompensated heart failure - systolic heart failure- 2/2 to ?dietary non compliance?  C/W Lasix 60 mg IV q12h and monitor I&O.  On Aldactone and Digoxin.  Plan as per primary team and cardiology.     He can follow up Dr Santoro outpatient for monitoring and further management

## 2019-11-26 NOTE — CONSULT NOTE ADULT - SUBJECTIVE AND OBJECTIVE BOX
SHANE ASHTON  MRN-[id number]    HISTORY OF PRESENT ILLNESS:    74 year old male w/ past medical history of CAD s/p stent, non-obstructive cardiomyopathy with EF 26%, hypertension, hyperlipidemia, DM II, Afib, MDS with deletion 2presents for SOB. He says that his SOB has been there for weeks but has been progressing from being only at exertion to occurring even at rest. He denies chest pain, he is compliant with his medications including lasix but says that he might have been eating salty foot and not a strict diet recently. He was on a life vest but it was discontinued by Dr. Potts because per patient, he did not need it anymore and that the medications were working ; he had an echo with Dr. Castro 1 month ago and it showed an EF of 40%. His previous echo was in 2019 and it showed an EF of 26% with valvular disease. He denies fever/chills, abdominal pain or leg swelling. He has nocturia but no burning in urine. He has not been gaining weight and his appetite is unchanged.  In ED, VS wnl, CXR showing bilateral pulmonary vascular congestion with trace bilateral pleural effusions. BNP 52318 and troponin 0.03. Hb 8.2.  He is s/p lasix 20 mg IV   Patients sob has improved since initial  presentation        PMH/PSH:  PAST MEDICAL & SURGICAL HISTORY:  Afib  GERD (gastroesophageal reflux disease)  CAD (coronary artery disease)  Diabetes  HTN (hypertension)  H/O colonoscopy: 10 yeras ago    ALLERGIES:  Allergies    No Known Allergies    Intolerances      SOCIAL HABITS:  negative    FAMILY HISTORY:   FAMILY HISTORY:  FH: hypertension      REVIEW OF SYSTEM:  Elements of review of systems are negative or non-applicable except as noted above in HPI section.       HOME MEDICATIONS:  atorvastatin 20 mg oral tablet  Entresto 24 mg-26 mg oral tablet  famotidine 40 mg oral tablet  glimepiride 4 mg oral tablet  metFORMIN 1000 mg oral tablet  metoprolol succinate 200 mg oral tablet, extended release  tamsulosin 0.4 mg oral capsule    MEDICATIONS:  MEDICATIONS  (STANDING):  aspirin  chewable 81 milliGRAM(s) Oral daily  atorvastatin 20 milliGRAM(s) Oral at bedtime  chlorhexidine 4% Liquid 1 Application(s) Topical <User Schedule>  dextrose 5%. 1000 milliLiter(s) (50 mL/Hr) IV Continuous <Continuous>  dextrose 50% Injectable 12.5 Gram(s) IV Push once  dextrose 50% Injectable 25 Gram(s) IV Push once  dextrose 50% Injectable 25 Gram(s) IV Push once  digoxin     Tablet 0.125 milliGRAM(s) Oral daily  famotidine    Tablet 20 milliGRAM(s) Oral two times a day  furosemide   Injectable 60 milliGRAM(s) IV Push every 12 hours  insulin glargine Injectable (LANTUS) 10 Unit(s) SubCutaneous at bedtime  insulin lispro (HumaLOG) corrective regimen sliding scale   SubCutaneous three times a day before meals  insulin lispro Injectable (HumaLOG) 5 Unit(s) SubCutaneous three times a day before meals  metoprolol succinate  milliGRAM(s) Oral daily  spironolactone 25 milliGRAM(s) Oral daily  tamsulosin Oral Tab/Cap - Peds 0.4 milliGRAM(s) Oral at bedtime    MEDICATIONS  (PRN):  dextrose 40% Gel 15 Gram(s) Oral once PRN Blood Glucose LESS THAN 70 milliGRAM(s)/deciliter  glucagon  Injectable 1 milliGRAM(s) IntraMuscular once PRN Glucose LESS THAN 70 milligrams/deciliter        VITALS:   Vital Signs Last 24 Hrs  T(C): 37.2 (2019 14:43), Max: 38 (2019 10:42)  T(F): 98.9 (2019 14:43), Max: 100.4 (2019 10:42)  HR: 115 (2019 14:43) (88 - 121)  BP: 140/78 (2019 08:03) (132/82 - 153/85)  BP(mean): --  RR: 18 (2019 08:03) (18 - 23)  SpO2: 94% (2019 08:03) (90% - 100%)        PHYSICAL EXAM:    GENERAL: NAD  HEAD:  Atraumatic, Normocephalic  NECK: Supple, No JVD  CHEST/LUNG: dec breath sounds  HEART: Regular rate and rhythm; No murmurs  ABDOMEN: Soft, Nontender, Nondistended  EXTREMITIES:  Good peripheral Pulses, No clubbing, cyanosis, or edema      LABS:                        8.5    2.13  )-----------( 86       ( 2019 09:14 )             26.8     -    141  |  102  |  37<H>  ----------------------------<  260<H>  3.8   |  22  |  1.2    Ca    8.0<L>      2019 09:14  Mg     2.0         TPro  7.8  /  Alb  3.7  /  TBili  1.8<H>  /  DBili  x   /  AST  86<H>  /  ALT  58<H>  /  AlkPhos  89      LIVER FUNCTIONS - ( 2019 12:39 )  Alb: 3.7 g/dL / Pro: 7.8 g/dL / ALK PHOS: 89 U/L / ALT: 58 U/L / AST: 86 U/L / GGT: x           CARDIAC MARKERS ( 2019 09:14 )  x     / 0.03 ng/mL / x     / x     / x      CARDIAC MARKERS ( 2019 21:51 )  x     / 0.02 ng/mL / x     / x     / x      CARDIAC MARKERS ( 2019 12:39 )  x     / 0.03 ng/mL / x     / x     / x        Blood Gas Profile - Arterial (19 @ 14:30)    pH, Arterial: 7.44    pCO2, Arterial: 33 mmHg    pO2, Arterial: 117 mmHg    HCO3, Arterial: 22 mmoL/L    Base Excess, Arterial: -1.5 mmoL/L    Oxygen Saturation, Arterial: 98 %          Urinalysis Basic - ( 2019 23:30 )    Color: Yellow / Appearance: Slightly Turbid / S.022 / pH: x  Gluc: x / Ketone: Trace  / Bili: Negative / Urobili: 3 mg/dL   Blood: x / Protein: 100 mg/dL / Nitrite: Negative   Leuk Esterase: Negative / RBC: 2 /HPF / WBC 3 /HPF   Sq Epi: x / Non Sq Epi: 2 /HPF / Bacteria: Negative          ABG & VENT SETTINGS (when applicable)  ABG - ( 2019 14:30 )  pH, Arterial: 7.44  pH, Blood: x     /  pCO2: 33    /  pO2: 117   / HCO3: 22    / Base Excess: -1.5  /  SaO2: 98                    DIAGNOSTIC STUDIES:    < from: Xray Chest 1 View-PORTABLE IMMEDIATE (19 @ 14:10) >  Impression:      Pulmonary vascular congestion. Possible trace pleural effusions.    < end of copied text >

## 2019-11-26 NOTE — CONSULT NOTE ADULT - SUBJECTIVE AND OBJECTIVE BOX
Patient is a 74y old  Male who presents with a chief complaint of Dyspnea (2019 15:23)      HPI:  74 year old male w/ past medical history of CAD s/p stent, non-obstructive cardiomyopathy with EF 26%, hypertension, hyperlipidemia, DM II, Afib, MDS with deletion 20q presents for SOB. He says that his SOB has been there for weeks but has been progressing from being only at exertion to occurring even at rest. He denies chest pain, he is compliant with his medications including lasix but says that he might have been eating salty foot and not a strict diet recently. He was on a life vest but it was discontinued by Dr. Potts because per patient, he did not need it anymore and that the medications were working ; he had an echo with Dr. Castro 1 month ago and it showed an EF of 40%. His previous echo was in 2019 and it showed an EF of 26% with valvular disease. He denies fever/chills, abdominal pain or leg swelling. He has nocturia but no burning in urine. He has not been gaining weight and his appetite is unchanged.  In ED, VS wnl, CXR showing bilateral pulmonary vascular congestion with trace bilateral pleural effusions. BNP 65970 and troponin 0.03. Hb 8.2.  He is s/p lasix 20 mg IV (2019 17:49)      PAST MEDICAL & SURGICAL HISTORY:  Afib  GERD (gastroesophageal reflux disease)  CAD (coronary artery disease)  Diabetes  HTN (hypertension)  H/O colonoscopy: 10 yeras ago      PREVIOUS DIAGNOSTIC TESTING:      ECHO  FINDINGS: < from: Transthoracic Echocardiogram (11.26.19 @ 11:59) >   1. Left ventricular ejection fraction, by visual estimation, is 25 to   30%.   2. Moderately decreased global left ventricular systolic function.   3. Severely decreased segmental left ventricular systolic function.   4. Mildly increased LV wall thickness.   5. Spectral Doppler shows restrictive pattern of left ventricular   myocardial filling (Grade III diastolic dysfunction).   6. PSAP at least 40.   7.Mild aortic regurgitation.   8. Sclerotic aortic valve with decreased opening.   9. Peak gradient of 27 with a mean of 14 and calcualted CHINYERE of 1.3 c/w   mod AS.  10. Peak transaortic gradient equals 27.7 mmHg, mean transaortic gradient   equals 10.2mmHg, the calculated aortic valve area equals 1.15 cm² by the   continuity equation consistent with moderate aortic stenosis.  11. There is moderate aortic root calcification.    < end of copied text >      STRESS TEST  FINDINGS:    CATHETERIZATION  FINDINGS:    MEDICATIONS  (STANDING):  aspirin  chewable 81 milliGRAM(s) Oral daily  atorvastatin 20 milliGRAM(s) Oral at bedtime  chlorhexidine 4% Liquid 1 Application(s) Topical <User Schedule>  dextrose 5%. 1000 milliLiter(s) (50 mL/Hr) IV Continuous <Continuous>  dextrose 50% Injectable 12.5 Gram(s) IV Push once  dextrose 50% Injectable 25 Gram(s) IV Push once  dextrose 50% Injectable 25 Gram(s) IV Push once  digoxin     Tablet 0.125 milliGRAM(s) Oral daily  famotidine    Tablet 20 milliGRAM(s) Oral two times a day  furosemide   Injectable 60 milliGRAM(s) IV Push every 12 hours  insulin glargine Injectable (LANTUS) 10 Unit(s) SubCutaneous at bedtime  insulin lispro (HumaLOG) corrective regimen sliding scale   SubCutaneous three times a day before meals  insulin lispro Injectable (HumaLOG) 5 Unit(s) SubCutaneous three times a day before meals  metoprolol succinate  milliGRAM(s) Oral daily  spironolactone 25 milliGRAM(s) Oral daily  tamsulosin Oral Tab/Cap - Peds 0.4 milliGRAM(s) Oral at bedtime    MEDICATIONS  (PRN):  dextrose 40% Gel 15 Gram(s) Oral once PRN Blood Glucose LESS THAN 70 milliGRAM(s)/deciliter  glucagon  Injectable 1 milliGRAM(s) IntraMuscular once PRN Glucose LESS THAN 70 milligrams/deciliter      FAMILY HISTORY:  FH: hypertension      SOCIAL HISTORY:  CIGARETTES: no  ALCOHOL: no  DRUGS: no                      REVIEW OF SYSTEMS:  CONSTITUTIONAL: today no more distress, Looks stable  NECK: No pain   RESPIRATORY: No more cough, wheezing, shortness of breath  CARDIOVASCULAR: No chest pain, palpitations, leg swelling resolved  GASTROINTESTINAL: No abdominal or epigastric pain. No nausea, vomiting, or hematemesis;  No melena.  NEUROLOGICAL: No dizziness, headaches, memory loss, loss of strength  SKIN: No itching, burning, rashes, or lesions   ENDOCRINE: No heat or cold intolerance        Vital Signs Last 24 Hrs  T(C): 36.8 (2019 17:13), Max: 38 (2019 10:42)  T(F): 98.2 (2019 17:13), Max: 100.4 (2019 10:42)  HR: 113 (2019 17:13) (113 - 121)  BP: 134/76 (2019 17:13) (132/82 - 153/85)  BP(mean): --  RR: 18 (2019 08:03) (18 - 23)  SpO2: 94% (2019 08:03) (90% - 100%)                      PHYSICAL EXAM:  GENERAL: No distress, in supine position   HEAD:  Atraumatic, Normocephalic  NECK: Supple, No JVD, No Bruit  NERVOUS SYSTEM:  Alert, Awake, Oriented to time, place, person; Normal memory and speech; Normal motor Strength 5/5 B/L upper and lower extremities  CHEST/LUNG: mildly decreased air entry to lung base bilaterally; No wheeze, no more crackle, rales, rhonchi  HEART: Regular heart beat, S1, A2, P2, No S3, No gallop, aortic systolic murmur  ABDOMEN: Soft, Non tender, Non distended; Bowel sounds present  EXTREMITIES:  2+ Peripheral Pulses, No clubbing, No edema  SKIN: No rashes or lesions    TELEMETRY: NSR. occasions of ST    ECG: < from: 12 Lead ECG (19 @ 09:48) >   Sinus tachy cardia with Premature supraventricular complexes  Possible Left atrial enlargement  Septal infarct , age undetermined    < end of copied text >      I&O's Detail      LABS:                        8.5    2.13  )-----------( 86       ( 2019 09:14 )             26.8         141  |  102  |  37<H>  ----------------------------<  260<H>  3.8   |  22  |  1.2    Ca    8.0<L>      2019 09:14  Mg     2.0         TPro  7.8  /  Alb  3.7  /  TBili  1.8<H>  /  DBili  x   /  AST  86<H>  /  ALT  58<H>  /  AlkPhos  89      CARDIAC MARKERS ( 2019 09:14 )  x     / 0.03 ng/mL / x     / x     / x      CARDIAC MARKERS ( 2019 21:51 )  x     / 0.02 ng/mL / x     / x     / x      CARDIAC MARKERS ( 2019 12:39 )  x     / 0.03 ng/mL / x     / x     / x            Urinalysis Basic - ( 2019 23:30 )    Color: Yellow / Appearance: Slightly Turbid / S.022 / pH: x  Gluc: x / Ketone: Trace  / Bili: Negative / Urobili: 3 mg/dL   Blood: x / Protein: 100 mg/dL / Nitrite: Negative   Leuk Esterase: Negative / RBC: 2 /HPF / WBC 3 /HPF   Sq Epi: x / Non Sq Epi: 2 /HPF / Bacteria: Negative      I&O's Summary      RADIOLOGY & ADDITIONAL STUDIES: < from: Xray Chest 1 View-PORTABLE IMMEDIATE (19 @ 14:10) >    Pulmonary vascular congestion. Possible trace pleural effusions.    < end of copied text >

## 2019-11-26 NOTE — CONSULT NOTE ADULT - SUBJECTIVE AND OBJECTIVE BOX
Patient is a 74y old  Male who presents with a chief complaint of Dyspnea (25 Nov 2019 17:49)      HPI:  74 year old male w/ past medical history of CAD s/p stent, non-obstructive cardiomyopathy with EF 26%, hypertension, hyperlipidemia, DM II, Afib, MDS with deletion 20q presents for SOB. He says that his SOB has been there for weeks but has been progressing from being only at exertion to occurring even at rest. He denies chest pain, he is compliant with his medications including lasix but says that he might have been eating salty foot and not a strict diet recently. He was on a life vest but it was discontinued by Dr. Potts because per patient, he did not need it anymore and that the medications were working ; he had an echo with Dr. Castro 1 month ago and it showed an EF of 40%. His previous echo was in July 2019 and it showed an EF of 26% with valvular disease. He denies fever/chills, abdominal pain or leg swelling. He has nocturia but no burning in urine. He has not been gaining weight and his appetite is unchanged.  In ED, VS wnl, CXR showing bilateral pulmonary vascular congestion with trace bilateral pleural effusions. BNP 65677 and troponin 0.03. Hb 8.2.  He is s/p lasix 20 mg IV (25 Nov 2019 17:49)    Hematology history  Mat is a 74 year old patient of Dr Santoro who was evaluated for pancytopenia s/p BM biopsy showing MDS with deletion of chromosome 20 who is managed with observation. His IPSS score is 3, making him low risk MDS. His last transfusion was more than 3 months ago. He presented with Hb of 8.2 (which is closer to his baseline) and he received 1 unit PRBC overnight        ROS:  Negative except for:    PAST MEDICAL & SURGICAL HISTORY:  Afib  GERD (gastroesophageal reflux disease)  CAD (coronary artery disease)  Diabetes  HTN (hypertension)  H/O colonoscopy: 10 yeras ago      SOCIAL HISTORY: Quit smoking >30 years ago  No alcohol abuse/illicit drug use     FAMILY HISTORY:  FH: hypertension. Bladder Ca in mother in her 70S      MEDICATIONS  (STANDING):  aspirin  chewable 81 milliGRAM(s) Oral daily  atorvastatin 20 milliGRAM(s) Oral at bedtime  chlorhexidine 4% Liquid 1 Application(s) Topical <User Schedule>  digoxin     Tablet 0.125 milliGRAM(s) Oral daily  famotidine    Tablet 20 milliGRAM(s) Oral two times a day  furosemide   Injectable 60 milliGRAM(s) IV Push every 12 hours  metoprolol succinate  milliGRAM(s) Oral daily  spironolactone 25 milliGRAM(s) Oral daily  tamsulosin Oral Tab/Cap - Peds 0.4 milliGRAM(s) Oral at bedtime    MEDICATIONS  (PRN):      Weight (kg): 79.4 (11-25-19 @ 11:52)  Allergies    No Known Allergies    Intolerances        Vital Signs Last 24 Hrs  T(C): 37.1 (26 Nov 2019 08:03), Max: 37.1 (25 Nov 2019 20:00)  T(F): 98.8 (26 Nov 2019 08:03), Max: 98.8 (26 Nov 2019 08:03)  HR: 114 (26 Nov 2019 08:03) (71 - 121)  BP: 140/78 (26 Nov 2019 08:03) (132/82 - 153/85)  BP(mean): --  RR: 18 (26 Nov 2019 08:03) (18 - 23)  SpO2: 94% (26 Nov 2019 08:03) (90% - 100%)    PHYSICAL EXAM  General: adult in NAD, looks pale  HEENT: clear oropharynx  Neck: supple  CV: normal S1/S2 with no murmur rubs or gallops  Lungs: BS decreased b/l bases  Abdomen: soft non-tender  Ext: no LE edema  Neuro: alert and oriented X 4, no focal deficits      LABS:                          8.5    2.13  )-----------( 86       ( 26 Nov 2019 09:14 )             26.8         Mean Cell Volume : 92.1 fL  Mean Cell Hemoglobin : 29.2 pg  Mean Cell Hemoglobin Concentration : 31.7 g/dL  Auto Neutrophil # : 1.00 K/uL  Auto Lymphocyte # : 0.69 K/uL  Auto Monocyte # : 0.40 K/uL  Auto Eosinophil # : 0.00 K/uL  Auto Basophil # : 0.01 K/uL  Auto Neutrophil % : 46.9 %  Auto Lymphocyte % : 32.4 %  Auto Monocyte % : 18.8 %  Auto Eosinophil % : 0.0 %  Auto Basophil % : 0.5 %      Serial CBC's  11-26 @ 09:14  Hct-26.8 / Hgb-8.5 / Plat-86 / RBC-2.91 / WBC-2.13  Serial CBC's  11-25 @ 12:39  Hct-26.8 / Hgb-8.2 / Plat-98 / RBC-2.75 / WBC-4.06      11-26    141  |  102  |  37<H>  ----------------------------<  260<H>  3.8   |  22  |  1.2    Ca    8.0<L>      26 Nov 2019 09:14  Mg     2.0     11-26    TPro  7.8  /  Alb  3.7  /  TBili  1.8<H>  /  DBili  x   /  AST  86<H>  /  ALT  58<H>  /  AlkPhos  89  11-25    < from: Xray Chest 1 View-PORTABLE IMMEDIATE (11.25.19 @ 14:10) >  Pulmonary vascular congestion. Possible trace pleural effusions.    < end of copied text > Patient is a 74y old  male who presents with a chief complaint of dyspnea (25 Nov 2019 17:49). Hematology consultation called because of his history of MDS.      HPI:  74 year old male w/ past medical history of CAD, s/p stent placement, non-obstructive cardiomyopathy with EF 26%, hypertension, hyperlipidemia, DM II, Afib, MDS with deletion 20q, presents for SOB. He says that his SOB has been there for weeks but has been progressing from being only at exertion to occurring even at rest. He denies chest pain, he is compliant with his medications including Lasix but says that he might have been eating salty foot and not a strict diet recently. He was on a life vest but it was discontinued by Dr. Potts because, as per the patient, he did not need it anymore and that the medications were working ; he had an echo with Dr. Castro 1 month ago and it showed an EF of 40%. His previous echo was in July 2019 and it showed an EF of 26% with valvular disease. He denies fever/chills, abdominal pain or leg swelling. He has nocturia but no burning in urine. He has not been gaining weight and his appetite is unchanged.  In ED, VS wnl, CXR showing bilateral pulmonary vascular congestion with trace bilateral pleural effusions. BNP 93349 and troponin 0.03. Hb 8.2.  He is s/p Lasix 20 mg IV (25 Nov 2019 17:49) and started feeling better.    Hematology history  Mat is a 74 year old patient of Dr Santoro who was evaluated for pancytopenia, s/p BM biopsy showing MDS with deletion of chromosome 20. The patient is managed with observation for the time being. His IPSS score is 3, making him low risk MDS. His last transfusion was more than 3 months ago. He presented with Hgb of 8.2 (which is closer to his baseline) and he received 1 unit PRBC overnight.       ROS:  Negative except for:    PAST MEDICAL & SURGICAL HISTORY:  Afib  GERD (gastroesophageal reflux disease)  CAD (coronary artery disease)  Diabetes  HTN (hypertension)  H/O colonoscopy: 10 yeras ago      SOCIAL HISTORY: Quit smoking >30 years ago  No alcohol abuse/illicit drug use     FAMILY HISTORY:  FH: hypertension. Bladder Ca in mother in her 70S      MEDICATIONS  (STANDING):  aspirin  chewable 81 milliGRAM(s) Oral daily  atorvastatin 20 milliGRAM(s) Oral at bedtime  chlorhexidine 4% Liquid 1 Application(s) Topical <User Schedule>  digoxin     Tablet 0.125 milliGRAM(s) Oral daily  famotidine    Tablet 20 milliGRAM(s) Oral two times a day  furosemide   Injectable 60 milliGRAM(s) IV Push every 12 hours  metoprolol succinate  milliGRAM(s) Oral daily  spironolactone 25 milliGRAM(s) Oral daily  tamsulosin Oral Tab/Cap - Peds 0.4 milliGRAM(s) Oral at bedtime    MEDICATIONS  (PRN):      Weight (kg): 79.4 (11-25-19 @ 11:52)  Allergies    No Known Allergies    Intolerances        Vital Signs Last 24 Hrs  T(C): 37.1 (26 Nov 2019 08:03), Max: 37.1 (25 Nov 2019 20:00)  T(F): 98.8 (26 Nov 2019 08:03), Max: 98.8 (26 Nov 2019 08:03)  HR: 114 (26 Nov 2019 08:03) (71 - 121)  BP: 140/78 (26 Nov 2019 08:03) (132/82 - 153/85)  BP(mean): --  RR: 18 (26 Nov 2019 08:03) (18 - 23)  SpO2: 94% (26 Nov 2019 08:03) (90% - 100%)    PHYSICAL EXAM  General: adult in NAD, looks pale  HEENT: clear oropharynx  Neck: supple  CV: normal S1/S2 with no murmur rubs or gallops  Lungs: BS decreased b/l bases  Abdomen: soft non-tender  Ext: no LE edema  Neuro: alert and oriented X 4, no focal deficits      LABS:                          8.5    2.13  )-----------( 86       ( 26 Nov 2019 09:14 )             26.8         Mean Cell Volume : 92.1 fL  Mean Cell Hemoglobin : 29.2 pg  Mean Cell Hemoglobin Concentration : 31.7 g/dL  Auto Neutrophil # : 1.00 K/uL  Auto Lymphocyte # : 0.69 K/uL  Auto Monocyte # : 0.40 K/uL  Auto Eosinophil # : 0.00 K/uL  Auto Basophil # : 0.01 K/uL  Auto Neutrophil % : 46.9 %  Auto Lymphocyte % : 32.4 %  Auto Monocyte % : 18.8 %  Auto Eosinophil % : 0.0 %  Auto Basophil % : 0.5 %      Serial CBC's  11-26 @ 09:14  Hct-26.8 / Hgb-8.5 / Plat-86 / RBC-2.91 / WBC-2.13  Serial CBC's  11-25 @ 12:39  Hct-26.8 / Hgb-8.2 / Plat-98 / RBC-2.75 / WBC-4.06      11-26    141  |  102  |  37<H>  ----------------------------<  260<H>  3.8   |  22  |  1.2    Ca    8.0<L>      26 Nov 2019 09:14  Mg     2.0     11-26    TPro  7.8  /  Alb  3.7  /  TBili  1.8<H>  /  DBili  x   /  AST  86<H>  /  ALT  58<H>  /  AlkPhos  89  11-25    < from: Xray Chest 1 View-PORTABLE IMMEDIATE (11.25.19 @ 14:10) >  Pulmonary vascular congestion. Possible trace pleural effusions.    < end of copied text >

## 2019-11-26 NOTE — ED ADULT NURSE REASSESSMENT NOTE - NS ED NURSE REASSESS COMMENT FT1
Dr. Mckeon made aware that pt HR is now 124, Pt not complaining of any cp, distress, or pain. As per MD, will order ekg to make sure rhythm is sinus; however, if confirmed sinus no further interventions and will just continue to monitor.

## 2019-11-26 NOTE — PROGRESS NOTE ADULT - SUBJECTIVE AND OBJECTIVE BOX
SUBJECTIVE:    Patient is a 74y old Male who presents with a chief complaint of Dyspnea (2019 10:34)    Stable, no acute events.    PAST MEDICAL & SURGICAL HISTORY  Afib  GERD (gastroesophageal reflux disease)  CAD (coronary artery disease)  Diabetes  HTN (hypertension)  H/O colonoscopy: 10 yeras ago       ALLERGIES:  No Known Allergies    MEDICATIONS:  STANDING MEDICATIONS  aspirin  chewable 81 milliGRAM(s) Oral daily  atorvastatin 20 milliGRAM(s) Oral at bedtime  chlorhexidine 4% Liquid 1 Application(s) Topical <User Schedule>  digoxin     Tablet 0.125 milliGRAM(s) Oral daily  famotidine    Tablet 20 milliGRAM(s) Oral two times a day  furosemide   Injectable 60 milliGRAM(s) IV Push every 12 hours  metoprolol succinate  milliGRAM(s) Oral daily  spironolactone 25 milliGRAM(s) Oral daily  tamsulosin Oral Tab/Cap - Peds 0.4 milliGRAM(s) Oral at bedtime    PRN MEDICATIONS    VITALS:   T(F): 100.4  HR: 120  BP: 140/78  RR: 18  SpO2: 94%    LABS:                        8.5    2.13  )-----------( 86       ( 2019 09:14 )             26.8     11-    141  |  102  |  37<H>  ----------------------------<  260<H>  3.8   |  22  |  1.2    Ca    8.0<L>      2019 09:14  Mg     2.0         TPro  7.8  /  Alb  3.7  /  TBili  1.8<H>  /  DBili  x   /  AST  86<H>  /  ALT  58<H>  /  AlkPhos  89        Urinalysis Basic - ( 2019 23:30 )    Color: Yellow / Appearance: Slightly Turbid / S.022 / pH: x  Gluc: x / Ketone: Trace  / Bili: Negative / Urobili: 3 mg/dL   Blood: x / Protein: 100 mg/dL / Nitrite: Negative   Leuk Esterase: Negative / RBC: 2 /HPF / WBC 3 /HPF   Sq Epi: x / Non Sq Epi: 2 /HPF / Bacteria: Negative      ABG - ( 2019 14:30 )  pH, Arterial: 7.44  pH, Blood: x     /  pCO2: 33    /  pO2: 117   / HCO3: 22    / Base Excess: -1.5  /  SaO2: 98                Troponin T, Serum: 0.03 ng/mL <HH> (19 @ 09:14)  Lactate, Blood: 2.8 mmol/L <H> (19 @ 21:51)  Troponin T, Serum: 0.02 ng/mL <H> (19 @ 21:51)  Lactate, Blood: 7.8 mmol/L <HH> (19 @ 14:38)      CARDIAC MARKERS ( 2019 09:14 )  x     / 0.03 ng/mL / x     / x     / x      CARDIAC MARKERS ( 2019 21:51 )  x     / 0.02 ng/mL / x     / x     / x      CARDIAC MARKERS ( 2019 12:39 )  x     / 0.03 ng/mL / x     / x     / x            < from: Transthoracic Echocardiogram (19 @ 11:59) >  Summary:   1. Left ventricular ejection fraction, by visual estimation, is 25 to   30%.   2. Moderately decreased global left ventricular systolic function.   3. Severely decreased segmental left ventricular systolic function.   4. Mildly increased LV wall thickness.   5. Spectral Doppler shows restrictive pattern of left ventricular   myocardial filling (Grade III diastolic dysfunction).   6. PSAP at least 40.   7.Mild aortic regurgitation.   8. Sclerotic aortic valve with decreased opening.   9. Peak gradient of 27 with a mean of 14 and calcualted CHINYERE of 1.3 c/w   mod AS.  10. Peak transaortic gradient equals 27.7 mmHg, mean transaortic gradient   equals 10.2mmHg, the calculated aortic valve area equals 1.15 cm² by the   continuity equation consistent with moderate aortic stenosis.  11. There is moderate aortic root calcification.    < end of copied text >        PHYSICAL EXAM:  GEN: NAD, comfortable  LUNGS: CTAB, no w/r/r  HEART: regular, tachycardic, no m/r/g  ABD: soft, NT/ND, +BS  EXT: no edema, PP b/l  NEURO: AAOx3

## 2019-11-26 NOTE — CONSULT NOTE ADULT - ASSESSMENT
77 y/o male with HfrEF admitted with shortness of breath    SOB  CHF decompensated systolic dysfxn  Pleural effusion-small b/l  AFIb  Anemia s/p transfusion  MDS    monitor 02 sat  cont lasix iv  maintain negative balance  cardio eval  s/p transfusion  repeat cxr 48 hours  dvt/gi px

## 2019-11-26 NOTE — CONSULT NOTE ADULT - ASSESSMENT
IMPRESSION: Rehab of gait dysfunction    PRECAUTIONS: [  ] Cardiac  [  ] Respiratory  [  ] Seizures [  ] Contact Isolation  [  ] Droplet Isolation  [  ] Other    Weight Bearing Status:     RECOMMENDATION:    Out of Bed to Chair     DVT/Decubiti Prophylaxis    REHAB PLAN:     [ x  ] Bedside P/T 3-5 times a week   [   ]   Bedside O/T  2-3 times a week             [   ] No Rehab Therapy Indicated                   [   ]  Speech Therapy   Conditioning/ROM                                    ADL  Bed Mobility                                               Conditioning/ROM  Transfers                                                     Bed Mobility  Sitting /Standing Balance                         Transfers                                        Gait Training                                               Sitting/Standing Balance  Stair Training [   ]Applicable                    Home equipment Eval                                                                        Splinting  [   ] Only      GOALS:   ADL   [   ]   Independent                    Transfers  [  x ] Independent                          Ambulation  [ x  ] Independent     [  x  ] With device                            [   ]  CG                                                         [   ]  CG                                                                  [   ] CG                            [    ] Min A                                                   [   ] Min A                                                              [   ] Min  A          DISCHARGE PLAN:   [   ]  Good candidate for Intensive Rehabilitation/Hospital based                                             Will tolerate 3hrs Intensive Rehab Daily                                       [  x  ]  Short Term Rehab in Skilled Nursing Facility / senior housing                                       [    ]  Home with Outpatient or VN services                                         [    ]  Possible Candidate for Intensive Hospital based Rehab

## 2019-11-26 NOTE — CONSULT NOTE ADULT - ASSESSMENT
decompensated chronic systolic HF, exacerbated due to anemia and non compliance with low salt diet in the presence of moderate A.S  patient admits not observing limited salt intake, i had a lengthy talk about it with his home assistant and his niece   anemia, post transfusion  moderate AS    decrease lasix to 40 mg IV daily for the next 2 days  continue with the rest of the medicine  if in 24 hours there is no arrhythmia d/c telemetry tomorrow

## 2019-11-27 LAB
ANION GAP SERPL CALC-SCNC: 11 MMOL/L — SIGNIFICANT CHANGE UP (ref 7–14)
BASOPHILS # BLD AUTO: 0.01 K/UL — SIGNIFICANT CHANGE UP (ref 0–0.2)
BASOPHILS NFR BLD AUTO: 0.4 % — SIGNIFICANT CHANGE UP (ref 0–1)
BUN SERPL-MCNC: 37 MG/DL — HIGH (ref 10–20)
CALCIUM SERPL-MCNC: 8 MG/DL — LOW (ref 8.5–10.1)
CHLORIDE SERPL-SCNC: 103 MMOL/L — SIGNIFICANT CHANGE UP (ref 98–110)
CO2 SERPL-SCNC: 27 MMOL/L — SIGNIFICANT CHANGE UP (ref 17–32)
CREAT SERPL-MCNC: 1.1 MG/DL — SIGNIFICANT CHANGE UP (ref 0.7–1.5)
EOSINOPHIL # BLD AUTO: 0.01 K/UL — SIGNIFICANT CHANGE UP (ref 0–0.7)
EOSINOPHIL NFR BLD AUTO: 0.4 % — SIGNIFICANT CHANGE UP (ref 0–8)
ESTIMATED AVERAGE GLUCOSE: 146 MG/DL — HIGH (ref 68–114)
GLUCOSE BLDC GLUCOMTR-MCNC: 170 MG/DL — HIGH (ref 70–99)
GLUCOSE BLDC GLUCOMTR-MCNC: 184 MG/DL — HIGH (ref 70–99)
GLUCOSE BLDC GLUCOMTR-MCNC: 188 MG/DL — HIGH (ref 70–99)
GLUCOSE BLDC GLUCOMTR-MCNC: 205 MG/DL — HIGH (ref 70–99)
GLUCOSE SERPL-MCNC: 211 MG/DL — HIGH (ref 70–99)
HBA1C BLD-MCNC: 6.7 % — HIGH (ref 4–5.6)
HCT VFR BLD CALC: 26.6 % — LOW (ref 42–52)
HGB BLD-MCNC: 8.3 G/DL — LOW (ref 14–18)
IMM GRANULOCYTES NFR BLD AUTO: 1.2 % — HIGH (ref 0.1–0.3)
LYMPHOCYTES # BLD AUTO: 0.93 K/UL — LOW (ref 1.2–3.4)
LYMPHOCYTES # BLD AUTO: 38 % — SIGNIFICANT CHANGE UP (ref 20.5–51.1)
MAGNESIUM SERPL-MCNC: 2 MG/DL — SIGNIFICANT CHANGE UP (ref 1.8–2.4)
MCHC RBC-ENTMCNC: 28.9 PG — SIGNIFICANT CHANGE UP (ref 27–31)
MCHC RBC-ENTMCNC: 31.2 G/DL — LOW (ref 32–37)
MCV RBC AUTO: 92.7 FL — SIGNIFICANT CHANGE UP (ref 80–94)
MONOCYTES # BLD AUTO: 0.54 K/UL — SIGNIFICANT CHANGE UP (ref 0.1–0.6)
MONOCYTES NFR BLD AUTO: 22 % — HIGH (ref 1.7–9.3)
NEUTROPHILS # BLD AUTO: 0.93 K/UL — LOW (ref 1.4–6.5)
NEUTROPHILS NFR BLD AUTO: 38 % — LOW (ref 42.2–75.2)
NRBC # BLD: 4 /100 WBCS — HIGH (ref 0–0)
PLATELET # BLD AUTO: 91 K/UL — LOW (ref 130–400)
POTASSIUM SERPL-MCNC: 3.4 MMOL/L — LOW (ref 3.5–5)
POTASSIUM SERPL-SCNC: 3.4 MMOL/L — LOW (ref 3.5–5)
RBC # BLD: 2.87 M/UL — LOW (ref 4.7–6.1)
RBC # FLD: 19.9 % — HIGH (ref 11.5–14.5)
SODIUM SERPL-SCNC: 141 MMOL/L — SIGNIFICANT CHANGE UP (ref 135–146)
WBC # BLD: 2.45 K/UL — LOW (ref 4.8–10.8)
WBC # FLD AUTO: 2.45 K/UL — LOW (ref 4.8–10.8)

## 2019-11-27 RX ORDER — POTASSIUM CHLORIDE 20 MEQ
40 PACKET (EA) ORAL ONCE
Refills: 0 | Status: COMPLETED | OUTPATIENT
Start: 2019-11-27 | End: 2019-11-27

## 2019-11-27 RX ORDER — TAMSULOSIN HYDROCHLORIDE 0.4 MG/1
0.4 CAPSULE ORAL AT BEDTIME
Refills: 0 | Status: DISCONTINUED | OUTPATIENT
Start: 2019-11-27 | End: 2019-11-29

## 2019-11-27 RX ADMIN — FAMOTIDINE 20 MILLIGRAM(S): 10 INJECTION INTRAVENOUS at 17:26

## 2019-11-27 RX ADMIN — INSULIN GLARGINE 10 UNIT(S): 100 INJECTION, SOLUTION SUBCUTANEOUS at 22:07

## 2019-11-27 RX ADMIN — Medication 1: at 11:36

## 2019-11-27 RX ADMIN — Medication 200 MILLIGRAM(S): at 06:10

## 2019-11-27 RX ADMIN — SPIRONOLACTONE 25 MILLIGRAM(S): 25 TABLET, FILM COATED ORAL at 06:10

## 2019-11-27 RX ADMIN — Medication 40 MILLIGRAM(S): at 06:10

## 2019-11-27 RX ADMIN — TAMSULOSIN HYDROCHLORIDE 0.4 MILLIGRAM(S): 0.4 CAPSULE ORAL at 22:07

## 2019-11-27 RX ADMIN — Medication 1: at 17:25

## 2019-11-27 RX ADMIN — Medication 5 UNIT(S): at 11:35

## 2019-11-27 RX ADMIN — Medication 5 UNIT(S): at 08:42

## 2019-11-27 RX ADMIN — FAMOTIDINE 20 MILLIGRAM(S): 10 INJECTION INTRAVENOUS at 06:10

## 2019-11-27 RX ADMIN — Medication 40 MILLIEQUIVALENT(S): at 17:26

## 2019-11-27 RX ADMIN — Medication 81 MILLIGRAM(S): at 11:35

## 2019-11-27 RX ADMIN — ATORVASTATIN CALCIUM 20 MILLIGRAM(S): 80 TABLET, FILM COATED ORAL at 22:07

## 2019-11-27 RX ADMIN — Medication 0.12 MILLIGRAM(S): at 06:10

## 2019-11-27 RX ADMIN — Medication 2: at 08:42

## 2019-11-27 RX ADMIN — Medication 5 UNIT(S): at 17:25

## 2019-11-27 NOTE — PROGRESS NOTE ADULT - SUBJECTIVE AND OBJECTIVE BOX
Patient was seen and examined. Spoke with RN. Chart reviewed.  No events overnight.  Vital Signs Last 24 Hrs  T(F): 96.8 (2019 05:44), Max: 98.9 (2019 14:43)  HR: 117 (2019 05:44) (110 - 117)  BP: 121/70 (2019 05:44) (116/66 - 134/76)  SpO2: --  MEDICATIONS  (STANDING):  aspirin  chewable 81 milliGRAM(s) Oral daily  atorvastatin 20 milliGRAM(s) Oral at bedtime  chlorhexidine 4% Liquid 1 Application(s) Topical <User Schedule>  dextrose 5%. 1000 milliLiter(s) (50 mL/Hr) IV Continuous <Continuous>  dextrose 50% Injectable 12.5 Gram(s) IV Push once  dextrose 50% Injectable 25 Gram(s) IV Push once  dextrose 50% Injectable 25 Gram(s) IV Push once  digoxin     Tablet 0.125 milliGRAM(s) Oral daily  famotidine    Tablet 20 milliGRAM(s) Oral two times a day  furosemide   Injectable 40 milliGRAM(s) IV Push daily  insulin glargine Injectable (LANTUS) 10 Unit(s) SubCutaneous at bedtime  insulin lispro (HumaLOG) corrective regimen sliding scale   SubCutaneous three times a day before meals  insulin lispro Injectable (HumaLOG) 5 Unit(s) SubCutaneous three times a day before meals  metoprolol succinate  milliGRAM(s) Oral daily  potassium chloride    Tablet ER 40 milliEquivalent(s) Oral once  spironolactone 25 milliGRAM(s) Oral daily  tamsulosin 0.4 milliGRAM(s) Oral at bedtime    MEDICATIONS  (PRN):  dextrose 40% Gel 15 Gram(s) Oral once PRN Blood Glucose LESS THAN 70 milliGRAM(s)/deciliter  glucagon  Injectable 1 milliGRAM(s) IntraMuscular once PRN Glucose LESS THAN 70 milligrams/deciliter    Labs:                        8.3    2.45  )-----------( 91       ( 2019 05:24 )             26.6                         8.5    2.13  )-----------( 86       ( 2019 09:14 )             26.8     2019 05:24    141    |  103    |  37     ----------------------------<  211    3.4     |  27     |  1.1    2019 09:14    141    |  102    |  37     ----------------------------<  260    3.8     |  22     |  1.2      Ca    8.0        2019 05:24  Ca    8.0        2019 09:14  Mg     2.0       2019 05:24  Mg     2.0       2019 09:14    TPro  7.8    /  Alb  3.7    /  TBili  1.8    /  DBili  x      /  AST  86     /  ALT  58     /  AlkPhos  89     2019 12:39      Urinalysis Basic - ( 2019 23:30 )    Color: Yellow / Appearance: Slightly Turbid / S.022 / pH: x  Gluc: x / Ketone: Trace  / Bili: Negative / Urobili: 3 mg/dL   Blood: x / Protein: 100 mg/dL / Nitrite: Negative   Leuk Esterase: Negative / RBC: 2 /HPF / WBC 3 /HPF   Sq Epi: x / Non Sq Epi: 2 /HPF / Bacteria: Negative        General: comfortable, NAD  Neurology: A&Ox3, nonfocal  Head:  Normocephalic, atraumatic  ENT:  Mucosa moist, no ulcerations  Neck:  Supple, no JVD,   Skin: no breakdowns (as per RN)  Resp: dec breath sounds   CV: RRR, S1S2,   GI: Soft, NT, bowel sounds  MS: No edema, + peripheral pulses, FROM all 4 extremity      A/P:  SOB  HFrEF  decompensated   Pleural effusion-small b/l  A.FIb  Anemia s/p transfusion  MDS    monitor 02 sat, BiPAP prn   cont Lasix IV and cardiac meds as per cardio   cardio following   hg stable   maintain negative balance  repeat CXR tomorrow  heme/onc as o/p   d/w [id number] physician    anticipate d/c 24-48 hours   DVT prophylaxis  Decubitus prevention- all measures as per RN protocol  Please call or text me with any questions or updates Patient was seen and examined. Spoke with RN. Chart reviewed.  No events overnight.  Vital Signs Last 24 Hrs  T(F): 96.8 (2019 05:44), Max: 98.9 (2019 14:43)  HR: 117 (2019 05:44) (110 - 117)  BP: 121/70 (2019 05:44) (116/66 - 134/76)  SpO2: --  MEDICATIONS  (STANDING):  aspirin  chewable 81 milliGRAM(s) Oral daily  atorvastatin 20 milliGRAM(s) Oral at bedtime  chlorhexidine 4% Liquid 1 Application(s) Topical <User Schedule>  dextrose 5%. 1000 milliLiter(s) (50 mL/Hr) IV Continuous <Continuous>  dextrose 50% Injectable 12.5 Gram(s) IV Push once  dextrose 50% Injectable 25 Gram(s) IV Push once  dextrose 50% Injectable 25 Gram(s) IV Push once  digoxin     Tablet 0.125 milliGRAM(s) Oral daily  famotidine    Tablet 20 milliGRAM(s) Oral two times a day  furosemide   Injectable 40 milliGRAM(s) IV Push daily  insulin glargine Injectable (LANTUS) 10 Unit(s) SubCutaneous at bedtime  insulin lispro (HumaLOG) corrective regimen sliding scale   SubCutaneous three times a day before meals  insulin lispro Injectable (HumaLOG) 5 Unit(s) SubCutaneous three times a day before meals  metoprolol succinate  milliGRAM(s) Oral daily  potassium chloride    Tablet ER 40 milliEquivalent(s) Oral once  spironolactone 25 milliGRAM(s) Oral daily  tamsulosin 0.4 milliGRAM(s) Oral at bedtime    MEDICATIONS  (PRN):  dextrose 40% Gel 15 Gram(s) Oral once PRN Blood Glucose LESS THAN 70 milliGRAM(s)/deciliter  glucagon  Injectable 1 milliGRAM(s) IntraMuscular once PRN Glucose LESS THAN 70 milligrams/deciliter    Labs:                        8.3    2.45  )-----------( 91       ( 2019 05:24 )             26.6                         8.5    2.13  )-----------( 86       ( 2019 09:14 )             26.8     2019 05:24    141    |  103    |  37     ----------------------------<  211    3.4     |  27     |  1.1    2019 09:14    141    |  102    |  37     ----------------------------<  260    3.8     |  22     |  1.2      Ca    8.0        2019 05:24  Ca    8.0        2019 09:14  Mg     2.0       2019 05:24  Mg     2.0       2019 09:14    TPro  7.8    /  Alb  3.7    /  TBili  1.8    /  DBili  x      /  AST  86     /  ALT  58     /  AlkPhos  89     2019 12:39      Urinalysis Basic - ( 2019 23:30 )    Color: Yellow / Appearance: Slightly Turbid / S.022 / pH: x  Gluc: x / Ketone: Trace  / Bili: Negative / Urobili: 3 mg/dL   Blood: x / Protein: 100 mg/dL / Nitrite: Negative   Leuk Esterase: Negative / RBC: 2 /HPF / WBC 3 /HPF   Sq Epi: x / Non Sq Epi: 2 /HPF / Bacteria: Negative        General: comfortable, NAD  Neurology: A&Ox3, nonfocal  Head:  Normocephalic, atraumatic  ENT:  Mucosa moist, no ulcerations  Neck:  Supple, no JVD,   Skin: no breakdowns (as per RN)  Resp: dec breath sounds   CV: RRR, S1S2,   GI: Soft, NT, bowel sounds  MS: No edema, + peripheral pulses, FROM all 4 extremity      A/P:  SOB  HFrEF  decompensated   Pleural effusion-small b/l  A.FIb  Anemia s/p transfusion  MDS    monitor 02 sat, BiPAP prn   cont Lasix IV and cardiac meds as per cardio   cardio following   limited salt intake   hg stable   maintain negative balance  repeat CXR tomorrow  heme/onc as o/p   glycemic control   d/w [id number] physician    anticipate d/c 24-48 hours   DVT prophylaxis  Decubitus prevention- all measures as per RN protocol  Please call or text me with any questions or updates

## 2019-11-27 NOTE — PROGRESS NOTE ADULT - ASSESSMENT
acute decompensated chronic systolic HF due to anemia and salt intake  anemia, chronic but it seems progressive  CKD    current therapy  ambulate with assistance  1 more day of Lasix IV  chest x-ray tomorrow or Friday  from Friday switch to Lasix po then if he is stable anticipate d/c home   no further cardiac w/u

## 2019-11-27 NOTE — PROGRESS NOTE ADULT - SUBJECTIVE AND OBJECTIVE BOX
Subjective:  sob, leg edema, respiratory distress improved, cr at base 1.1 now, hgb at 8,     MEDICATIONS  (STANDING):  aspirin  chewable 81 milliGRAM(s) Oral daily  atorvastatin 20 milliGRAM(s) Oral at bedtime  chlorhexidine 4% Liquid 1 Application(s) Topical <User Schedule>  dextrose 5%. 1000 milliLiter(s) (50 mL/Hr) IV Continuous <Continuous>  dextrose 50% Injectable 12.5 Gram(s) IV Push once  dextrose 50% Injectable 25 Gram(s) IV Push once  dextrose 50% Injectable 25 Gram(s) IV Push once  digoxin     Tablet 0.125 milliGRAM(s) Oral daily  famotidine    Tablet 20 milliGRAM(s) Oral two times a day  furosemide   Injectable 40 milliGRAM(s) IV Push daily  insulin glargine Injectable (LANTUS) 10 Unit(s) SubCutaneous at bedtime  insulin lispro (HumaLOG) corrective regimen sliding scale   SubCutaneous three times a day before meals  insulin lispro Injectable (HumaLOG) 5 Unit(s) SubCutaneous three times a day before meals  metoprolol succinate  milliGRAM(s) Oral daily  potassium chloride    Tablet ER 40 milliEquivalent(s) Oral once  spironolactone 25 milliGRAM(s) Oral daily  tamsulosin 0.4 milliGRAM(s) Oral at bedtime    MEDICATIONS  (PRN):  dextrose 40% Gel 15 Gram(s) Oral once PRN Blood Glucose LESS THAN 70 milliGRAM(s)/deciliter  glucagon  Injectable 1 milliGRAM(s) IntraMuscular once PRN Glucose LESS THAN 70 milligrams/deciliter            Vital Signs Last 24 Hrs  T(C): 36.3 (27 Nov 2019 13:00), Max: 36.8 (26 Nov 2019 17:13)  T(F): 97.4 (27 Nov 2019 13:00), Max: 98.2 (26 Nov 2019 17:13)  HR: 110 (27 Nov 2019 13:00) (110 - 117)  BP: 110/60 (27 Nov 2019 13:00) (110/60 - 134/76)  BP(mean): --  RR: 18 (27 Nov 2019 13:00) (18 - 18)  SpO2: 94% (27 Nov 2019 13:57) (94% - 94%)             REVIEW OF SYSTEMS:  CONSTITUTIONAL: no fever, no chills, no diaphoresis  CARDIOLOGY: no chest pain, no palpitation, no diaphoresis, no faint   RESPIRATORY: less dyspnea, no wheeze, no more orthopnea,  GI: no abdominal pain, no dyspepsia, no nausea, no vomiting, no diarrhea.    HEENT: no congestion, no nasal bleeding  SKIN: no ecchymosis, no petechia             PHYSICAL EXAM:  · CONSTITUTIONAL: Looks more stable, very pale  . NECK: Supple, no JVD,   · RESPIRATORY: Normal air entry to lung base, no wheeze, no crackle, no wet rales  · CARDIOVASCULAR: S1, A2, P2, systolic apical murmur, no click,  · EXTREMITIES: No cyanosis, no clubbing, no edema  · VASCULAR: Pulses are regular, equal,  	  TELEMETRY: nsr    ECG: NO NEW ECG   < from: 12 Lead ECG (11.26.19 @ 09:48) >  Sinus tachycardiawith Premature supraventricular complexes  Possible Left atrial enlargement  Septal infarct , age undetermined  Abnormal ECG    < end of copied text >    LABS:                        8.3    2.45  )-----------( 91       ( 27 Nov 2019 05:24 )             26.6     11-27    141  |  103  |  37<H>  ----------------------------<  211<H>  3.4<L>   |  27  |  1.1    Ca    8.0<L>      27 Nov 2019 05:24  Mg     2.0     11-27      CARDIAC MARKERS ( 26 Nov 2019 09:14 )  x     / 0.03 ng/mL / x     / x     / x      CARDIAC MARKERS ( 25 Nov 2019 21:51 )  x     / 0.02 ng/mL / x     / x     / x              I&O's Summary    26 Nov 2019 07:01  -  27 Nov 2019 07:00  --------------------------------------------------------  IN: 120 mL / OUT: 400 mL / NET: -280 mL      BNP  RADIOLOGY & ADDITIONAL STUDIES:    IMPRESSION AND PLAN:

## 2019-11-27 NOTE — PHYSICAL THERAPY INITIAL EVALUATION ADULT - PLANNED THERAPY INTERVENTIONS, PT EVAL
strengthening/balance training/bed mobility training/transfer training/gait training/postural re-education

## 2019-11-27 NOTE — PROGRESS NOTE ADULT - SUBJECTIVE AND OBJECTIVE BOX
SHANE ASHTON 74y Male  MRN#: [id number]         SUBJECTIVE  Patient is a 74y old Male who presents with a chief complaint of Dyspnea (2019 19:41)  Currently admitted to medicine with the primary diagnosis of Dyspnea on exertion    Today is hospital day 2d, and this morning pt is resting comfortably. He reports improvement in his breathing status.        OBJECTIVE  PAST MEDICAL & SURGICAL HISTORY  GERD (gastroesophageal reflux disease)  CAD (coronary artery disease)  Diabetes  HTN (hypertension)  Afib  H/O colonoscopy: 10 yeras ago    ALLERGIES:  No Known Allergies    MEDICATIONS:  STANDING MEDICATIONS  aspirin  chewable 81 milliGRAM(s) Oral daily  atorvastatin 20 milliGRAM(s) Oral at bedtime  chlorhexidine 4% Liquid 1 Application(s) Topical <User Schedule>  dextrose 5%. 1000 milliLiter(s) IV Continuous <Continuous>  dextrose 50% Injectable 12.5 Gram(s) IV Push once  dextrose 50% Injectable 25 Gram(s) IV Push once  dextrose 50% Injectable 25 Gram(s) IV Push once  digoxin     Tablet 0.125 milliGRAM(s) Oral daily  famotidine    Tablet 20 milliGRAM(s) Oral two times a day  furosemide   Injectable 40 milliGRAM(s) IV Push daily  insulin glargine Injectable (LANTUS) 10 Unit(s) SubCutaneous at bedtime  insulin lispro (HumaLOG) corrective regimen sliding scale   SubCutaneous three times a day before meals  insulin lispro Injectable (HumaLOG) 5 Unit(s) SubCutaneous three times a day before meals  metoprolol succinate  milliGRAM(s) Oral daily  potassium chloride    Tablet ER 40 milliEquivalent(s) Oral once  spironolactone 25 milliGRAM(s) Oral daily  tamsulosin 0.4 milliGRAM(s) Oral at bedtime    PRN MEDICATIONS  dextrose 40% Gel 15 Gram(s) Oral once PRN  glucagon  Injectable 1 milliGRAM(s) IntraMuscular once PRN      Home Medications:  atorvastatin 20 mg oral tablet: 1 tab(s) orally once a day (2019 18:44)  Entresto 24 mg-26 mg oral tablet: 1 tab(s) orally 2 times a day (2019 18:44)  famotidine 40 mg oral tablet: 1 tab(s) orally once a day (at bedtime) (2019 18:44)  glimepiride 4 mg oral tablet: 1 tab(s) orally once a day (2019 18:44)  metFORMIN 1000 mg oral tablet: 1 tab(s) orally 2 times a day (2019 18:44)  metoprolol succinate 200 mg oral tablet, extended release: 1 tab(s) orally once a day (2019 18:44)  tamsulosin 0.4 mg oral capsule: 1 cap(s) orally once a day (at bedtime) (2019 18:44)      VITAL SIGNS: Last 24 Hours  T(C): 36 (2019 05:44), Max: 38 (2019 10:42)  T(F): 96.8 (2019 05:44), Max: 100.4 (2019 10:42)  HR: 117 (2019 05:44) (110 - 120)  BP: 121/70 (2019 05:44) (116/66 - 134/76)  BP(mean): --  RR: 18 (2019 05:44) (18 - 18)  SpO2: --    LABS:                        8.3    2.45  )-----------( 91       ( 2019 05:24 )             26.6     11    141  |  103  |  37<H>  ----------------------------<  211<H>  3.4<L>   |  27  |  1.1    Ca    8.0<L>      2019 05:24  Mg     2.0         TPro  7.8  /  Alb  3.7  /  TBili  1.8<H>  /  DBili  x   /  AST  86<H>  /  ALT  58<H>  /  AlkPhos  89        Urinalysis Basic - ( 2019 23:30 )    Color: Yellow / Appearance: Slightly Turbid / S.022 / pH: x  Gluc: x / Ketone: Trace  / Bili: Negative / Urobili: 3 mg/dL   Blood: x / Protein: 100 mg/dL / Nitrite: Negative   Leuk Esterase: Negative / RBC: 2 /HPF / WBC 3 /HPF   Sq Epi: x / Non Sq Epi: 2 /HPF / Bacteria: Negative      ABG - ( 2019 14:30 )  pH, Arterial: 7.44  pH, Blood: x     /  pCO2: 33    /  pO2: 117   / HCO3: 22    / Base Excess: -1.5  /  SaO2: 98            CARDIAC MARKERS ( 2019 09:14 )  x     / 0.03 ng/mL / x     / x     / x      CARDIAC MARKERS ( 2019 21:51 )  x     / 0.02 ng/mL / x     / x     / x      CARDIAC MARKERS ( 2019 12:39 )  x     / 0.03 ng/mL / x     / x     / x          I&O's Summary    2019 07:01  -  2019 07:00  --------------------------------------------------------  IN: 120 mL / OUT: 400 mL / NET: -280 mL          PHYSICAL EXAM:    General: Not in distress.   HEENT: Moist mucus membranes. PERRLA.  Cardio: Regular rate and rhythm, S1, S2, no murmur, rub, or gallop.  Pulm: Clear to auscultation bilaterally. No wheezing, or rhonchi  Abdomen: Soft, non-tender, non-distended. Normoactive bowel sounds.  Extremities: No cyanosis or edema bilaterally. No calf tenderness to palpation.  Neuro: A&O x3. No focal deficits.       RADIOLOGY:    < from: VA Duplex Upper Ext Vein Scan, Left (19 @ 16:06) >  Impression:    Superficial thrombophlebitis noted in the left basilic vein.        < from: Xray Chest 1 View-PORTABLE IMMEDIATE (19 @ 14:10) >  Impression:      Pulmonary vascular congestion. Possible trace pleural effusions.    < end of copied text >

## 2019-11-27 NOTE — PHYSICAL THERAPY INITIAL EVALUATION ADULT - GENERAL OBSERVATIONS, REHAB EVAL
chart reviewed - pt approach in ED - pt refused PT IE for unclear reasons
PT eval completed. Chart reviewed. Patient encountered semi fowlers in bed, in NAD, +IV lock. +tele, agreeable to therapy.

## 2019-11-27 NOTE — PROGRESS NOTE ADULT - ASSESSMENT
# Decompensated chronic systolic HF, exacerbated due to anemia and non compliance with low salt diet in the presence of moderate AS   - BNP 18,000 on admission. Evidence of vascular congestion on CXR.  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

## 2019-11-28 LAB
ALBUMIN SERPL ELPH-MCNC: 2.9 G/DL — LOW (ref 3.5–5.2)
ALP SERPL-CCNC: 68 U/L — SIGNIFICANT CHANGE UP (ref 30–115)
ALT FLD-CCNC: 139 U/L — HIGH (ref 0–41)
ANION GAP SERPL CALC-SCNC: 14 MMOL/L — SIGNIFICANT CHANGE UP (ref 7–14)
AST SERPL-CCNC: 113 U/L — HIGH (ref 0–41)
BILIRUB SERPL-MCNC: 0.8 MG/DL — SIGNIFICANT CHANGE UP (ref 0.2–1.2)
BUN SERPL-MCNC: 31 MG/DL — HIGH (ref 10–20)
CALCIUM SERPL-MCNC: 8 MG/DL — LOW (ref 8.5–10.1)
CHLORIDE SERPL-SCNC: 103 MMOL/L — SIGNIFICANT CHANGE UP (ref 98–110)
CO2 SERPL-SCNC: 26 MMOL/L — SIGNIFICANT CHANGE UP (ref 17–32)
CREAT SERPL-MCNC: 1.1 MG/DL — SIGNIFICANT CHANGE UP (ref 0.7–1.5)
GLUCOSE BLDC GLUCOMTR-MCNC: 107 MG/DL — HIGH (ref 70–99)
GLUCOSE BLDC GLUCOMTR-MCNC: 114 MG/DL — HIGH (ref 70–99)
GLUCOSE BLDC GLUCOMTR-MCNC: 225 MG/DL — HIGH (ref 70–99)
GLUCOSE BLDC GLUCOMTR-MCNC: 231 MG/DL — HIGH (ref 70–99)
GLUCOSE BLDC GLUCOMTR-MCNC: 254 MG/DL — HIGH (ref 70–99)
GLUCOSE SERPL-MCNC: 198 MG/DL — HIGH (ref 70–99)
HCT VFR BLD CALC: 26.6 % — LOW (ref 42–52)
HGB BLD-MCNC: 8.1 G/DL — LOW (ref 14–18)
MAGNESIUM SERPL-MCNC: 2 MG/DL — SIGNIFICANT CHANGE UP (ref 1.8–2.4)
MCHC RBC-ENTMCNC: 29 PG — SIGNIFICANT CHANGE UP (ref 27–31)
MCHC RBC-ENTMCNC: 30.5 G/DL — LOW (ref 32–37)
MCV RBC AUTO: 95.3 FL — HIGH (ref 80–94)
PLATELET # BLD AUTO: 100 K/UL — LOW (ref 130–400)
POTASSIUM SERPL-MCNC: 3.4 MMOL/L — LOW (ref 3.5–5)
POTASSIUM SERPL-SCNC: 3.4 MMOL/L — LOW (ref 3.5–5)
PROT SERPL-MCNC: 6.3 G/DL — SIGNIFICANT CHANGE UP (ref 6–8)
RBC # BLD: 2.79 M/UL — LOW (ref 4.7–6.1)
RBC # FLD: 19.1 % — HIGH (ref 11.5–14.5)
SODIUM SERPL-SCNC: 143 MMOL/L — SIGNIFICANT CHANGE UP (ref 135–146)
WBC # BLD: 2.11 K/UL — LOW (ref 4.8–10.8)
WBC # FLD AUTO: 2.11 K/UL — LOW (ref 4.8–10.8)

## 2019-11-28 PROCEDURE — 71045 X-RAY EXAM CHEST 1 VIEW: CPT | Mod: 26

## 2019-11-28 RX ADMIN — Medication 0.12 MILLIGRAM(S): at 06:42

## 2019-11-28 RX ADMIN — FAMOTIDINE 20 MILLIGRAM(S): 10 INJECTION INTRAVENOUS at 17:34

## 2019-11-28 RX ADMIN — Medication 5 UNIT(S): at 17:34

## 2019-11-28 RX ADMIN — TAMSULOSIN HYDROCHLORIDE 0.4 MILLIGRAM(S): 0.4 CAPSULE ORAL at 21:24

## 2019-11-28 RX ADMIN — Medication 200 MILLIGRAM(S): at 06:42

## 2019-11-28 RX ADMIN — Medication 5 UNIT(S): at 08:38

## 2019-11-28 RX ADMIN — FAMOTIDINE 20 MILLIGRAM(S): 10 INJECTION INTRAVENOUS at 06:42

## 2019-11-28 RX ADMIN — Medication 81 MILLIGRAM(S): at 12:59

## 2019-11-28 RX ADMIN — Medication 5 UNIT(S): at 12:56

## 2019-11-28 RX ADMIN — ATORVASTATIN CALCIUM 20 MILLIGRAM(S): 80 TABLET, FILM COATED ORAL at 21:24

## 2019-11-28 RX ADMIN — Medication 2: at 12:56

## 2019-11-28 RX ADMIN — INSULIN GLARGINE 10 UNIT(S): 100 INJECTION, SOLUTION SUBCUTANEOUS at 21:45

## 2019-11-28 RX ADMIN — SPIRONOLACTONE 25 MILLIGRAM(S): 25 TABLET, FILM COATED ORAL at 08:04

## 2019-11-28 RX ADMIN — Medication 2: at 08:39

## 2019-11-28 RX ADMIN — Medication 40 MILLIGRAM(S): at 08:04

## 2019-11-28 NOTE — PROGRESS NOTE ADULT - SUBJECTIVE AND OBJECTIVE BOX
Patient was seen and examined. Spoke with RN. Chart reviewed.  No events overnight.  Vital Signs Last 24 Hrs  T(F): 97.1 (28 Nov 2019 05:24), Max: 97.4 (27 Nov 2019 13:00)  HR: 90 (28 Nov 2019 08:02) (90 - 110)  BP: 114/67 (28 Nov 2019 08:02) (102/60 - 114/67)  SpO2: 100% (28 Nov 2019 00:15) (89% - 100%)  MEDICATIONS  (STANDING):  aspirin  chewable 81 milliGRAM(s) Oral daily  atorvastatin 20 milliGRAM(s) Oral at bedtime  chlorhexidine 4% Liquid 1 Application(s) Topical <User Schedule>  dextrose 5%. 1000 milliLiter(s) (50 mL/Hr) IV Continuous <Continuous>  dextrose 50% Injectable 12.5 Gram(s) IV Push once  dextrose 50% Injectable 25 Gram(s) IV Push once  dextrose 50% Injectable 25 Gram(s) IV Push once  digoxin     Tablet 0.125 milliGRAM(s) Oral daily  famotidine    Tablet 20 milliGRAM(s) Oral two times a day  insulin glargine Injectable (LANTUS) 10 Unit(s) SubCutaneous at bedtime  insulin lispro (HumaLOG) corrective regimen sliding scale   SubCutaneous three times a day before meals  insulin lispro Injectable (HumaLOG) 5 Unit(s) SubCutaneous three times a day before meals  metoprolol succinate  milliGRAM(s) Oral daily  spironolactone 25 milliGRAM(s) Oral daily  tamsulosin 0.4 milliGRAM(s) Oral at bedtime    MEDICATIONS  (PRN):  dextrose 40% Gel 15 Gram(s) Oral once PRN Blood Glucose LESS THAN 70 milliGRAM(s)/deciliter  glucagon  Injectable 1 milliGRAM(s) IntraMuscular once PRN Glucose LESS THAN 70 milligrams/deciliter    Labs:                        8.3    2.45  )-----------( 91       ( 27 Nov 2019 05:24 )             26.6                         8.5    2.13  )-----------( 86       ( 26 Nov 2019 09:14 )             26.8     28 Nov 2019 07:26    143    |  103    |  31     ----------------------------<  198    3.4     |  26     |  1.1    27 Nov 2019 05:24    141    |  103    |  37     ----------------------------<  211    3.4     |  27     |  1.1      Ca    8.0        28 Nov 2019 07:26  Ca    8.0        27 Nov 2019 05:24  Mg     2.0       28 Nov 2019 07:26  Mg     2.0       27 Nov 2019 05:24    TPro  6.3    /  Alb  2.9    /  TBili  0.8    /  DBili  x      /  AST  113    /  ALT  139    /  AlkPhos  68     28 Nov 2019 07:26          Culture - Blood (collected 26 Nov 2019 17:24)  Source: .Blood None  Preliminary Report (28 Nov 2019 02:01):    No growth to date.      General: comfortable, NAD  Head:  Normocephalic, atraumatic  ENT:  Mucosa moist, no ulcerations  Neck:  Supple, no JVD,   Skin: Left hand boil  Resp: CTA B/L  CV: RRR, S1S2,   GI: Soft, NT, bowel sounds  MS: No edema, + peripheral pulses,      A/P:  75 yo man with acute systolic CHF decompensation, moderate AS, anemia    diuretics as per cardiology    change to PO tomorrow    OOB    monitor Cr    O2 as needed    local care for hand swelling    DC planning 24-48 hours  DVT prophylaxis  Decubitus prevention- all measures as per RN protocol  Please call or text me with any questions or updates

## 2019-11-29 ENCOUNTER — TRANSCRIPTION ENCOUNTER (OUTPATIENT)
Age: 74
End: 2019-11-29

## 2019-11-29 VITALS
RESPIRATION RATE: 20 BRPM | HEART RATE: 110 BPM | TEMPERATURE: 96 F | SYSTOLIC BLOOD PRESSURE: 157 MMHG | DIASTOLIC BLOOD PRESSURE: 67 MMHG

## 2019-11-29 LAB
GLUCOSE BLDC GLUCOMTR-MCNC: 145 MG/DL — HIGH (ref 70–99)
GLUCOSE BLDC GLUCOMTR-MCNC: 185 MG/DL — HIGH (ref 70–99)

## 2019-11-29 PROCEDURE — 71045 X-RAY EXAM CHEST 1 VIEW: CPT | Mod: 26

## 2019-11-29 RX ADMIN — Medication 40 MILLIGRAM(S): at 05:45

## 2019-11-29 RX ADMIN — Medication 1: at 12:21

## 2019-11-29 RX ADMIN — SPIRONOLACTONE 25 MILLIGRAM(S): 25 TABLET, FILM COATED ORAL at 05:47

## 2019-11-29 RX ADMIN — Medication 81 MILLIGRAM(S): at 12:21

## 2019-11-29 RX ADMIN — Medication 200 MILLIGRAM(S): at 05:46

## 2019-11-29 RX ADMIN — Medication 0.12 MILLIGRAM(S): at 05:45

## 2019-11-29 RX ADMIN — FAMOTIDINE 20 MILLIGRAM(S): 10 INJECTION INTRAVENOUS at 05:47

## 2019-11-29 RX ADMIN — Medication 5 UNIT(S): at 08:37

## 2019-11-29 RX ADMIN — Medication 5 UNIT(S): at 12:21

## 2019-11-29 RX ADMIN — CHLORHEXIDINE GLUCONATE 1 APPLICATION(S): 213 SOLUTION TOPICAL at 05:45

## 2019-11-29 NOTE — DISCHARGE NOTE NURSING/CASE MANAGEMENT/SOCIAL WORK - NSDCPEPT PROEDHF_GEN_ALL_CORE
Call primary care provider for follow up after discharge/Monitor weight daily/Report signs and symptoms to primary care provider/Low salt diet/Activities as tolerated

## 2019-11-29 NOTE — DISCHARGE NOTE PROVIDER - HOSPITAL COURSE
Pt presented for exacerbation of systolic heart failure. He improved on IV diuresis, and will be discharged with plan to follow up with cardiology.

## 2019-11-29 NOTE — PROGRESS NOTE ADULT - SUBJECTIVE AND OBJECTIVE BOX
SHANE ASHTON  74y, Male  Allergy: No Known Allergies      LAST 24-Hr EVENTS:  breathing better, complains of generalized weakness  VITALS:  T(F): 96 (11-29-19 @ 14:11), Max: 97.6 (11-29-19 @ 05:29)  HR: 110 (11-29-19 @ 14:11)  BP: 157/67 (11-29-19 @ 14:11) (120/67 - 157/67)  RR: 20 (11-29-19 @ 14:11)  SpO2: --    PHYSICAL EXAM:    GENERAL: NAD, well-developed  HEAD:  Atraumatic, Normocephalic  NECK: Supple, No JVD  CHEST/LUNG: Clear to auscultation bilaterally; No wheeze  HEART: Regular rate and rhythm; No murmurs, rubs, or gallops  ABDOMEN: Soft, Nontender, Nondistended; Bowel sounds present  EXTREMITIES:  2+ Peripheral Pulses, No clubbing, cyanosis, or edema        TESTS & MEASUREMENTS:    IN: 0 mL / OUT: 200 mL / NET: -200 mL    IN: 330 mL / OUT: 475 mL / NET: -145 mL    IN: 420 mL / OUT: 600 mL / NET: -180 mL                            8.1    2.11  )-----------( 100      ( 28 Nov 2019 07:26 )             26.6       11-28    143  |  103  |  31<H>  ----------------------------<  198<H>  3.4<L>   |  26  |  1.1    Ca    8.0<L>      28 Nov 2019 07:26  Mg     2.0     11-28    TPro  6.3  /  Alb  2.9<L>  /  TBili  0.8  /  DBili  x   /  AST  113<H>  /  ALT  139<H>  /  AlkPhos  68  11-28    LIVER FUNCTIONS - ( 28 Nov 2019 07:26 )  Alb: 2.9 g/dL / Pro: 6.3 g/dL / ALK PHOS: 68 U/L / ALT: 139 U/L / AST: 113 U/L / GGT: x                 Culture - Blood (collected 11-26-19 @ 17:24)  Source: .Blood None  Preliminary Report (11-28-19 @ 02:01):    No growth to date.          ABG & VENT SETTINGS: (when applicable)    n/a    RADIOLOGY & ADDITIONAL TESTS:    < from: Xray Chest 1 View AP/PA (11.29.19 @ 09:25) >  Impression:      Worsening bilateralopacifications consistent with CHF.    Follow-up as needed.    < end of copied text >  MEDICATIONS:  MEDICATIONS  (STANDING):  aspirin  chewable 81 milliGRAM(s) Oral daily  atorvastatin 20 milliGRAM(s) Oral at bedtime  chlorhexidine 4% Liquid 1 Application(s) Topical <User Schedule>  dextrose 5%. 1000 milliLiter(s) (50 mL/Hr) IV Continuous <Continuous>  dextrose 50% Injectable 12.5 Gram(s) IV Push once  dextrose 50% Injectable 25 Gram(s) IV Push once  dextrose 50% Injectable 25 Gram(s) IV Push once  digoxin     Tablet 0.125 milliGRAM(s) Oral daily  famotidine    Tablet 20 milliGRAM(s) Oral two times a day  furosemide    Tablet 40 milliGRAM(s) Oral daily  insulin glargine Injectable (LANTUS) 10 Unit(s) SubCutaneous at bedtime  insulin lispro (HumaLOG) corrective regimen sliding scale   SubCutaneous three times a day before meals  insulin lispro Injectable (HumaLOG) 5 Unit(s) SubCutaneous three times a day before meals  metoprolol succinate  milliGRAM(s) Oral daily  spironolactone 25 milliGRAM(s) Oral daily  tamsulosin 0.4 milliGRAM(s) Oral at bedtime    MEDICATIONS  (PRN):  dextrose 40% Gel 15 Gram(s) Oral once PRN Blood Glucose LESS THAN 70 milliGRAM(s)/deciliter  glucagon  Injectable 1 milliGRAM(s) IntraMuscular once PRN Glucose LESS THAN 70 milligrams/deciliter

## 2019-11-29 NOTE — DISCHARGE NOTE PROVIDER - NSDCMRMEDTOKEN_GEN_ALL_CORE_FT
aspirin 81 mg oral tablet, chewable: 1 tab(s) orally once a day  atorvastatin 20 mg oral tablet: 1 tab(s) orally once a day  digoxin 125 mcg (0.125 mg) oral tablet: 1 tab(s) orally once a day  Entresto 24 mg-26 mg oral tablet: 1 tab(s) orally 2 times a day  famotidine 40 mg oral tablet: 1 tab(s) orally once a day (at bedtime)  furosemide 40 mg oral tablet: 1 tab(s) orally once a day  glimepiride 4 mg oral tablet: 1 tab(s) orally once a day  metFORMIN 1000 mg oral tablet: 1 tab(s) orally 2 times a day  metoprolol succinate 200 mg oral tablet, extended release: 1 tab(s) orally once a day  spironolactone 25 mg oral tablet: 1 tab(s) orally once a day  tamsulosin 0.4 mg oral capsule: 1 cap(s) orally once a day (at bedtime)

## 2019-11-29 NOTE — PROGRESS NOTE ADULT - ASSESSMENT
dyspnea, improved  acute on chronic systolic chf  pleural effusions  atrial fibrillation  pancytopenia/MDS      monitor SpO2 on room air resting and post ambulation  continue cardiac medications and management  diuretics  out of bed/physical therapy  monitor hgb prn and transfuse as indicated  prognosis guarded

## 2019-11-29 NOTE — DISCHARGE NOTE PROVIDER - CARE PROVIDER_API CALL
Grady Castro)  Cardiology; Internal Medicine; Nuclear Cardiology  [address] [place] [address]  Phone: [phone]  Fax: [phone]  Follow Up Time:

## 2019-11-29 NOTE — PROGRESS NOTE ADULT - SUBJECTIVE AND OBJECTIVE BOX
Patient was seen and examined. Spoke with RN. Chart reviewed.  No events overnight.  Vital Signs Last 24 Hrs  T(F): 97.6 (29 Nov 2019 05:29), Max: 97.6 (29 Nov 2019 05:29)  HR: 87 (29 Nov 2019 05:29) (87 - 123)  BP: 123/66 (29 Nov 2019 05:29) (119/63 - 123/66)  SpO2: --  MEDICATIONS  (STANDING):  aspirin  chewable 81 milliGRAM(s) Oral daily  atorvastatin 20 milliGRAM(s) Oral at bedtime  chlorhexidine 4% Liquid 1 Application(s) Topical <User Schedule>  dextrose 5%. 1000 milliLiter(s) (50 mL/Hr) IV Continuous <Continuous>  dextrose 50% Injectable 12.5 Gram(s) IV Push once  dextrose 50% Injectable 25 Gram(s) IV Push once  dextrose 50% Injectable 25 Gram(s) IV Push once  digoxin     Tablet 0.125 milliGRAM(s) Oral daily  famotidine    Tablet 20 milliGRAM(s) Oral two times a day  furosemide    Tablet 40 milliGRAM(s) Oral daily  insulin glargine Injectable (LANTUS) 10 Unit(s) SubCutaneous at bedtime  insulin lispro (HumaLOG) corrective regimen sliding scale   SubCutaneous three times a day before meals  insulin lispro Injectable (HumaLOG) 5 Unit(s) SubCutaneous three times a day before meals  metoprolol succinate  milliGRAM(s) Oral daily  spironolactone 25 milliGRAM(s) Oral daily  tamsulosin 0.4 milliGRAM(s) Oral at bedtime    MEDICATIONS  (PRN):  dextrose 40% Gel 15 Gram(s) Oral once PRN Blood Glucose LESS THAN 70 milliGRAM(s)/deciliter  glucagon  Injectable 1 milliGRAM(s) IntraMuscular once PRN Glucose LESS THAN 70 milligrams/deciliter    Labs:                        8.1    2.11  )-----------( 100      ( 28 Nov 2019 07:26 )             26.6     28 Nov 2019 07:26    143    |  103    |  31     ----------------------------<  198    3.4     |  26     |  1.1      Ca    8.0        28 Nov 2019 07:26  Mg     2.0       28 Nov 2019 07:26    TPro  6.3    /  Alb  2.9    /  TBili  0.8    /  DBili  x      /  AST  113    /  ALT  139    /  AlkPhos  68     28 Nov 2019 07:26          Culture - Blood (collected 26 Nov 2019 17:24)  Source: .Blood None  Preliminary Report (28 Nov 2019 02:01):    No growth to date.      General: comfortable, NAD  Neurology: , nonfocal  Head:  Normocephalic, atraumatic  ENT:  Mucosa moist, no ulcerations  Neck:  Supple, no JVD,   Skin: no breakdowns (as per RN)  Resp: CTA B/L  CV: RRR, S1S2,   GI: Soft, NT, bowel sounds  MS: No edema, + peripheral pulses, FROM all 4 extremity      A/P:  75 yo man with acute systolic CHF decompensation, moderate AS, anemia    diuretics as per cardiology    change to PO today if ok with cardio    OOB    anemia- monitor Hg- outpt f/u with PCP and GI    O2 as needed    local care for hand swelling- f/u with PMD    DC planning   DVT prophylaxis  Decubitus prevention- all measures as per RN protocol  Please call or text me with any questions or updates

## 2019-11-29 NOTE — DISCHARGE NOTE NURSING/CASE MANAGEMENT/SOCIAL WORK - PATIENT PORTAL LINK FT
You can access the FollowMyHealth Patient Portal offered by [facility] by registering at the following website: [email or web address]. By joining [employer]’s FollowMyHealth portal, you will also be able to view your health information using other applications (apps) compatible with our system.

## 2019-11-29 NOTE — DISCHARGE NOTE PROVIDER - NSDCCPCAREPLAN_GEN_ALL_CORE_FT
PRINCIPAL DISCHARGE DIAGNOSIS  Diagnosis: Acute exacerbation of congestive heart failure  Assessment and Plan of Treatment: Continue with your medications as prescribed, and continue to follow up with your cardiologist (Dr. Castro). Avoid increased salt intake and limit your fluid intake to <1.2L/day.

## 2019-12-02 ENCOUNTER — APPOINTMENT (OUTPATIENT)
Dept: HEMATOLOGY ONCOLOGY | Facility: CLINIC | Age: 74
End: 2019-12-02
Payer: MEDICARE

## 2019-12-02 ENCOUNTER — LABORATORY RESULT (OUTPATIENT)
Age: 74
End: 2019-12-02

## 2019-12-02 VITALS
RESPIRATION RATE: 14 BRPM | WEIGHT: 166 LBS | HEIGHT: 70 IN | BODY MASS INDEX: 23.77 KG/M2 | TEMPERATURE: 97 F | HEART RATE: 100 BPM | DIASTOLIC BLOOD PRESSURE: 55 MMHG | SYSTOLIC BLOOD PRESSURE: 109 MMHG

## 2019-12-02 DIAGNOSIS — D46.9 MYELODYSPLASTIC SYNDROME, UNSPECIFIED: ICD-10-CM

## 2019-12-02 DIAGNOSIS — L02.519 CUTANEOUS ABSCESS OF UNSPECIFIED HAND: ICD-10-CM

## 2019-12-02 PROBLEM — I48.91 UNSPECIFIED ATRIAL FIBRILLATION: Chronic | Status: ACTIVE | Noted: 2019-11-25

## 2019-12-02 LAB
CULTURE RESULTS: SIGNIFICANT CHANGE UP
SPECIMEN SOURCE: SIGNIFICANT CHANGE UP

## 2019-12-02 PROCEDURE — 99214 OFFICE O/P EST MOD 30 MIN: CPT

## 2019-12-02 RX ORDER — BLOOD-GLUCOSE METER
W/DEVICE KIT MISCELLANEOUS
Qty: 1 | Refills: 0 | Status: ACTIVE | COMMUNITY
Start: 2019-12-02 | End: 1900-01-01

## 2019-12-02 NOTE — REASON FOR VISIT
[Follow-Up Visit] : a follow-up visit for [Myelodysplastic syndrome] : myelodysplastic syndrome [Formal Caregiver] : formal caregiver

## 2019-12-03 LAB
HCT VFR BLD CALC: 24.1 %
HGB BLD-MCNC: 7.5 G/DL
MCHC RBC-ENTMCNC: 29.8 PG
MCHC RBC-ENTMCNC: 31.1 G/DL
MCV RBC AUTO: 95.6 FL
PLATELET # BLD AUTO: 46 K/UL
PMV BLD: NORMAL
RBC # BLD: 2.52 M/UL
RBC # FLD: 19.1 %
WBC # FLD AUTO: 3.25 K/UL

## 2019-12-03 NOTE — REVIEW OF SYSTEMS
[Fatigue] : fatigue [SOB on Exertion] : shortness of breath during exertion [Recent Change In Weight] : ~T recent weight change [Difficulty Walking] : difficulty walking [Joint Stiffness] : joint stiffness [Joint Pain] : joint pain [Insomnia] : insomnia [Muscle Weakness] : muscle weakness [Easy Bleeding] : a tendency for easy bleeding [Negative] : Psychiatric [Shortness Of Breath] : no shortness of breath [FreeTextEntry2] : Lost about 20 lbs over a period of 5 months. [FreeTextEntry3] : Cataract. not operated [de-identified] : Some nose bleed

## 2019-12-03 NOTE — CONSULT LETTER
[Dear  ___] : Dear  [unfilled], [Courtesy Letter:] : I had the pleasure of seeing your patient, [unfilled], in my office today. [Please see my note below.] : Please see my note below. [Consult Closing:] : Thank you very much for allowing me to participate in the care of this patient.  If you have any questions, please do not hesitate to contact me. [Sincerely,] : Sincerely, [FreeTextEntry3] : Dr. Santoro

## 2019-12-03 NOTE — PHYSICAL EXAM
[Ambulatory and capable of all self care but unable to carry out any work activities] : Status 2- Ambulatory and capable of all self care but unable to carry out any work activities. Up and about more than 50% of waking hours [Normal] : affect appropriate [de-identified] : Eyeglasses noted [de-identified] : But somewhat decreased breath sounds especially at the bases. [de-identified] : Moderately tachycardic with occasional extrasystoles (no obvious irregularly irregular rate) with grade I-II/VI systolic murmur. [de-identified] : B/L lower extremity edema [de-identified] : Arthritic changes [de-identified] : Erythema on dorsal surface of both hands. Tender. Purulent abscess on left hand. Right hand slioghtly swollen. [de-identified] : Using a walker for ambulation.

## 2019-12-03 NOTE — ASSESSMENT
[FreeTextEntry1] : 1. 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 \par Given that he has  CHF, it may be beneficial to maintain HB level above 8. While giving transfusions is an option, but that will put him at risk of fluid overload, in this case, we discussed starting  Procrit to target Hgb of 8.5 to 9 .Discussed side effects including HTN and risk of thrombosis.\par - That being said, slow platelet drop may suggest that disease is progressing to acute myeloid process. If it gets worsened over next few weeks, he will need hypomethylating agents. \par \par 2. Abscess on left hand and right hand cellulitis: ANC is acceptable. Will prescribe Clindamycin for 2 weeks. Will also refer to general surgery, if it is not improving, for I & D. \par  \par \par Plan:\par - Start Procrit 30,000 sq weekly for 4 doses to begin with. \par - Weekly CBC\par - Oral antibiotics fro abscess. \par \par All their questions answered.\par Patient seen and examined with Dr. Santoro who agreed with the above.\par

## 2019-12-03 NOTE — HISTORY OF PRESENT ILLNESS
[Disease:__________________________] : Disease: [unfilled] [de-identified] : The patient returns for a follow up visit after hospitalization last week for CHF exacerbation. His EF is about 25-35%. His Hgb on admission was about 8 and has received 1 unit PRBC. \par His HB remained stable at 8.1 all through hospitalization. He has abscess ( purulent) associated with erythema and tenderness on his left hand. Unsure why he was not  sent home on  antibiotics\par Post hospitalization, his right hand is swollen and tender as well, probably from IV infiltration. Not purulent. He denies Fever. \par CBC was repeated here and it showed a WBC count of 3.02, HB of 7.4 and platelets 46. \par

## 2019-12-04 ENCOUNTER — INPATIENT (INPATIENT)
Facility: HOSPITAL | Age: 74
LOS: 7 days | Discharge: HOME HEALTH | End: 2019-12-12
Attending: INTERNAL MEDICINE | Admitting: INTERNAL MEDICINE
Payer: MEDICARE

## 2019-12-04 ENCOUNTER — APPOINTMENT (OUTPATIENT)
Dept: CARE COORDINATION | Facility: HOME HEALTH | Age: 74
End: 2019-12-04
Payer: MEDICARE

## 2019-12-04 VITALS
HEART RATE: 136 BPM | TEMPERATURE: 98 F | SYSTOLIC BLOOD PRESSURE: 133 MMHG | OXYGEN SATURATION: 97 % | RESPIRATION RATE: 18 BRPM | DIASTOLIC BLOOD PRESSURE: 82 MMHG

## 2019-12-04 VITALS
RESPIRATION RATE: 16 BRPM | TEMPERATURE: 99.6 F | BODY MASS INDEX: 23.82 KG/M2 | OXYGEN SATURATION: 99 % | WEIGHT: 166 LBS | HEART RATE: 140 BPM | DIASTOLIC BLOOD PRESSURE: 70 MMHG | SYSTOLIC BLOOD PRESSURE: 120 MMHG

## 2019-12-04 DIAGNOSIS — D46.C MYELODYSPLASTIC SYNDROME WITH ISOLATED DEL(5Q) CHROMOSOMAL ABNORMALITY: ICD-10-CM

## 2019-12-04 DIAGNOSIS — R06.02 SHORTNESS OF BREATH: ICD-10-CM

## 2019-12-04 DIAGNOSIS — Z95.5 PRESENCE OF CORONARY ANGIOPLASTY IMPLANT AND GRAFT: ICD-10-CM

## 2019-12-04 DIAGNOSIS — I25.10 ATHEROSCLEROTIC HEART DISEASE OF NATIVE CORONARY ARTERY WITHOUT ANGINA PECTORIS: ICD-10-CM

## 2019-12-04 DIAGNOSIS — I35.0 NONRHEUMATIC AORTIC (VALVE) STENOSIS: ICD-10-CM

## 2019-12-04 DIAGNOSIS — I13.0 HYPERTENSIVE HEART AND CHRONIC KIDNEY DISEASE WITH HEART FAILURE AND STAGE 1 THROUGH STAGE 4 CHRONIC KIDNEY DISEASE, OR UNSPECIFIED CHRONIC KIDNEY DISEASE: ICD-10-CM

## 2019-12-04 DIAGNOSIS — R26.9 UNSPECIFIED ABNORMALITIES OF GAIT AND MOBILITY: ICD-10-CM

## 2019-12-04 DIAGNOSIS — Z87.891 PERSONAL HISTORY OF NICOTINE DEPENDENCE: ICD-10-CM

## 2019-12-04 DIAGNOSIS — Z91.11 PATIENT'S NONCOMPLIANCE WITH DIETARY REGIMEN: ICD-10-CM

## 2019-12-04 DIAGNOSIS — I50.23 ACUTE ON CHRONIC SYSTOLIC (CONGESTIVE) HEART FAILURE: ICD-10-CM

## 2019-12-04 DIAGNOSIS — E78.5 HYPERLIPIDEMIA, UNSPECIFIED: ICD-10-CM

## 2019-12-04 DIAGNOSIS — E11.22 TYPE 2 DIABETES MELLITUS WITH DIABETIC CHRONIC KIDNEY DISEASE: ICD-10-CM

## 2019-12-04 DIAGNOSIS — D61.818 OTHER PANCYTOPENIA: ICD-10-CM

## 2019-12-04 DIAGNOSIS — N18.9 CHRONIC KIDNEY DISEASE, UNSPECIFIED: ICD-10-CM

## 2019-12-04 DIAGNOSIS — N40.0 BENIGN PROSTATIC HYPERPLASIA WITHOUT LOWER URINARY TRACT SYMPTOMS: ICD-10-CM

## 2019-12-04 DIAGNOSIS — K21.9 GASTRO-ESOPHAGEAL REFLUX DISEASE WITHOUT ESOPHAGITIS: ICD-10-CM

## 2019-12-04 DIAGNOSIS — E87.2 ACIDOSIS: ICD-10-CM

## 2019-12-04 DIAGNOSIS — I48.0 PAROXYSMAL ATRIAL FIBRILLATION: ICD-10-CM

## 2019-12-04 DIAGNOSIS — Z66 DO NOT RESUSCITATE: ICD-10-CM

## 2019-12-04 DIAGNOSIS — Z79.84 LONG TERM (CURRENT) USE OF ORAL HYPOGLYCEMIC DRUGS: ICD-10-CM

## 2019-12-04 DIAGNOSIS — R00.0 TACHYCARDIA, UNSPECIFIED: ICD-10-CM

## 2019-12-04 DIAGNOSIS — Z98.890 OTHER SPECIFIED POSTPROCEDURAL STATES: Chronic | ICD-10-CM

## 2019-12-04 DIAGNOSIS — R50.9 FEVER, UNSPECIFIED: ICD-10-CM

## 2019-12-04 LAB
ALBUMIN SERPL ELPH-MCNC: 3.3 G/DL — LOW (ref 3.5–5.2)
ALP SERPL-CCNC: 66 U/L — SIGNIFICANT CHANGE UP (ref 30–115)
ALT FLD-CCNC: 30 U/L — SIGNIFICANT CHANGE UP (ref 0–41)
ANION GAP SERPL CALC-SCNC: 19 MMOL/L — HIGH (ref 7–14)
ANISOCYTOSIS BLD QL: SLIGHT — SIGNIFICANT CHANGE UP
APPEARANCE UR: CLEAR — SIGNIFICANT CHANGE UP
APTT BLD: 30.6 SEC — SIGNIFICANT CHANGE UP (ref 27–39.2)
AST SERPL-CCNC: 19 U/L — SIGNIFICANT CHANGE UP (ref 0–41)
BACTERIA # UR AUTO: NEGATIVE — SIGNIFICANT CHANGE UP
BASE EXCESS BLDV CALC-SCNC: 4.6 MMOL/L — HIGH (ref -2–2)
BASOPHILS # BLD AUTO: 0 K/UL — SIGNIFICANT CHANGE UP (ref 0–0.2)
BASOPHILS NFR BLD AUTO: 0 % — SIGNIFICANT CHANGE UP (ref 0–1)
BILIRUB SERPL-MCNC: 1.1 MG/DL — SIGNIFICANT CHANGE UP (ref 0.2–1.2)
BILIRUB UR-MCNC: NEGATIVE — SIGNIFICANT CHANGE UP
BUN SERPL-MCNC: 20 MG/DL — SIGNIFICANT CHANGE UP (ref 10–20)
CALCIUM SERPL-MCNC: 8.5 MG/DL — SIGNIFICANT CHANGE UP (ref 8.5–10.1)
CHLORIDE SERPL-SCNC: 101 MMOL/L — SIGNIFICANT CHANGE UP (ref 98–110)
CO2 SERPL-SCNC: 22 MMOL/L — SIGNIFICANT CHANGE UP (ref 17–32)
COLOR SPEC: SIGNIFICANT CHANGE UP
CREAT SERPL-MCNC: 1 MG/DL — SIGNIFICANT CHANGE UP (ref 0.7–1.5)
DIFF PNL FLD: NEGATIVE — SIGNIFICANT CHANGE UP
EOSINOPHIL # BLD AUTO: 0 K/UL — SIGNIFICANT CHANGE UP (ref 0–0.7)
EOSINOPHIL NFR BLD AUTO: 0 % — SIGNIFICANT CHANGE UP (ref 0–8)
EPI CELLS # UR: 0 /HPF — SIGNIFICANT CHANGE UP (ref 0–5)
GIANT PLATELETS BLD QL SMEAR: PRESENT — SIGNIFICANT CHANGE UP
GLUCOSE SERPL-MCNC: 91 MG/DL — SIGNIFICANT CHANGE UP (ref 70–99)
GLUCOSE UR QL: NEGATIVE — SIGNIFICANT CHANGE UP
HCO3 BLDV-SCNC: 29 MMOL/L — SIGNIFICANT CHANGE UP (ref 22–29)
HCT VFR BLD CALC: 25.2 % — LOW (ref 42–52)
HGB BLD-MCNC: 7.7 G/DL — LOW (ref 14–18)
HYALINE CASTS # UR AUTO: 1 /LPF — SIGNIFICANT CHANGE UP (ref 0–7)
HYPOCHROMIA BLD QL: SIGNIFICANT CHANGE UP
INR BLD: 1.19 RATIO — SIGNIFICANT CHANGE UP (ref 0.65–1.3)
KETONES UR-MCNC: NEGATIVE — SIGNIFICANT CHANGE UP
LACTATE BLDV-MCNC: 2.7 MMOL/L — HIGH (ref 0.5–1.6)
LACTATE SERPL-SCNC: 2.4 MMOL/L — HIGH (ref 0.7–2)
LEUKOCYTE ESTERASE UR-ACNC: NEGATIVE — SIGNIFICANT CHANGE UP
LYMPHOCYTES # BLD AUTO: 1.07 K/UL — LOW (ref 1.2–3.4)
LYMPHOCYTES # BLD AUTO: 52.6 % — HIGH (ref 20.5–51.1)
MACROCYTES BLD QL: SLIGHT — SIGNIFICANT CHANGE UP
MANUAL SMEAR VERIFICATION: SIGNIFICANT CHANGE UP
MCHC RBC-ENTMCNC: 29.2 PG — SIGNIFICANT CHANGE UP (ref 27–31)
MCHC RBC-ENTMCNC: 30.6 G/DL — LOW (ref 32–37)
MCV RBC AUTO: 95.5 FL — HIGH (ref 80–94)
MONOCYTES # BLD AUTO: 0.16 K/UL — SIGNIFICANT CHANGE UP (ref 0.1–0.6)
MONOCYTES NFR BLD AUTO: 7.8 % — SIGNIFICANT CHANGE UP (ref 1.7–9.3)
NEUTROPHILS # BLD AUTO: 0.77 K/UL — LOW (ref 1.4–6.5)
NEUTROPHILS NFR BLD AUTO: 34.5 % — LOW (ref 42.2–75.2)
NEUTS BAND # BLD: 3.4 % — SIGNIFICANT CHANGE UP (ref 0–6)
NITRITE UR-MCNC: NEGATIVE — SIGNIFICANT CHANGE UP
NRBC # BLD: 5 /100 — HIGH (ref 0–0)
NRBC # BLD: SIGNIFICANT CHANGE UP /100 WBCS (ref 0–0)
PCO2 BLDV: 41 MMHG — SIGNIFICANT CHANGE UP (ref 41–51)
PH BLDV: 7.46 — HIGH (ref 7.26–7.43)
PH UR: 6 — SIGNIFICANT CHANGE UP (ref 5–8)
PLAT MORPH BLD: ABNORMAL
PLATELET # BLD AUTO: 50 K/UL — LOW (ref 130–400)
PO2 BLDV: 29 MMHG — SIGNIFICANT CHANGE UP (ref 20–40)
POIKILOCYTOSIS BLD QL AUTO: SIGNIFICANT CHANGE UP
POLYCHROMASIA BLD QL SMEAR: SIGNIFICANT CHANGE UP
POTASSIUM SERPL-MCNC: 3.3 MMOL/L — LOW (ref 3.5–5)
POTASSIUM SERPL-SCNC: 3.3 MMOL/L — LOW (ref 3.5–5)
PROT SERPL-MCNC: 7.1 G/DL — SIGNIFICANT CHANGE UP (ref 6–8)
PROT UR-MCNC: ABNORMAL
PROTHROM AB SERPL-ACNC: 13.7 SEC — HIGH (ref 9.95–12.87)
RBC # BLD: 2.64 M/UL — LOW (ref 4.7–6.1)
RBC # FLD: 19 % — HIGH (ref 11.5–14.5)
RBC BLD AUTO: ABNORMAL
RBC CASTS # UR COMP ASSIST: 3 /HPF — SIGNIFICANT CHANGE UP (ref 0–4)
SAO2 % BLDV: 54 % — SIGNIFICANT CHANGE UP
SODIUM SERPL-SCNC: 142 MMOL/L — SIGNIFICANT CHANGE UP (ref 135–146)
SP GR SPEC: 1.01 — SIGNIFICANT CHANGE UP (ref 1.01–1.02)
UROBILINOGEN FLD QL: SIGNIFICANT CHANGE UP
VARIANT LYMPHS # BLD: 1.7 % — SIGNIFICANT CHANGE UP (ref 0–5)
WBC # BLD: 2.03 K/UL — LOW (ref 4.8–10.8)
WBC # FLD AUTO: 2.03 K/UL — LOW (ref 4.8–10.8)
WBC UR QL: 1 /HPF — SIGNIFICANT CHANGE UP (ref 0–5)

## 2019-12-04 PROCEDURE — 99285 EMERGENCY DEPT VISIT HI MDM: CPT | Mod: 25

## 2019-12-04 PROCEDURE — 93010 ELECTROCARDIOGRAM REPORT: CPT | Mod: 77

## 2019-12-04 PROCEDURE — 99348 HOME/RES VST EST LOW MDM 30: CPT

## 2019-12-04 PROCEDURE — 93010 ELECTROCARDIOGRAM REPORT: CPT | Mod: 76

## 2019-12-04 PROCEDURE — 71045 X-RAY EXAM CHEST 1 VIEW: CPT | Mod: 26

## 2019-12-04 RX ORDER — SACUBITRIL AND VALSARTAN 24; 26 MG/1; MG/1
24-26 TABLET, FILM COATED ORAL
Qty: 180 | Refills: 0 | Status: DISCONTINUED | COMMUNITY
Start: 2019-08-08

## 2019-12-04 RX ORDER — FUROSEMIDE 40 MG
40 TABLET ORAL DAILY
Refills: 0 | Status: DISCONTINUED | OUTPATIENT
Start: 2019-12-04 | End: 2019-12-06

## 2019-12-04 RX ORDER — ASPIRIN/CALCIUM CARB/MAGNESIUM 324 MG
81 TABLET ORAL DAILY
Refills: 0 | Status: DISCONTINUED | OUTPATIENT
Start: 2019-12-04 | End: 2019-12-12

## 2019-12-04 RX ORDER — CEFEPIME 1 G/1
1000 INJECTION, POWDER, FOR SOLUTION INTRAMUSCULAR; INTRAVENOUS EVERY 8 HOURS
Refills: 0 | Status: DISCONTINUED | OUTPATIENT
Start: 2019-12-04 | End: 2019-12-05

## 2019-12-04 RX ORDER — SODIUM CHLORIDE 9 MG/ML
1000 INJECTION INTRAMUSCULAR; INTRAVENOUS; SUBCUTANEOUS ONCE
Refills: 0 | Status: COMPLETED | OUTPATIENT
Start: 2019-12-04 | End: 2019-12-04

## 2019-12-04 RX ORDER — POTASSIUM CHLORIDE 20 MEQ
40 PACKET (EA) ORAL ONCE
Refills: 0 | Status: COMPLETED | OUTPATIENT
Start: 2019-12-04 | End: 2019-12-05

## 2019-12-04 RX ORDER — CEPHALEXIN 500 MG/1
500 CAPSULE ORAL
Qty: 15 | Refills: 0 | Status: DISCONTINUED | COMMUNITY
Start: 2019-07-27

## 2019-12-04 RX ORDER — DIGOXIN 250 MCG
0.12 TABLET ORAL DAILY
Refills: 0 | Status: DISCONTINUED | OUTPATIENT
Start: 2019-12-04 | End: 2019-12-12

## 2019-12-04 RX ORDER — SPIRONOLACTONE 25 MG/1
25 TABLET, FILM COATED ORAL DAILY
Refills: 0 | Status: DISCONTINUED | OUTPATIENT
Start: 2019-12-04 | End: 2019-12-12

## 2019-12-04 RX ORDER — DIGOXIN 250 MCG
0.25 TABLET ORAL ONCE
Refills: 0 | Status: COMPLETED | OUTPATIENT
Start: 2019-12-04 | End: 2019-12-04

## 2019-12-04 RX ORDER — METFORMIN HYDROCHLORIDE 850 MG/1
1000 TABLET ORAL
Refills: 0 | Status: DISCONTINUED | OUTPATIENT
Start: 2019-12-04 | End: 2019-12-05

## 2019-12-04 RX ORDER — POTASSIUM CHLORIDE 750 MG/1
10 TABLET, FILM COATED, EXTENDED RELEASE ORAL TWICE DAILY
Qty: 30 | Refills: 2 | Status: DISCONTINUED | COMMUNITY
Start: 2019-10-10 | End: 2019-12-04

## 2019-12-04 RX ORDER — CEFEPIME 1 G/1
2000 INJECTION, POWDER, FOR SOLUTION INTRAMUSCULAR; INTRAVENOUS ONCE
Refills: 0 | Status: COMPLETED | OUTPATIENT
Start: 2019-12-04 | End: 2019-12-04

## 2019-12-04 RX ORDER — TAMSULOSIN HYDROCHLORIDE 0.4 MG/1
0.4 CAPSULE ORAL AT BEDTIME
Refills: 0 | Status: DISCONTINUED | OUTPATIENT
Start: 2019-12-04 | End: 2019-12-12

## 2019-12-04 RX ORDER — DIGOXIN 250 MCG
0.25 TABLET ORAL ONCE
Refills: 0 | Status: DISCONTINUED | OUTPATIENT
Start: 2019-12-04 | End: 2019-12-04

## 2019-12-04 RX ORDER — FAMOTIDINE 10 MG/ML
40 INJECTION INTRAVENOUS AT BEDTIME
Refills: 0 | Status: DISCONTINUED | OUTPATIENT
Start: 2019-12-04 | End: 2019-12-12

## 2019-12-04 RX ORDER — ATORVASTATIN CALCIUM 80 MG/1
20 TABLET, FILM COATED ORAL AT BEDTIME
Refills: 0 | Status: DISCONTINUED | OUTPATIENT
Start: 2019-12-04 | End: 2019-12-12

## 2019-12-04 RX ORDER — DULAGLUTIDE 0.75 MG/.5ML
0.75 INJECTION, SOLUTION SUBCUTANEOUS
Qty: 6 | Refills: 0 | Status: DISCONTINUED | COMMUNITY
Start: 2019-01-16

## 2019-12-04 RX ORDER — METOPROLOL TARTRATE 50 MG
50 TABLET ORAL ONCE
Refills: 0 | Status: COMPLETED | OUTPATIENT
Start: 2019-12-04 | End: 2019-12-04

## 2019-12-04 RX ORDER — METOPROLOL TARTRATE 100 MG/1
100 TABLET, FILM COATED ORAL
Qty: 60 | Refills: 0 | Status: DISCONTINUED | COMMUNITY
Start: 2019-07-10

## 2019-12-04 RX ORDER — SACUBITRIL AND VALSARTAN 24; 26 MG/1; MG/1
24-26 TABLET, FILM COATED ORAL TWICE DAILY
Refills: 0 | Status: ACTIVE | COMMUNITY

## 2019-12-04 RX ORDER — SACUBITRIL AND VALSARTAN 49; 51 MG/1; MG/1
49-51 TABLET, FILM COATED ORAL
Refills: 0 | Status: DISCONTINUED | COMMUNITY
End: 2019-12-04

## 2019-12-04 RX ORDER — SULFAMETHOXAZOLE AND TRIMETHOPRIM 800; 160 MG/1; MG/1
800-160 TABLET ORAL
Qty: 6 | Refills: 0 | Status: DISCONTINUED | COMMUNITY
Start: 2019-11-20

## 2019-12-04 RX ORDER — VANCOMYCIN HCL 1 G
1000 VIAL (EA) INTRAVENOUS ONCE
Refills: 0 | Status: COMPLETED | OUTPATIENT
Start: 2019-12-04 | End: 2019-12-04

## 2019-12-04 RX ORDER — SODIUM CHLORIDE 9 MG/ML
500 INJECTION, SOLUTION INTRAVENOUS ONCE
Refills: 0 | Status: COMPLETED | OUTPATIENT
Start: 2019-12-04 | End: 2019-12-04

## 2019-12-04 RX ORDER — GLIMEPIRIDE 1 MG
2 TABLET ORAL
Refills: 0 | Status: DISCONTINUED | OUTPATIENT
Start: 2019-12-04 | End: 2019-12-05

## 2019-12-04 RX ORDER — ATORVASTATIN CALCIUM 80 MG/1
80 TABLET, FILM COATED ORAL
Qty: 30 | Refills: 0 | Status: DISCONTINUED | COMMUNITY
Start: 2019-07-10

## 2019-12-04 RX ORDER — ACETAMINOPHEN 500 MG
650 TABLET ORAL ONCE
Refills: 0 | Status: COMPLETED | OUTPATIENT
Start: 2019-12-04 | End: 2019-12-04

## 2019-12-04 RX ORDER — ASPIRIN 81 MG/1
81 TABLET, CHEWABLE ORAL
Qty: 30 | Refills: 0 | Status: DISCONTINUED | COMMUNITY
Start: 2019-07-10

## 2019-12-04 RX ORDER — LISINOPRIL 10 MG/1
10 TABLET ORAL
Qty: 90 | Refills: 0 | Status: DISCONTINUED | COMMUNITY
Start: 2019-06-03

## 2019-12-04 RX ORDER — SACUBITRIL AND VALSARTAN 24; 26 MG/1; MG/1
1 TABLET, FILM COATED ORAL
Refills: 0 | Status: DISCONTINUED | OUTPATIENT
Start: 2019-12-04 | End: 2019-12-12

## 2019-12-04 RX ORDER — METOPROLOL TARTRATE 50 MG
200 TABLET ORAL DAILY
Refills: 0 | Status: DISCONTINUED | OUTPATIENT
Start: 2019-12-04 | End: 2019-12-12

## 2019-12-04 RX ORDER — VANCOMYCIN HCL 1 G
1000 VIAL (EA) INTRAVENOUS EVERY 12 HOURS
Refills: 0 | Status: DISCONTINUED | OUTPATIENT
Start: 2019-12-04 | End: 2019-12-09

## 2019-12-04 RX ADMIN — Medication 650 MILLIGRAM(S): at 19:52

## 2019-12-04 RX ADMIN — Medication 650 MILLIGRAM(S): at 16:05

## 2019-12-04 RX ADMIN — SODIUM CHLORIDE 1000 MILLILITER(S): 9 INJECTION INTRAMUSCULAR; INTRAVENOUS; SUBCUTANEOUS at 17:00

## 2019-12-04 RX ADMIN — Medication 0.25 MILLIGRAM(S): at 21:44

## 2019-12-04 RX ADMIN — SODIUM CHLORIDE 1000 MILLILITER(S): 9 INJECTION INTRAMUSCULAR; INTRAVENOUS; SUBCUTANEOUS at 15:00

## 2019-12-04 RX ADMIN — CEFEPIME 100 MILLIGRAM(S): 1 INJECTION, POWDER, FOR SOLUTION INTRAMUSCULAR; INTRAVENOUS at 15:00

## 2019-12-04 RX ADMIN — ATORVASTATIN CALCIUM 20 MILLIGRAM(S): 80 TABLET, FILM COATED ORAL at 22:19

## 2019-12-04 RX ADMIN — Medication 50 MILLIGRAM(S): at 20:31

## 2019-12-04 RX ADMIN — Medication 650 MILLIGRAM(S): at 20:29

## 2019-12-04 RX ADMIN — Medication 650 MILLIGRAM(S): at 14:00

## 2019-12-04 RX ADMIN — SODIUM CHLORIDE 1000 MILLILITER(S): 9 INJECTION INTRAMUSCULAR; INTRAVENOUS; SUBCUTANEOUS at 16:00

## 2019-12-04 RX ADMIN — CEFEPIME 2000 MILLIGRAM(S): 1 INJECTION, POWDER, FOR SOLUTION INTRAMUSCULAR; INTRAVENOUS at 15:30

## 2019-12-04 RX ADMIN — Medication 1000 MILLIGRAM(S): at 15:45

## 2019-12-04 RX ADMIN — SODIUM CHLORIDE 500 MILLILITER(S): 9 INJECTION, SOLUTION INTRAVENOUS at 19:56

## 2019-12-04 RX ADMIN — SODIUM CHLORIDE 41.67 MILLILITER(S): 9 INJECTION INTRAMUSCULAR; INTRAVENOUS; SUBCUTANEOUS at 14:19

## 2019-12-04 RX ADMIN — Medication 250 MILLIGRAM(S): at 14:45

## 2019-12-04 NOTE — ED CDU PROVIDER INITIAL DAY NOTE - NS ED ROS FT
Constitutional: + fever, chills, no recent weight loss, change in appetite or malaise  Eyes: no redness/discharge/pain/vision changes  ENT: no rhinorrhea/ear pain/sore throat  Cardiac: No chest pain, SOB or edema.  Respiratory: No cough or respiratory distress  GI: No nausea, vomiting, diarrhea or abdominal pain.  : No dysuria, frequency, urgency or hematuria  MS: no pain to back or extremities, no loss of ROM, no weakness  Neuro: No headache or weakness. No LOC.  Skin: + skin rash.  Endocrine: No history of thyroid disease or diabetes.

## 2019-12-04 NOTE — ED PROVIDER NOTE - PMH
Afib    CAD (coronary artery disease)    Diabetes    GERD (gastroesophageal reflux disease)    HTN (hypertension)    MDS (myelodysplastic syndrome)

## 2019-12-04 NOTE — ED PROVIDER NOTE - CARE PLAN
Principal Discharge DX:	Sepsis, due to unspecified organism, unspecified whether acute organ dysfunction present  Secondary Diagnosis:	Leukopenia, unspecified type Principal Discharge DX:	Sepsis, due to unspecified organism, unspecified whether acute organ dysfunction present  Secondary Diagnosis:	Pancytopenia  Secondary Diagnosis:	Cellulitis

## 2019-12-04 NOTE — ED PROVIDER NOTE - PHYSICAL EXAMINATION
Physical Exam    Vital Signs: I have reviewed the initial vital signs.  Constitutional: well-nourished, appears stated age, no acute distress  Eyes: Conjunctiva pink, Sclera clear, PERRLA, EOMI.  Cardiovascular: S1 and S2, regular rate, regular rhythm, well-perfused extremities, radial pulses equal and 2+  Respiratory: unlabored respiratory effort, clear to auscultation bilaterally no wheezing, rales and rhonchi  Gastrointestinal: soft, non-tender abdomen, no pulsatile mass, normal bowl sounds  Musculoskeletal: supple neck, no lower extremity edema, no midline tenderness  Integumentary: warm, dry, no rash. Pale in appearance. L wrist erythematous over medial aspect, mildy edematous, no abscess noted. R wrist appears erythematous, edematous, tender to palpation.   Neurologic: awake, alert, cranial nerves II-XII grossly intact, extremities’ motor and sensory functions grossly intact  Psychiatric: appropriate mood, appropriate affect

## 2019-12-04 NOTE — ED PROVIDER NOTE - CLINICAL SUMMARY MEDICAL DECISION MAKING FREE TEXT BOX
74yM p/w fever and worsening cellulitis despite PO clindamycin - pt pale, w/o resp distress, +cellulitis w/o apparent abscess.  Labs w/ pancytopenia of unclear etiology, preserved electrolytes/renal function, mildly elevated lactate c/w sepsis.  Pt given judicious/slow IV NS (ultimately 30cc/kg, but more slowly) given hx CHF w/ recent exacerbation and exam w/o volume overload, but pt persistently tachycardic.  Lactate downtrending with IV NS and HR improved.  Abx started, cultures sent and pt placed in obs for close monitoring. 74yM p/w fever and worsening cellulitis despite PO clindamycin - pt pale, w/o resp distress, +cellulitis w/o apparent abscess.  Labs w/ pancytopenia of unclear etiology, preserved electrolytes/renal function, mildly elevated lactate c/w sepsis.  CXR w/o PNA.  EKG sinus tachy w/o ischemia.  Pt given judicious/slow IV NS (ultimately 30cc/kg, but more slowly) given hx CHF w/ recent exacerbation and exam w/o volume overload, but pt persistently tachycardic.  Lactate downtrending with IV NS and HR improved.  Abx started, cultures sent and pt placed in obs for close monitoring.

## 2019-12-04 NOTE — ED PROVIDER NOTE - NS ED ROS FT
Constitutional: (-) fever  Eyes/ENT: (-) blurry vision, (-) epistaxis  Cardiovascular: (-) chest pain, (-) syncope  Respiratory: (-) cough, (-) shortness of breath  Gastrointestinal: (-) vomiting, (-) diarrhea  Musculoskeletal: (-) neck pain, (-) back pain, (-) joint pain  Integumentary: (+) rash (See HPI), (-) edema  Neurological: (-) headache, (-) altered mental status  Psychiatric: (-) hallucinations  Allergic/Immunologic: (-) pruritus

## 2019-12-04 NOTE — ED ADULT TRIAGE NOTE - CHIEF COMPLAINT QUOTE
BIBA from assisted living faculity c/o fever and tachycardia for 3 days on antibiotics for 2 days for cellulitis of legs BIBA from assisted living faculity c/o fever and tachycardia for 3 days on antibiotics for 2 days for cellulitis of legs.  EKG done in triage for  BIBA from assisted living faculity c/o fever and tachycardia for 3 days on antibiotics for 2 days for cellulitis of legs.  EKG done in triage for  and evaluated by MD

## 2019-12-04 NOTE — ED PROVIDER NOTE - OBJECTIVE STATEMENT
Pt is a 74 year old male with PMH CHF, A-fib, BPH, HLD, DM presents to ED sent in from NH for fever. Pt is a poor historian, unable to provide much additional details most hx provided by pt's visiting nurse. States was placed on clindamycin 2d prior  for cellulitis (has chronic cellulitis of L wrist, but noticed new cellulitis to R wrist).  Today, visiting nurse was worried that R wrist cellulitis was getting worse, w/ focal skin changes suggestive of possible abscess formation.  Nurse attests to low grade fevers of 100-101 F. Pt dnies fever, chills, body aches. Denies chest pain, SOB or abdominal pain.

## 2019-12-04 NOTE — PLAN
[FreeTextEntry1] : EMS activated - 150s , highly suspicious for infection . NP stayed with patient until EMS arrival , remained stable and then transported to [place] ED- inpatient  team notified

## 2019-12-04 NOTE — HISTORY OF PRESENT ILLNESS
[FreeTextEntry1] : Patient seen at home s/p recent discharge from [facility] for CHF exacerbation patient is temporarily unable to leave home as it requires a considerable and taxing effort \par  [de-identified] : Patient is a 75 y/o male enrolled in the stars program  with a PMH of HTN, DM , MDS,  CHF and anemia  seen at home s/p recent discharge from [facility] for CHF exacerbations , patient was  diuresed by the inpatient team and dc home with HHA at assited living,  On arrival to home patient is a0x3 mild tachypnea noted denies c/p, sob and cough states being currently treated for a right hand cellulitis with 2 days of po antbiotics with minimal improvement , and left hand abscess improved and has been present for 1 week. Patients -150 EMS activated

## 2019-12-04 NOTE — ED CLERICAL - NS ED CLERK NOTE PRE-ARRIVAL INFORMATION; ADDITIONAL PRE-ARRIVAL INFORMATION
This patient is enrolled in the STAR readmission reduction initiative and has active care navigation. This patient can be followed up by the care navigation team within 24 hours.  To arrange close follow-up or to obtain additional clinical information, please call the contact number above. The on call [facility] Hospitalist has been notified and will coordinate care in concert with the ED Physician including consults as necessary. Call [phone] (North), [phone] (South) to reach the hospitalist. You may also call the Hospitalist Division at  at either site. Consider CDU for management per guidelines This patient is enrolled in the STAR readmission reduction initiative and has active care navigation. This patient can be followed up by the care navigation team within 24 hours.  To arrange close follow-up or to obtain additional clinical information, please call the contact number above. The on call [facility] Hospitalist has been notified and will coordinate care in concert with the ED Physician including consults as necessary. Call [phone] to reach the hospitalist. You may also call the Hospitalist Division at  at either site. Consider CDU for management per guidelines

## 2019-12-04 NOTE — ED PROVIDER NOTE - ATTENDING CONTRIBUTION TO CARE
I personally evaluated the patient, including history and physical exam at bedside, and I discussed the plan of care with the resident or PA directly. I reviewed the Resident’s or Physician Assistant’s note (as assigned above), and agree with the findings and plan except as documented in my note.    74yM p/w fever and worsening cellulitis - pt poor historian, unable to provide much additional details.  Hx provided by hospitalist who received report from pt's visiting nurse - pt currently on clindamycin started ~2d ago for cellulitis (has chronic cellulitis of L wrist, but noticed new cellulitis to R wrist).  Today, visiting nurse was worried that R wrist cellulitis was getting worse, w/ focal skin changes suggestive of possible abscess formation.  Pt w/ low grade fevers at home, so she sent him to the ED for evaluation and ?drainage.      elderly white male resting comfortably in bed, appearing pale and mildly unwell  breathing comfortably on RA w/o cough or inc WOB  L wrist w/ erythema w/o abscess; R wrist w/ erythema and swelling, but no fluctuance or sig tenderness to suggest drainable abscess

## 2019-12-04 NOTE — COUNSELING
[de-identified] : Pt was informed about CN’s role/ STARS program and overview of transitional care reviewed with patient. In addition, yellow contact card was provided. Pt educated on topics of importance such as compliance with all provider visits, prescribed medication regimen, and low salt diet. Pt encouraged calling CN with any issues, concerns or questions, also educated to notify CN if experiencing CP, SOB , cough, increased mucus/phlegm production, abdominal discomfort/swelling, difficulty sleeping or lying flat, fever, chills, fatigue, weight gain of 2-3lbs in 24 hours or 5lbs in one week,  dizziness, lightheadedness, n/v/d/c, swelling to extremities and/or any signs of CHF exacerbation as reviewed. Reassurance provided. Will continue to monitor\par \par \par

## 2019-12-04 NOTE — PHYSICAL EXAM
[No Acute Distress] : no acute distress [Normal Sclera/Conjunctiva] : normal sclera/conjunctiva [Clear to Auscultation] : lungs were clear to auscultation bilaterally [Acc Muscles Use] : accessory muscle use was noted [Clear Bilaterally] : the lungs were clear to auscultation bilaterally [Tachycardia] : tachycardic [Rhythm Regular] : regular [Pedal Pulses Present] : the pedal pulses are present [No Edema] : there was no peripheral edema [Soft] : abdomen soft [Non Tender] : non-tender [Non-distended] : non-distended [Normal Affect] : the affect was normal [No Focal Deficits] : no focal deficits [Alert and Oriented x3] : oriented to person, place, and time [de-identified] : erythema  and warmth noted to rt hand/ lt hand healing boil in place

## 2019-12-04 NOTE — ED CDU PROVIDER INITIAL DAY NOTE - PHYSICAL EXAMINATION
CONSTITUTIONAL: + chronic ill appearing; in no apparent distress.   EYES: PERRL; EOM intact. + mild pallor  CARDIOVASCULAR: + tachycardia Normal S1, S2; no murmurs, rubs, or gallops.   RESPIRATORY: Normal chest excursion with respiration; breath sounds clear and equal bilaterally; no wheezes, rhonchi, or rales.  GI/: Normal bowel sounds; non-distended; non-tender; no palpable organomegaly.   MS: No evidence of trauma or deformity. Non-tender to palpation. No scoliosis. No CVA tenderness. Normal ROM in all four extremities; non-tender to palpation; distal pulses are normal.   SKIN: + mild erythema with petechia rash to right forearm with swelling/warmness. + mild erythema and localized swelling noted to left wrist, ? old IV vs phlebotomy site, no drainage and bleeding. no rash to neck/back.   NEURO/PSYCH: A & O x 4; grossly unremarkable. no focal deficit. speaking coherently. no nuchal rigidity. CONSTITUTIONAL: + chronic ill appearing; in no apparent distress.   EYES: PERRL; EOM intact. + mild pallor  CARDIOVASCULAR: + tachycardia Normal S1, S2; no murmurs, rubs, or gallops.   RESPIRATORY: Normal chest excursion with respiration; breath sounds clear and equal bilaterally; no wheezes, rhonchi, or rales.  GI/: Normal bowel sounds; non-distended; non-tender; no palpable organomegaly.   MS: No evidence of trauma or deformity. Non-tender to palpation. No scoliosis. No CVA tenderness. Normal ROM in all four extremities; non-tender to palpation; distal pulses are normal.   SKIN: + mild erythema with petechia rash from dorsum of right hand extending to distal forearm with swelling/warmness. + mild erythema and localized swelling noted to left wrist, ? old IV vs phlebotomy site, no drainage and bleeding. no rash to neck/back.   NEURO/PSYCH: A & O x 4; grossly unremarkable. no focal deficit. speaking coherently. no nuchal rigidity.

## 2019-12-04 NOTE — ED ADULT NURSE NOTE - CHIEF COMPLAINT QUOTE
BIBA from assisted living faculity c/o fever and tachycardia for 3 days on antibiotics for 2 days for cellulitis of legs.  EKG done in triage for  and evaluated by MD

## 2019-12-04 NOTE — ED ADULT NURSE NOTE - NSIMPLEMENTINTERV_GEN_ALL_ED
Implemented All Fall with Harm Risk Interventions:  [place] to call system. Call bell, personal items and telephone within reach. Instruct patient to call for assistance. Room bathroom lighting operational. Non-slip footwear when patient is off stretcher. Physically safe environment: no spills, clutter or unnecessary equipment. Stretcher in lowest position, wheels locked, appropriate side rails in place. Provide visual cue, wrist band, yellow gown, etc. Monitor gait and stability. Monitor for mental status changes and reorient to person, place, and time. Review medications for side effects contributing to fall risk. Reinforce activity limits and safety measures with patient and family. Provide visual clues: red socks.

## 2019-12-04 NOTE — ED CDU PROVIDER INITIAL DAY NOTE - OBJECTIVE STATEMENT
73 yo male hx of MDS/HTN/HLD/DM/CHF  (EF - 25-30% on 11/26/19) placed in obs 2/2 [facility] patient. patient came to ED 2/2 fever this morning. as per patient, visiting nurse came this am and found fever so she sent patient back to hospital. Patient had some redness to both forearms from last admission. patient denies other medical complaints.  Denies HA/dizziness/chest pain/palpitation/sob/abd pain/n/v/d/ black stool/bloody stool/urinary sxs

## 2019-12-05 LAB
ANION GAP SERPL CALC-SCNC: 16 MMOL/L — HIGH (ref 7–14)
ANION GAP SERPL CALC-SCNC: 16 MMOL/L — HIGH (ref 7–14)
BASOPHILS # BLD AUTO: 0 K/UL — SIGNIFICANT CHANGE UP (ref 0–0.2)
BASOPHILS NFR BLD AUTO: 0 % — SIGNIFICANT CHANGE UP (ref 0–1)
BLD GP AB SCN SERPL QL: SIGNIFICANT CHANGE UP
BUN SERPL-MCNC: 20 MG/DL — SIGNIFICANT CHANGE UP (ref 10–20)
BUN SERPL-MCNC: 21 MG/DL — HIGH (ref 10–20)
CALCIUM SERPL-MCNC: 7.9 MG/DL — LOW (ref 8.5–10.1)
CALCIUM SERPL-MCNC: 8.1 MG/DL — LOW (ref 8.5–10.1)
CHLORIDE SERPL-SCNC: 101 MMOL/L — SIGNIFICANT CHANGE UP (ref 98–110)
CHLORIDE SERPL-SCNC: 102 MMOL/L — SIGNIFICANT CHANGE UP (ref 98–110)
CO2 SERPL-SCNC: 22 MMOL/L — SIGNIFICANT CHANGE UP (ref 17–32)
CO2 SERPL-SCNC: 23 MMOL/L — SIGNIFICANT CHANGE UP (ref 17–32)
CREAT SERPL-MCNC: 1 MG/DL — SIGNIFICANT CHANGE UP (ref 0.7–1.5)
CREAT SERPL-MCNC: 1 MG/DL — SIGNIFICANT CHANGE UP (ref 0.7–1.5)
CULTURE RESULTS: NO GROWTH — SIGNIFICANT CHANGE UP
EOSINOPHIL # BLD AUTO: 0.02 K/UL — SIGNIFICANT CHANGE UP (ref 0–0.7)
EOSINOPHIL NFR BLD AUTO: 0.9 % — SIGNIFICANT CHANGE UP (ref 0–8)
GLUCOSE BLDC GLUCOMTR-MCNC: 140 MG/DL — HIGH (ref 70–99)
GLUCOSE BLDC GLUCOMTR-MCNC: 167 MG/DL — HIGH (ref 70–99)
GLUCOSE SERPL-MCNC: 168 MG/DL — HIGH (ref 70–99)
GLUCOSE SERPL-MCNC: 191 MG/DL — HIGH (ref 70–99)
HCT VFR BLD CALC: 20.8 % — LOW (ref 42–52)
HCT VFR BLD CALC: 22.6 % — LOW (ref 42–52)
HCT VFR BLD CALC: 22.9 % — LOW (ref 42–52)
HCT VFR BLD CALC: 23.9 % — LOW (ref 42–52)
HGB BLD-MCNC: 6.5 G/DL — CRITICAL LOW (ref 14–18)
HGB BLD-MCNC: 6.8 G/DL — CRITICAL LOW (ref 14–18)
HGB BLD-MCNC: 6.9 G/DL — CRITICAL LOW (ref 14–18)
HGB BLD-MCNC: 7.2 G/DL — LOW (ref 14–18)
IMM GRANULOCYTES NFR BLD AUTO: 0.9 % — HIGH (ref 0.1–0.3)
LYMPHOCYTES # BLD AUTO: 0.73 K/UL — LOW (ref 1.2–3.4)
LYMPHOCYTES # BLD AUTO: 31.5 % — SIGNIFICANT CHANGE UP (ref 20.5–51.1)
MAGNESIUM SERPL-MCNC: 1.4 MG/DL — LOW (ref 1.8–2.4)
MCHC RBC-ENTMCNC: 28.3 PG — SIGNIFICANT CHANGE UP (ref 27–31)
MCHC RBC-ENTMCNC: 28.8 PG — SIGNIFICANT CHANGE UP (ref 27–31)
MCHC RBC-ENTMCNC: 28.8 PG — SIGNIFICANT CHANGE UP (ref 27–31)
MCHC RBC-ENTMCNC: 29.5 PG — SIGNIFICANT CHANGE UP (ref 27–31)
MCHC RBC-ENTMCNC: 29.7 G/DL — LOW (ref 32–37)
MCHC RBC-ENTMCNC: 30.1 G/DL — LOW (ref 32–37)
MCHC RBC-ENTMCNC: 30.5 G/DL — LOW (ref 32–37)
MCHC RBC-ENTMCNC: 31.3 G/DL — LOW (ref 32–37)
MCV RBC AUTO: 94.2 FL — HIGH (ref 80–94)
MCV RBC AUTO: 94.5 FL — HIGH (ref 80–94)
MCV RBC AUTO: 95.4 FL — HIGH (ref 80–94)
MCV RBC AUTO: 95.6 FL — HIGH (ref 80–94)
MONOCYTES # BLD AUTO: 0.49 K/UL — SIGNIFICANT CHANGE UP (ref 0.1–0.6)
MONOCYTES NFR BLD AUTO: 21.1 % — HIGH (ref 1.7–9.3)
NEUTROPHILS # BLD AUTO: 1.06 K/UL — LOW (ref 1.4–6.5)
NEUTROPHILS NFR BLD AUTO: 45.6 % — SIGNIFICANT CHANGE UP (ref 42.2–75.2)
NRBC # BLD: 1 /100 WBCS — HIGH (ref 0–0)
NRBC # BLD: 1 /100 WBCS — HIGH (ref 0–0)
NRBC # BLD: 2 /100 WBCS — HIGH (ref 0–0)
NRBC # BLD: 2 /100 WBCS — HIGH (ref 0–0)
NT-PROBNP SERPL-SCNC: 6136 PG/ML — HIGH (ref 0–300)
PLATELET # BLD AUTO: 45 K/UL — LOW (ref 130–400)
PLATELET # BLD AUTO: 51 K/UL — LOW (ref 130–400)
PLATELET # BLD AUTO: 58 K/UL — LOW (ref 130–400)
PLATELET # BLD AUTO: 67 K/UL — LOW (ref 130–400)
POTASSIUM SERPL-MCNC: 3.1 MMOL/L — LOW (ref 3.5–5)
POTASSIUM SERPL-MCNC: 3.6 MMOL/L — SIGNIFICANT CHANGE UP (ref 3.5–5)
POTASSIUM SERPL-SCNC: 3.1 MMOL/L — LOW (ref 3.5–5)
POTASSIUM SERPL-SCNC: 3.6 MMOL/L — SIGNIFICANT CHANGE UP (ref 3.5–5)
RBC # BLD: 2.2 M/UL — LOW (ref 4.7–6.1)
RBC # BLD: 2.4 M/UL — LOW (ref 4.7–6.1)
RBC # BLD: 2.4 M/UL — LOW (ref 4.7–6.1)
RBC # BLD: 2.5 M/UL — LOW (ref 4.7–6.1)
RBC # FLD: 18.8 % — HIGH (ref 11.5–14.5)
RBC # FLD: 18.9 % — HIGH (ref 11.5–14.5)
RBC # FLD: 19 % — HIGH (ref 11.5–14.5)
RBC # FLD: 19 % — HIGH (ref 11.5–14.5)
SODIUM SERPL-SCNC: 140 MMOL/L — SIGNIFICANT CHANGE UP (ref 135–146)
SODIUM SERPL-SCNC: 140 MMOL/L — SIGNIFICANT CHANGE UP (ref 135–146)
SPECIMEN SOURCE: SIGNIFICANT CHANGE UP
TROPONIN T SERPL-MCNC: 0.05 NG/ML — CRITICAL HIGH
TROPONIN T SERPL-MCNC: 0.06 NG/ML — CRITICAL HIGH
WBC # BLD: 1.65 K/UL — LOW (ref 4.8–10.8)
WBC # BLD: 2.32 K/UL — LOW (ref 4.8–10.8)
WBC # BLD: 2.71 K/UL — LOW (ref 4.8–10.8)
WBC # BLD: 3.24 K/UL — LOW (ref 4.8–10.8)
WBC # FLD AUTO: 1.65 K/UL — LOW (ref 4.8–10.8)
WBC # FLD AUTO: 2.32 K/UL — LOW (ref 4.8–10.8)
WBC # FLD AUTO: 2.71 K/UL — LOW (ref 4.8–10.8)
WBC # FLD AUTO: 3.24 K/UL — LOW (ref 4.8–10.8)

## 2019-12-05 PROCEDURE — 93010 ELECTROCARDIOGRAM REPORT: CPT

## 2019-12-05 PROCEDURE — 99218: CPT | Mod: 25

## 2019-12-05 RX ORDER — SODIUM CHLORIDE 9 MG/ML
1000 INJECTION, SOLUTION INTRAVENOUS
Refills: 0 | Status: DISCONTINUED | OUTPATIENT
Start: 2019-12-05 | End: 2019-12-05

## 2019-12-05 RX ORDER — FAMOTIDINE 10 MG/ML
1 INJECTION INTRAVENOUS
Qty: 0 | Refills: 0 | DISCHARGE

## 2019-12-05 RX ORDER — ACETAMINOPHEN 500 MG
650 TABLET ORAL ONCE
Refills: 0 | Status: COMPLETED | OUTPATIENT
Start: 2019-12-05 | End: 2019-12-05

## 2019-12-05 RX ORDER — ATORVASTATIN CALCIUM 80 MG/1
1 TABLET, FILM COATED ORAL
Qty: 0 | Refills: 0 | DISCHARGE

## 2019-12-05 RX ORDER — CLOPIDOGREL BISULFATE 75 MG/1
1 TABLET, FILM COATED ORAL
Qty: 0 | Refills: 0 | DISCHARGE

## 2019-12-05 RX ORDER — GLIMEPIRIDE 1 MG
1 TABLET ORAL
Qty: 0 | Refills: 0 | DISCHARGE

## 2019-12-05 RX ORDER — METFORMIN HYDROCHLORIDE 850 MG/1
1 TABLET ORAL
Qty: 0 | Refills: 0 | DISCHARGE

## 2019-12-05 RX ORDER — ENOXAPARIN SODIUM 100 MG/ML
40 INJECTION SUBCUTANEOUS DAILY
Refills: 0 | Status: DISCONTINUED | OUTPATIENT
Start: 2019-12-05 | End: 2019-12-12

## 2019-12-05 RX ORDER — SACUBITRIL AND VALSARTAN 24; 26 MG/1; MG/1
1 TABLET, FILM COATED ORAL
Qty: 0 | Refills: 0 | DISCHARGE

## 2019-12-05 RX ORDER — IBUPROFEN 200 MG
600 TABLET ORAL ONCE
Refills: 0 | Status: COMPLETED | OUTPATIENT
Start: 2019-12-05 | End: 2019-12-05

## 2019-12-05 RX ADMIN — Medication 600 MILLIGRAM(S): at 16:52

## 2019-12-05 RX ADMIN — TAMSULOSIN HYDROCHLORIDE 0.4 MILLIGRAM(S): 0.4 CAPSULE ORAL at 22:41

## 2019-12-05 RX ADMIN — CEFEPIME 100 MILLIGRAM(S): 1 INJECTION, POWDER, FOR SOLUTION INTRAMUSCULAR; INTRAVENOUS at 05:56

## 2019-12-05 RX ADMIN — Medication 0.12 MILLIGRAM(S): at 05:55

## 2019-12-05 RX ADMIN — SODIUM CHLORIDE 100 MILLILITER(S): 9 INJECTION, SOLUTION INTRAVENOUS at 04:27

## 2019-12-05 RX ADMIN — Medication 81 MILLIGRAM(S): at 11:55

## 2019-12-05 RX ADMIN — Medication 40 MILLIGRAM(S): at 05:55

## 2019-12-05 RX ADMIN — Medication 200 MILLIGRAM(S): at 05:55

## 2019-12-05 RX ADMIN — Medication 250 MILLIGRAM(S): at 04:53

## 2019-12-05 RX ADMIN — SPIRONOLACTONE 25 MILLIGRAM(S): 25 TABLET, FILM COATED ORAL at 05:55

## 2019-12-05 RX ADMIN — Medication 650 MILLIGRAM(S): at 12:53

## 2019-12-05 RX ADMIN — FAMOTIDINE 40 MILLIGRAM(S): 10 INJECTION INTRAVENOUS at 22:42

## 2019-12-05 RX ADMIN — Medication 40 MILLIEQUIVALENT(S): at 08:42

## 2019-12-05 RX ADMIN — SACUBITRIL AND VALSARTAN 1 TABLET(S): 24; 26 TABLET, FILM COATED ORAL at 17:54

## 2019-12-05 RX ADMIN — ATORVASTATIN CALCIUM 20 MILLIGRAM(S): 80 TABLET, FILM COATED ORAL at 22:42

## 2019-12-05 RX ADMIN — Medication 2 MILLIGRAM(S): at 08:39

## 2019-12-05 RX ADMIN — METFORMIN HYDROCHLORIDE 1000 MILLIGRAM(S): 850 TABLET ORAL at 05:55

## 2019-12-05 RX ADMIN — Medication 250 MILLIGRAM(S): at 17:49

## 2019-12-05 RX ADMIN — SACUBITRIL AND VALSARTAN 1 TABLET(S): 24; 26 TABLET, FILM COATED ORAL at 05:55

## 2019-12-05 RX ADMIN — ENOXAPARIN SODIUM 40 MILLIGRAM(S): 100 INJECTION SUBCUTANEOUS at 17:56

## 2019-12-05 RX ADMIN — Medication 650 MILLIGRAM(S): at 04:27

## 2019-12-05 NOTE — PROGRESS NOTE ADULT - SUBJECTIVE AND OBJECTIVE BOX
SHANE ASHTON  74y, Male  Allergy: No Known Allergies    Hospital Day: 1d    Patient seen and examined earlier today.     PMH/PSH:  MDS (myelodysplastic syndrome)  Afib  GERD (gastroesophageal reflux disease)  CAD (coronary artery disease)  Diabetes  HTN (hypertension)  H/O colonoscopy: 10 yeras ago      LAST 24-Hr EVENTS:  Remains in ED-OBS status.    VITALS:  T(F): 98.7 (19 @ 07:34), Max: 101 (19 @ 13:00)  HR: 117 (19 @ 07:34)  BP: 131/80 (19 @ 07:34) (120/68 - 158/107)  RR: 18 (19 @ 07:34)  SpO2: 100% (19 @ 07:34)    TESTS & MEASUREMENTS:  Weight (Kg): 79.4 (19 @ 13:00)  BMI (kg/m2): 25.1 ()                6.5    1.65  )-----------( 58       ( 05 Dec 2019 05:24 )             20.8   Hemoglobin Trend:  Hemoglobin: 6.5 g/dL ( @ 05:24)  Hemoglobin: 7.7 g/dL ( @ 15:20)    PT/INR - ( 04 Dec 2019 15:20 )   PT: 13.70 sec;   INR: 1.19 ratio         PTT - ( 04 Dec 2019 15:20 )  PTT:30.6 sec      140  |  102  |  20  ----------------------------<  191<H>  3.1<L>   |  22  |  1.0    Ca    7.9<L>      05 Dec 2019 05:24    TPro  7.1  /  Alb  3.3<L>  /  TBili  1.1  /  DBili  x   /  AST  19  /  ALT  30  /  AlkPhos  66      LIVER FUNCTIONS - ( 04 Dec 2019 15:20 )  Alb: 3.3 g/dL / Pro: 7.1 g/dL / ALK PHOS: 66 U/L / ALT: 30 U/L / AST: 19 U/L / GGT: x           CARDIAC MARKERS ( 05 Dec 2019 07:30 )  x     / 0.05 ng/mL / x     / x     / x        Urinalysis Basic - ( 04 Dec 2019 20:40 )    Color: Light Yellow / Appearance: Clear / S.014 / pH: x  Gluc: x / Ketone: Negative  / Bili: Negative / Urobili: <2 mg/dL   Blood: x / Protein: 30 mg/dL / Nitrite: Negative   Leuk Esterase: Negative / RBC: 3 /HPF / WBC 1 /HPF   Sq Epi: x / Non Sq Epi: 0 /HPF / Bacteria: Negative    Lactate, Blood: 2.4 mmol/L (19 @ 16:50)      RADIOLOGY & ADDITIONAL TESTS:  < from: Xray Chest 1 View-PORTABLE IMMEDIATE (19 @ 15:07) >    No radiographic evidence of acute cardiopulmonary disease.    MEDICATIONS:  MEDICATIONS  (STANDING):  aspirin  chewable 81 milliGRAM(s) Oral daily  atorvastatin 20 milliGRAM(s) Oral at bedtime  cefepime   IVPB 1000 milliGRAM(s) IV Intermittent every 8 hours  digoxin     Tablet 0.125 milliGRAM(s) Oral daily  famotidine    Tablet 40 milliGRAM(s) Oral at bedtime  furosemide    Tablet 40 milliGRAM(s) Oral daily  glimepiride. 2 milliGRAM(s) Oral with breakfast  lactated ringers. 1000 milliLiter(s) (100 mL/Hr) IV Continuous <Continuous>  metFORMIN 1000 milliGRAM(s) Oral two times a day  metoprolol succinate  milliGRAM(s) Oral daily  sacubitril 24 mG/valsartan 26 mG 1 Tablet(s) Oral two times a day  spironolactone 25 milliGRAM(s) Oral daily  tamsulosin 0.4 milliGRAM(s) Oral at bedtime  vancomycin  IVPB 1000 milliGRAM(s) IV Intermittent every 12 hours      HOME MEDICATIONS:  atorvastatin 20 mg oral tablet ()  Entresto 24 mg-26 mg oral tablet ()  famotidine 40 mg oral tablet ()  glimepiride 4 mg oral tablet ()  metFORMIN 1000 mg oral tablet ()  metoprolol succinate 200 mg oral tablet, extended release ()  tamsulosin 0.4 mg oral capsule ()      PHYSICAL EXAM:  GENERAL: A&O x3,  in NAD/P,   NECK: No Swelling  CHEST/LUNG: Good air entry, No wheezing  HEART: RRR, No murmurs  ABDOMEN: Soft, Bowel sounds present  EXTREMITIES:  No clubbing,

## 2019-12-05 NOTE — ED ADULT NURSE REASSESSMENT NOTE - NS ED NURSE REASSESS COMMENT FT1
RN spoke with MD Wood of patient's lab levels as per MD will trend patients lab levels. Respiratory came and took patient off bi-pap around 0800, pt tolerating Nasal Cannula well, pt able to eat at this time. will continue to monitor

## 2019-12-05 NOTE — H&P ADULT - NSHPPHYSICALEXAM_GEN_ALL_CORE
VITAL SIGNS: Last 24 Hours  T(C): 37.1 (05 Dec 2019 07:34), Max: 38.3 (04 Dec 2019 13:00)  T(F): 98.7 (05 Dec 2019 07:34), Max: 101 (04 Dec 2019 13:00)  HR: 117 (05 Dec 2019 07:34) (117 - 142)  BP: 131/80 (05 Dec 2019 07:34) (120/68 - 158/107)  BP(mean): --  RR: 18 (05 Dec 2019 07:34) (18 - 18)  SpO2: 100% (05 Dec 2019 07:34) (89% - 100%)      PHYSICAL EXAM:    GENERAL: NAD, well-developed, AAOx3  HEENT:  Atraumatic, Normocephalic. EOMI, PERRLA, conjunctiva and sclera clear, No JVD  PULMONARY: Clear to auscultation bilaterally; No wheeze  CARDIOVASCULAR: Regular rate and rhythm; No murmurs, rubs, or gallops  GASTROINTESTINAL: Soft, Nontender, Nondistended; Bowel sounds present  MUSCULOSKELETAL:  2+ Peripheral Pulses, No clubbing, cyanosis, or edema  NEUROLOGY: non-focal  SKIN: significant swelling, erythema and tenderness involving  dorsum of right hand and extending to distal forearm. Mild swelling and erythema with black surrounding scab.

## 2019-12-05 NOTE — PROVIDER CONTACT NOTE (OTHER) - ASSESSMENT
Pt assessed , pt denies chest pain at this time, VSS, pt on cardiac monitor. PT A&Ox3. Pt rectally febrile just given Tylenol at 12:53. Pt eating at time of episode. pt resting in bed.

## 2019-12-05 NOTE — ED ADULT NURSE REASSESSMENT NOTE - NS ED NURSE REASSESS COMMENT FT1
pt AOx3,VSS  pt on cardiac monitor, denies any type of chest pain at this time. pt on bi-pap, tolerating well. pt states he does feel a little better at this time. safety and comfort measures maintained . will continue to monitor

## 2019-12-05 NOTE — ED CDU PROVIDER SUBSEQUENT DAY NOTE - PROGRESS NOTE DETAILS
HR improved to 110s after Metoprolol and Digoxin HR increased back 140s. Temp - 99.7, BP - 150/100. SaO2 - 90%. RR - 22. will start patient on BiPAP. give Tylenol and 1000L LR @ 100mL/hour. will continue to observe. Dr Lopez re-assessed patient this AM. Will admit as cellulitis does not appear to be improved. Endorsed to MAR

## 2019-12-05 NOTE — H&P ADULT - ASSESSMENT
# Sepsis secondary to cellulitis  - Possible underlying abscess in right hand/forearm  - BP is stable but patient still in sinus tachycardia and having fevers  - Continue IV Vancomycin to cover for MRSA  - Burn team evaluation    # HFrEF  - Patient just discharged after CHF exacerbation  - Currently patient is euvolemic  - Will hold IV hydration and Lasix  - Close monitoring for signs of volume overload and resume Lasix accordingly  - Continue Entresto BID and Spironolactone  - Continue BIPAP at night and  as needed    #Hypokalemia  - Secondary to diuresis  - Replete as needed    # MDS/pancytopenia  - Hb today 6.5  - repeat CBC and transfuse if Hb<7    # Paroxysmal Afib not on AC:  - Currently NSR  - C/w Metoprolol and digoxin    # DMII  - Hold oral antidiabetics  - Monitor FS qac/hs and start insulin accordingly    # BPH  - c/w flomax 0.4 mg qd    # DVT ppx: Lovenox 40 mg qd  # Dispo: From [facility] living # Sepsis secondary to cellulitis  - Possible underlying abscess in right hand/forearm  - BP is stable but patient still in sinus tachycardia and having fevers  - Continue IV Vancomycin to cover for MRSA; check trough prior to 4th dose  - Burn team evaluation    # HFrEF  - Patient just discharged after CHF exacerbation  - Received 2.5 L of iV boluses in ED and is on 100 cc/hr LR  - Currently patient is euvolemic  - Will hold IV hydration and Lasix  - Close monitoring for signs of volume overload and resume Lasix accordingly  - Continue Entresto BID and Spironolactone  - Continue BIPAP at night and  as needed    #Hypokalemia  - Secondary to diuresis  - Replete as needed    # MDS/pancytopenia  - Hb today 6.5  - repeat CBC and transfuse if Hb<7    # Paroxysmal Afib not on AC:  - Currently NSR  - C/w Metoprolol and digoxin    # DMII  - Hold oral antidiabetics  - Monitor FS qac/hs and start insulin accordingly    # BPH  - c/w flomax 0.4 mg qd    # DVT ppx: Lovenox 40 mg qd  # Dispo: From [facility] living # Sepsis secondary to cellulitis  - Possible underlying abscess in right hand/forearm  - BP is stable but patient still in sinus tachycardia and having fevers  - Continue IV Vancomycin to cover for MRSA; check trough prior to 4th dose  - Pending Blood cultures  - Burn team evaluation    # HFrEF  - Patient just discharged after CHF exacerbation  - Received 2.5 L of iV boluses in ED and is on 100 cc/hr LR  - Currently patient is euvolemic  - Will hold IV hydration and Lasix  - Close monitoring for signs of volume overload and resume Lasix accordingly  - Continue Entresto BID and Spironolactone  - Continue BIPAP at night and  as needed    #Hypokalemia  - Secondary to diuresis  - Replete as needed    # MDS/pancytopenia  - Hb today 6.5  - repeat CBC and transfuse if Hb<7    # Paroxysmal Afib not on AC:  - Currently NSR  - C/w Metoprolol and digoxin    # DMII  - Hold oral antidiabetics  - Monitor FS qac/hs and start insulin accordingly    # BPH  - c/w flomax 0.4 mg qd    # DVT ppx: Lovenox 40 mg qd  # Dispo: From [facility] living

## 2019-12-05 NOTE — ED CDU PROVIDER DISPOSITION NOTE - SECONDARY DIAGNOSIS.
MDS (myelodysplastic syndrome) Sepsis, due to unspecified organism, unspecified whether acute organ dysfunction present

## 2019-12-05 NOTE — PATIENT PROFILE ADULT - NSTRANSFEREYEGLASSESPAIRS_GEN_A_NUR
[facility] Progress Note    Patient: Bobby Moise Date: 2018   : 1952 Attending: Elton Byrne MD   66 year old male DOA: 2018     Admission date: 2018     Intensive Care Unit admission date:  18  Intubation date:  NA    Summary:  66 y.o. With limited PMH presenting with symptomatic \"new\" onset atrial fibrillation.      24 hour:   -- remains on dilt gtt.  BP with episodes of asymptomatic hypotension.   -- hr range 50's - 118    HOSPITAL PROBLEM LIST:  -- New onset atrial fibrillation with RVR  -- Leukopenia  -- thrombocytopenia     ASSESSMENT AND PLAN:  66 y.o. Male in very good health presents with new onset palpitations and presyncope with atrial fibrillation which is new onset.    -- trend troponins  -- ECHO  -- EP planning on KULDEEP and DCCV  -- TALA VASC score is relatively low at least at this point.   -- Thyroid function studies  -- can transfer to the general medical floor  -- full code.       Subjective:  -- no complaints. Eating breakfast. No recurrence of chest pain or palpatations since being admitted.     Objective:  I/O last 3 completed shifts:  In: 695 [P.O.:540; I.V.:155]  Out: 1400 [Urine:1400]  No intake/output data recorded.    Vital Last Value 24 Hour Range   Temperature 98.2 °F (36.8 °C) (18 0325) Temp  Min: 97.7 °F (36.5 °C)  Max: 98.6 °F (37 °C)   Pulse 72 (18 0500) Pulse  Min: 58  Max: 196   Respiratory 22 (18 0400) Resp  Min: 17  Max: 35   Non-Invasive  Blood Pressure 98/55 (18 0500) BP  Min: 91/60  Max: 142/87   Pulse Oximetry 94 % (18 0500) SpO2  Min: 77 %  Max: 99 %   Arterial   Blood Pressure   No Data Recorded      Physical Exam:   Constitutional:  Well developed, well nourished, no acute distress, non-toxic appearance   Eyes:  PERRL, conjunctiva normal   HENT:  Atraumatic, external ears normal, nose normal, oropharynx moist, no pharyngeal exudates.   Respiratory:  No respiratory distress, normal breath  sounds, no rales, no wheezing   Cardiovascular: IIR, no murmurs appreciated.  GI:  Soft, nondistended, nontender, no rebound, no guarding   :  No costovertebral angle tenderness   Musculoskeletal:  No edema, no tenderness, no deformities.   Integument:  Well hydrated, no rash   Lymphatic:  No lymphadenopathy noted   Neurologic:  Alert & oriented x 3, CN 2-12 normal, no gross focal deficits   Psychiatric:  Speech and behavior appropriate     Pertinent Reviewed: Allergies, Medications, Labs, Imaging and Physician and Nursing Notes    BEST PRACTICES  DVT Prophylaxis: Hep SQ  Stress ulcer Prophylaxis: Not indicated  Nutrition: reg diet  Glycemic Control: intrinsic control  Line Necessity: PIV    ACCS Attestation  This patient is critically ill as documented above. I evaluated the patient and reviewed imaging and laboratory data. Critical care services I provided [id number] ([facility] hospital care, level III).    Howie Byrne MD  [facility]  Pager: [phone]   1 pair

## 2019-12-05 NOTE — ED ADULT NURSE REASSESSMENT NOTE - NS ED NURSE REASSESS COMMENT FT1
Pt placed on bipap after desat to low 80s. Removed for morning medication pass, but placed on nasal cannula. Pt resting comfortably. no c/o at this time

## 2019-12-05 NOTE — PROVIDER CONTACT NOTE (OTHER) - ASSESSMENT
PT assessed A&ox3, pt febrile, was given tylenol . RN wants to know if we should wait to admisnter blood transfusion.

## 2019-12-05 NOTE — ED ADULT NURSE REASSESSMENT NOTE - NS ED NURSE REASSESS COMMENT FT1
PT RESTING COMFORTABLY IN BED. NO C/O AT THIS TIME. WILL CONTINUE TO MONITOR. HEART RATE DECREASED FROM DIGOXIN IV

## 2019-12-05 NOTE — H&P ADULT - NSHPLABSRESULTS_GEN_ALL_CORE
LABS:                        6.5    1.65  )-----------( 58       ( 05 Dec 2019 05:24 )             20.8     12    140  |  102  |  20  ----------------------------<  191<H>  3.1<L>   |  22  |  1.0    Ca    7.9<L>      05 Dec 2019 05:24    TPro  7.1  /  Alb  3.3<L>  /  TBili  1.1  /  DBili  x   /  AST  19  /  ALT  30  /  AlkPhos  66  12    PT/INR - ( 04 Dec 2019 15:20 )   PT: 13.70 sec;   INR: 1.19 ratio         PTT - ( 04 Dec 2019 15:20 )  PTT:30.6 sec  Urinalysis Basic - ( 04 Dec 2019 20:40 )    Color: Light Yellow / Appearance: Clear / S.014 / pH: x  Gluc: x / Ketone: Negative  / Bili: Negative / Urobili: <2 mg/dL   Blood: x / Protein: 30 mg/dL / Nitrite: Negative   Leuk Esterase: Negative / RBC: 3 /HPF / WBC 1 /HPF   Sq Epi: x / Non Sq Epi: 0 /HPF / Bacteria: Negative        Troponin T, Serum: 0.05 ng/mL <HH> (19 @ 07:30)  Lactate, Blood: 2.4 mmol/L <H> (19 @ 16:50)      CARDIAC MARKERS ( 05 Dec 2019 07:30 )  x     / 0.05 ng/mL / x     / x     / x          RADIOLOGY:  < from: Xray Chest 1 View-PORTABLE IMMEDIATE (19 @ 15:07) >    Impression:      No radiographic evidence of acute cardiopulmonary disease.    < end of copied text >

## 2019-12-05 NOTE — ED ADULT NURSE REASSESSMENT NOTE - NS ED NURSE REASSESS COMMENT FT1
3001 contacted regarding the temperature . Instruction received to put back on the cooling blanket intermittently . instruction received to hold off the blood transfusion because of fever and the recent Hb result

## 2019-12-05 NOTE — H&P ADULT - HISTORY OF PRESENT ILLNESS
74 year old gentleman knwon to have CAD s/p stent, non-obstructive cardiomyopathy with EF 26%, hypertension, hyperlipidemia, DM II, Afib, MDS presents for fever and worsening swelling in his right hand.    Patient was recently admitted for CHF exacerbation and was discharged on 29 November.  Since before discharge, family say he had swelling and redness in both hands. Since then, swelling in right hand has been getting worse. Since 2 days before presentation, patient started having fevers. He started PO clindamycin with no improvement of symptoms.  Otherwise, ROS is negative. No SOB, cough, or chest pain.  In ED, patient was found to be septic with fever and tachycardia. He was initially put under observation. Admitted today for persistent tachycardia and non improving symptoms.

## 2019-12-05 NOTE — H&P ADULT - NSICDXPASTMEDICALHX_GEN_ALL_CORE_FT
PAST MEDICAL HISTORY:  Afib     CAD (coronary artery disease)     Diabetes     GERD (gastroesophageal reflux disease)     HTN (hypertension)     MDS (myelodysplastic syndrome)

## 2019-12-05 NOTE — ED CDU PROVIDER DISPOSITION NOTE - CLINICAL COURSE
74 year old gentleman knwon to have CAD s/p stent, non-obstructive cardiomyopathy with EF 26%, hypertension, hyperlipidemia, DM II, Afib, MDS presents for fever and worsening swelling in his right hand. pt placed in edou under [facility] program eval. pt given broad spectrum abx for right hand cellultis. no sig improvement in exam or vitals.  pt tx for sepsis repeat lactate mild improved. burn to be consulted for possible drainage.

## 2019-12-05 NOTE — H&P ADULT - NSHPSOCIALHISTORY_GEN_ALL_CORE
- Non-smoker  - No alcohol use  - No illicit drug use  - From [facility] assisted living, has an aid
not applicable

## 2019-12-06 LAB
ALBUMIN SERPL ELPH-MCNC: 2.8 G/DL — LOW (ref 3.5–5.2)
ALP SERPL-CCNC: 62 U/L — SIGNIFICANT CHANGE UP (ref 30–115)
ALT FLD-CCNC: 21 U/L — SIGNIFICANT CHANGE UP (ref 0–41)
ANION GAP SERPL CALC-SCNC: 14 MMOL/L — SIGNIFICANT CHANGE UP (ref 7–14)
ANION GAP SERPL CALC-SCNC: 16 MMOL/L — HIGH (ref 7–14)
APPEARANCE UR: ABNORMAL
AST SERPL-CCNC: 14 U/L — SIGNIFICANT CHANGE UP (ref 0–41)
BACTERIA # UR AUTO: NEGATIVE — SIGNIFICANT CHANGE UP
BASOPHILS # BLD AUTO: 0 K/UL — SIGNIFICANT CHANGE UP (ref 0–0.2)
BASOPHILS # BLD AUTO: 0 K/UL — SIGNIFICANT CHANGE UP (ref 0–0.2)
BASOPHILS NFR BLD AUTO: 0 % — SIGNIFICANT CHANGE UP (ref 0–1)
BASOPHILS NFR BLD AUTO: 0 % — SIGNIFICANT CHANGE UP (ref 0–1)
BILIRUB SERPL-MCNC: 1 MG/DL — SIGNIFICANT CHANGE UP (ref 0.2–1.2)
BILIRUB UR-MCNC: NEGATIVE — SIGNIFICANT CHANGE UP
BUN SERPL-MCNC: 26 MG/DL — HIGH (ref 10–20)
BUN SERPL-MCNC: 27 MG/DL — HIGH (ref 10–20)
CALCIUM SERPL-MCNC: 7.8 MG/DL — LOW (ref 8.5–10.1)
CALCIUM SERPL-MCNC: 7.9 MG/DL — LOW (ref 8.5–10.1)
CHLORIDE SERPL-SCNC: 101 MMOL/L — SIGNIFICANT CHANGE UP (ref 98–110)
CHLORIDE SERPL-SCNC: 98 MMOL/L — SIGNIFICANT CHANGE UP (ref 98–110)
CO2 SERPL-SCNC: 23 MMOL/L — SIGNIFICANT CHANGE UP (ref 17–32)
CO2 SERPL-SCNC: 24 MMOL/L — SIGNIFICANT CHANGE UP (ref 17–32)
COLOR SPEC: YELLOW — SIGNIFICANT CHANGE UP
CREAT SERPL-MCNC: 1 MG/DL — SIGNIFICANT CHANGE UP (ref 0.7–1.5)
CREAT SERPL-MCNC: 1.1 MG/DL — SIGNIFICANT CHANGE UP (ref 0.7–1.5)
DIFF PNL FLD: NEGATIVE — SIGNIFICANT CHANGE UP
EOSINOPHIL # BLD AUTO: 0.01 K/UL — SIGNIFICANT CHANGE UP (ref 0–0.7)
EOSINOPHIL # BLD AUTO: 0.01 K/UL — SIGNIFICANT CHANGE UP (ref 0–0.7)
EOSINOPHIL NFR BLD AUTO: 0.3 % — SIGNIFICANT CHANGE UP (ref 0–8)
EOSINOPHIL NFR BLD AUTO: 0.4 % — SIGNIFICANT CHANGE UP (ref 0–8)
EPI CELLS # UR: 1 /HPF — SIGNIFICANT CHANGE UP (ref 0–5)
GLUCOSE BLDC GLUCOMTR-MCNC: 344 MG/DL — HIGH (ref 70–99)
GLUCOSE BLDC GLUCOMTR-MCNC: 358 MG/DL — HIGH (ref 70–99)
GLUCOSE SERPL-MCNC: 297 MG/DL — HIGH (ref 70–99)
GLUCOSE SERPL-MCNC: 354 MG/DL — HIGH (ref 70–99)
GLUCOSE UR QL: ABNORMAL
HCT VFR BLD CALC: 21.9 % — LOW (ref 42–52)
HCT VFR BLD CALC: 22.5 % — LOW (ref 42–52)
HCT VFR BLD CALC: 26.1 % — LOW (ref 42–52)
HGB BLD-MCNC: 6.5 G/DL — CRITICAL LOW (ref 14–18)
HGB BLD-MCNC: 6.9 G/DL — CRITICAL LOW (ref 14–18)
HGB BLD-MCNC: 8.1 G/DL — LOW (ref 14–18)
HYALINE CASTS # UR AUTO: 5 /LPF — SIGNIFICANT CHANGE UP (ref 0–7)
IMM GRANULOCYTES NFR BLD AUTO: 0.7 % — HIGH (ref 0.1–0.3)
IMM GRANULOCYTES NFR BLD AUTO: 1.8 % — HIGH (ref 0.1–0.3)
KETONES UR-MCNC: NEGATIVE — SIGNIFICANT CHANGE UP
LEUKOCYTE ESTERASE UR-ACNC: NEGATIVE — SIGNIFICANT CHANGE UP
LYMPHOCYTES # BLD AUTO: 0.47 K/UL — LOW (ref 1.2–3.4)
LYMPHOCYTES # BLD AUTO: 0.97 K/UL — LOW (ref 1.2–3.4)
LYMPHOCYTES # BLD AUTO: 20.9 % — SIGNIFICANT CHANGE UP (ref 20.5–51.1)
LYMPHOCYTES # BLD AUTO: 33.4 % — SIGNIFICANT CHANGE UP (ref 20.5–51.1)
MAGNESIUM SERPL-MCNC: 1.4 MG/DL — LOW (ref 1.8–2.4)
MAGNESIUM SERPL-MCNC: 1.6 MG/DL — LOW (ref 1.8–2.4)
MCHC RBC-ENTMCNC: 28.5 PG — SIGNIFICANT CHANGE UP (ref 27–31)
MCHC RBC-ENTMCNC: 28.6 PG — SIGNIFICANT CHANGE UP (ref 27–31)
MCHC RBC-ENTMCNC: 29.2 PG — SIGNIFICANT CHANGE UP (ref 27–31)
MCHC RBC-ENTMCNC: 29.7 G/DL — LOW (ref 32–37)
MCHC RBC-ENTMCNC: 30.7 G/DL — LOW (ref 32–37)
MCHC RBC-ENTMCNC: 31 G/DL — LOW (ref 32–37)
MCV RBC AUTO: 92.2 FL — SIGNIFICANT CHANGE UP (ref 80–94)
MCV RBC AUTO: 95.3 FL — HIGH (ref 80–94)
MCV RBC AUTO: 96.1 FL — HIGH (ref 80–94)
MONOCYTES # BLD AUTO: 0.4 K/UL — SIGNIFICANT CHANGE UP (ref 0.1–0.6)
MONOCYTES # BLD AUTO: 0.61 K/UL — HIGH (ref 0.1–0.6)
MONOCYTES NFR BLD AUTO: 17.8 % — HIGH (ref 1.7–9.3)
MONOCYTES NFR BLD AUTO: 21 % — HIGH (ref 1.7–9.3)
NEUTROPHILS # BLD AUTO: 1.29 K/UL — LOW (ref 1.4–6.5)
NEUTROPHILS # BLD AUTO: 1.33 K/UL — LOW (ref 1.4–6.5)
NEUTROPHILS NFR BLD AUTO: 44.6 % — SIGNIFICANT CHANGE UP (ref 42.2–75.2)
NEUTROPHILS NFR BLD AUTO: 59.1 % — SIGNIFICANT CHANGE UP (ref 42.2–75.2)
NITRITE UR-MCNC: NEGATIVE — SIGNIFICANT CHANGE UP
NRBC # BLD: 1 /100 WBCS — HIGH (ref 0–0)
NRBC # BLD: 2 /100 WBCS — HIGH (ref 0–0)
NRBC # BLD: 2 /100 WBCS — HIGH (ref 0–0)
PH UR: 6 — SIGNIFICANT CHANGE UP (ref 5–8)
PLATELET # BLD AUTO: 46 K/UL — LOW (ref 130–400)
PLATELET # BLD AUTO: 47 K/UL — LOW (ref 130–400)
PLATELET # BLD AUTO: 70 K/UL — LOW (ref 130–400)
POTASSIUM SERPL-MCNC: 3.3 MMOL/L — LOW (ref 3.5–5)
POTASSIUM SERPL-MCNC: 3.4 MMOL/L — LOW (ref 3.5–5)
POTASSIUM SERPL-SCNC: 3.3 MMOL/L — LOW (ref 3.5–5)
POTASSIUM SERPL-SCNC: 3.4 MMOL/L — LOW (ref 3.5–5)
PROT SERPL-MCNC: 6 G/DL — SIGNIFICANT CHANGE UP (ref 6–8)
PROT UR-MCNC: ABNORMAL
RBC # BLD: 2.28 M/UL — LOW (ref 4.7–6.1)
RBC # BLD: 2.36 M/UL — LOW (ref 4.7–6.1)
RBC # BLD: 2.83 M/UL — LOW (ref 4.7–6.1)
RBC # FLD: 18.3 % — HIGH (ref 11.5–14.5)
RBC # FLD: 18.7 % — HIGH (ref 11.5–14.5)
RBC # FLD: 19 % — HIGH (ref 11.5–14.5)
RBC CASTS # UR COMP ASSIST: 7 /HPF — HIGH (ref 0–4)
SODIUM SERPL-SCNC: 136 MMOL/L — SIGNIFICANT CHANGE UP (ref 135–146)
SODIUM SERPL-SCNC: 140 MMOL/L — SIGNIFICANT CHANGE UP (ref 135–146)
SP GR SPEC: 1.02 — SIGNIFICANT CHANGE UP (ref 1.01–1.02)
UROBILINOGEN FLD QL: ABNORMAL
VANCOMYCIN TROUGH SERPL-MCNC: 15.2 UG/ML — HIGH (ref 5–10)
WBC # BLD: 2.25 K/UL — LOW (ref 4.8–10.8)
WBC # BLD: 2.31 K/UL — LOW (ref 4.8–10.8)
WBC # BLD: 2.9 K/UL — LOW (ref 4.8–10.8)
WBC # FLD AUTO: 2.25 K/UL — LOW (ref 4.8–10.8)
WBC # FLD AUTO: 2.31 K/UL — LOW (ref 4.8–10.8)
WBC # FLD AUTO: 2.9 K/UL — LOW (ref 4.8–10.8)
WBC UR QL: 2 /HPF — SIGNIFICANT CHANGE UP (ref 0–5)

## 2019-12-06 PROCEDURE — 71045 X-RAY EXAM CHEST 1 VIEW: CPT | Mod: 26

## 2019-12-06 PROCEDURE — 93010 ELECTROCARDIOGRAM REPORT: CPT

## 2019-12-06 RX ORDER — FUROSEMIDE 40 MG
40 TABLET ORAL EVERY 12 HOURS
Refills: 0 | Status: DISCONTINUED | OUTPATIENT
Start: 2019-12-06 | End: 2019-12-10

## 2019-12-06 RX ORDER — POTASSIUM CHLORIDE 20 MEQ
40 PACKET (EA) ORAL ONCE
Refills: 0 | Status: COMPLETED | OUTPATIENT
Start: 2019-12-06 | End: 2019-12-06

## 2019-12-06 RX ORDER — INSULIN LISPRO 100/ML
8 VIAL (ML) SUBCUTANEOUS ONCE
Refills: 0 | Status: COMPLETED | OUTPATIENT
Start: 2019-12-06 | End: 2019-12-06

## 2019-12-06 RX ORDER — SODIUM CHLORIDE 9 MG/ML
250 INJECTION INTRAMUSCULAR; INTRAVENOUS; SUBCUTANEOUS ONCE
Refills: 0 | Status: COMPLETED | OUTPATIENT
Start: 2019-12-06 | End: 2019-12-06

## 2019-12-06 RX ORDER — METOPROLOL TARTRATE 50 MG
5 TABLET ORAL ONCE
Refills: 0 | Status: COMPLETED | OUTPATIENT
Start: 2019-12-06 | End: 2019-12-06

## 2019-12-06 RX ORDER — MAGNESIUM SULFATE 500 MG/ML
1 VIAL (ML) INJECTION ONCE
Refills: 0 | Status: DISCONTINUED | OUTPATIENT
Start: 2019-12-06 | End: 2019-12-06

## 2019-12-06 RX ORDER — ACETAMINOPHEN 500 MG
650 TABLET ORAL EVERY 6 HOURS
Refills: 0 | Status: DISCONTINUED | OUTPATIENT
Start: 2019-12-06 | End: 2019-12-09

## 2019-12-06 RX ORDER — ACETAMINOPHEN 500 MG
650 TABLET ORAL EVERY 6 HOURS
Refills: 0 | Status: DISCONTINUED | OUTPATIENT
Start: 2019-12-06 | End: 2019-12-12

## 2019-12-06 RX ORDER — FUROSEMIDE 40 MG
40 TABLET ORAL ONCE
Refills: 0 | Status: COMPLETED | OUTPATIENT
Start: 2019-12-06 | End: 2019-12-06

## 2019-12-06 RX ORDER — MAGNESIUM SULFATE 500 MG/ML
2 VIAL (ML) INJECTION ONCE
Refills: 0 | Status: COMPLETED | OUTPATIENT
Start: 2019-12-06 | End: 2019-12-06

## 2019-12-06 RX ORDER — MEROPENEM 1 G/30ML
1000 INJECTION INTRAVENOUS EVERY 8 HOURS
Refills: 0 | Status: COMPLETED | OUTPATIENT
Start: 2019-12-06 | End: 2019-12-11

## 2019-12-06 RX ADMIN — Medication 650 MILLIGRAM(S): at 07:46

## 2019-12-06 RX ADMIN — MEROPENEM 100 MILLIGRAM(S): 1 INJECTION INTRAVENOUS at 22:12

## 2019-12-06 RX ADMIN — Medication 650 MILLIGRAM(S): at 08:13

## 2019-12-06 RX ADMIN — ATORVASTATIN CALCIUM 20 MILLIGRAM(S): 80 TABLET, FILM COATED ORAL at 22:12

## 2019-12-06 RX ADMIN — Medication 600 MILLIGRAM(S): at 10:06

## 2019-12-06 RX ADMIN — FAMOTIDINE 40 MILLIGRAM(S): 10 INJECTION INTRAVENOUS at 22:12

## 2019-12-06 RX ADMIN — Medication 250 MILLIGRAM(S): at 10:06

## 2019-12-06 RX ADMIN — Medication 40 MILLIGRAM(S): at 06:27

## 2019-12-06 RX ADMIN — Medication 40 MILLIGRAM(S): at 17:49

## 2019-12-06 RX ADMIN — TAMSULOSIN HYDROCHLORIDE 0.4 MILLIGRAM(S): 0.4 CAPSULE ORAL at 22:13

## 2019-12-06 RX ADMIN — Medication 8 UNIT(S): at 22:43

## 2019-12-06 RX ADMIN — Medication 5 MILLIGRAM(S): at 02:48

## 2019-12-06 RX ADMIN — Medication 650 MILLIGRAM(S): at 02:43

## 2019-12-06 RX ADMIN — ENOXAPARIN SODIUM 40 MILLIGRAM(S): 100 INJECTION SUBCUTANEOUS at 11:36

## 2019-12-06 RX ADMIN — Medication 50 GRAM(S): at 10:08

## 2019-12-06 RX ADMIN — Medication 250 MILLIGRAM(S): at 23:39

## 2019-12-06 RX ADMIN — SODIUM CHLORIDE 500 MILLILITER(S): 9 INJECTION INTRAMUSCULAR; INTRAVENOUS; SUBCUTANEOUS at 02:36

## 2019-12-06 RX ADMIN — Medication 40 MILLIEQUIVALENT(S): at 10:08

## 2019-12-06 RX ADMIN — Medication 40 MILLIGRAM(S): at 10:08

## 2019-12-06 RX ADMIN — SACUBITRIL AND VALSARTAN 1 TABLET(S): 24; 26 TABLET, FILM COATED ORAL at 06:24

## 2019-12-06 RX ADMIN — SACUBITRIL AND VALSARTAN 1 TABLET(S): 24; 26 TABLET, FILM COATED ORAL at 17:50

## 2019-12-06 RX ADMIN — MEROPENEM 100 MILLIGRAM(S): 1 INJECTION INTRAVENOUS at 14:04

## 2019-12-06 RX ADMIN — Medication 81 MILLIGRAM(S): at 11:35

## 2019-12-06 RX ADMIN — Medication 200 MILLIGRAM(S): at 06:26

## 2019-12-06 RX ADMIN — Medication 250 MILLIGRAM(S): at 06:24

## 2019-12-06 RX ADMIN — Medication 0.12 MILLIGRAM(S): at 06:25

## 2019-12-06 RX ADMIN — Medication 250 MILLIGRAM(S): at 06:26

## 2019-12-06 RX ADMIN — SPIRONOLACTONE 25 MILLIGRAM(S): 25 TABLET, FILM COATED ORAL at 06:25

## 2019-12-06 NOTE — PROGRESS NOTE ADULT - SUBJECTIVE AND OBJECTIVE BOX
Patient was seen and examined. Spoke with RN. Chart reviewed.  No events overnight.  Vital Signs Last 24 Hrs  T(F): 97.7 (06 Dec 2019 09:38), Max: 102.3 (05 Dec 2019 16:21)  HR: 107 (06 Dec 2019 10:11) (107 - 168)  BP: 132/78 (06 Dec 2019 05:40) (111/65 - 187/105)  SpO2: 98% (06 Dec 2019 10:11) (93% - 100%)  MEDICATIONS  (STANDING):  aspirin  chewable 81 milliGRAM(s) Oral daily  atorvastatin 20 milliGRAM(s) Oral at bedtime  digoxin     Tablet 0.125 milliGRAM(s) Oral daily  enoxaparin Injectable 40 milliGRAM(s) SubCutaneous daily  famotidine    Tablet 40 milliGRAM(s) Oral at bedtime  furosemide   Injectable 40 milliGRAM(s) IV Push every 12 hours  meropenem  IVPB 1000 milliGRAM(s) IV Intermittent every 8 hours  metoprolol succinate  milliGRAM(s) Oral daily  sacubitril 24 mG/valsartan 26 mG 1 Tablet(s) Oral two times a day  spironolactone 25 milliGRAM(s) Oral daily  tamsulosin 0.4 milliGRAM(s) Oral at bedtime  vancomycin  IVPB 1000 milliGRAM(s) IV Intermittent every 12 hours    MEDICATIONS  (PRN):  acetaminophen   Tablet .. 650 milliGRAM(s) Oral every 6 hours PRN Temp greater or equal to 38C (100.4F)  acetaminophen  Suppository .. 650 milliGRAM(s) Rectal every 6 hours PRN Temp greater or equal to 38C (100.4F)    Labs:                        6.9    2.25  )-----------( 46       ( 06 Dec 2019 05:30 )             22.5                         6.5    2.31  )-----------( 47       ( 06 Dec 2019 00:49 )             21.9     06 Dec 2019 05:30    140    |  101    |  27     ----------------------------<  297    3.4     |  23     |  1.0    05 Dec 2019 11:07    140    |  101    |  21     ----------------------------<  168    3.6     |  23     |  1.0      Ca    7.9        06 Dec 2019 05:30  Ca    8.1        05 Dec 2019 11:07  Mg     1.4       06 Dec 2019 05:30  Mg     1.4       05 Dec 2019 17:24    TPro  6.0    /  Alb  2.8    /  TBili  1.0    /  DBili  x      /  AST  14     /  ALT  21     /  AlkPhos  62     06 Dec 2019 05:30  TPro  7.1    /  Alb  3.3    /  TBili  1.1    /  DBili  x      /  AST  19     /  ALT  30     /  AlkPhos  66     04 Dec 2019 15:20    PT/INR - ( 04 Dec 2019 15:20 )   PT: 13.70 sec;   INR: 1.19 ratio         PTT - ( 04 Dec 2019 15:20 )  PTT:30.6 sec  Urinalysis Basic - ( 04 Dec 2019 20:40 )    Color: Light Yellow / Appearance: Clear / S.014 / pH: x  Gluc: x / Ketone: Negative  / Bili: Negative / Urobili: <2 mg/dL   Blood: x / Protein: 30 mg/dL / Nitrite: Negative   Leuk Esterase: Negative / RBC: 3 /HPF / WBC 1 /HPF   Sq Epi: x / Non Sq Epi: 0 /HPF / Bacteria: Negative        Culture - Urine (collected 04 Dec 2019 20:40)  Source: .Urine Clean Catch (Midstream)  Final Report (05 Dec 2019 23:21):    No growth    Culture - Blood (collected 04 Dec 2019 15:20)  Source: .Blood Blood-Peripheral  Preliminary Report (05 Dec 2019 23:01):    No growth to date.    Culture - Blood (collected 04 Dec 2019 15:20)  Source: .Blood Blood-Peripheral  Preliminary Report (05 Dec 2019 23:01):    No growth to date.      General: comfortable, pale, NAD  Neurology: A&Ox2, nonfocal  Head:  Normocephalic, atraumatic  ENT:  Mucosa moist, no ulcerations  Neck:  Supple, no JVD,   Skin: left hand erythematous bump with scabbing, right hand large erythematous patch    Resp:  B/L maxx   CV: RRR, S1S2,   GI: Soft, NT, bowel sounds  MS: No edema, + peripheral pulses, FROM all 4 extremity      A/P:  74 year old male with CAD s/p stent, non-obstructive cardiomyopathy with EF 26%, HTN, HLD, DM II, Afib, MDS presents for fever and worsening swelling in his right hand      Sepsis 2/2 cellulitis.     Possible underlying abscess in right hand/forearm    MDS, anemia     HFrEF with fluid overload     continue abx  ID eval   Heme/onc eval   f/u bcx   burn service eval   Dr. Santoro f/u (as per family)   f/u TTE  Cardio eval (Dr. Castro)   Lasix   BiPAP  monitor hemodynamic status if worsening ICU   monitor electrolytes replete as needed   monitor Hg closely, transfuse if no contraindications   glycemic control   d/w resident physician    FULL CODE  DVT prophylaxis  Decubitus prevention- all measures as per RN protocol  Please call or text me with any questions or updates

## 2019-12-06 NOTE — PROGRESS NOTE ADULT - ASSESSMENT
74 year old gentleman knwon to have CAD s/p stent, non-obstructive cardiomyopathy with EF 26%, hypertension, hyperlipidemia, DM II, Afib, MDS presents for fever and worsening swelling in his right hand + fevers.     # Sepsis secondary to cellulitis. Possible underlying abscess in right hand/forearm  - BP is stable but patient still in sinus tachycardia and having fevers  - Continue IV Vancomycin to cover for MRSA; check trough prior to 4th dose  - Pending Blood cultures  - Burn team evaluation    # HFrEF  - Patient just discharged after CHF exacerbation  - Received 2.5 L of iV boluses in ED and is on 100 cc/hr LR  - Currently patient is euvolemic  - Will hold IV hydration and Lasix  - Close monitoring for signs of volume overload and resume Lasix accordingly  - Continue Entresto BID and Spironolactone  - Continue BIPAP at night and  as needed    #Hypokalemia  - Secondary to diuresis  - Replete as needed    # MDS/pancytopenia  - Hb today 6.5  - repeat CBC and transfuse if Hb<7    # Paroxysmal Afib not on AC:  - Currently NSR  - C/w Metoprolol and digoxin    # DMII  - Hold oral antidiabetics  - Monitor FS qac/hs and start insulin accordingly    # BPH  - c/w flomax 0.4 mg qd    # DVT ppx: Lovenox 40 mg qd  # Dispo: From [facility] living 74 year old gentleman knwon to have CAD s/p stent, non-obstructive cardiomyopathy with EF 26%, hypertension, hyperlipidemia, DM II, Afib, MDS presents for fever and worsening swelling in his right hand + fevers.     # Sepsis secondary to cellulitis. Possible underlying abscess in right hand/forearm  - BP is stable but patient still in sinus tachycardia and having fevers  - Continue IV Vancomycin to cover for MRSA; check trough prior to 4th dose  - Pending Blood cultures  - Burn team evaluation  - f/u ID consult  - f/u TTE    # HFrEF  - Patient just discharged after CHF exacerbation  - Received 2.5 L of iV boluses in ED, now fluid overloaded on exam with worsening CXR this morning (bilateral effusions and infiltrates).   - Started IV Lasix 40 mg BID  - Continue Entresto BID and Spironolactone  - Continue BIPAP at night and PRN    # Hypokalemia - repleted on 12/6  - Secondary to diuresis  - Replete as needed    # MDS/pancytopenia  - Hb today 6.9  - will transfuse today once patient becomes afebrile    # Paroxysmal Afib not on AC  - Currently NSR  - C/w Metoprolol and digoxin    # DMII  - Hold oral antidiabetics  - Monitor FS qac/hs and start insulin accordingly    # BPH  - c/w flomax 0.4 mg qd    # DVT ppx: Lovenox 40 mg qd  # Dispo: From [facility] assisted living  FULL CODE

## 2019-12-06 NOTE — CONSULT NOTE ADULT - SUBJECTIVE AND OBJECTIVE BOX
SHANE ASHTON  74y, Male  Allergy: No Known Allergies    CHIEF COMPLAINT: Fevers and worsening hand erythema (06 Dec 2019 11:49)    HPI:  74 year old gentleman knwon to have CAD s/p stent, non-obstructive cardiomyopathy with EF 26%, hypertension, hyperlipidemia, DM II, Afib, MDS presents for fever and worsening swelling in his right hand.    Patient was recently admitted for CHF exacerbation and was discharged on .  Since before discharge, family say he had swelling and redness in both hands. Since then, swelling in right hand has been getting worse. Since 2 days before presentation, patient started having fevers. He started PO clindamycin with no improvement of symptoms.  Otherwise, ROS is negative. No SOB, cough, or chest pain.  In ED, patient was found to be septic with fever and tachycardia. He was initially put under observation. Admitted today for persistent tachycardia and non improving symptoms. (05 Dec 2019 10:02)    FAMILY HISTORY:  FH: hypertension    PAST MEDICAL & SURGICAL HISTORY:  MDS (myelodysplastic syndrome)  Afib  GERD (gastroesophageal reflux disease)  CAD (coronary artery disease)  Diabetes  HTN (hypertension)  H/O colonoscopy: 10 yeras ago    SOCIAL HISTORY    Substance Use (  ) never used  (  ) IVDU (  ) Other:  Tobacco Usage:  (   ) never smoked   (   ) former smoker   (   ) current smoker   Alcohol Usage: (   ) social  (   ) daily use (   ) denies  Sexual History:     ROS  General: Denies rigors, nightsweats  HEENT: Denies headache, rhinorrhea, sore throat, eye pain  CV: Denies CP, palpitations  PULM: Denies wheezing, hemoptysis  GI: Denies hematemesis, hematochezia, melena  : Denies discharge, hematuria  MSK: Denies arthralgias, myalgias  SKIN: Denies rash, lesions  NEURO: Denies paresthesias, weakness  PSYCH: Denies depression, anxiety    VITALS:  T(F): 97.7, Max: 102.3 (19 @ 16:21)  HR: 107  BP: 132/78  RR: 20Vital Signs Last 24 Hrs  T(C): 36.5 (06 Dec 2019 09:38), Max: 39.1 (05 Dec 2019 16:21)  T(F): 97.7 (06 Dec 2019 09:38), Max: 102.3 (05 Dec 2019 16:21)  HR: 107 (06 Dec 2019 10:11) (107 - 168)  BP: 132/78 (06 Dec 2019 05:40) (111/65 - 187/105)  BP(mean): --  RR: 20 (06 Dec 2019 10:11) (19 - 24)  SpO2: 98% (06 Dec 2019 10:11) (93% - 100%)    PHYSICAL EXAM:  Gen: NAD, resting in bed  HEENT: Normocephalic, atraumatic  Neck: supple, no lymphadenopathy  CV: Regular rate & regular rhythm  Lungs: decreased BS at bases  Abdomen: Soft, BS present  Ext: Warm, well perfused  Neuro: non focal, awake  Skin: Abscess on Both Dorsal Aspect of Hands     TESTS & MEASUREMENTS:                        6.9    2.25  )-----------( 46       ( 06 Dec 2019 05:30 )             22.5         140  |  101  |  27<H>  ----------------------------<  297<H>  3.4<L>   |  23  |  1.0    Ca    7.9<L>      06 Dec 2019 05:30  Mg     1.4         TPro  6.0  /  Alb  2.8<L>  /  TBili  1.0  /  DBili  x   /  AST  14  /  ALT  21  /  AlkPhos  62      eGFR if Non African American: 73 mL/min/1.73M2 (19 @ 05:30)  eGFR if African American: 84 mL/min/1.73M2 (19 @ 05:30)    LIVER FUNCTIONS - ( 06 Dec 2019 05:30 )  Alb: 2.8 g/dL / Pro: 6.0 g/dL / ALK PHOS: 62 U/L / ALT: 21 U/L / AST: 14 U/L / GGT: x           Urinalysis Basic - ( 04 Dec 2019 20:40 )    Color: Light Yellow / Appearance: Clear / S.014 / pH: x  Gluc: x / Ketone: Negative  / Bili: Negative / Urobili: <2 mg/dL   Blood: x / Protein: 30 mg/dL / Nitrite: Negative   Leuk Esterase: Negative / RBC: 3 /HPF / WBC 1 /HPF   Sq Epi: x / Non Sq Epi: 0 /HPF / Bacteria: Negative    Culture - Urine (collected 19 @ 20:40)  Source: .Urine Clean Catch (Midstream)  Final Report (19 @ 23:21):    No growth    Culture - Blood (collected 19 @ 15:20)  Source: .Blood Blood-Peripheral  Preliminary Report (19 @ 23:01):    No growth to date.    Culture - Blood (collected 19 @ 15:20)  Source: .Blood Blood-Peripheral  Preliminary Report (19 @ 23:01):    No growth to date.        Lactate, Blood: 2.4 mmol/L (19 @ 16:50)  Blood Gas Venous - Lactate: 2.7 mmoL/L (19 @ 14:29)      INFECTIOUS DISEASES TESTING      RADIOLOGY & ADDITIONAL TESTS:  I have personally reviewed the last Chest xray  CXR      CT      CARDIOLOGY TESTING  12 Lead ECG:   Ventricular Rate 151 BPM    Atrial Rate 147 BPM    P-R Interval 120 ms    QRS Duration 80 ms    Q-T Interval 270 ms    QTC Calculation(Bezet) 427 ms    P Axis 32 degrees    R Axis -3 degrees    T Axis 84 degrees    Diagnosis Line Sinus tachycardia  Nonspecific ST and T wave abnormality  Abnormal ECG    Confirmed by Rudy Menezes ([id number]) on 2019 5:46:27 AM (19 @ 02:30)  12 Lead ECG:   Ventricular Rate 126 BPM    Atrial Rate 126 BPM    P-R Interval 140 ms    QRS Duration 78 ms    Q-T Interval 306 ms    QTC Calculation(Bezet) 443 ms    P Axis 55 degrees    R Axis 11 degrees    T Axis 50 degrees    Diagnosis Line Sinus tachycardia  Nonspecific ST and T wave abnormality  Abnormal ECG    Confirmed by Rudy Menezes ([id number]) on 2019 9:16:55 PM (19 @ 17:06)    All available historical records have been reviewed    MEDICATIONS  aspirin  chewable 81  atorvastatin 20  digoxin     Tablet 0.125  enoxaparin Injectable 40  famotidine    Tablet 40  furosemide   Injectable 40  meropenem  IVPB 1000  metoprolol succinate   sacubitril 24 mG/valsartan 26 mG 1  spironolactone 25  tamsulosin 0.4  vancomycin  IVPB 1000    ANTIBIOTICS:  meropenem  IVPB 1000 milliGRAM(s) IV Intermittent every 8 hours  vancomycin  IVPB 1000 milliGRAM(s) IV Intermittent every 12 hours    All available historical data has been reviewed    ASSESSMENT  74y Male with a PMH of CAD, s/p Stents, Non-Obstructive Cardiomyopathy w/ EF 26%, HTN, Hyperlipidemia, DM, MDS     IMPRESSION  # Frebile and Tachycardiac on Admission    # Abscess w/ Cellulitic Right Hand, Eschar w/ Abscess on Left Hand    RECOMMENDATIONS  - Recommend; Abscess on Both Hands to be drained w/ washout   - F/u w/ WoundCx from Drained Abscess   - Continue w/ Vancomycin 1g q12 and Meropenem 1g q8       This is a pended note. All final recommendations to follow pending discussion with ID Attending SHANE ASHTON  74y, Male  Allergy: No Known Allergies    CHIEF COMPLAINT: Fevers and worsening hand erythema (06 Dec 2019 11:49)    HPI:  74 year old gentleman knwon to have CAD s/p stent, non-obstructive cardiomyopathy with EF 26%, hypertension, hyperlipidemia, DM II, Afib, MDS presents for fever and worsening swelling in his right hand.    Patient was recently admitted for CHF exacerbation and was discharged on .  Since before discharge, family say he had swelling and redness in both hands. Since then, swelling in right hand has been getting worse. Since 2 days before presentation, patient started having fevers. He started PO clindamycin with no improvement of symptoms.  Otherwise, ROS is negative. No SOB, cough, or chest pain.  In ED, patient was found to be septic with fever and tachycardia. He was initially put under observation. Admitted today for persistent tachycardia and non improving symptoms. (05 Dec 2019 10:02)    FAMILY HISTORY:  FH: hypertension    PAST MEDICAL & SURGICAL HISTORY:  MDS (myelodysplastic syndrome)  Afib  GERD (gastroesophageal reflux disease)  CAD (coronary artery disease)  Diabetes  HTN (hypertension)  H/O colonoscopy: 10 yeras ago    SOCIAL HISTORY    Substance Use (  ) never used  (  ) IVDU (  ) Other:  Tobacco Usage:  (   ) never smoked   (   ) former smoker   (   ) current smoker   Alcohol Usage: (   ) social  (   ) daily use (   ) denies  Sexual History:     ROS  General: Denies rigors, nightsweats  HEENT: Denies headache, rhinorrhea, sore throat, eye pain  CV: Denies CP, palpitations  PULM: Denies wheezing, hemoptysis  GI: Denies hematemesis, hematochezia, melena  : Denies discharge, hematuria  MSK: Denies arthralgias, myalgias  SKIN: Denies rash, lesions  NEURO: Denies paresthesias, weakness  PSYCH: Denies depression, anxiety    VITALS:  T(F): 97.7, Max: 102.3 (19 @ 16:21)  HR: 107  BP: 132/78  RR: 20Vital Signs Last 24 Hrs  T(C): 36.5 (06 Dec 2019 09:38), Max: 39.1 (05 Dec 2019 16:21)  T(F): 97.7 (06 Dec 2019 09:38), Max: 102.3 (05 Dec 2019 16:21)  HR: 107 (06 Dec 2019 10:11) (107 - 168)  BP: 132/78 (06 Dec 2019 05:40) (111/65 - 187/105)  BP(mean): --  RR: 20 (06 Dec 2019 10:11) (19 - 24)  SpO2: 98% (06 Dec 2019 10:11) (93% - 100%)    PHYSICAL EXAM:  Gen: NAD, resting in bed  HEENT: Normocephalic, atraumatic  Neck: supple, no lymphadenopathy  CV: Regular rate & regular rhythm  Lungs: decreased BS at bases  Abdomen: Soft, BS present  Ext: Warm, well perfused  Neuro: non focal, awake  Skin: Abscess on Both Dorsal Aspect of Hands     TESTS & MEASUREMENTS:                        6.9    2.25  )-----------( 46       ( 06 Dec 2019 05:30 )             22.5         140  |  101  |  27<H>  ----------------------------<  297<H>  3.4<L>   |  23  |  1.0    Ca    7.9<L>      06 Dec 2019 05:30  Mg     1.4         TPro  6.0  /  Alb  2.8<L>  /  TBili  1.0  /  DBili  x   /  AST  14  /  ALT  21  /  AlkPhos  62      eGFR if Non African American: 73 mL/min/1.73M2 (19 @ 05:30)  eGFR if African American: 84 mL/min/1.73M2 (19 @ 05:30)    LIVER FUNCTIONS - ( 06 Dec 2019 05:30 )  Alb: 2.8 g/dL / Pro: 6.0 g/dL / ALK PHOS: 62 U/L / ALT: 21 U/L / AST: 14 U/L / GGT: x           Urinalysis Basic - ( 04 Dec 2019 20:40 )    Color: Light Yellow / Appearance: Clear / S.014 / pH: x  Gluc: x / Ketone: Negative  / Bili: Negative / Urobili: <2 mg/dL   Blood: x / Protein: 30 mg/dL / Nitrite: Negative   Leuk Esterase: Negative / RBC: 3 /HPF / WBC 1 /HPF   Sq Epi: x / Non Sq Epi: 0 /HPF / Bacteria: Negative    Culture - Urine (collected 19 @ 20:40)  Source: .Urine Clean Catch (Midstream)  Final Report (19 @ 23:21):    No growth    Culture - Blood (collected 19 @ 15:20)  Source: .Blood Blood-Peripheral  Preliminary Report (19 @ 23:01):    No growth to date.    Culture - Blood (collected 19 @ 15:20)  Source: .Blood Blood-Peripheral  Preliminary Report (19 @ 23:01):    No growth to date.        Lactate, Blood: 2.4 mmol/L (19 @ 16:50)  Blood Gas Venous - Lactate: 2.7 mmoL/L (19 @ 14:29)      INFECTIOUS DISEASES TESTING      RADIOLOGY & ADDITIONAL TESTS:  I have personally reviewed the last Chest xray  CXR      CT      CARDIOLOGY TESTING  12 Lead ECG:   Ventricular Rate 151 BPM    Atrial Rate 147 BPM    P-R Interval 120 ms    QRS Duration 80 ms    Q-T Interval 270 ms    QTC Calculation(Bezet) 427 ms    P Axis 32 degrees    R Axis -3 degrees    T Axis 84 degrees    Diagnosis Line Sinus tachycardia  Nonspecific ST and T wave abnormality  Abnormal ECG    Confirmed by Rudy Menezes ([id number]) on 2019 5:46:27 AM (19 @ 02:30)  12 Lead ECG:   Ventricular Rate 126 BPM    Atrial Rate 126 BPM    P-R Interval 140 ms    QRS Duration 78 ms    Q-T Interval 306 ms    QTC Calculation(Bezet) 443 ms    P Axis 55 degrees    R Axis 11 degrees    T Axis 50 degrees    Diagnosis Line Sinus tachycardia  Nonspecific ST and T wave abnormality  Abnormal ECG    Confirmed by Rudy Menezes ([id number]) on 2019 9:16:55 PM (19 @ 17:06)    All available historical records have been reviewed    MEDICATIONS  aspirin  chewable 81  atorvastatin 20  digoxin     Tablet 0.125  enoxaparin Injectable 40  famotidine    Tablet 40  furosemide   Injectable 40  meropenem  IVPB 1000  metoprolol succinate   sacubitril 24 mG/valsartan 26 mG 1  spironolactone 25  tamsulosin 0.4  vancomycin  IVPB 1000    ANTIBIOTICS:  meropenem  IVPB 1000 milliGRAM(s) IV Intermittent every 8 hours  vancomycin  IVPB 1000 milliGRAM(s) IV Intermittent every 12 hours    All available historical data has been reviewed    ASSESSMENT  74y Male with a PMH of CAD, s/p Stents, Non-Obstructive Cardiomyopathy w/ EF 26%, HTN, Hyperlipidemia, DM, MDS     IMPRESSION  # Sepsis secondary to abscess / cellulitis right hand dorsum.   -no fascitis/tenosynovitis    # Abscess without cellulitis left hand dorsum with necrotic tissue   -no evidence of ecthyma  -no fungal infection    RECOMMENDATIONS  - debridement of both abscesses with bacterial and fungal cultures  - Vancomycin 1g q12h  -Meropenem 1g iv q8h

## 2019-12-06 NOTE — ED ADULT NURSE REASSESSMENT NOTE - NS ED NURSE REASSESS COMMENT FT1
[id number] contacted regarding blood transfusion for fever 100.2 rectally and a heart rate of 116. As per MD @[id number] , will administer Naproxena nd and reduce fever and to administer blood transfusion

## 2019-12-06 NOTE — PROGRESS NOTE ADULT - SUBJECTIVE AND OBJECTIVE BOX
SUBJECTIVE:    Patient is a 74y old  Male who presents with a chief complaint of fevers and worsening right hand erythema.    Currently admitted to medicine with the primary diagnosis of sepsis secondary to cellulitis and possible abscess of right hand.      Today is hospital day 1. Patient was seen and examined at bedside with daughter. This morning he is resting in bed on BIPAP. AM CXR showed worsening bilateral opacities and effusions. Febrile overnight to 102.3.     PAST MEDICAL & SURGICAL HISTORY  PAST MEDICAL & SURGICAL HISTORY:  MDS (myelodysplastic syndrome)  Afib  GERD (gastroesophageal reflux disease)  CAD (coronary artery disease)  Diabetes  HTN (hypertension)  H/O colonoscopy: 10 yeras ago    ALLERGIES:  No Known Allergies    MEDICATIONS:  STANDING MEDICATIONS  aspirin  chewable 81 milliGRAM(s) Oral daily  atorvastatin 20 milliGRAM(s) Oral at bedtime  digoxin     Tablet 0.125 milliGRAM(s) Oral daily  enoxaparin Injectable 40 milliGRAM(s) SubCutaneous daily  famotidine    Tablet 40 milliGRAM(s) Oral at bedtime  furosemide   Injectable 40 milliGRAM(s) IV Push every 12 hours  meropenem  IVPB 1000 milliGRAM(s) IV Intermittent every 8 hours  metoprolol succinate  milliGRAM(s) Oral daily  sacubitril 24 mG/valsartan 26 mG 1 Tablet(s) Oral two times a day  spironolactone 25 milliGRAM(s) Oral daily  tamsulosin 0.4 milliGRAM(s) Oral at bedtime  vancomycin  IVPB 1000 milliGRAM(s) IV Intermittent every 12 hours    PRN MEDICATIONS  acetaminophen   Tablet .. 650 milliGRAM(s) Oral every 6 hours PRN  acetaminophen  Suppository .. 650 milliGRAM(s) Rectal every 6 hours PRN    VITALS:   T(F): 97.7  HR: 107  BP: 132/78  RR: 20  SpO2: 98%    LABS:                        6.9    2.25  )-----------( 46       ( 06 Dec 2019 05:30 )             22.5     12-06    140  |  101  |  27<H>  ----------------------------<  297<H>  3.4<L>   |  23  |  1.0    Ca    7.9<L>      06 Dec 2019 05:30  Mg     1.4     12-06    TPro  6.0  /  Alb  2.8<L>  /  TBili  1.0  /  DBili  x   /  AST  14  /  ALT  21  /  AlkPhos  62  12-06    PT/INR - ( 04 Dec 2019 15:20 )   PT: 13.70 sec;   INR: 1.19 ratio         PTT - ( 04 Dec 2019 15:20 )  PTT:30.6 sec  Urinalysis Basic - ( 04 Dec 2019 20:40 )    Color: Light Yellow / Appearance: Clear / S.014 / pH: x  Gluc: x / Ketone: Negative  / Bili: Negative / Urobili: <2 mg/dL   Blood: x / Protein: 30 mg/dL / Nitrite: Negative   Leuk Esterase: Negative / RBC: 3 /HPF / WBC 1 /HPF   Sq Epi: x / Non Sq Epi: 0 /HPF / Bacteria: Negative        Troponin T, Serum: 0.06 ng/mL <HH> (19 @ 17:24)      Culture - Urine (collected 04 Dec 2019 20:40)  Source: .Urine Clean Catch (Midstream)  Final Report (05 Dec 2019 23:21):    No growth    Culture - Blood (collected 04 Dec 2019 15:20)  Source: .Blood Blood-Peripheral  Preliminary Report (05 Dec 2019 23:01):    No growth to date.    Culture - Blood (collected 04 Dec 2019 15:20)  Source: .Blood Blood-Peripheral  Preliminary Report (05 Dec 2019 23:01):    No growth to date.      CARDIAC MARKERS ( 05 Dec 2019 17:24 )  x     / 0.06 ng/mL / x     / x     / x      CARDIAC MARKERS ( 05 Dec 2019 07:30 )  x     / 0.05 ng/mL / x     / x     / x          RADIOLOGY:  < from: Xray Chest 1 View- PORTABLE-Routine (19 @ 05:10) >    Impression:      Worsening bilateral opacities and effusions.          < end of copied text >          PHYSICAL EXAM:  GENERAL: Elderly M in NAD, on bipap.   HEAD: Atraumatic  NECK: Supple  CHEST/LUNG: air entry bilaterally; bilateral rhonchi.   HEART: S1, S2; RRR; No murmurs, rubs, or gallops  ABDOMEN: BS+; Soft, Non-tender, Non-distended  EXTREMITIES:  2+ Peripheral Pulses. erythematous patch over right hand with mild streaking. left hand has 1 cm X 1 cm erythematous bump with scabbing.   PSYCH: AAOx3  NEUROLOGY: non-focal SUBJECTIVE:    Patient is a 73 y/o Male who presents with a chief complaint of fevers and worsening right hand erythema.    Currently admitted to medicine with the primary diagnosis of sepsis secondary to cellulitis and possible abscess of right hand.      Today is hospital day 1. Patient was seen and examined at bedside with daughter. This morning he is resting in bed on BIPAP. AM CXR showed worsening bilateral opacities and effusions. Febrile overnight to 102.3.     PAST MEDICAL & SURGICAL HISTORY  PAST MEDICAL & SURGICAL HISTORY:  MDS (myelodysplastic syndrome)  Afib  GERD (gastroesophageal reflux disease)  CAD (coronary artery disease)  Diabetes  HTN (hypertension)  H/O colonoscopy: 10 yeras ago    ALLERGIES:  No Known Allergies    MEDICATIONS:  STANDING MEDICATIONS  aspirin  chewable 81 milliGRAM(s) Oral daily  atorvastatin 20 milliGRAM(s) Oral at bedtime  digoxin     Tablet 0.125 milliGRAM(s) Oral daily  enoxaparin Injectable 40 milliGRAM(s) SubCutaneous daily  famotidine    Tablet 40 milliGRAM(s) Oral at bedtime  furosemide   Injectable 40 milliGRAM(s) IV Push every 12 hours  meropenem  IVPB 1000 milliGRAM(s) IV Intermittent every 8 hours  metoprolol succinate  milliGRAM(s) Oral daily  sacubitril 24 mG/valsartan 26 mG 1 Tablet(s) Oral two times a day  spironolactone 25 milliGRAM(s) Oral daily  tamsulosin 0.4 milliGRAM(s) Oral at bedtime  vancomycin  IVPB 1000 milliGRAM(s) IV Intermittent every 12 hours    PRN MEDICATIONS  acetaminophen   Tablet .. 650 milliGRAM(s) Oral every 6 hours PRN  acetaminophen  Suppository .. 650 milliGRAM(s) Rectal every 6 hours PRN    VITALS:   T(F): 97.7  HR: 107  BP: 132/78  RR: 20  SpO2: 98%    LABS:                        6.9    2.25  )-----------( 46       ( 06 Dec 2019 05:30 )             22.5     12-06    140  |  101  |  27<H>  ----------------------------<  297<H>  3.4<L>   |  23  |  1.0    Ca    7.9<L>      06 Dec 2019 05:30  Mg     1.4     12-06    TPro  6.0  /  Alb  2.8<L>  /  TBili  1.0  /  DBili  x   /  AST  14  /  ALT  21  /  AlkPhos  62  12-    PT/INR - ( 04 Dec 2019 15:20 )   PT: 13.70 sec;   INR: 1.19 ratio         PTT - ( 04 Dec 2019 15:20 )  PTT:30.6 sec  Urinalysis Basic - ( 04 Dec 2019 20:40 )    Color: Light Yellow / Appearance: Clear / S.014 / pH: x  Gluc: x / Ketone: Negative  / Bili: Negative / Urobili: <2 mg/dL   Blood: x / Protein: 30 mg/dL / Nitrite: Negative   Leuk Esterase: Negative / RBC: 3 /HPF / WBC 1 /HPF   Sq Epi: x / Non Sq Epi: 0 /HPF / Bacteria: Negative        Troponin T, Serum: 0.06 ng/mL <HH> (19 @ 17:24)      Culture - Urine (collected 04 Dec 2019 20:40)  Source: .Urine Clean Catch (Midstream)  Final Report (05 Dec 2019 23:21):    No growth    Culture - Blood (collected 04 Dec 2019 15:20)  Source: .Blood Blood-Peripheral  Preliminary Report (05 Dec 2019 23:01):    No growth to date.    Culture - Blood (collected 04 Dec 2019 15:20)  Source: .Blood Blood-Peripheral  Preliminary Report (05 Dec 2019 23:01):    No growth to date.      CARDIAC MARKERS ( 05 Dec 2019 17:24 )  x     / 0.06 ng/mL / x     / x     / x      CARDIAC MARKERS ( 05 Dec 2019 07:30 )  x     / 0.05 ng/mL / x     / x     / x          RADIOLOGY:  < from: Xray Chest 1 View- PORTABLE-Routine (19 @ 05:10) >    Impression:      Worsening bilateral opacities and effusions.          < end of copied text >          PHYSICAL EXAM:  GENERAL: Elderly M on bipap.   HEAD: Atraumatic  NECK: Supple  CHEST/LUNG: air entry bilaterally; bilateral rhonchi.   HEART: S1, S2; RRR; No murmurs, rubs, or gallops  ABDOMEN: BS+; Soft, Non-tender, Non-distended  EXTREMITIES:  2+ Peripheral Pulses. erythematous patch over right hand with mild streaking. left hand has 1 cm X 1 cm erythematous bump with scabbing.   PSYCH: AAOx3  NEUROLOGY: non-focal

## 2019-12-07 LAB
ALBUMIN SERPL ELPH-MCNC: 2.8 G/DL — LOW (ref 3.5–5.2)
ALP SERPL-CCNC: 71 U/L — SIGNIFICANT CHANGE UP (ref 30–115)
ALT FLD-CCNC: 20 U/L — SIGNIFICANT CHANGE UP (ref 0–41)
ANION GAP SERPL CALC-SCNC: 13 MMOL/L — SIGNIFICANT CHANGE UP (ref 7–14)
AST SERPL-CCNC: 17 U/L — SIGNIFICANT CHANGE UP (ref 0–41)
BASOPHILS # BLD AUTO: 0 K/UL — SIGNIFICANT CHANGE UP (ref 0–0.2)
BASOPHILS NFR BLD AUTO: 0 % — SIGNIFICANT CHANGE UP (ref 0–1)
BILIRUB SERPL-MCNC: 1.6 MG/DL — HIGH (ref 0.2–1.2)
BUN SERPL-MCNC: 24 MG/DL — HIGH (ref 10–20)
CALCIUM SERPL-MCNC: 8.2 MG/DL — LOW (ref 8.5–10.1)
CHLORIDE SERPL-SCNC: 99 MMOL/L — SIGNIFICANT CHANGE UP (ref 98–110)
CO2 SERPL-SCNC: 27 MMOL/L — SIGNIFICANT CHANGE UP (ref 17–32)
CREAT SERPL-MCNC: 1.1 MG/DL — SIGNIFICANT CHANGE UP (ref 0.7–1.5)
CULTURE RESULTS: NO GROWTH — SIGNIFICANT CHANGE UP
EOSINOPHIL # BLD AUTO: 0.02 K/UL — SIGNIFICANT CHANGE UP (ref 0–0.7)
EOSINOPHIL NFR BLD AUTO: 0.8 % — SIGNIFICANT CHANGE UP (ref 0–8)
GLUCOSE BLDC GLUCOMTR-MCNC: 155 MG/DL — HIGH (ref 70–99)
GLUCOSE BLDC GLUCOMTR-MCNC: 190 MG/DL — HIGH (ref 70–99)
GLUCOSE BLDC GLUCOMTR-MCNC: 192 MG/DL — HIGH (ref 70–99)
GLUCOSE BLDC GLUCOMTR-MCNC: 269 MG/DL — HIGH (ref 70–99)
GLUCOSE BLDC GLUCOMTR-MCNC: 280 MG/DL — HIGH (ref 70–99)
GLUCOSE SERPL-MCNC: 286 MG/DL — HIGH (ref 70–99)
HCT VFR BLD CALC: 27.5 % — LOW (ref 42–52)
HGB BLD-MCNC: 8.7 G/DL — LOW (ref 14–18)
IMM GRANULOCYTES NFR BLD AUTO: 1.7 % — HIGH (ref 0.1–0.3)
LYMPHOCYTES # BLD AUTO: 0.93 K/UL — LOW (ref 1.2–3.4)
LYMPHOCYTES # BLD AUTO: 38.8 % — SIGNIFICANT CHANGE UP (ref 20.5–51.1)
MAGNESIUM SERPL-MCNC: 1.7 MG/DL — LOW (ref 1.8–2.4)
MCHC RBC-ENTMCNC: 29 PG — SIGNIFICANT CHANGE UP (ref 27–31)
MCHC RBC-ENTMCNC: 31.6 G/DL — LOW (ref 32–37)
MCV RBC AUTO: 91.7 FL — SIGNIFICANT CHANGE UP (ref 80–94)
MONOCYTES # BLD AUTO: 0.45 K/UL — SIGNIFICANT CHANGE UP (ref 0.1–0.6)
MONOCYTES NFR BLD AUTO: 18.8 % — HIGH (ref 1.7–9.3)
NEUTROPHILS # BLD AUTO: 0.96 K/UL — LOW (ref 1.4–6.5)
NEUTROPHILS NFR BLD AUTO: 39.9 % — LOW (ref 42.2–75.2)
NRBC # BLD: 2 /100 WBCS — HIGH (ref 0–0)
NT-PROBNP SERPL-SCNC: HIGH PG/ML (ref 0–300)
PLATELET # BLD AUTO: 71 K/UL — LOW (ref 130–400)
POTASSIUM SERPL-MCNC: 3.1 MMOL/L — LOW (ref 3.5–5)
POTASSIUM SERPL-SCNC: 3.1 MMOL/L — LOW (ref 3.5–5)
PROT SERPL-MCNC: 6.7 G/DL — SIGNIFICANT CHANGE UP (ref 6–8)
RBC # BLD: 3 M/UL — LOW (ref 4.7–6.1)
RBC # FLD: 19.9 % — HIGH (ref 11.5–14.5)
SODIUM SERPL-SCNC: 139 MMOL/L — SIGNIFICANT CHANGE UP (ref 135–146)
SPECIMEN SOURCE: SIGNIFICANT CHANGE UP
TROPONIN T SERPL-MCNC: 0.08 NG/ML — CRITICAL HIGH
WBC # BLD: 2.4 K/UL — LOW (ref 4.8–10.8)
WBC # FLD AUTO: 2.4 K/UL — LOW (ref 4.8–10.8)

## 2019-12-07 PROCEDURE — 99232 SBSQ HOSP IP/OBS MODERATE 35: CPT

## 2019-12-07 PROCEDURE — 93306 TTE W/DOPPLER COMPLETE: CPT | Mod: 26

## 2019-12-07 RX ORDER — INSULIN GLARGINE 100 [IU]/ML
15 INJECTION, SOLUTION SUBCUTANEOUS AT BEDTIME
Refills: 0 | Status: DISCONTINUED | OUTPATIENT
Start: 2019-12-07 | End: 2019-12-12

## 2019-12-07 RX ORDER — POTASSIUM CHLORIDE 20 MEQ
20 PACKET (EA) ORAL
Refills: 0 | Status: DISCONTINUED | OUTPATIENT
Start: 2019-12-07 | End: 2019-12-07

## 2019-12-07 RX ORDER — DEXTROSE 50 % IN WATER 50 %
25 SYRINGE (ML) INTRAVENOUS ONCE
Refills: 0 | Status: DISCONTINUED | OUTPATIENT
Start: 2019-12-07 | End: 2019-12-12

## 2019-12-07 RX ORDER — INSULIN LISPRO 100/ML
7 VIAL (ML) SUBCUTANEOUS
Refills: 0 | Status: DISCONTINUED | OUTPATIENT
Start: 2019-12-07 | End: 2019-12-12

## 2019-12-07 RX ORDER — GLUCAGON INJECTION, SOLUTION 0.5 MG/.1ML
1 INJECTION, SOLUTION SUBCUTANEOUS ONCE
Refills: 0 | Status: DISCONTINUED | OUTPATIENT
Start: 2019-12-07 | End: 2019-12-12

## 2019-12-07 RX ORDER — DEXTROSE 50 % IN WATER 50 %
15 SYRINGE (ML) INTRAVENOUS ONCE
Refills: 0 | Status: DISCONTINUED | OUTPATIENT
Start: 2019-12-07 | End: 2019-12-12

## 2019-12-07 RX ORDER — POTASSIUM CHLORIDE 20 MEQ
40 PACKET (EA) ORAL ONCE
Refills: 0 | Status: COMPLETED | OUTPATIENT
Start: 2019-12-07 | End: 2019-12-07

## 2019-12-07 RX ORDER — SODIUM CHLORIDE 9 MG/ML
1000 INJECTION, SOLUTION INTRAVENOUS
Refills: 0 | Status: DISCONTINUED | OUTPATIENT
Start: 2019-12-07 | End: 2019-12-12

## 2019-12-07 RX ORDER — POTASSIUM CHLORIDE 20 MEQ
40 PACKET (EA) ORAL ONCE
Refills: 0 | Status: DISCONTINUED | OUTPATIENT
Start: 2019-12-07 | End: 2019-12-07

## 2019-12-07 RX ORDER — DEXTROSE 50 % IN WATER 50 %
12.5 SYRINGE (ML) INTRAVENOUS ONCE
Refills: 0 | Status: DISCONTINUED | OUTPATIENT
Start: 2019-12-07 | End: 2019-12-12

## 2019-12-07 RX ORDER — INSULIN LISPRO 100/ML
7 VIAL (ML) SUBCUTANEOUS ONCE
Refills: 0 | Status: COMPLETED | OUTPATIENT
Start: 2019-12-07 | End: 2019-12-07

## 2019-12-07 RX ADMIN — TAMSULOSIN HYDROCHLORIDE 0.4 MILLIGRAM(S): 0.4 CAPSULE ORAL at 21:54

## 2019-12-07 RX ADMIN — FAMOTIDINE 40 MILLIGRAM(S): 10 INJECTION INTRAVENOUS at 21:54

## 2019-12-07 RX ADMIN — Medication 7 UNIT(S): at 12:22

## 2019-12-07 RX ADMIN — Medication 7 UNIT(S): at 17:33

## 2019-12-07 RX ADMIN — MEROPENEM 100 MILLIGRAM(S): 1 INJECTION INTRAVENOUS at 14:43

## 2019-12-07 RX ADMIN — Medication 40 MILLIGRAM(S): at 06:19

## 2019-12-07 RX ADMIN — INSULIN GLARGINE 15 UNIT(S): 100 INJECTION, SOLUTION SUBCUTANEOUS at 21:59

## 2019-12-07 RX ADMIN — Medication 40 MILLIGRAM(S): at 17:34

## 2019-12-07 RX ADMIN — Medication 0.12 MILLIGRAM(S): at 06:21

## 2019-12-07 RX ADMIN — ATORVASTATIN CALCIUM 20 MILLIGRAM(S): 80 TABLET, FILM COATED ORAL at 21:54

## 2019-12-07 RX ADMIN — MEROPENEM 100 MILLIGRAM(S): 1 INJECTION INTRAVENOUS at 06:21

## 2019-12-07 RX ADMIN — Medication 40 MILLIEQUIVALENT(S): at 06:20

## 2019-12-07 RX ADMIN — Medication 250 MILLIGRAM(S): at 12:21

## 2019-12-07 RX ADMIN — Medication 40 MILLIEQUIVALENT(S): at 12:22

## 2019-12-07 RX ADMIN — ENOXAPARIN SODIUM 40 MILLIGRAM(S): 100 INJECTION SUBCUTANEOUS at 12:21

## 2019-12-07 RX ADMIN — Medication 81 MILLIGRAM(S): at 12:21

## 2019-12-07 RX ADMIN — SPIRONOLACTONE 25 MILLIGRAM(S): 25 TABLET, FILM COATED ORAL at 06:21

## 2019-12-07 RX ADMIN — Medication 7 UNIT(S): at 08:43

## 2019-12-07 RX ADMIN — SACUBITRIL AND VALSARTAN 1 TABLET(S): 24; 26 TABLET, FILM COATED ORAL at 17:34

## 2019-12-07 RX ADMIN — Medication 200 MILLIGRAM(S): at 06:22

## 2019-12-07 RX ADMIN — MEROPENEM 100 MILLIGRAM(S): 1 INJECTION INTRAVENOUS at 21:53

## 2019-12-07 RX ADMIN — SACUBITRIL AND VALSARTAN 1 TABLET(S): 24; 26 TABLET, FILM COATED ORAL at 06:21

## 2019-12-07 RX ADMIN — Medication 250 MILLIGRAM(S): at 22:27

## 2019-12-07 NOTE — CONSULT NOTE ADULT - SUBJECTIVE AND OBJECTIVE BOX
Patient is a 74y old  Male who presents with a chief complaint of hands swelling (07 Dec 2019 10:35)      HPI:  74 year old gentleman knwon to have CAD s/p stent, non-obstructive cardiomyopathy with EF 26%, hypertension, hyperlipidemia, DM II, Afib, MDS presents for fever and worsening swelling in his right hand.    Patient was recently admitted for CHF exacerbation and was discharged on .  Since before discharge, family say he had swelling and redness in both hands. Since then, swelling in right hand has been getting worse. Since 2 days before presentation, patient started having fevers. He started PO clindamycin with no improvement of symptoms.  Otherwise, ROS is negative. No SOB, cough, or chest pain.  In ED, patient was found to be septic with fever and tachycardia. He was initially put under observation. Admitted today for persistent tachycardia and non improving symptoms. (05 Dec 2019 10:02)         PAST MEDICAL & SURGICAL HISTORY:  MDS (myelodysplastic syndrome)  Afib  GERD (gastroesophageal reflux disease)  CAD (coronary artery disease)  Diabetes  HTN (hypertension)  H/O colonoscopy: 10 yeras ago    SOCIAL HISTORY: Quit smoking >30 years ago  No alcohol abuse/illicit drug use     FAMILY HISTORY:  FH: hypertension. Bladder Ca in mother in her 70S        Allergies    No Known Allergies    Height (cm): 177.8 (19 @ 15:19)  Weight (kg): 77 (19 @ 15:19)  BMI (kg/m2): 24.4 (19 @ 15:19)  BSA (m2): 1.95 (19 @ 15:19)      HOME MEDICATIONS:  atorvastatin 20 mg oral tablet: 1 tab(s) orally once a day (05 Dec 2019 10:12)  Entresto 24 mg-26 mg oral tablet: 1 tab(s) orally 2 times a day (05 Dec 2019 10:12)  famotidine 40 mg oral tablet: 1 tab(s) orally once a day (at bedtime) (05 Dec 2019 10:12)  glimepiride 4 mg oral tablet: 1 tab(s) orally once a day (05 Dec 2019 10:12)  metFORMIN 1000 mg oral tablet: 1 tab(s) orally 2 times a day (05 Dec 2019 10:12)  metoprolol succinate 200 mg oral tablet, extended release: 1 tab(s) orally once a day (05 Dec 2019 10:12)  tamsulosin 0.4 mg oral capsule: 1 cap(s) orally once a day (at bedtime) (05 Dec 2019 10:12)      Vital Signs Last 24 Hrs  T(C): 36 (07 Dec 2019 04:45), Max: 37.3 (06 Dec 2019 19:53)  T(F): 96.8 (07 Dec 2019 04:45), Max: 99.2 (06 Dec 2019 19:53)  HR: 101 (07 Dec 2019 04:45) (65 - 114)  BP: 112/55 (07 Dec 2019 04:45) (112/55 - 123/70)  BP(mean): --  RR: 20 (07 Dec 2019 04:45) (20 - 22)  SpO2: 95% (07 Dec 2019 07:57) (94% - 100%)    PHYSICAL EXAM  General: adult in NAD, looks very pale  HEENT: clear oropharynx  CV: normal S1/S2 with no murmur rubs or gallops  Lungs: BS decreased b/l bases  Abdomen: soft non-tender  Ext: b/l hand swelling with clear area of erythema and swelling on right hand with fluctuation   Neuro: alert and oriented X 4, no focal deficits      MEDICATIONS  (STANDING):  aspirin  chewable 81 milliGRAM(s) Oral daily  atorvastatin 20 milliGRAM(s) Oral at bedtime  dextrose 5%. 1000 milliLiter(s) (50 mL/Hr) IV Continuous <Continuous>  dextrose 50% Injectable 12.5 Gram(s) IV Push once  dextrose 50% Injectable 25 Gram(s) IV Push once  dextrose 50% Injectable 25 Gram(s) IV Push once  digoxin     Tablet 0.125 milliGRAM(s) Oral daily  enoxaparin Injectable 40 milliGRAM(s) SubCutaneous daily  famotidine    Tablet 40 milliGRAM(s) Oral at bedtime  furosemide   Injectable 40 milliGRAM(s) IV Push every 12 hours  insulin glargine Injectable (LANTUS) 15 Unit(s) SubCutaneous at bedtime  insulin lispro Injectable (HumaLOG) 7 Unit(s) SubCutaneous three times a day before meals  meropenem  IVPB 1000 milliGRAM(s) IV Intermittent every 8 hours  metoprolol succinate  milliGRAM(s) Oral daily  potassium chloride   Powder 40 milliEquivalent(s) Oral once  sacubitril 24 mG/valsartan 26 mG 1 Tablet(s) Oral two times a day  spironolactone 25 milliGRAM(s) Oral daily  tamsulosin 0.4 milliGRAM(s) Oral at bedtime  vancomycin  IVPB 1000 milliGRAM(s) IV Intermittent every 12 hours    MEDICATIONS  (PRN):  acetaminophen   Tablet .. 650 milliGRAM(s) Oral every 6 hours PRN Temp greater or equal to 38C (100.4F)  acetaminophen  Suppository .. 650 milliGRAM(s) Rectal every 6 hours PRN Temp greater or equal to 38C (100.4F)  dextrose 40% Gel 15 Gram(s) Oral once PRN Blood Glucose LESS THAN 70 milliGRAM(s)/deciliter  glucagon  Injectable 1 milliGRAM(s) IntraMuscular once PRN Glucose LESS THAN 70 milligrams/deciliter      LABS:                          8.7    2.40  )-----------( 71       ( 07 Dec 2019 09:43 )             27.5         Mean Cell Volume : 91.7 fL  Mean Cell Hemoglobin : 29.0 pg  Mean Cell Hemoglobin Concentration : 31.6 g/dL  Auto Neutrophil # : 0.96 K/uL  Auto Lymphocyte # : 0.93 K/uL  Auto Monocyte # : 0.45 K/uL  Auto Eosinophil # : 0.02 K/uL  Auto Basophil # : 0.00 K/uL  Auto Neutrophil % : 39.9 %  Auto Lymphocyte % : 38.8 %  Auto Monocyte % : 18.8 %  Auto Eosinophil % : 0.8 %  Auto Basophil % : 0.0 %      Serial CBC's   @ 09:43  Hct-27.5 / Hgb-8.7 / Plat-71 / RBC-3.00 / WBC-2.40  Serial CBC's   @ 21:20  Hct-26.1 / Hgb-8.1 / Plat-70 / RBC-2.83 / WBC-2.90  Serial CBC's   @ 05:30  Hct-22.5 / Hgb-6.9 / Plat-46 / RBC-2.36 / WBC-2.25  Serial CBC's   @ 00:49  Hct-21.9 / Hgb-6.5 / Plat-47 / RBC-2.28 / WBC-2.31  Serial CBC's   @ 20:54  Hct-23.9 / Hgb-7.2 / Plat-67 / RBC-2.50 / WBC-3.24  Serial CBC's   @ 17:24  Hct-22.6 / Hgb-6.9 / Plat-45 / RBC-2.40 / WBC-2.71  Serial CBC's   @ 11:07  Hct-22.9 / Hgb-6.8 / Plat-51 / RBC-2.40 / WBC-2.32  Serial CBC's   @ 05:24  Hct-20.8 / Hgb-6.5 / Plat-58 / RBC-2.20 / WBC-1.65  Serial CBC's   @ 15:20  Hct-25.2 / Hgb-7.7 / Plat-50 / RBC-2.64 / WBC-2.03          139  |  99  |  24<H>  ----------------------------<  286<H>  3.1<L>   |  27  |  1.1    Ca    8.2<L>      07 Dec 2019 09:43  Mg     1.7         TPro  6.7  /  Alb  2.8<L>  /  TBili  1.6<H>  /  DBili  x   /  AST  17  /  ALT  20  /  AlkPhos  71                        Urinalysis Basic - ( 06 Dec 2019 19:05 )    Color: Yellow / Appearance: Slightly Turbid / S.019 / pH: x  Gluc: x / Ketone: Negative  / Bili: Negative / Urobili: 3 mg/dL   Blood: x / Protein: 30 mg/dL / Nitrite: Negative   Leuk Esterase: Negative / RBC: 7 /HPF / WBC 2 /HPF   Sq Epi: x / Non Sq Epi: 1 /HPF / Bacteria: Negative          Culture - Urine (collected 04 Dec 2019 20:40)  Source: .Urine Clean Catch (Midstream)  Final Report (05 Dec 2019 23:21):    No growth    Culture - Blood (collected 04 Dec 2019 15:20)  Source: .Blood Blood-Peripheral  Preliminary Report (05 Dec 2019 23:01):    No growth to date.    Culture - Blood (collected 04 Dec 2019 15:20)  Source: .Blood Blood-Peripheral  Preliminary Report (05 Dec 2019 23:01):    No growth to date.            BLOOD SMEAR INTERPRETATION:       RADIOLOGY & ADDITIONAL STUDIES:

## 2019-12-07 NOTE — PROGRESS NOTE ADULT - ASSESSMENT
74 year old gentleman knwon to have CAD s/p stent, non-obstructive cardiomyopathy with EF 26%, hypertension, hyperlipidemia, DM II, Afib, MDS presents for fever and worsening swelling in his right hand + fevers.     # Sepsis secondary to hands abscess and cellulitis  - BP stable, remain with no fever  - trough level appropriate c/w Vanc and meropenem, ID onboard  - Burn team evaluation for possible debridement   - f/u TTE    # HFrEF  - SOB improving, remained on NC  - c/w IV Lasix 40 mg BID  - Continue Entresto BID and Spironolactone  - Continue BIPAP at night and PRN    # Hypokalemia - 3.1   - Secondary to diuresis  - Replete as needed    # MDS/pancytopenia  - s/p 1 Uprbc, Hb today 8.4, stable  - cont monitor and keep active t/s    # Paroxysmal Afib not on AC  - Currently NSR  - C/w Metoprolol and digoxin    # DMII  - Hold oral antidiabetics  - Monitor FS qac/hs and start insulin accordingly    # BPH  - c/w flomax 0.4 mg qd    # DVT ppx: Lovenox 40 mg qd  # Dispo: From [facility] assisted living  FULL CODE 74 year old gentleman knwon to have CAD s/p stent, non-obstructive cardiomyopathy with EF 26%, hypertension, hyperlipidemia, DM II, Afib, MDS presents for fever and worsening swelling in his right hand + fevers.     # Sepsis secondary to hands abscess and cellulitis  - BP stable, remain with no fever  - trough level appropriate c/w Vanc and meropenem, ID onboard  - Burn team evaluation for possible debridement   - f/u TTE    # HFrEF  - SOB improving, remained on NC  - c/w IV Lasix 40 mg BID  - Continue Entresto BID and Spironolactone  - Continue BIPAP at night and PRN    # Hypokalemia - 3.1   - Secondary to diuresis  - Replete as needed    # MDS/pancytopenia  - s/p 1 Uprbc, Hb today 8.4, stable  - cont monitor and keep active t/s  - f/u with hem/onc john  # Paroxysmal Afib not on AC  - Currently NSR  - C/w Metoprolol and digoxin    # DMII  - Hold oral antidiabetics  - Monitor FS qac/hs and start insulin accordingly    # BPH  - c/w flomax 0.4 mg qd    # DVT ppx: Lovenox 40 mg qd  # Dispo: From [facility] assisted living  FULL CODE

## 2019-12-07 NOTE — PROGRESS NOTE ADULT - ASSESSMENT
Sepsis 2/2 cellulitis.     Possible underlying abscess in right hand/forearm    MDS, anemia     HFrEF with fluid overload    id noted  burn fu for debridement  iv lasix   fulytes  onc fu  continue current rx

## 2019-12-07 NOTE — PROGRESS NOTE ADULT - EXTREMITIES COMMENTS
right hand dorsum with significant reduction in edema/erythema/ no drainage  left necrotic ulcer with no change. No erythema

## 2019-12-07 NOTE — PROGRESS NOTE ADULT - ASSESSMENT
ASSESSMENT  74y Male with a PMH of CAD, s/p Stents, Non-Obstructive Cardiomyopathy w/ EF 26%, HTN, Hyperlipidemia, DM, MDS     IMPRESSION  # Sepsis secondary to abscess / cellulitis right hand dorsum.   -no fascitis/tenosynovitis    -has improved significantly on ABx  # Abscess without cellulitis left hand dorsum with necrotic tissue   -better with ABx  -no evidence of ecthyma  -no fungal infection    -BCx 12/4 NG    RECOMMENDATIONS  - debridement of both abscesses with bacterial and fungal cultures  - Vancomycin 1g q12h  -Meropenem 1g iv q8h

## 2019-12-07 NOTE — PROGRESS NOTE ADULT - SUBJECTIVE AND OBJECTIVE BOX
Patient was seen and examined. Spoke with RN. Chart reviewed.      No events overnight.  Vital Signs Last 24 Hrs  T(F): 97.6 (07 Dec 2019 14:27), Max: 99.2 (06 Dec 2019 19:53)  HR: 100 (07 Dec 2019 14:27) (65 - 114)  BP: 120/69 (07 Dec 2019 14:27) (112/55 - 123/70)  SpO2: 95% (07 Dec 2019 07:57) (94% - 100%)  MEDICATIONS  (STANDING):  aspirin  chewable 81 milliGRAM(s) Oral daily  atorvastatin 20 milliGRAM(s) Oral at bedtime  dextrose 5%. 1000 milliLiter(s) (50 mL/Hr) IV Continuous <Continuous>  dextrose 50% Injectable 12.5 Gram(s) IV Push once  dextrose 50% Injectable 25 Gram(s) IV Push once  dextrose 50% Injectable 25 Gram(s) IV Push once  digoxin     Tablet 0.125 milliGRAM(s) Oral daily  enoxaparin Injectable 40 milliGRAM(s) SubCutaneous daily  famotidine    Tablet 40 milliGRAM(s) Oral at bedtime  furosemide   Injectable 40 milliGRAM(s) IV Push every 12 hours  insulin glargine Injectable (LANTUS) 15 Unit(s) SubCutaneous at bedtime  insulin lispro Injectable (HumaLOG) 7 Unit(s) SubCutaneous three times a day before meals  meropenem  IVPB 1000 milliGRAM(s) IV Intermittent every 8 hours  metoprolol succinate  milliGRAM(s) Oral daily  sacubitril 24 mG/valsartan 26 mG 1 Tablet(s) Oral two times a day  spironolactone 25 milliGRAM(s) Oral daily  tamsulosin 0.4 milliGRAM(s) Oral at bedtime  vancomycin  IVPB 1000 milliGRAM(s) IV Intermittent every 12 hours    MEDICATIONS  (PRN):  acetaminophen   Tablet .. 650 milliGRAM(s) Oral every 6 hours PRN Temp greater or equal to 38C (100.4F)  acetaminophen  Suppository .. 650 milliGRAM(s) Rectal every 6 hours PRN Temp greater or equal to 38C (100.4F)  dextrose 40% Gel 15 Gram(s) Oral once PRN Blood Glucose LESS THAN 70 milliGRAM(s)/deciliter  glucagon  Injectable 1 milliGRAM(s) IntraMuscular once PRN Glucose LESS THAN 70 milligrams/deciliter    Labs:                        8.7    2.40  )-----------( 71       ( 07 Dec 2019 09:43 )             27.5                         8.1    2.90  )-----------( 70       ( 06 Dec 2019 21:20 )             26.1     07 Dec 2019 09:43    139    |  99     |  24     ----------------------------<  286    3.1     |  27     |  1.1    06 Dec 2019 21:20    136    |  98     |  26     ----------------------------<  354    3.3     |  24     |  1.1      Ca    8.2        07 Dec 2019 09:43  Ca    7.8        06 Dec 2019 21:20  Mg     1.7       07 Dec 2019 09:43  Mg     1.6       06 Dec 2019 21:20    TPro  6.7    /  Alb  2.8    /  TBili  1.6    /  DBili  x      /  AST  17     /  ALT  20     /  AlkPhos  71     07 Dec 2019 09:43  TPro  6.0    /  Alb  2.8    /  TBili  1.0    /  DBili  x      /  AST  14     /  ALT  21     /  AlkPhos  62     06 Dec 2019 05:30      Urinalysis Basic - ( 06 Dec 2019 19:05 )    Color: Yellow / Appearance: Slightly Turbid / S.019 / pH: x  Gluc: x / Ketone: Negative  / Bili: Negative / Urobili: 3 mg/dL   Blood: x / Protein: 30 mg/dL / Nitrite: Negative   Leuk Esterase: Negative / RBC: 7 /HPF / WBC 2 /HPF   Sq Epi: x / Non Sq Epi: 1 /HPF / Bacteria: Negative        Culture - Urine (collected 04 Dec 2019 20:40)  Source: .Urine Clean Catch (Midstream)  Final Report (05 Dec 2019 23:21):    No growth    Culture - Blood (collected 04 Dec 2019 15:20)  Source: .Blood Blood-Peripheral  Preliminary Report (05 Dec 2019 23:01):    No growth to date.    Culture - Blood (collected 04 Dec 2019 15:20)  Source: .Blood Blood-Peripheral  Preliminary Report (05 Dec 2019 23:01):    No growth to date.        Radiology:    General: comfortable, NAD  Neurology: Awake confused  Head:  Normocephalic, atraumatic  ENT:  Mucosa moist, no ulcerations  Neck:  Supple, no JVD,   Skin: right hand extensor reythema/abcess  Resp: CTA B/L  CV: RRR, S1S2,   GI: Soft, NT, bowel sounds  MS: No edema, + peripheral pulses, FROM all 4 extremity

## 2019-12-07 NOTE — PROGRESS NOTE ADULT - SUBJECTIVE AND OBJECTIVE BOX
DAISHA, SHANE  74y, Male    All available historical data reviewed    OVERNIGHT EVENTS:    no fevers, feels well, no overt pain hands  ROS:  General: Denies rigors, night sweats  HEENT: Denies headache, rhinorrhea, sore throat, eye pain  CV: Denies CP, palpitations  PULM: Denies wheezing, hemoptysis  GI: Denies hematemesis, hematochezia, melena  : Denies discharge, hematuria  MSK: Denies arthralgias, myalgias  SKIN: Denies rash, lesions  NEURO: Denies paresthesias, weakness  PSYCH: Denies depression, anxiety    VITALS:  T(F): 96.8, Max: 99.2 (19 @ 19:53)  HR: 101  BP: 112/55  RR: 20Vital Signs Last 24 Hrs  T(C): 36 (07 Dec 2019 04:45), Max: 37.3 (06 Dec 2019 19:53)  T(F): 96.8 (07 Dec 2019 04:45), Max: 99.2 (06 Dec 2019 19:53)  HR: 101 (07 Dec 2019 04:45) (65 - 114)  BP: 112/55 (07 Dec 2019 04:45) (112/55 - 123/70)  BP(mean): --  RR: 20 (07 Dec 2019 04:45) (20 - 22)  SpO2: 95% (07 Dec 2019 07:57) (94% - 100%)    TESTS & MEASUREMENTS:                        8.1    2.90  )-----------( 70       ( 06 Dec 2019 21:20 )             26.1         136  |  98  |  26<H>  ----------------------------<  354<H>  3.3<L>   |  24  |  1.1    Ca    7.8<L>      06 Dec 2019 21:20  Mg     1.6         TPro  6.0  /  Alb  2.8<L>  /  TBili  1.0  /  DBili  x   /  AST  14  /  ALT  21  /  AlkPhos  62      LIVER FUNCTIONS - ( 06 Dec 2019 05:30 )  Alb: 2.8 g/dL / Pro: 6.0 g/dL / ALK PHOS: 62 U/L / ALT: 21 U/L / AST: 14 U/L / GGT: x             Culture - Urine (collected 19 @ 20:40)  Source: .Urine Clean Catch (Midstream)  Final Report (19 @ 23:21):    No growth    Culture - Blood (collected 19 @ 15:20)  Source: .Blood Blood-Peripheral  Preliminary Report (19 @ 23:01):    No growth to date.    Culture - Blood (collected 19 @ 15:20)  Source: .Blood Blood-Peripheral  Preliminary Report (19 @ 23:01):    No growth to date.      Urinalysis Basic - ( 06 Dec 2019 19:05 )    Color: Yellow / Appearance: Slightly Turbid / S.019 / pH: x  Gluc: x / Ketone: Negative  / Bili: Negative / Urobili: 3 mg/dL   Blood: x / Protein: 30 mg/dL / Nitrite: Negative   Leuk Esterase: Negative / RBC: 7 /HPF / WBC 2 /HPF   Sq Epi: x / Non Sq Epi: 1 /HPF / Bacteria: Negative          RADIOLOGY & ADDITIONAL TESTS:  Personal review of radiological diagnostics performed  Echo and EKG results noted when applicable.     ANTIBIOTICS:  meropenem  IVPB 1000 milliGRAM(s) IV Intermittent every 8 hours  vancomycin  IVPB 1000 milliGRAM(s) IV Intermittent every 12 hours

## 2019-12-07 NOTE — CONSULT NOTE ADULT - ASSESSMENT
74 year old male w/ past medical history of CAD s/p stent, non-obstructive cardiomyopathy with EF 26%, hypertension, hyperlipidemia, DM II, Afib, MDS with deletion 20q admitted for swelling of b/l hands .    # SEPSIS SEC TO B/L HAND ABSCESS :   - Seen by ID recommended to c/w IV abx and recommend drainage .  - Afebrile .    # MDS with 20q deletion (good cytogenetics) with IPSS score of 3- signifying low risk MDS with good prognosis.   -was given one unit PRBC yesterday .   - Discussed with pt and family on last admission that If the transfusion requirement goes above 1 unit a month, we will consider starting Procrit. Vidaza will also be recommended if there is progressive deterioration of the hemogram.   -No need for any acute management at this time from a hematological standpoint. recommend to transfuse as needed .   Monitor CBC. Given his CHF, Keep Hb>8.  Will be very cautious in terms of aggressive treatment given his age and underlying medical problems.    - Will f/u with pt.

## 2019-12-07 NOTE — PROGRESS NOTE ADULT - SUBJECTIVE AND OBJECTIVE BOX
Patient is a 74y old  Male who presents with a chief complaint of Fevers and worsening hand erythema (06 Dec 2019 11:49)      OVERNIGHT EVENTS: remained stable with no fever    SUBJECTIVE / INTERVAL HPI: Patient seen and examined at bedside.     VITAL SIGNS:  Vital Signs Last 24 Hrs  T(C): 36 (07 Dec 2019 04:45), Max: 37.3 (06 Dec 2019 19:53)  T(F): 96.8 (07 Dec 2019 04:45), Max: 99.2 (06 Dec 2019 19:53)  HR: 101 (07 Dec 2019 04:45) (65 - 114)  BP: 112/55 (07 Dec 2019 04:45) (112/55 - 123/70)  BP(mean): --  RR: 20 (07 Dec 2019 04:45) (20 - 22)  SpO2: 95% (07 Dec 2019 07:57) (94% - 100%)    PHYSICAL EXAM:    General: WDWN  HEENT: NC/AT; PERRL, clear conjunctiva  Neck: supple  Cardiovascular: +S1/S2; RRR  Respiratory: CTA b/l; no W/R/R  Gastrointestinal: soft, NT/ND; +BSx4  Extremities: WWP; 2+ peripheral pulses; right flexor hand swelling consist with cellulitis and 2x3cm abscess, dry swelling of left flexor surface of left hand   Neurological: AAOx3; no focal deficits    MEDICATIONS:  MEDICATIONS  (STANDING):  aspirin  chewable 81 milliGRAM(s) Oral daily  atorvastatin 20 milliGRAM(s) Oral at bedtime  dextrose 5%. 1000 milliLiter(s) (50 mL/Hr) IV Continuous <Continuous>  dextrose 50% Injectable 12.5 Gram(s) IV Push once  dextrose 50% Injectable 25 Gram(s) IV Push once  dextrose 50% Injectable 25 Gram(s) IV Push once  digoxin     Tablet 0.125 milliGRAM(s) Oral daily  enoxaparin Injectable 40 milliGRAM(s) SubCutaneous daily  famotidine    Tablet 40 milliGRAM(s) Oral at bedtime  furosemide   Injectable 40 milliGRAM(s) IV Push every 12 hours  insulin glargine Injectable (LANTUS) 15 Unit(s) SubCutaneous at bedtime  insulin lispro Injectable (HumaLOG) 7 Unit(s) SubCutaneous three times a day before meals  meropenem  IVPB 1000 milliGRAM(s) IV Intermittent every 8 hours  metoprolol succinate  milliGRAM(s) Oral daily  sacubitril 24 mG/valsartan 26 mG 1 Tablet(s) Oral two times a day  spironolactone 25 milliGRAM(s) Oral daily  tamsulosin 0.4 milliGRAM(s) Oral at bedtime  vancomycin  IVPB 1000 milliGRAM(s) IV Intermittent every 12 hours    MEDICATIONS  (PRN):  acetaminophen   Tablet .. 650 milliGRAM(s) Oral every 6 hours PRN Temp greater or equal to 38C (100.4F)  acetaminophen  Suppository .. 650 milliGRAM(s) Rectal every 6 hours PRN Temp greater or equal to 38C (100.4F)  dextrose 40% Gel 15 Gram(s) Oral once PRN Blood Glucose LESS THAN 70 milliGRAM(s)/deciliter  glucagon  Injectable 1 milliGRAM(s) IntraMuscular once PRN Glucose LESS THAN 70 milligrams/deciliter      ALLERGIES:  Allergies    No Known Allergies    Intolerances        LABS:                        8.7    2.40  )-----------( 71       ( 07 Dec 2019 09:43 )             27.5         136  |  98  |  26<H>  ----------------------------<  354<H>  3.3<L>   |  24  |  1.1    Ca    7.8<L>      06 Dec 2019 21:20  Mg     1.6         TPro  6.0  /  Alb  2.8<L>  /  TBili  1.0  /  DBili  x   /  AST  14  /  ALT  21  /  AlkPhos  62        Urinalysis Basic - ( 06 Dec 2019 19:05 )    Color: Yellow / Appearance: Slightly Turbid / S.019 / pH: x  Gluc: x / Ketone: Negative  / Bili: Negative / Urobili: 3 mg/dL   Blood: x / Protein: 30 mg/dL / Nitrite: Negative   Leuk Esterase: Negative / RBC: 7 /HPF / WBC 2 /HPF   Sq Epi: x / Non Sq Epi: 1 /HPF / Bacteria: Negative      CAPILLARY BLOOD GLUCOSE      POCT Blood Glucose.: 192 mg/dL (07 Dec 2019 08:17)      Culture - Urine (19 @ 20:40)    Specimen Source: .Urine Clean Catch (Midstream)    Culture Results:   No growth    Culture - Blood (19 @ 15:20)    Specimen Source: .Blood Blood-Peripheral    Culture Results:   No growth to date.

## 2019-12-08 PROBLEM — D46.9 MYELODYSPLASTIC SYNDROME, UNSPECIFIED: Chronic | Status: ACTIVE | Noted: 2019-12-04

## 2019-12-08 LAB
ACANTHOCYTES BLD QL SMEAR: SLIGHT — SIGNIFICANT CHANGE UP
ANION GAP SERPL CALC-SCNC: 14 MMOL/L — SIGNIFICANT CHANGE UP (ref 7–14)
ANISOCYTOSIS BLD QL: SLIGHT — SIGNIFICANT CHANGE UP
BASOPHILS # BLD AUTO: 0 K/UL — SIGNIFICANT CHANGE UP (ref 0–0.2)
BASOPHILS NFR BLD AUTO: 0 % — SIGNIFICANT CHANGE UP (ref 0–1)
BLD GP AB SCN SERPL QL: SIGNIFICANT CHANGE UP
BUN SERPL-MCNC: 24 MG/DL — HIGH (ref 10–20)
CALCIUM SERPL-MCNC: 8 MG/DL — LOW (ref 8.5–10.1)
CHLORIDE SERPL-SCNC: 100 MMOL/L — SIGNIFICANT CHANGE UP (ref 98–110)
CO2 SERPL-SCNC: 26 MMOL/L — SIGNIFICANT CHANGE UP (ref 17–32)
CREAT SERPL-MCNC: 1.1 MG/DL — SIGNIFICANT CHANGE UP (ref 0.7–1.5)
ELLIPTOCYTES BLD QL SMEAR: SLIGHT — SIGNIFICANT CHANGE UP
EOSINOPHIL # BLD AUTO: 0.07 K/UL — SIGNIFICANT CHANGE UP (ref 0–0.7)
EOSINOPHIL NFR BLD AUTO: 3.4 % — SIGNIFICANT CHANGE UP (ref 0–8)
GIANT PLATELETS BLD QL SMEAR: PRESENT — SIGNIFICANT CHANGE UP
GLUCOSE BLDC GLUCOMTR-MCNC: 135 MG/DL — HIGH (ref 70–99)
GLUCOSE BLDC GLUCOMTR-MCNC: 145 MG/DL — HIGH (ref 70–99)
GLUCOSE BLDC GLUCOMTR-MCNC: 202 MG/DL — HIGH (ref 70–99)
GLUCOSE BLDC GLUCOMTR-MCNC: 291 MG/DL — HIGH (ref 70–99)
GLUCOSE SERPL-MCNC: 152 MG/DL — HIGH (ref 70–99)
HCT VFR BLD CALC: 25.4 % — LOW (ref 42–52)
HGB BLD-MCNC: 7.9 G/DL — LOW (ref 14–18)
LYMPHOCYTES # BLD AUTO: 1.01 K/UL — LOW (ref 1.2–3.4)
LYMPHOCYTES # BLD AUTO: 46.6 % — SIGNIFICANT CHANGE UP (ref 20.5–51.1)
MACROCYTES BLD QL: SLIGHT — SIGNIFICANT CHANGE UP
MANUAL SMEAR VERIFICATION: SIGNIFICANT CHANGE UP
MCHC RBC-ENTMCNC: 28.5 PG — SIGNIFICANT CHANGE UP (ref 27–31)
MCHC RBC-ENTMCNC: 31.1 G/DL — LOW (ref 32–37)
MCV RBC AUTO: 91.7 FL — SIGNIFICANT CHANGE UP (ref 80–94)
MONOCYTES # BLD AUTO: 0.24 K/UL — SIGNIFICANT CHANGE UP (ref 0.1–0.6)
MONOCYTES NFR BLD AUTO: 11.2 % — HIGH (ref 1.7–9.3)
NEUTROPHILS # BLD AUTO: 0.75 K/UL — LOW (ref 1.4–6.5)
NEUTROPHILS NFR BLD AUTO: 34.5 % — LOW (ref 42.2–75.2)
NRBC # BLD: 3 /100 — HIGH (ref 0–0)
NRBC # BLD: SIGNIFICANT CHANGE UP /100 WBCS (ref 0–0)
OVALOCYTES BLD QL SMEAR: SIGNIFICANT CHANGE UP
PLAT MORPH BLD: NORMAL — SIGNIFICANT CHANGE UP
PLATELET # BLD AUTO: 59 K/UL — LOW (ref 130–400)
POIKILOCYTOSIS BLD QL AUTO: SIGNIFICANT CHANGE UP
POTASSIUM SERPL-MCNC: 3.1 MMOL/L — LOW (ref 3.5–5)
POTASSIUM SERPL-SCNC: 3.1 MMOL/L — LOW (ref 3.5–5)
PROMYELOCYTES # FLD: 0.9 % — HIGH (ref 0–0)
RBC # BLD: 2.77 M/UL — LOW (ref 4.7–6.1)
RBC # FLD: 19.7 % — HIGH (ref 11.5–14.5)
RBC BLD AUTO: ABNORMAL
SMUDGE CELLS # BLD: PRESENT — SIGNIFICANT CHANGE UP
SODIUM SERPL-SCNC: 140 MMOL/L — SIGNIFICANT CHANGE UP (ref 135–146)
VANCOMYCIN TROUGH SERPL-MCNC: 20.4 UG/ML — HIGH (ref 5–10)
VARIANT LYMPHS # BLD: 3.4 % — SIGNIFICANT CHANGE UP (ref 0–5)
WBC # BLD: 2.16 K/UL — LOW (ref 4.8–10.8)
WBC # FLD AUTO: 2.16 K/UL — LOW (ref 4.8–10.8)

## 2019-12-08 PROCEDURE — 71045 X-RAY EXAM CHEST 1 VIEW: CPT | Mod: 26

## 2019-12-08 RX ORDER — POTASSIUM CHLORIDE 20 MEQ
40 PACKET (EA) ORAL EVERY 4 HOURS
Refills: 0 | Status: DISCONTINUED | OUTPATIENT
Start: 2019-12-08 | End: 2019-12-08

## 2019-12-08 RX ORDER — INSULIN LISPRO 100/ML
VIAL (ML) SUBCUTANEOUS
Refills: 0 | Status: DISCONTINUED | OUTPATIENT
Start: 2019-12-08 | End: 2019-12-12

## 2019-12-08 RX ORDER — POTASSIUM CHLORIDE 20 MEQ
40 PACKET (EA) ORAL
Refills: 0 | Status: COMPLETED | OUTPATIENT
Start: 2019-12-08 | End: 2019-12-08

## 2019-12-08 RX ADMIN — MEROPENEM 100 MILLIGRAM(S): 1 INJECTION INTRAVENOUS at 05:43

## 2019-12-08 RX ADMIN — Medication 250 MILLIGRAM(S): at 23:30

## 2019-12-08 RX ADMIN — Medication 81 MILLIGRAM(S): at 11:09

## 2019-12-08 RX ADMIN — Medication 0.12 MILLIGRAM(S): at 05:45

## 2019-12-08 RX ADMIN — Medication 7 UNIT(S): at 17:00

## 2019-12-08 RX ADMIN — Medication 7 UNIT(S): at 08:14

## 2019-12-08 RX ADMIN — FAMOTIDINE 40 MILLIGRAM(S): 10 INJECTION INTRAVENOUS at 21:23

## 2019-12-08 RX ADMIN — SPIRONOLACTONE 25 MILLIGRAM(S): 25 TABLET, FILM COATED ORAL at 05:44

## 2019-12-08 RX ADMIN — Medication 2: at 12:06

## 2019-12-08 RX ADMIN — INSULIN GLARGINE 15 UNIT(S): 100 INJECTION, SOLUTION SUBCUTANEOUS at 21:23

## 2019-12-08 RX ADMIN — Medication 200 MILLIGRAM(S): at 05:44

## 2019-12-08 RX ADMIN — SACUBITRIL AND VALSARTAN 1 TABLET(S): 24; 26 TABLET, FILM COATED ORAL at 17:00

## 2019-12-08 RX ADMIN — Medication 40 MILLIEQUIVALENT(S): at 11:10

## 2019-12-08 RX ADMIN — MEROPENEM 100 MILLIGRAM(S): 1 INJECTION INTRAVENOUS at 14:26

## 2019-12-08 RX ADMIN — Medication 40 MILLIGRAM(S): at 17:00

## 2019-12-08 RX ADMIN — MEROPENEM 100 MILLIGRAM(S): 1 INJECTION INTRAVENOUS at 21:22

## 2019-12-08 RX ADMIN — ATORVASTATIN CALCIUM 20 MILLIGRAM(S): 80 TABLET, FILM COATED ORAL at 21:23

## 2019-12-08 RX ADMIN — Medication 40 MILLIGRAM(S): at 05:45

## 2019-12-08 RX ADMIN — SACUBITRIL AND VALSARTAN 1 TABLET(S): 24; 26 TABLET, FILM COATED ORAL at 05:44

## 2019-12-08 RX ADMIN — ENOXAPARIN SODIUM 40 MILLIGRAM(S): 100 INJECTION SUBCUTANEOUS at 11:09

## 2019-12-08 RX ADMIN — Medication 250 MILLIGRAM(S): at 12:16

## 2019-12-08 RX ADMIN — TAMSULOSIN HYDROCHLORIDE 0.4 MILLIGRAM(S): 0.4 CAPSULE ORAL at 21:23

## 2019-12-08 RX ADMIN — Medication 7 UNIT(S): at 12:06

## 2019-12-08 NOTE — PROGRESS NOTE ADULT - SUBJECTIVE AND OBJECTIVE BOX
Patient was seen and examined. Spoke with RN. Chart reviewed.  No events overnight.  Vital Signs Last 24 Hrs  T(F): 98.1 (08 Dec 2019 05:03), Max: 98.6 (07 Dec 2019 21:43)  HR: 101 (08 Dec 2019 05:03) (100 - 114)  BP: 112/65 (08 Dec 2019 05:03) (108/71 - 120/69)  SpO2: 98% (08 Dec 2019 07:16) (98% - 98%)  MEDICATIONS  (STANDING):  aspirin  chewable 81 milliGRAM(s) Oral daily  atorvastatin 20 milliGRAM(s) Oral at bedtime  dextrose 5%. 1000 milliLiter(s) (50 mL/Hr) IV Continuous <Continuous>  dextrose 50% Injectable 12.5 Gram(s) IV Push once  dextrose 50% Injectable 25 Gram(s) IV Push once  dextrose 50% Injectable 25 Gram(s) IV Push once  digoxin     Tablet 0.125 milliGRAM(s) Oral daily  enoxaparin Injectable 40 milliGRAM(s) SubCutaneous daily  famotidine    Tablet 40 milliGRAM(s) Oral at bedtime  furosemide   Injectable 40 milliGRAM(s) IV Push every 12 hours  insulin glargine Injectable (LANTUS) 15 Unit(s) SubCutaneous at bedtime  insulin lispro (HumaLOG) corrective regimen sliding scale   SubCutaneous three times a day before meals  insulin lispro Injectable (HumaLOG) 7 Unit(s) SubCutaneous three times a day before meals  meropenem  IVPB 1000 milliGRAM(s) IV Intermittent every 8 hours  metoprolol succinate  milliGRAM(s) Oral daily  potassium chloride   Powder 40 milliEquivalent(s) Oral every 2 hours  sacubitril 24 mG/valsartan 26 mG 1 Tablet(s) Oral two times a day  spironolactone 25 milliGRAM(s) Oral daily  tamsulosin 0.4 milliGRAM(s) Oral at bedtime  vancomycin  IVPB 1000 milliGRAM(s) IV Intermittent every 12 hours    MEDICATIONS  (PRN):  acetaminophen   Tablet .. 650 milliGRAM(s) Oral every 6 hours PRN Temp greater or equal to 38C (100.4F)  acetaminophen  Suppository .. 650 milliGRAM(s) Rectal every 6 hours PRN Temp greater or equal to 38C (100.4F)  dextrose 40% Gel 15 Gram(s) Oral once PRN Blood Glucose LESS THAN 70 milliGRAM(s)/deciliter  glucagon  Injectable 1 milliGRAM(s) IntraMuscular once PRN Glucose LESS THAN 70 milligrams/deciliter    Labs:                        7.9    2.16  )-----------( 59       ( 08 Dec 2019 07:16 )             25.4                         8.7    2.40  )-----------( 71       ( 07 Dec 2019 09:43 )             27.5     08 Dec 2019 07:16    140    |  100    |  24     ----------------------------<  152    3.1     |  26     |  1.1    07 Dec 2019 09:43    139    |  99     |  24     ----------------------------<  286    3.1     |  27     |  1.1      Ca    8.0        08 Dec 2019 07:16  Ca    8.2        07 Dec 2019 09:43  Mg     1.7       07 Dec 2019 09:43  Mg     1.6       06 Dec 2019 21:20    TPro  6.7    /  Alb  2.8    /  TBili  1.6    /  DBili  x      /  AST  17     /  ALT  20     /  AlkPhos  71     07 Dec 2019 09:43      Urinalysis Basic - ( 06 Dec 2019 19:05 )    Color: Yellow / Appearance: Slightly Turbid / S.019 / pH: x  Gluc: x / Ketone: Negative  / Bili: Negative / Urobili: 3 mg/dL   Blood: x / Protein: 30 mg/dL / Nitrite: Negative   Leuk Esterase: Negative / RBC: 7 /HPF / WBC 2 /HPF   Sq Epi: x / Non Sq Epi: 1 /HPF / Bacteria: Negative        Culture - Urine (collected 06 Dec 2019 19:05)  Source: .Urine Clean Catch (Midstream)  Final Report (07 Dec 2019 22:31):    No growth      General: comfortable, NAD  Head:  Normocephalic, atraumatic  ENT:  Mucosa moist, no ulcerations  Neck:  Supple, no JVD,   Resp: CTA B/L  CV: RRR, S1S2,   GI: Soft, NT, bowel sounds  MS: b/l hand boils      A/P:  74 year old man with CAD s/p stent, non-obstructive cardiomyopathy with EF 26%, hypertension, hyperlipidemia, DM II, Afib, MDS presents for fever and worsening swelling b/l hands, with fever- now clinically improving    # Sepsis secondary to hands abscess and cellulitis  - IV abx as per ID  -ID f/u on monday  - Burn f/u    # HFrEF  - SOB improving, remained on NC  - Cchange to PO Lasix 40 mg BID  - Continue Entresto BID and Spironolactone  - Continue BIPAP at night and PRN  - TTE- as above - EF 30-35 , GIIDD, Moderate PAH, Mild-Mod AS  -cardio eval    # Hypokalemia - 3.1   - Replete K and Mg     outpt hematology f/u for MDS    OOB    PT/rehab    DVT prophylaxis  Decubitus prevention- all measures as per RN protocol  Please call or text me with any questions or updates

## 2019-12-08 NOTE — DIETITIAN INITIAL EVALUATION ADULT. - ENERGY NEEDS
Estimated Calorie Needs: [id number] x AF 1.2-1.3=1814-1966kcal/day  Estimated Protein Needs: 77-92grams/day (1-1.2grams/kg of admit weight)  Estimated Fluid Needs: 1814-1966mL/day (1mL/kcal)

## 2019-12-08 NOTE — DIETITIAN INITIAL EVALUATION ADULT. - OTHER INFO
Dx: 73y/o male with pmhx noted above admitted with sepsis secondary to cellulitis. Noted to have possible underlying abscess in the right hand and forearm, CHF with fluid overload, MDS and anemia. No pressure injury is noted (Uziel Score-17). S/p swallow evaluation (12/6), recs include provide a dysphagia 3: soft diet with thin liquids which was taken.      Subjective Data: The patient reports following a regular diet at home; consumed three meals at 100%; denies MVI use.

## 2019-12-08 NOTE — PROGRESS NOTE ADULT - ASSESSMENT
74 year old gentleman knwon to have CAD s/p stent, non-obstructive cardiomyopathy with EF 26%, hypertension, hyperlipidemia, DM II, Afib, MDS presents for fever and worsening swelling in his right hand + fevers.     # Sepsis secondary to hands abscess and cellulitis  - BP stable, remain with no fever  - trough level appropriate Dec 8 - 20.5 -- Continue with Vanc and meropenem, ID onboard  - Burn team evaluation for possible debridement     # HFrEF  - SOB improving, remained on NC  - Continue with IV Lasix 40 mg BID  - Continue Entresto BID and Spironolactone  - Continue BIPAP at night and PRN  - TTE- as above - EF 30-35 , GIIDD, Moderate PAH, Mild-Mod AS    # Hypokalemia - 3.1 again Dec 8  - Secondary to diuresis  - Replete as needed    # MDS/pancytopenia  - s/p 1 Uprbc, Hb today 7.9, stable  - cont monitor and keep active t/s  - f/u with hem/onc john    # Paroxysmal Afib not on AC  - Currently NSR  - Continue with Metoprolol  qD and digoxin .125 qD    # DMII- Hold oral antidiabetics - Lantus 15 qHS Lispro 7 qAC  # BPH - Continue with  flomax 0.4 mg qd    Diet: Dysphagia 3 Soft; Thins - 1200 fluid restriction  DVT ppx:  Lovenox 40 mg qd  GI ppx: not indicated  Activity: Ambulater as tolerated  Dispo: From [facility] assisted living  FULL CODE 74 year old gentleman knwon to have CAD s/p stent, non-obstructive cardiomyopathy with EF 26%, hypertension, hyperlipidemia, DM II, Afib, MDS presents for fever and worsening swelling in his right hand + fevers.     # Sepsis secondary to hands abscess and cellulitis  - BP stable, remain with no fever  - trough level appropriate Dec 8 - 20.5 -- Continue with Vanc and meropenem, ID onboard  - Burn team evaluation for possible debridement -Pending    # HFrEF  - SOB improving, remained on NC  - Continue with IV Lasix 40 mg BID  - Continue Entresto BID and Spironolactone  - Continue BIPAP at night and PRN  - TTE- as above - EF 30-35 , GIIDD, Moderate PAH, Mild-Mod AS    # Hypokalemia - 3.1 again Dec 8   - Secondary to diuresis  - Replete as needed    # MDS/pancytopenia  - s/p 1 Uprbc, Hb today Dec 8th 7.9, stable  - cont monitor and keep active t/s  - f/u with hem/onc john    # Paroxysmal Afib not on AC  - Currently NSR? Slightly Irregular on exam  - Continue with Metoprolol  qD and digoxin .125 qD    # DMII- Hold oral antidiabetics - Lantus 15 qHS Lispro 7 qAC  # BPH - Continue with  flomax 0.4 mg qd    Diet: Dysphagia 3 Soft; Thins - 1200 fluid restriction  DVT ppx:  Lovenox 40 mg qd  GI ppx: not indicated  Activity: Ambulater as tolerated  Dispo: From [facility] assisted living  FULL CODE

## 2019-12-08 NOTE — DIETITIAN INITIAL EVALUATION ADULT. - PERTINENT LABORATORY DATA
(12/8) Na-140, K-3.1, CL-100, BUN-24, Cr-1.1, Glucose-152mg/dL, POC-202, 135, 155, 190, 269, 192mg/dL, Corrected Ca-9 (WNL), H/H-7.9/25.4, WBC-2.16

## 2019-12-08 NOTE — DIETITIAN INITIAL EVALUATION ADULT. - RD TO REMAIN AVAILABLE
Intervention: 1.Meals and Snacks 2.Nutrition Related Medication    Monitor/Evaluate: Diet order, energy intake, nutrition focused physical findings, glucose, anemia profile/yes

## 2019-12-08 NOTE — DIETITIAN INITIAL EVALUATION ADULT. - FACTORS AFF FOOD INTAKE
The patient reports consuming 50% of meals secondary to poor appetite; declined an offer of an oral supplement.  Reports having a bowel movement (12/6).

## 2019-12-08 NOTE — PROGRESS NOTE ADULT - SUBJECTIVE AND OBJECTIVE BOX
*This is a Preliminary Note and will be edited*  SUBJECTIVE:  74 year old gentleman knwon to have CAD s/p stent, non-obstructive cardiomyopathy with EF 26%, hypertension, hyperlipidemia, DM II, Afib, MDS presents for fever and worsening swelling in his right hand + fevers. (07 Dec 2019 11:07)    Currently admitted to medicine with the primary diagnosis of Cellulitis     Today is hospital day 3d. This morning he is resting comfortably in bed and reports no new** issues or overnight events.   Pt. denies HA, Cp, Abd Pain or discomfort.**  Pt. is urinating and stooling appropriately.**    PAST MEDICAL & SURGICAL HISTORY  MDS (myelodysplastic syndrome)  Afib  GERD (gastroesophageal reflux disease)  CAD (coronary artery disease)  Diabetes  HTN (hypertension)  H/O colonoscopy: 10 yeras ago    SOCIAL HISTORY:  Negative for smoking/alcohol/drug use.     ALLERGIES:  No Known Allergies    MEDICATIONS:  STANDING MEDICATIONS  acetaminophen   Tablet .. 650 milliGRAM(s) Oral every 6 hours PRN Temp greater or equal to 38C (100.4F)  acetaminophen  Suppository .. 650 milliGRAM(s) Rectal every 6 hours PRN Temp greater or equal to 38C (100.4F)  aspirin  chewable 81 milliGRAM(s) Oral daily  atorvastatin 20 milliGRAM(s) Oral at bedtime  dextrose 40% Gel 15 Gram(s) Oral once PRN Blood Glucose LESS THAN 70 milliGRAM(s)/deciliter  dextrose 5%. 1000 milliLiter(s) (50 mL/Hr) IV Continuous <Continuous>  dextrose 50% Injectable 12.5 Gram(s) IV Push once  dextrose 50% Injectable 25 Gram(s) IV Push once  dextrose 50% Injectable 25 Gram(s) IV Push once  digoxin     Tablet 0.125 milliGRAM(s) Oral daily  enoxaparin Injectable 40 milliGRAM(s) SubCutaneous daily  famotidine    Tablet 40 milliGRAM(s) Oral at bedtime  furosemide   Injectable 40 milliGRAM(s) IV Push every 12 hours  glucagon  Injectable 1 milliGRAM(s) IntraMuscular once PRN Glucose LESS THAN 70 milligrams/deciliter  insulin glargine Injectable (LANTUS) 15 Unit(s) SubCutaneous at bedtime  insulin lispro (HumaLOG) corrective regimen sliding scale   SubCutaneous three times a day before meals  insulin lispro Injectable (HumaLOG) 7 Unit(s) SubCutaneous three times a day before meals  meropenem  IVPB 1000 milliGRAM(s) IV Intermittent every 8 hours  metoprolol succinate  milliGRAM(s) Oral daily  sacubitril 24 mG/valsartan 26 mG 1 Tablet(s) Oral two times a day  spironolactone 25 milliGRAM(s) Oral daily  tamsulosin 0.4 milliGRAM(s) Oral at bedtime  vancomycin  IVPB 1000 milliGRAM(s) IV Intermittent every 12 hours    PRN MEDICATIONS  acetaminophen   Tablet .. 650 milliGRAM(s) Oral every 6 hours PRN  acetaminophen  Suppository .. 650 milliGRAM(s) Rectal every 6 hours PRN  dextrose 40% Gel 15 Gram(s) Oral once PRN  glucagon  Injectable 1 milliGRAM(s) IntraMuscular once PRN    VITALS:   T(F): 98.1  HR: 101 (100 - 114)  BP: 112/65 (108/71 - 120/69)  RR: 18 (18 - 20)  SpO2: 98% (98% - 98%)    LABS:                        7.9    2.16  )-----------( 59       ( 08 Dec 2019 07:16 )             25.4     12    140  |  100  |  24<H>  ----------------------------<  152<H>  3.1<L>   |  26  |  1.1    Ca    8.0<L>      08 Dec 2019 07:16  Mg     1.7         TPro  6.7  /  Alb  2.8<L>  /  TBili  1.6<H>  /  DBili  x   /  AST  17  /  ALT  20  /  AlkPhos  71        Urinalysis Basic - ( 06 Dec 2019 19:05 )    Color: Yellow / Appearance: Slightly Turbid / S.019 / pH: x  Gluc: x / Ketone: Negative  / Bili: Negative / Urobili: 3 mg/dL   Blood: x / Protein: 30 mg/dL / Nitrite: Negative   Leuk Esterase: Negative / RBC: 7 /HPF / WBC 2 /HPF   Sq Epi: x / Non Sq Epi: 1 /HPF / Bacteria: Negative    Troponin T, Serum: 0.08 ng/mL <HH> (- @ 09:43)  Culture - Urine (collected 06 Dec 2019 19:05)  Source: .Urine Clean Catch (Midstream)  Final Report (07 Dec 2019 22:31):    No growth    CARDIAC MARKERS ( 07 Dec 2019 09:43 )  x     / 0.08 ng/mL / x     / x     / x        Troponin T, Serum: 0.08 ng/mL (19 @ 09:43)  Serum Pro-Brain Natriuretic Peptide: 51084 pg/mL (19 @ 09:43)  Troponin T, Serum: 0.06 ng/mL (19 @ 17:24)      RADIOLOGY:  < from: Transthoracic Echocardiogram (19 @ 10:51) >  Summary:   1. Left ventricular ejection fraction, by visual estimation, is 30 to   35%.   2. Moderately decreased global left ventricular systolic function.   3. Mild left ventricular hypertrophy.   4. Mildly increased LV wall thickness.   5. Normal left ventricular internal cavity size.   6. Spectral Doppler shows pseudonormal pattern of left ventricular   myocardial filling (Grade II diastolic dysfunction).   7. Moderate pleural effusion in both left and right lateral regions.   8. Mild mitral valve regurgitation.   9. Mild thickening of the anterior and posterior mitral valve leaflets.  10. Mild aortic regurgitation.  11. Sclerotic aortic valve with decreased opening.  12. Estimated pulmonary artery systolic pressure is 56.3 mmHg assuming a   right atrial pressure of 3 mmHg, which is consistent with moderate   pulmonary hypertension.  13. No evidence of mitral stenosis.  14. Peak transaortic gradient equals 33.4 mmHg, mean transaortic gradient   equals 17.2 mmHg, the calculated aortic valve area equals 1.02 cm² by the   continuity equation consistent with moderate aortic stenosis.  15. The aortic valve mean gradient is 17.2 mmHg consistent with mild   aortic stenosis.  < end of copied text >    PHYSICAL EXAM:  GEN: In no acute distress. Pt. is awake in bed able to have a conversation.  LUNGS: CTABL, Symmetrical inspiration, no increased work of breathing  HEART: +S1,S2, RRR, No murmurs, Rubs, Gallops   ABD: Bowel Sounds Present, Soft, non tender, non distended, no guarding, no rebound.   EXT: 2+ peripheral Pulses, no clubbing, no cyanosis, no Edema.   NEURO: AAOX3. No focal deficits. CN 2-12 Grossly intact. SUBJECTIVE:  74 year old gentleman knwon to have CAD s/p stent, non-obstructive cardiomyopathy with EF 26%, hypertension, hyperlipidemia, DM II, Afib, MDS presents for fever and worsening swelling in his right hand + fevers. (07 Dec 2019 11:07)    Currently admitted to medicine with the primary diagnosis of Cellulitis     Today is hospital day 3d. This morning he is resting comfortably in bed and reports no new issues or overnight events.   Pt. denies HA, Cp, Abd Pain or discomfort.    PAST MEDICAL & SURGICAL HISTORY  MDS (myelodysplastic syndrome)  Afib  GERD (gastroesophageal reflux disease)  CAD (coronary artery disease)  Diabetes  HTN (hypertension)  H/O colonoscopy: 10 yeras ago    SOCIAL HISTORY:  Negative for smoking/alcohol/drug use.     ALLERGIES:  No Known Allergies    MEDICATIONS:  STANDING MEDICATIONS  acetaminophen   Tablet .. 650 milliGRAM(s) Oral every 6 hours PRN Temp greater or equal to 38C (100.4F)  acetaminophen  Suppository .. 650 milliGRAM(s) Rectal every 6 hours PRN Temp greater or equal to 38C (100.4F)  aspirin  chewable 81 milliGRAM(s) Oral daily  atorvastatin 20 milliGRAM(s) Oral at bedtime  dextrose 40% Gel 15 Gram(s) Oral once PRN Blood Glucose LESS THAN 70 milliGRAM(s)/deciliter  dextrose 5%. 1000 milliLiter(s) (50 mL/Hr) IV Continuous <Continuous>  dextrose 50% Injectable 12.5 Gram(s) IV Push once  dextrose 50% Injectable 25 Gram(s) IV Push once  dextrose 50% Injectable 25 Gram(s) IV Push once  digoxin     Tablet 0.125 milliGRAM(s) Oral daily  enoxaparin Injectable 40 milliGRAM(s) SubCutaneous daily  famotidine    Tablet 40 milliGRAM(s) Oral at bedtime  furosemide   Injectable 40 milliGRAM(s) IV Push every 12 hours  glucagon  Injectable 1 milliGRAM(s) IntraMuscular once PRN Glucose LESS THAN 70 milligrams/deciliter  insulin glargine Injectable (LANTUS) 15 Unit(s) SubCutaneous at bedtime  insulin lispro (HumaLOG) corrective regimen sliding scale   SubCutaneous three times a day before meals  insulin lispro Injectable (HumaLOG) 7 Unit(s) SubCutaneous three times a day before meals  meropenem  IVPB 1000 milliGRAM(s) IV Intermittent every 8 hours  metoprolol succinate  milliGRAM(s) Oral daily  sacubitril 24 mG/valsartan 26 mG 1 Tablet(s) Oral two times a day  spironolactone 25 milliGRAM(s) Oral daily  tamsulosin 0.4 milliGRAM(s) Oral at bedtime  vancomycin  IVPB 1000 milliGRAM(s) IV Intermittent every 12 hours    PRN MEDICATIONS  acetaminophen   Tablet .. 650 milliGRAM(s) Oral every 6 hours PRN  acetaminophen  Suppository .. 650 milliGRAM(s) Rectal every 6 hours PRN  dextrose 40% Gel 15 Gram(s) Oral once PRN  glucagon  Injectable 1 milliGRAM(s) IntraMuscular once PRN    VITALS:   T(F): 98.1  HR: 101 (100 - 114)  BP: 112/65 (108/71 - 120/69)  RR: 18 (18 - 20)  SpO2: 98% (98% - 98%)    LABS:                        7.9    2.16  )-----------( 59       ( 08 Dec 2019 07:16 )             25.4         140  |  100  |  24<H>  ----------------------------<  152<H>  3.1<L>   |  26  |  1.1    Ca    8.0<L>      08 Dec 2019 07:16  Mg     1.7         TPro  6.7  /  Alb  2.8<L>  /  TBili  1.6<H>  /  DBili  x   /  AST  17  /  ALT  20  /  AlkPhos  71        Urinalysis Basic - ( 06 Dec 2019 19:05 )    Color: Yellow / Appearance: Slightly Turbid / S.019 / pH: x  Gluc: x / Ketone: Negative  / Bili: Negative / Urobili: 3 mg/dL   Blood: x / Protein: 30 mg/dL / Nitrite: Negative   Leuk Esterase: Negative / RBC: 7 /HPF / WBC 2 /HPF   Sq Epi: x / Non Sq Epi: 1 /HPF / Bacteria: Negative    Troponin T, Serum: 0.08 ng/mL <HH> (19 @ 09:43)  Culture - Urine (collected 06 Dec 2019 19:05)  Source: .Urine Clean Catch (Midstream)  Final Report (07 Dec 2019 22:31):    No growth    CARDIAC MARKERS ( 07 Dec 2019 09:43 )  x     / 0.08 ng/mL / x     / x     / x        Troponin T, Serum: 0.08 ng/mL (19 @ 09:43)  Serum Pro-Brain Natriuretic Peptide: 50767 pg/mL (19 @ 09:43)  Troponin T, Serum: 0.06 ng/mL (19 @ 17:24)      RADIOLOGY:  < from: Transthoracic Echocardiogram (19 @ 10:51) >  Summary:   1. Left ventricular ejection fraction, by visual estimation, is 30 to   35%.   2. Moderately decreased global left ventricular systolic function.   3. Mild left ventricular hypertrophy.   4. Mildly increased LV wall thickness.   5. Normal left ventricular internal cavity size.   6. Spectral Doppler shows pseudonormal pattern of left ventricular   myocardial filling (Grade II diastolic dysfunction).   7. Moderate pleural effusion in both left and right lateral regions.   8. Mild mitral valve regurgitation.   9. Mild thickening of the anterior and posterior mitral valve leaflets.  10. Mild aortic regurgitation.  11. Sclerotic aortic valve with decreased opening.  12. Estimated pulmonary artery systolic pressure is 56.3 mmHg assuming a   right atrial pressure of 3 mmHg, which is consistent with moderate   pulmonary hypertension.  13. No evidence of mitral stenosis.  14. Peak transaortic gradient equals 33.4 mmHg, mean transaortic gradient   equals 17.2 mmHg, the calculated aortic valve area equals 1.02 cm² by the   continuity equation consistent with moderate aortic stenosis.  15. The aortic valve mean gradient is 17.2 mmHg consistent with mild   aortic stenosis.  < end of copied text >    PHYSICAL EXAM:  GEN: In no acute distress. Pt. is awake in bed able to have a conversation. Head movements and lip smacking  LUNGS: CTABL, Symmetrical inspiration, no increased work of breathing  HEART: +S1,S2, Slightly Irregular rhythm appreciated   ABD: Bowel Sounds Present, Soft, non tender, non distended, no guarding, no rebound.   EXT: 2+ peripheral Pulses, no clubbing, no cyanosis, no Edema.  BL UE wrists with erythematous lesions. Erythema wrose on RUE, LUE has what looks like old lesion.   NEURO: Alert and oriented. No focal deficits.

## 2019-12-09 ENCOUNTER — OUTPATIENT (OUTPATIENT)
Dept: OUTPATIENT SERVICES | Facility: HOSPITAL | Age: 74
LOS: 1 days | Discharge: HOME | End: 2019-12-09

## 2019-12-09 DIAGNOSIS — Z02.9 ENCOUNTER FOR ADMINISTRATIVE EXAMINATIONS, UNSPECIFIED: ICD-10-CM

## 2019-12-09 DIAGNOSIS — D64.9 ANEMIA, UNSPECIFIED: ICD-10-CM

## 2019-12-09 DIAGNOSIS — I50.1 LEFT VENTRICULAR FAILURE, UNSPECIFIED: ICD-10-CM

## 2019-12-09 DIAGNOSIS — Z98.890 OTHER SPECIFIED POSTPROCEDURAL STATES: Chronic | ICD-10-CM

## 2019-12-09 DIAGNOSIS — I42.9 CARDIOMYOPATHY, UNSPECIFIED: ICD-10-CM

## 2019-12-09 LAB
ANION GAP SERPL CALC-SCNC: 14 MMOL/L — SIGNIFICANT CHANGE UP (ref 7–14)
ANISOCYTOSIS BLD QL: SIGNIFICANT CHANGE UP
APPEARANCE UR: CLEAR — SIGNIFICANT CHANGE UP
BACTERIA # UR AUTO: NEGATIVE — SIGNIFICANT CHANGE UP
BASOPHILS # BLD AUTO: 0.02 K/UL — SIGNIFICANT CHANGE UP (ref 0–0.2)
BASOPHILS NFR BLD AUTO: 0.9 % — SIGNIFICANT CHANGE UP (ref 0–1)
BILIRUB UR-MCNC: NEGATIVE — SIGNIFICANT CHANGE UP
BUN SERPL-MCNC: 25 MG/DL — HIGH (ref 10–20)
CALCIUM SERPL-MCNC: 8.2 MG/DL — LOW (ref 8.5–10.1)
CHLORIDE SERPL-SCNC: 102 MMOL/L — SIGNIFICANT CHANGE UP (ref 98–110)
CO2 SERPL-SCNC: 29 MMOL/L — SIGNIFICANT CHANGE UP (ref 17–32)
COLOR SPEC: COLORLESS — SIGNIFICANT CHANGE UP
CREAT SERPL-MCNC: 1.3 MG/DL — SIGNIFICANT CHANGE UP (ref 0.7–1.5)
CULTURE RESULTS: SIGNIFICANT CHANGE UP
CULTURE RESULTS: SIGNIFICANT CHANGE UP
DIFF PNL FLD: ABNORMAL
EOSINOPHIL # BLD AUTO: 0.02 K/UL — SIGNIFICANT CHANGE UP (ref 0–0.7)
EOSINOPHIL NFR BLD AUTO: 0.9 % — SIGNIFICANT CHANGE UP (ref 0–8)
EPI CELLS # UR: 0 /HPF — SIGNIFICANT CHANGE UP (ref 0–5)
GIANT PLATELETS BLD QL SMEAR: PRESENT — SIGNIFICANT CHANGE UP
GLUCOSE BLDC GLUCOMTR-MCNC: 105 MG/DL — HIGH (ref 70–99)
GLUCOSE BLDC GLUCOMTR-MCNC: 132 MG/DL — HIGH (ref 70–99)
GLUCOSE BLDC GLUCOMTR-MCNC: 138 MG/DL — HIGH (ref 70–99)
GLUCOSE BLDC GLUCOMTR-MCNC: 192 MG/DL — HIGH (ref 70–99)
GLUCOSE SERPL-MCNC: 145 MG/DL — HIGH (ref 70–99)
GLUCOSE UR QL: NEGATIVE — SIGNIFICANT CHANGE UP
HCT VFR BLD CALC: 25.2 % — LOW (ref 42–52)
HGB BLD-MCNC: 8 G/DL — LOW (ref 14–18)
HYALINE CASTS # UR AUTO: 0 /LPF — SIGNIFICANT CHANGE UP (ref 0–7)
KETONES UR-MCNC: NEGATIVE — SIGNIFICANT CHANGE UP
LEUKOCYTE ESTERASE UR-ACNC: NEGATIVE — SIGNIFICANT CHANGE UP
LYMPHOCYTES # BLD AUTO: 1.1 K/UL — LOW (ref 1.2–3.4)
LYMPHOCYTES # BLD AUTO: 55.3 % — HIGH (ref 20.5–51.1)
MANUAL SMEAR VERIFICATION: SIGNIFICANT CHANGE UP
MCHC RBC-ENTMCNC: 29.2 PG — SIGNIFICANT CHANGE UP (ref 27–31)
MCHC RBC-ENTMCNC: 31.7 G/DL — LOW (ref 32–37)
MCV RBC AUTO: 92 FL — SIGNIFICANT CHANGE UP (ref 80–94)
MONOCYTES # BLD AUTO: 0.4 K/UL — SIGNIFICANT CHANGE UP (ref 0.1–0.6)
MONOCYTES NFR BLD AUTO: 20.2 % — HIGH (ref 1.7–9.3)
NEUTROPHILS # BLD AUTO: 0.42 K/UL — LOW (ref 1.4–6.5)
NEUTROPHILS NFR BLD AUTO: 21 % — LOW (ref 42.2–75.2)
NITRITE UR-MCNC: NEGATIVE — SIGNIFICANT CHANGE UP
NRBC # BLD: 2 /100 — HIGH (ref 0–0)
NRBC # BLD: SIGNIFICANT CHANGE UP /100 WBCS (ref 0–0)
OVALOCYTES BLD QL SMEAR: SIGNIFICANT CHANGE UP
PH UR: 7 — SIGNIFICANT CHANGE UP (ref 5–8)
PLAT MORPH BLD: ABNORMAL
PLATELET # BLD AUTO: 70 K/UL — LOW (ref 130–400)
POIKILOCYTOSIS BLD QL AUTO: SIGNIFICANT CHANGE UP
POLYCHROMASIA BLD QL SMEAR: SIGNIFICANT CHANGE UP
POTASSIUM SERPL-MCNC: 3.3 MMOL/L — LOW (ref 3.5–5)
POTASSIUM SERPL-SCNC: 3.3 MMOL/L — LOW (ref 3.5–5)
PROT UR-MCNC: NEGATIVE — SIGNIFICANT CHANGE UP
RBC # BLD: 2.74 M/UL — LOW (ref 4.7–6.1)
RBC # FLD: 19.4 % — HIGH (ref 11.5–14.5)
RBC BLD AUTO: ABNORMAL
RBC CASTS # UR COMP ASSIST: 345 /HPF — HIGH (ref 0–4)
SCHISTOCYTES BLD QL AUTO: SIGNIFICANT CHANGE UP
SMUDGE CELLS # BLD: PRESENT — SIGNIFICANT CHANGE UP
SODIUM SERPL-SCNC: 145 MMOL/L — SIGNIFICANT CHANGE UP (ref 135–146)
SP GR SPEC: 1.01 — LOW (ref 1.01–1.02)
SPECIMEN SOURCE: SIGNIFICANT CHANGE UP
SPECIMEN SOURCE: SIGNIFICANT CHANGE UP
UROBILINOGEN FLD QL: ABNORMAL
VARIANT LYMPHS # BLD: 1.7 % — SIGNIFICANT CHANGE UP (ref 0–5)
WBC # BLD: 1.99 K/UL — LOW (ref 4.8–10.8)
WBC # FLD AUTO: 1.99 K/UL — LOW (ref 4.8–10.8)
WBC UR QL: 1 /HPF — SIGNIFICANT CHANGE UP (ref 0–5)

## 2019-12-09 PROCEDURE — 99231 SBSQ HOSP IP/OBS SF/LOW 25: CPT

## 2019-12-09 PROCEDURE — 71045 X-RAY EXAM CHEST 1 VIEW: CPT | Mod: 26

## 2019-12-09 RX ORDER — POTASSIUM CHLORIDE 20 MEQ
20 PACKET (EA) ORAL
Refills: 0 | Status: COMPLETED | OUTPATIENT
Start: 2019-12-09 | End: 2019-12-09

## 2019-12-09 RX ORDER — VANCOMYCIN HCL 1 G
750 VIAL (EA) INTRAVENOUS EVERY 12 HOURS
Refills: 0 | Status: DISCONTINUED | OUTPATIENT
Start: 2019-12-09 | End: 2019-12-11

## 2019-12-09 RX ADMIN — FAMOTIDINE 40 MILLIGRAM(S): 10 INJECTION INTRAVENOUS at 21:15

## 2019-12-09 RX ADMIN — Medication 20 MILLIEQUIVALENT(S): at 17:47

## 2019-12-09 RX ADMIN — Medication 250 MILLIGRAM(S): at 17:40

## 2019-12-09 RX ADMIN — Medication 40 MILLIGRAM(S): at 05:10

## 2019-12-09 RX ADMIN — Medication 0.12 MILLIGRAM(S): at 05:12

## 2019-12-09 RX ADMIN — Medication 20 MILLIEQUIVALENT(S): at 20:06

## 2019-12-09 RX ADMIN — Medication 81 MILLIGRAM(S): at 12:27

## 2019-12-09 RX ADMIN — Medication 1: at 12:27

## 2019-12-09 RX ADMIN — MEROPENEM 100 MILLIGRAM(S): 1 INJECTION INTRAVENOUS at 14:24

## 2019-12-09 RX ADMIN — MEROPENEM 100 MILLIGRAM(S): 1 INJECTION INTRAVENOUS at 05:14

## 2019-12-09 RX ADMIN — MEROPENEM 100 MILLIGRAM(S): 1 INJECTION INTRAVENOUS at 22:32

## 2019-12-09 RX ADMIN — Medication 7 UNIT(S): at 18:10

## 2019-12-09 RX ADMIN — Medication 20 MILLIEQUIVALENT(S): at 21:16

## 2019-12-09 RX ADMIN — SPIRONOLACTONE 25 MILLIGRAM(S): 25 TABLET, FILM COATED ORAL at 05:12

## 2019-12-09 RX ADMIN — SACUBITRIL AND VALSARTAN 1 TABLET(S): 24; 26 TABLET, FILM COATED ORAL at 17:40

## 2019-12-09 RX ADMIN — ENOXAPARIN SODIUM 40 MILLIGRAM(S): 100 INJECTION SUBCUTANEOUS at 12:27

## 2019-12-09 RX ADMIN — Medication 7 UNIT(S): at 12:27

## 2019-12-09 RX ADMIN — SACUBITRIL AND VALSARTAN 1 TABLET(S): 24; 26 TABLET, FILM COATED ORAL at 05:12

## 2019-12-09 RX ADMIN — INSULIN GLARGINE 15 UNIT(S): 100 INJECTION, SOLUTION SUBCUTANEOUS at 22:32

## 2019-12-09 RX ADMIN — TAMSULOSIN HYDROCHLORIDE 0.4 MILLIGRAM(S): 0.4 CAPSULE ORAL at 21:15

## 2019-12-09 RX ADMIN — Medication 40 MILLIGRAM(S): at 17:44

## 2019-12-09 RX ADMIN — Medication 7 UNIT(S): at 08:00

## 2019-12-09 RX ADMIN — ATORVASTATIN CALCIUM 20 MILLIGRAM(S): 80 TABLET, FILM COATED ORAL at 21:15

## 2019-12-09 RX ADMIN — Medication 200 MILLIGRAM(S): at 05:12

## 2019-12-09 NOTE — CONSULT NOTE ADULT - ATTENDING COMMENTS
abscesses dorsum bilateral hands    rec: soap and water qd, xeroform gauze, kerlex dressing change bid    will I and D at bedside
nec fasc left thigh post wide debridement with viable muscle    rec: cont local wound care, will tx to burn unit when bed av for hydrotherapy, and wound closure
I have personally examined the patient and reviewed the documentation above.  Corrections and edits were made wherever needed.
Patient examined at the bedside and discussed with Dr Frye . left hand cellulitis improved , right with fluctuance , likely need drainage .

## 2019-12-09 NOTE — PROGRESS NOTE ADULT - ASSESSMENT
Sepsis 2/2 cellulitis.     Possible underlying abscess in right hand/forearm    MDS, anemia     HFrEF with fluid overload    id noted  burn fu for debridement today  iv lasix   fulytes  onc fu  continue current rx

## 2019-12-09 NOTE — PROGRESS NOTE ADULT - SUBJECTIVE AND OBJECTIVE BOX
SHANE ASHTON 74y Male  MRN#: [id number]         SUBJECTIVE  Patient is a 74y old Male who presents with a chief complaint of Cellulitis (08 Dec 2019 09:03)  Currently admitted to medicine with the primary diagnosis of Cellulitis    Patient reports improvement in his hand inflammation    OBJECTIVE  PAST MEDICAL & SURGICAL HISTORY  MDS (myelodysplastic syndrome)  Afib  GERD (gastroesophageal reflux disease)  CAD (coronary artery disease)  Diabetes  HTN (hypertension)  H/O colonoscopy: 10 yeras ago    ALLERGIES:  No Known Allergies    MEDICATIONS:  STANDING MEDICATIONS  aspirin  chewable 81 milliGRAM(s) Oral daily  atorvastatin 20 milliGRAM(s) Oral at bedtime  dextrose 5%. 1000 milliLiter(s) IV Continuous <Continuous>  dextrose 50% Injectable 12.5 Gram(s) IV Push once  dextrose 50% Injectable 25 Gram(s) IV Push once  dextrose 50% Injectable 25 Gram(s) IV Push once  digoxin     Tablet 0.125 milliGRAM(s) Oral daily  enoxaparin Injectable 40 milliGRAM(s) SubCutaneous daily  famotidine    Tablet 40 milliGRAM(s) Oral at bedtime  furosemide   Injectable 40 milliGRAM(s) IV Push every 12 hours  insulin glargine Injectable (LANTUS) 15 Unit(s) SubCutaneous at bedtime  insulin lispro (HumaLOG) corrective regimen sliding scale   SubCutaneous three times a day before meals  insulin lispro Injectable (HumaLOG) 7 Unit(s) SubCutaneous three times a day before meals  meropenem  IVPB 1000 milliGRAM(s) IV Intermittent every 8 hours  metoprolol succinate  milliGRAM(s) Oral daily  sacubitril 24 mG/valsartan 26 mG 1 Tablet(s) Oral two times a day  spironolactone 25 milliGRAM(s) Oral daily  tamsulosin 0.4 milliGRAM(s) Oral at bedtime  vancomycin  IVPB 750 milliGRAM(s) IV Intermittent every 12 hours    PRN MEDICATIONS  acetaminophen   Tablet .. 650 milliGRAM(s) Oral every 6 hours PRN  acetaminophen  Suppository .. 650 milliGRAM(s) Rectal every 6 hours PRN  dextrose 40% Gel 15 Gram(s) Oral once PRN  glucagon  Injectable 1 milliGRAM(s) IntraMuscular once PRN      VITAL SIGNS: Last 24 Hours  T(C): 36.4 (09 Dec 2019 05:25), Max: 37 (08 Dec 2019 14:10)  T(F): 97.6 (09 Dec 2019 05:25), Max: 98.6 (08 Dec 2019 14:10)  HR: 97 (09 Dec 2019 05:25) (97 - 109)  BP: 127/66 (09 Dec 2019 05:25) (110/65 - 127/66)  BP(mean): --  RR: 18 (09 Dec 2019 05:25) (18 - 18)  SpO2: 93% (09 Dec 2019 09:15) (93% - 93%)    LABS:                        8.0    1.99  )-----------( 70       ( 09 Dec 2019 07:06 )             25.2     12-09    145  |  102  |  25<H>  ----------------------------<  145<H>  3.3<L>   |  29  |  1.3    Ca    8.2<L>      09 Dec 2019 07:06                Culture - Blood (collected 07 Dec 2019 09:43)  Source: .Blood None  Preliminary Report (08 Dec 2019 19:01):    No growth to date.    Culture - Urine (collected 06 Dec 2019 19:05)  Source: .Urine Clean Catch (Midstream)  Final Report (07 Dec 2019 22:31):    No growth          RADIOLOGY:        PHYSICAL EXAM:  GEN: In no acute distress. Pt. is awake in bed able to have a conversation. Head movements and lip smacking  LUNGS: CTABL, Symmetrical inspiration, no increased work of breathing  HEART: +S1,S2, Slightly Irregular rhythm appreciated   ABD: Bowel Sounds Present, Soft, non tender, non distended, no guarding, no rebound.   EXT: 2+ peripheral Pulses, no clubbing, no cyanosis, no Edema.  BL UE wrists with erythematous lesions. Erythema wrose on RUE, LUE has what looks like old lesion.   NEURO: Alert and oriented. No focal deficits.         Assessment and Plan:   74 year old gentleman knwon to have CAD s/p stent, non-obstructive cardiomyopathy with EF 26%, hypertension, hyperlipidemia, DM II, Afib, MDS presents for fever and worsening swelling in his right hand + fevers.     # Sepsis secondary to hands abscess and cellulitis  - BP stable, remain with no fever  - trough level appropriate Dec 8 - 20.5 -- Continue with Vanc and meropenem D5 abx, ID onboard  - Burn team evaluation for possible debridement --> will do I and D at bedside   - fu wound culture bacterial and fungal    # HFrEF  - SOB improving, remained on NC  - Continue with IV Lasix 40 mg BID  - Continue Entresto BID and Spironolactone  - Continue BIPAP at night and PRN  - TTE- as above - EF 30-35 , GIIDD, Moderate PAH, Mild-Mod AS    # Hypokalemia  - Secondary to diuresis  - Replete as needed    # MDS/pancytopenia  - s/p 1 Uprbc on 12/7, Hb stable  - cont monitor and keep active t/s  - f/u with hem/onc john --> transfuse if hb less than 8. fu with hem/onc as outpatient ? patient to be started on Procrit    # Paroxysmal Afib not on AC  - regular rate and rhythm  - Continue with Metoprolol  qD and digoxin .125 qD    # DMII- Hold oral antidiabetics - Lantus 15 qHS Lispro 7 qAC  # BPH - Continue with  flomax 0.4 mg qd    Diet: Dysphagia 3 Soft; Thins - 1200 fluid restriction  DVT ppx:  Lovenox 40 mg qd  GI ppx: not indicated  Activity: Ambulater as tolerated  Dispo: From [facility] assisted living  FULL CODE

## 2019-12-09 NOTE — CONSULT NOTE ADULT - SUBJECTIVE AND OBJECTIVE BOX
left thigh nec fasc post debridement    PE: intubated and sedated    large dressing change--> left thigh with exposed viable muscle ? viable muscle medial thigh     exposed SFA,

## 2019-12-09 NOTE — PROGRESS NOTE ADULT - ASSESSMENT
ASSESSMENT  74y Male with a PMH of CAD, s/p Stents, Non-Obstructive Cardiomyopathy w/ EF 26%, HTN, Hyperlipidemia, DM, MDS     IMPRESSION  # Sepsis secondary to abscess / cellulitis right hand dorsum.   -no fascitis/tenosynovitis    -has improved significantly on ABx  # Abscess without cellulitis left hand dorsum with necrotic tissue   -better with ABx  -no evidence of ecthyma  -no fungal infection    -BCx 12/4 NG    RECOMMENDATIONS  - debridement of both abscesses with bacterial and fungal cultures  - DECREASE to Vanc 750mg q12h IV

## 2019-12-09 NOTE — CONSULT NOTE ADULT - ASSESSMENT
abscesses dorsum bilateral hands    rec: soap and water qd, xeroform gauze, kerlex dressing change bid    will I and D at bedside

## 2019-12-09 NOTE — CONSULT NOTE ADULT - SUBJECTIVE AND OBJECTIVE BOX
pt with abscesses dorsum bilateral hands ? iv     PE: dorsum bilateral hands--> 2x2cm lesions with erythema and edema

## 2019-12-09 NOTE — CONSULT NOTE ADULT - ASSESSMENT
nec fasc left thigh post wide debridement with viable muscle    rec: cont local wound care, will tx to burn unit when bed av for hydrotherapy, and wound closure

## 2019-12-09 NOTE — PROGRESS NOTE ADULT - SUBJECTIVE AND OBJECTIVE BOX
Patient was seen and examined. Spoke with RN. Chart reviewed.  awake,confused  awaiitng i?d once family gives consent.  d/w ho  iv abx    No events overnight.  Vital Signs Last 24 Hrs  T(F): 97.9 (09 Dec 2019 14:13), Max: 98.2 (08 Dec 2019 19:58)  HR: 82 (09 Dec 2019 14:13) (82 - 97)  BP: 119/71 (09 Dec 2019 14:13) (119/71 - 127/66)  SpO2: 93% (09 Dec 2019 09:15) (93% - 93%)  MEDICATIONS  (STANDING):  aspirin  chewable 81 milliGRAM(s) Oral daily  atorvastatin 20 milliGRAM(s) Oral at bedtime  dextrose 5%. 1000 milliLiter(s) (50 mL/Hr) IV Continuous <Continuous>  dextrose 50% Injectable 12.5 Gram(s) IV Push once  dextrose 50% Injectable 25 Gram(s) IV Push once  dextrose 50% Injectable 25 Gram(s) IV Push once  digoxin     Tablet 0.125 milliGRAM(s) Oral daily  enoxaparin Injectable 40 milliGRAM(s) SubCutaneous daily  famotidine    Tablet 40 milliGRAM(s) Oral at bedtime  furosemide   Injectable 40 milliGRAM(s) IV Push every 12 hours  insulin glargine Injectable (LANTUS) 15 Unit(s) SubCutaneous at bedtime  insulin lispro (HumaLOG) corrective regimen sliding scale   SubCutaneous three times a day before meals  insulin lispro Injectable (HumaLOG) 7 Unit(s) SubCutaneous three times a day before meals  meropenem  IVPB 1000 milliGRAM(s) IV Intermittent every 8 hours  metoprolol succinate  milliGRAM(s) Oral daily  potassium chloride    Tablet ER 20 milliEquivalent(s) Oral every 2 hours  sacubitril 24 mG/valsartan 26 mG 1 Tablet(s) Oral two times a day  spironolactone 25 milliGRAM(s) Oral daily  tamsulosin 0.4 milliGRAM(s) Oral at bedtime  vancomycin  IVPB 750 milliGRAM(s) IV Intermittent every 12 hours    MEDICATIONS  (PRN):  acetaminophen   Tablet .. 650 milliGRAM(s) Oral every 6 hours PRN Temp greater or equal to 38C (100.4F)  dextrose 40% Gel 15 Gram(s) Oral once PRN Blood Glucose LESS THAN 70 milliGRAM(s)/deciliter  glucagon  Injectable 1 milliGRAM(s) IntraMuscular once PRN Glucose LESS THAN 70 milligrams/deciliter    Labs:                        8.0    1.99  )-----------( 70       ( 09 Dec 2019 07:06 )             25.2                         7.9    2.16  )-----------( 59       ( 08 Dec 2019 07:16 )             25.4     09 Dec 2019 07:06    145    |  102    |  25     ----------------------------<  145    3.3     |  29     |  1.3    08 Dec 2019 07:16    140    |  100    |  24     ----------------------------<  152    3.1     |  26     |  1.1      Ca    8.2        09 Dec 2019 07:06  Ca    8.0        08 Dec 2019 07:16            Culture - Blood (collected 07 Dec 2019 09:43)  Source: .Blood None  Preliminary Report (08 Dec 2019 19:01):    No growth to date.    Culture - Urine (collected 06 Dec 2019 19:05)  Source: .Urine Clean Catch (Midstream)  Final Report (07 Dec 2019 22:31):    No growth        Radiology:    General: comfortable, NAD  Neurology: A&Ox3, nonfocal  Head:  Normocephalic, atraumatic  ENT:  Mucosa moist, no ulcerations  Neck:  Supple, no JVD,   Skin: no breakdowns (as per RN)  Resp: CTA B/L  CV: RRR, S1S2,   GI: Soft, NT, bowel sounds  MS: No edema, + peripheral pulses, FROM all 4 extremity

## 2019-12-09 NOTE — PROGRESS NOTE ADULT - SUBJECTIVE AND OBJECTIVE BOX
DAISHA, SHANE  74y, Male  Allergy: No Known Allergies      CHIEF COMPLAINT: Cellulitis (08 Dec 2019 09:03)      INTERVAL EVENTS/HPI  - No acute events overnight Vancomycin Level, Trough: 20.4 (19 @ 07:16)  - T(F): , Max: 98.6 (19 @ 14:10)  - Denies any worsening symptoms  - Tolerating medication  - WBC Count: 2.16 (19 @ 07:16)  WBC Count: 2.40 (19 @ 09:43)  - Creatinine, Serum: 1.3 (19 @ 07:06)  Creatinine, Serum: 1.1 (19 @ 07:16)       ROS  General: Denies rigors, nightsweats  HEENT: Denies headache, rhinorrhea, sore throat, eye pain  CV: Denies CP, palpitations  PULM: Denies wheezing, hemoptysis  GI: Denies hematemesis, hematochezia, melena  : Denies discharge, hematuria  MSK: Denies arthralgias, myalgias  SKIN: Denies rash, lesions  NEURO: Denies paresthesias, weakness  PSYCH: Denies depression, anxiety    VITALS:  T(F): 97.6, Max: 98.6 (19 @ 14:10)  HR: 97  BP: 127/66  RR: 18Vital Signs Last 24 Hrs  T(C): 36.4 (09 Dec 2019 05:25), Max: 37 (08 Dec 2019 14:10)  T(F): 97.6 (09 Dec 2019 05:25), Max: 98.6 (08 Dec 2019 14:10)  HR: 97 (09 Dec 2019 05:25) (97 - 109)  BP: 127/66 (09 Dec 2019 05:25) (110/65 - 127/66)  BP(mean): --  RR: 18 (09 Dec 2019 05:25) (18 - 18)  SpO2: 93% (09 Dec 2019 09:15) (93% - 93%)    PHYSICAL EXAM:  Gen: NAD, resting in bed, very pale  HEENT: Normocephalic, atraumatic  Neck: supple, no lymphadenopathy  CV: Regular rate & regular rhythm  Lungs: decreased BS at bases, no fremitus  Abdomen: Soft, BS present  Ext: Warm, well perfused  Neuro: non focal, awake  Skin: b/l hand fluctuant erythematous lesions, L with eschar  Lines: no phlebitis    FH: Non-contributory  Social Hx: Non-contributory    TESTS & MEASUREMENTS:                        7.9    2.16  )-----------( 59       ( 08 Dec 2019 07:16 )             25.4         145  |  102  |  25<H>  ----------------------------<  145<H>  3.3<L>   |  29  |  1.3    Ca    8.2<L>      09 Dec 2019 07:06  Mg     1.7         TPro  6.7  /  Alb  2.8<L>  /  TBili  1.6<H>  /  DBili  x   /  AST  17  /  ALT  20  /  AlkPhos  71      eGFR if Non African American: 53 mL/min/1.73M2 (19 @ 07:06)  eGFR if African American: 61 mL/min/1.73M2 (19 @ 07:06)    LIVER FUNCTIONS - ( 07 Dec 2019 09:43 )  Alb: 2.8 g/dL / Pro: 6.7 g/dL / ALK PHOS: 71 U/L / ALT: 20 U/L / AST: 17 U/L / GGT: x               Culture - Blood (collected 19 @ 09:43)  Source: .Blood None  Preliminary Report (19 @ 19:01):    No growth to date.    Culture - Urine (collected 19 @ 19:05)  Source: .Urine Clean Catch (Midstream)  Final Report (19 @ 22:31):    No growth    Culture - Urine (collected 19 @ 20:40)  Source: .Urine Clean Catch (Midstream)  Final Report (19 @ 23:21):    No growth    Culture - Blood (collected 19 @ 15:20)  Source: .Blood Blood-Peripheral  Preliminary Report (19 @ 23:01):    No growth to date.    Culture - Blood (collected 19 @ 15:20)  Source: .Blood Blood-Peripheral  Preliminary Report (19 @ 23:01):    No growth to date.        Lactate, Blood: 2.4 mmol/L (19 @ 16:50)  Blood Gas Venous - Lactate: 2.7 mmoL/L (19 @ 14:29)      INFECTIOUS DISEASES TESTING      RADIOLOGY & ADDITIONAL TESTS:  I have personally reviewed the last Chest xray  CXR      CT      CARDIOLOGY TESTING  Transthoracic Echocardiogram:    EXAM:  2-D ECHO (TTE) COMPLETE        PROCEDURE DATE:  2019      INTERPRETATION:  REPORT:    TRANSTHORACIC ECHOCARDIOGRAM REPORT         Patient Name:   SHANE ASHTON Accession #: [id number]  Medical Rec #:  [id number]     Height:      71.0 in 180.3 cm  YOB: 1945     Weight:      169.0 lb 76.66 kg  Patient Age:    74 years      BSA:         1.96 m²  Patient Gender: M             BP:          112/55 mmHg       Date of Exam:        2019 10:51:32 AM  Referring Physician: [id number] BHAVESH MEHTA  Sonographer:         Aliya Davis  Reading Physician:   Shanta Wright M.D.    Procedure:   2D Echo/Doppler/Color Doppler Complete.  Indications: R01.1 - Cardiac Murmur, unspecified  Diagnosis:   Cardiac murmur, unspecified - R01.1         Summary:   1. Left ventricular ejection fraction, by visual estimation, is 30 to   35%.   2. Moderately decreased global left ventricular systolic function.   3. Mild left ventricular hypertrophy.   4. Mildly increased LV wall thickness.   5. Normal left ventricular internal cavity size.   6. Spectral Doppler shows pseudonormal pattern of left ventricular   myocardial filling (Grade II diastolic dysfunction).   7. Moderate pleural effusion in both left and right lateral regions.   8. Mild mitral valve regurgitation.   9. Mild thickening of the anterior and posterior mitral valve leaflets.  10. Mild aortic regurgitation.  11. Sclerotic aortic valve with decreased opening.  12. Estimated pulmonary artery systolic pressure is 56.3 mmHg assuming a   right atrial pressure of 3 mmHg, which is consistent with moderate   pulmonary hypertension.  13. No evidence of mitral stenosis.  14. Peak transaortic gradient equals 33.4 mmHg, mean transaortic gradient   equals 17.2 mmHg, the calculated aortic valve area equals 1.02 cm² by the   continuity equation consistent with moderate aortic stenosis.  15. The aortic valve mean gradient is 17.2 mmHg consistent with mild   aortic stenosis.    PHYSICIAN INTERPRETATION:  Left Ventricle: The left ventricular internal cavity size is normal. Left   ventricular wall thickness is mildly increased. There is mild left   ventricular hypertrophy. Global LV systolic function was moderately   decreased. Left ventricular ejection fraction, by visual estimation, is   30 to 35%. Spectral Doppler shows pseudonormal pattern of left   ventricular myocardial filling (Grade II diastolic dysfunction).  Right Ventricle: Normal right ventricular size and function.  Left Atrium: Normal left atrial size.  Right Atrium: Normal right atrial size.  Pericardium: There is no evidence of pericardial effusion. There is a   moderate pleural effusion in both left and right lateral regions.  Mitral Valve: The mitral valve is normal in structure. Mild thickening of   the anterior and posterior mitral valve leaflets. No evidence of mitral   stenosis. Mild mitral valve regurgitation is seen.  Tricuspid Valve: Structurally normal tricuspid valve, with normal leaflet   excursion. The tricuspid valve is normal in structure. Mild tricuspid   regurgitation is visualized. Estimated pulmonary artery systolic pressure   is 56.3 mmHg assuming a right atrial pressure of 3 mmHg, which is   consistent with moderate pulmonary hypertension.  Aortic Valve: The aortic valve is trileaflet. Sclerotic aortic valve with   decreased opening. Peak transaortic gradient equals 33.4 mmHg, mean   transaortic gradient equals 17.2 mmHg, the calculated aortic valve area   equals 1.02 cm² by the continuity equation consistent with moderate   aortic stenosis. The aortic valve mean gradient is 17.2 mmHg consistent   with mild aortic stenosis. Mild aortic valve regurgitation is seen.  Pulmonic Valve: Structurally normal pulmonic valve, with normal leaflet   excursion. The pulmonic valve is normal. Trace pulmonic valve   regurgitation.  Aorta: Aortic root measured at Sinus of Valsalva is normal.  Pulmonary Artery: The main pulmonary artery is normal in size.       2D AND M-MODE MEASUREMENTS (normal ranges within parentheses):  Left                 Normal   Aorta/Left             Normal  Ventricle:                     Atrium:  IVSd        0.79 cm  (0.7-1.1) AoV Cusp       0.86  (1.5-2.6)  (Mmode):                       Separation:     cm  LVPWd       0.87 cm  (0.7-1.1) Left Atrium    4.43  (1.9-4.0)  (Mmode):                       (Mmode):        cm  LVIDd       5.50 cm  (3.4-5.7) LA Volume      34.2  (Mmode):                       Index         ml/m²  LVIDs       4.60 cm  (Mmode):  LV FS       16.4 %    (>25%)  (Mmode):  Rel. Wall    0.31     (<0.42)  Thickness  Mm  LV Mass    85.2 g/m²  Index:  Mmode    SPECTRAL DOPPLER ANALYSIS:  LV DIASTOLIC FUNCTION:  MV Peak E: 0.94 m/s Decel Time: 127 msec  MV Peak A: 0.68 m/s  E/A Ratio: 1.37    Aortic Valve:  AoV VMax:    2.89 m/s  AoV Area, Vmax: 0.89 cm² Vmax Indx: 0.45 cm²/m²  AoV VTI:     0.43 m    AoV Area, VTI:  1.02 cm² VTI Indx:  0.52 cm²/m²  AoV Pk Grad: 33.4 mmHg  AoV Mn Grad: 17.2 mmHg    LVOT Vmax: 0.83 m/s  LVOT VTI:  0.14 m  LVOT Diam: 1.98 cm    Aortic Insufficiency:  AI Half-time:  681 msec  AI Decel Rate: 1.46 m/s²    Mitral Valve:  MV P1/2 Time: 36.77 msec  MV Area, PHT: 5.98 cm²    Tricuspid Valve and PA/RV Systolic Pressure: TR Max Velocity: 3.65 m/s RA   Pressure: 3 mmHg RVSP/PASP: 56.3 mmHg    Pulmonic Valve:  PV Max Velocity: 1.32 m/s PV Max P.9 mmHg PV Mean PG:       [id number] Shanta Wright M.D., Electronically signed on 2019 at 8:24:51   PM         *** Final ***                SHANTA WRIGHT M.D., RESIDENT RADIOLOGIST  This document has been electronically signed.  SHANTA WRIGHT M.D., RESIDENT RADIOLOGIST  This document has been electronically signed. Dec  7 2019 10:51AM             (19 @ 10:51)  12 Lead ECG:   Ventricular Rate 151 BPM    Atrial Rate 147 BPM    P-R Interval 120 ms    QRS Duration 80 ms    Q-T Interval 270 ms    QTC Calculation(Bezet) 427 ms    P Axis 32 degrees    R Axis -3 degrees    T Axis 84 degrees    Diagnosis Line Sinus tachycardia  Nonspecific ST and T wave abnormality  Abnormal ECG    Confirmed by Rudy Menezes ([id number]) on 2019 5:46:27 AM (19 @ 02:30)      MEDICATIONS  aspirin  chewable 81  atorvastatin 20  dextrose 5%. 1000  dextrose 50% Injectable 12.5  dextrose 50% Injectable 25  dextrose 50% Injectable 25  digoxin     Tablet 0.125  enoxaparin Injectable 40  famotidine    Tablet 40  furosemide   Injectable 40  insulin glargine Injectable (LANTUS) 15  insulin lispro (HumaLOG) corrective regimen sliding scale   insulin lispro Injectable (HumaLOG) 7  meropenem  IVPB 1000  metoprolol succinate   sacubitril 24 mG/valsartan 26 mG 1  spironolactone 25  tamsulosin 0.4  vancomycin  IVPB 1000      ANTIBIOTICS:  meropenem  IVPB 1000 milliGRAM(s) IV Intermittent every 8 hours  vancomycin  IVPB 1000 milliGRAM(s) IV Intermittent every 12 hours      All available historical records have been reviewed

## 2019-12-10 LAB
ANION GAP SERPL CALC-SCNC: 15 MMOL/L — HIGH (ref 7–14)
BUN SERPL-MCNC: 23 MG/DL — HIGH (ref 10–20)
CALCIUM SERPL-MCNC: 8.3 MG/DL — LOW (ref 8.5–10.1)
CHLORIDE SERPL-SCNC: 102 MMOL/L — SIGNIFICANT CHANGE UP (ref 98–110)
CO2 SERPL-SCNC: 28 MMOL/L — SIGNIFICANT CHANGE UP (ref 17–32)
CREAT SERPL-MCNC: 1.1 MG/DL — SIGNIFICANT CHANGE UP (ref 0.7–1.5)
GLUCOSE BLDC GLUCOMTR-MCNC: 136 MG/DL — HIGH (ref 70–99)
GLUCOSE BLDC GLUCOMTR-MCNC: 150 MG/DL — HIGH (ref 70–99)
GLUCOSE BLDC GLUCOMTR-MCNC: 159 MG/DL — HIGH (ref 70–99)
GLUCOSE BLDC GLUCOMTR-MCNC: 191 MG/DL — HIGH (ref 70–99)
GLUCOSE SERPL-MCNC: 161 MG/DL — HIGH (ref 70–99)
HCT VFR BLD CALC: 27.1 % — LOW (ref 42–52)
HGB BLD-MCNC: 8.3 G/DL — LOW (ref 14–18)
MAGNESIUM SERPL-MCNC: 1.9 MG/DL — SIGNIFICANT CHANGE UP (ref 1.8–2.4)
MCHC RBC-ENTMCNC: 28.4 PG — SIGNIFICANT CHANGE UP (ref 27–31)
MCHC RBC-ENTMCNC: 30.6 G/DL — LOW (ref 32–37)
MCV RBC AUTO: 92.8 FL — SIGNIFICANT CHANGE UP (ref 80–94)
NRBC # BLD: 3 /100 WBCS — HIGH (ref 0–0)
PLATELET # BLD AUTO: 88 K/UL — LOW (ref 130–400)
POTASSIUM SERPL-MCNC: 3.7 MMOL/L — SIGNIFICANT CHANGE UP (ref 3.5–5)
POTASSIUM SERPL-SCNC: 3.7 MMOL/L — SIGNIFICANT CHANGE UP (ref 3.5–5)
RBC # BLD: 2.92 M/UL — LOW (ref 4.7–6.1)
RBC # FLD: 18.9 % — HIGH (ref 11.5–14.5)
SODIUM SERPL-SCNC: 145 MMOL/L — SIGNIFICANT CHANGE UP (ref 135–146)
VANCOMYCIN TROUGH SERPL-MCNC: 18.1 UG/ML — HIGH (ref 5–10)
WBC # BLD: 1.98 K/UL — LOW (ref 4.8–10.8)
WBC # FLD AUTO: 1.98 K/UL — LOW (ref 4.8–10.8)

## 2019-12-10 PROCEDURE — 10061 I&D ABSCESS COMP/MULTIPLE: CPT

## 2019-12-10 PROCEDURE — 76770 US EXAM ABDO BACK WALL COMP: CPT | Mod: 26

## 2019-12-10 RX ORDER — FUROSEMIDE 40 MG
40 TABLET ORAL EVERY 12 HOURS
Refills: 0 | Status: DISCONTINUED | OUTPATIENT
Start: 2019-12-10 | End: 2019-12-12

## 2019-12-10 RX ADMIN — MEROPENEM 100 MILLIGRAM(S): 1 INJECTION INTRAVENOUS at 14:24

## 2019-12-10 RX ADMIN — Medication 40 MILLIGRAM(S): at 05:12

## 2019-12-10 RX ADMIN — Medication 7 UNIT(S): at 12:17

## 2019-12-10 RX ADMIN — SACUBITRIL AND VALSARTAN 1 TABLET(S): 24; 26 TABLET, FILM COATED ORAL at 05:14

## 2019-12-10 RX ADMIN — ATORVASTATIN CALCIUM 20 MILLIGRAM(S): 80 TABLET, FILM COATED ORAL at 22:46

## 2019-12-10 RX ADMIN — Medication 200 MILLIGRAM(S): at 05:14

## 2019-12-10 RX ADMIN — Medication 0.12 MILLIGRAM(S): at 05:11

## 2019-12-10 RX ADMIN — SACUBITRIL AND VALSARTAN 1 TABLET(S): 24; 26 TABLET, FILM COATED ORAL at 17:52

## 2019-12-10 RX ADMIN — Medication 1: at 09:10

## 2019-12-10 RX ADMIN — Medication 81 MILLIGRAM(S): at 12:15

## 2019-12-10 RX ADMIN — Medication 250 MILLIGRAM(S): at 17:54

## 2019-12-10 RX ADMIN — Medication 250 MILLIGRAM(S): at 05:13

## 2019-12-10 RX ADMIN — MEROPENEM 100 MILLIGRAM(S): 1 INJECTION INTRAVENOUS at 22:46

## 2019-12-10 RX ADMIN — ENOXAPARIN SODIUM 40 MILLIGRAM(S): 100 INJECTION SUBCUTANEOUS at 12:16

## 2019-12-10 RX ADMIN — INSULIN GLARGINE 15 UNIT(S): 100 INJECTION, SOLUTION SUBCUTANEOUS at 22:46

## 2019-12-10 RX ADMIN — Medication 7 UNIT(S): at 08:15

## 2019-12-10 RX ADMIN — SPIRONOLACTONE 25 MILLIGRAM(S): 25 TABLET, FILM COATED ORAL at 05:11

## 2019-12-10 RX ADMIN — TAMSULOSIN HYDROCHLORIDE 0.4 MILLIGRAM(S): 0.4 CAPSULE ORAL at 22:46

## 2019-12-10 RX ADMIN — MEROPENEM 100 MILLIGRAM(S): 1 INJECTION INTRAVENOUS at 05:10

## 2019-12-10 RX ADMIN — Medication 40 MILLIGRAM(S): at 17:52

## 2019-12-10 RX ADMIN — FAMOTIDINE 40 MILLIGRAM(S): 10 INJECTION INTRAVENOUS at 22:47

## 2019-12-10 RX ADMIN — Medication 1: at 17:51

## 2019-12-10 RX ADMIN — Medication 7 UNIT(S): at 17:54

## 2019-12-10 NOTE — PROGRESS NOTE ADULT - SUBJECTIVE AND OBJECTIVE BOX
SHANE ASHTON 74y Male  MRN#: [id number]         SUBJECTIVE  Patient is a 74y old Male who presents with a chief complaint of Cellulitis (08 Dec 2019 09:03)  Currently admitted to medicine with the primary diagnosis of Cellulitis    PAtient reports no pain or other symptoms    OBJECTIVE  PAST MEDICAL & SURGICAL HISTORY  MDS (myelodysplastic syndrome)  Afib  GERD (gastroesophageal reflux disease)  CAD (coronary artery disease)  Diabetes  HTN (hypertension)  H/O colonoscopy: 10 yeras ago    ALLERGIES:  No Known Allergies    MEDICATIONS:  STANDING MEDICATIONS  aspirin  chewable 81 milliGRAM(s) Oral daily  atorvastatin 20 milliGRAM(s) Oral at bedtime  dextrose 5%. 1000 milliLiter(s) IV Continuous <Continuous>  dextrose 50% Injectable 12.5 Gram(s) IV Push once  dextrose 50% Injectable 25 Gram(s) IV Push once  dextrose 50% Injectable 25 Gram(s) IV Push once  digoxin     Tablet 0.125 milliGRAM(s) Oral daily  enoxaparin Injectable 40 milliGRAM(s) SubCutaneous daily  famotidine    Tablet 40 milliGRAM(s) Oral at bedtime  furosemide   Injectable 40 milliGRAM(s) IV Push every 12 hours  insulin glargine Injectable (LANTUS) 15 Unit(s) SubCutaneous at bedtime  insulin lispro (HumaLOG) corrective regimen sliding scale   SubCutaneous three times a day before meals  insulin lispro Injectable (HumaLOG) 7 Unit(s) SubCutaneous three times a day before meals  meropenem  IVPB 1000 milliGRAM(s) IV Intermittent every 8 hours  metoprolol succinate  milliGRAM(s) Oral daily  sacubitril 24 mG/valsartan 26 mG 1 Tablet(s) Oral two times a day  spironolactone 25 milliGRAM(s) Oral daily  tamsulosin 0.4 milliGRAM(s) Oral at bedtime  vancomycin  IVPB 750 milliGRAM(s) IV Intermittent every 12 hours    PRN MEDICATIONS  acetaminophen   Tablet .. 650 milliGRAM(s) Oral every 6 hours PRN  dextrose 40% Gel 15 Gram(s) Oral once PRN  glucagon  Injectable 1 milliGRAM(s) IntraMuscular once PRN      VITAL SIGNS: Last 24 Hours  T(C): 36.8 (10 Dec 2019 04:47), Max: 36.8 (10 Dec 2019 04:47)  T(F): 98.2 (10 Dec 2019 04:47), Max: 98.2 (10 Dec 2019 04:47)  HR: 88 (10 Dec 2019 04:47) (82 - 96)  BP: 111/66 (10 Dec 2019 04:47) (111/66 - 119/71)  BP(mean): --  RR: 18 (09 Dec 2019 22:07) (18 - 18)  SpO2: --    LABS:                        8.3    1.98  )-----------( 88       ( 10 Dec 2019 08:24 )             27.1     12-10    145  |  102  |  23<H>  ----------------------------<  161<H>  3.7   |  28  |  1.1    Ca    8.3<L>      10 Dec 2019 08:24  Mg     1.9     12-10        Urinalysis Basic - ( 09 Dec 2019 21:00 )    Color: Colorless / Appearance: Clear / S.009 / pH: x  Gluc: x / Ketone: Negative  / Bili: Negative / Urobili: 3 mg/dL   Blood: x / Protein: Negative / Nitrite: Negative   Leuk Esterase: Negative / RBC: 345 /HPF / WBC 1 /HPF   Sq Epi: x / Non Sq Epi: 0 /HPF / Bacteria: Negative            Culture - Blood (collected 07 Dec 2019 09:43)  Source: .Blood None  Preliminary Report (08 Dec 2019 19:01):    No growth to date.          RADIOLOGY:        PHYSICAL EXAM:  GEN: In no acute distress. Pt. is awake in bed able to have a conversation. Head movements and lip smacking  LUNGS: CTABL, Symmetrical inspiration, no increased work of breathing  HEART: +S1,S2, Slightly Irregular rhythm appreciated   ABD: Bowel Sounds Present, Soft, non tender, non distended, no guarding, no rebound.   EXT: 2+ peripheral Pulses, no clubbing, no cyanosis, no Edema.  BL UE wrists with erythematous lesions. Erythema wrose on RUE, LUE has what looks like old lesion. erythema improving  NEURO: Alert and oriented. No focal deficits.         Assessment and Plan:   74 year old gentleman knwon to have CAD s/p stent, non-obstructive cardiomyopathy with EF 26%, hypertension, hyperlipidemia, DM II, Afib, MDS presents for fever and worsening swelling in his right hand + fevers.     # Sepsis secondary to hands abscess and cellulitis  - BP stable, remain with no fever  - trough level appropriate Dec 8 - 20.5 -- Continue with Vanc and meropenem D5 abx, ID onboard  - Burn team evaluation for possible debridement --> will do I and D at bedside   - fu wound culture bacterial and fungal    # HFrEF  - SOB improving, remained on NC  - Continue with IV Lasix 40 mg BID  - Continue Entresto BID and Spironolactone  - Continue BIPAP at night and PRN  - TTE- as above - EF 30-35 , GIIDD, Moderate PAH, Mild-Mod AS    # Hypokalemia  - Secondary to diuresis  - Replete as needed    # MDS/pancytopenia  - s/p 1 Uprbc on , Hb stable  - cont monitor and keep active t/s  - f/u with hem/onc john --> transfuse if hb less than 8. fu with hem/onc as outpatient ? patient to be started on Procrit    # Paroxysmal Afib not on AC  - regular rate and rhythm  - Continue with Metoprolol  qD and digoxin .125 qD    # DMII- Hold oral antidiabetics - Lantus 15 qHS Lispro 7 qAC  # BPH - Continue with  flomax 0.4 mg qd    Diet: Dysphagia 3 Soft; Thins - 1200 fluid restriction  DVT ppx:  Lovenox 40 mg qd  GI ppx: not indicated  Activity: Ambulater as tolerated  Dispo: From [facility] living  FULL CODE SHANE ASHTON 74y Male  MRN#: [id number]         SUBJECTIVE  Patient is a 74y old Male who presents with a chief complaint of Cellulitis (08 Dec 2019 09:03)  Currently admitted to medicine with the primary diagnosis of Cellulitis    PAtient reports no pain or other symptoms    OBJECTIVE  PAST MEDICAL & SURGICAL HISTORY  MDS (myelodysplastic syndrome)  Afib  GERD (gastroesophageal reflux disease)  CAD (coronary artery disease)  Diabetes  HTN (hypertension)  H/O colonoscopy: 10 yeras ago    ALLERGIES:  No Known Allergies    MEDICATIONS:  STANDING MEDICATIONS  aspirin  chewable 81 milliGRAM(s) Oral daily  atorvastatin 20 milliGRAM(s) Oral at bedtime  dextrose 5%. 1000 milliLiter(s) IV Continuous <Continuous>  dextrose 50% Injectable 12.5 Gram(s) IV Push once  dextrose 50% Injectable 25 Gram(s) IV Push once  dextrose 50% Injectable 25 Gram(s) IV Push once  digoxin     Tablet 0.125 milliGRAM(s) Oral daily  enoxaparin Injectable 40 milliGRAM(s) SubCutaneous daily  famotidine    Tablet 40 milliGRAM(s) Oral at bedtime  furosemide   Injectable 40 milliGRAM(s) IV Push every 12 hours  insulin glargine Injectable (LANTUS) 15 Unit(s) SubCutaneous at bedtime  insulin lispro (HumaLOG) corrective regimen sliding scale   SubCutaneous three times a day before meals  insulin lispro Injectable (HumaLOG) 7 Unit(s) SubCutaneous three times a day before meals  meropenem  IVPB 1000 milliGRAM(s) IV Intermittent every 8 hours  metoprolol succinate  milliGRAM(s) Oral daily  sacubitril 24 mG/valsartan 26 mG 1 Tablet(s) Oral two times a day  spironolactone 25 milliGRAM(s) Oral daily  tamsulosin 0.4 milliGRAM(s) Oral at bedtime  vancomycin  IVPB 750 milliGRAM(s) IV Intermittent every 12 hours    PRN MEDICATIONS  acetaminophen   Tablet .. 650 milliGRAM(s) Oral every 6 hours PRN  dextrose 40% Gel 15 Gram(s) Oral once PRN  glucagon  Injectable 1 milliGRAM(s) IntraMuscular once PRN      VITAL SIGNS: Last 24 Hours  T(C): 36.8 (10 Dec 2019 04:47), Max: 36.8 (10 Dec 2019 04:47)  T(F): 98.2 (10 Dec 2019 04:47), Max: 98.2 (10 Dec 2019 04:47)  HR: 88 (10 Dec 2019 04:47) (82 - 96)  BP: 111/66 (10 Dec 2019 04:47) (111/66 - 119/71)  BP(mean): --  RR: 18 (09 Dec 2019 22:07) (18 - 18)  SpO2: --    LABS:                        8.3    1.98  )-----------( 88       ( 10 Dec 2019 08:24 )             27.1     12-10    145  |  102  |  23<H>  ----------------------------<  161<H>  3.7   |  28  |  1.1    Ca    8.3<L>      10 Dec 2019 08:24  Mg     1.9     12-10        Urinalysis Basic - ( 09 Dec 2019 21:00 )    Color: Colorless / Appearance: Clear / S.009 / pH: x  Gluc: x / Ketone: Negative  / Bili: Negative / Urobili: 3 mg/dL   Blood: x / Protein: Negative / Nitrite: Negative   Leuk Esterase: Negative / RBC: 345 /HPF / WBC 1 /HPF   Sq Epi: x / Non Sq Epi: 0 /HPF / Bacteria: Negative            Culture - Blood (collected 07 Dec 2019 09:43)  Source: .Blood None  Preliminary Report (08 Dec 2019 19:01):    No growth to date.          RADIOLOGY:        PHYSICAL EXAM:  GEN: In no acute distress. Pt. is awake in bed able to have a conversation. Head movements and lip smacking  LUNGS: CTABL, Symmetrical inspiration, no increased work of breathing  HEART: +S1,S2, Slightly Irregular rhythm appreciated   ABD: Bowel Sounds Present, Soft, non tender, non distended, no guarding, no rebound.   EXT: 2+ peripheral Pulses, no clubbing, no cyanosis, no Edema.  BL UE wrists with erythematous lesions. Erythema wrose on RUE, LUE has what looks like old lesion. erythema improving  NEURO: Alert and oriented. No focal deficits.         Assessment and Plan:   74 year old gentleman knwon to have CAD s/p stent, non-obstructive cardiomyopathy with EF 26%, hypertension, hyperlipidemia, DM II, Afib, MDS presents for fever and worsening swelling in his right hand + fevers.     # Sepsis secondary to hands abscess and cellulitis  - BP stable, remain with no fever  - trough level appropriate Dec 8 - 20.5 -- Continue with Vanc and meropenem D6 abx, ID onboard  - Burn team evaluation for possible debridement --> will do I and D at bedside   - fu wound culture bacterial and fungal    # HFrEF  - SOB improving, remained on NC  - Continue with IV Lasix 40 mg BID  - Continue Entresto BID and Spironolactone  - Continue BIPAP at night and PRN  - TTE- as above - EF 30-35 , GIIDD, Moderate PAH, Mild-Mod AS    # Hypokalemia  - Secondary to diuresis  - Replete as needed    # MDS/pancytopenia  - s/p 1 Uprbc on , Hb stable  - cont monitor and keep active t/s  - f/u with hem/onc john --> transfuse if hb less than 8. fu with hem/onc as outpatient ? patient to be started on Procrit    # Paroxysmal Afib not on AC  - regular rate and rhythm  - Continue with Metoprolol  qD and digoxin .125 qD    # DMII- Hold oral antidiabetics - Lantus 15 qHS Lispro 7 qAC  # BPH - Continue with  flomax 0.4 mg qd    Diet: Dysphagia 3 Soft; Thins - 1200 fluid restriction  DVT ppx:  Lovenox 40 mg qd  GI ppx: not indicated  Activity: Ambulater as tolerated  Dispo: From [facility] living  FULL CODE

## 2019-12-10 NOTE — PROGRESS NOTE ADULT - ASSESSMENT
Sepsis 2/2 cellulitis.     Possible underlying abscess in right hand/forearm    MDS, anemia     HFrEF with fluid overload    id noted  burn fu for debridement today  iv lasix change to po  fulytes  onc fu  continue current rx

## 2019-12-10 NOTE — SWALLOW BEDSIDE ASSESSMENT ADULT - NS SPL SWALLOW CLINIC TRIAL FT
Mild oral dysphagia w/o any overt s/s of penetration/aspiration w/ thin, puree and soft solids
+min overt s/s of penetration/aspiration w/ thin

## 2019-12-10 NOTE — PHYSICAL THERAPY INITIAL EVALUATION ADULT - ADDITIONAL COMMENTS
Patient states he ambulates in facility using rolling walker. Patient independent in ADL's. Patient states occasionally he requires some help.

## 2019-12-10 NOTE — SWALLOW BEDSIDE ASSESSMENT ADULT - COMMENTS
no reported h/o PNA per pt
Pts niece at b/s reported dx of MDS ~6 mo ago, no reported h/o PNA, +overt s/s of dysphagia noted at home w/ c/o globus and coughing w/ po intake, pt and niece are open to further investigation of his maranda-pharyngeal swallow integrity

## 2019-12-10 NOTE — PHYSICAL THERAPY INITIAL EVALUATION ADULT - PLANNED THERAPY INTERVENTIONS, PT EVAL
gait training/transfer training/ROM/balance training/bed mobility training/stretching/neuromuscular re-education/strengthening

## 2019-12-10 NOTE — SWALLOW BEDSIDE ASSESSMENT ADULT - SLP GENERAL OBSERVATIONS
Pt received in bed awake and alert on room air.
Pt received in bed w/ niece at b/s, on O2 NC, awaiting debridement w/ burn

## 2019-12-10 NOTE — CDI QUERY NOTE - NSCDIOTHERTXTBX_GEN_ALL_CORE_HH
Query for Acuity of CHF:    Pt. c/o fevers, worsening Cellulitis admitted with Sepsis.  Pt. has history of HTN, HFrEF, MDS.    Progress note of 12/6 states:  "Received 2.5 L of iV boluses in ED, now fluid overloaded on exam with worsening CXR this morning (bilateral effusions and infiltrates). "    CXR on 12/6 states:  Worsening bilateral opacities and effusions.  Pt. started on Lasix 40mg IVP q12 hour.    on 12/9, Progress note states:  # HFrEF  - SOB improving, remained on NC  - Continue with IV Lasix 40 mg BID      Please document diagnosis significant with the above clinical indicators:    -  Acute on chronic Systolic CHF    -  Chronic Systolic CHF    -  Other (please specify)    -  Unable to determine    Thank you for your assistance in this matter.

## 2019-12-10 NOTE — PROGRESS NOTE ADULT - SUBJECTIVE AND OBJECTIVE BOX
SHANE ASHTON  74y, Male    All available historical data reviewed    OVERNIGHT EVENTS:  no fevers, feels well    ROS:  General: Denies rigors, night sweats  HEENT: Denies headache, rhinorrhea, sore throat, eye pain  CV: Denies CP, palpitations  PULM: Denies wheezing, hemoptysis  GI: Denies hematemesis, hematochezia, melena  : Denies discharge, hematuria  MSK: Denies arthralgias, myalgias  SKIN: Denies rash, lesions  NEURO: Denies paresthesias, weakness  PSYCH: Denies depression, anxiety    VITALS:  T(F): 98.2, Max: 98.2 (12-10-19 @ 04:47)  HR: 88  BP: 111/66  RR: 18Vital Signs Last 24 Hrs  T(C): 36.8 (10 Dec 2019 04:47), Max: 36.8 (10 Dec 2019 04:47)  T(F): 98.2 (10 Dec 2019 04:47), Max: 98.2 (10 Dec 2019 04:47)  HR: 88 (10 Dec 2019 04:47) (82 - 96)  BP: 111/66 (10 Dec 2019 04:47) (111/66 - 119/71)  BP(mean): --  RR: 18 (09 Dec 2019 22:07) (18 - 18)  SpO2: --    TESTS & MEASUREMENTS:                        8.3    1.98  )-----------( 88       ( 10 Dec 2019 08:24 )             27.1     12-10    145  |  102  |  23<H>  ----------------------------<  161<H>  3.7   |  28  |  1.1    Ca    8.3<L>      10 Dec 2019 08:24  Mg     1.9     12-10          Culture - Blood (collected 19 @ 09:43)  Source: .Blood None  Preliminary Report (19 @ 19:01):    No growth to date.    Culture - Urine (collected 19 @ 19:05)  Source: .Urine Clean Catch (Midstream)  Final Report (19 @ 22:31):    No growth    Culture - Urine (collected 19 @ 20:40)  Source: .Urine Clean Catch (Midstream)  Final Report (19 @ 23:21):    No growth    Culture - Blood (collected 19 @ 15:20)  Source: .Blood Blood-Peripheral  Final Report (19 @ 23:01):    No growth at 5 days.    Culture - Blood (collected 19 @ 15:20)  Source: .Blood Blood-Peripheral  Final Report (19 @ 23:01):    No growth at 5 days.      Urinalysis Basic - ( 09 Dec 2019 21:00 )    Color: Colorless / Appearance: Clear / S.009 / pH: x  Gluc: x / Ketone: Negative  / Bili: Negative / Urobili: 3 mg/dL   Blood: x / Protein: Negative / Nitrite: Negative   Leuk Esterase: Negative / RBC: 345 /HPF / WBC 1 /HPF   Sq Epi: x / Non Sq Epi: 0 /HPF / Bacteria: Negative          RADIOLOGY & ADDITIONAL TESTS:  Personal review of radiological diagnostics performed  Echo and EKG results noted when applicable.     ANTIBIOTICS:  meropenem  IVPB 1000 milliGRAM(s) IV Intermittent every 8 hours  vancomycin  IVPB 750 milliGRAM(s) IV Intermittent every 12 hours

## 2019-12-10 NOTE — SWALLOW BEDSIDE ASSESSMENT ADULT - SWALLOW EVAL: DIAGNOSIS
+toleration of puree, thin and soft solids w/o overt s/s of penetration/aspiration
+min overt s/s of penetration/aspiration w/ thins

## 2019-12-10 NOTE — PROGRESS NOTE ADULT - ASSESSMENT
· Assessment		  ASSESSMENT  74y Male with a PMH of CAD, s/p Stents, Non-Obstructive Cardiomyopathy w/ EF 26%, HTN, Hyperlipidemia, DM, MDS     IMPRESSION  # Sepsis secondary to abscess / cellulitis right hand dorsum.   -no fascitis/tenosynovitis    -has improved significantly on ABx  # Abscess without cellulitis left hand dorsum with necrotic tissue   -better with ABx  -no evidence of ecthyma  -no fungal infection    -BCx 12/4 NG    RECOMMENDATIONS  - debridement of both abscesses   - DECREASE to Vanc 750mg q12h IV  -will change to po ABx post debridement

## 2019-12-10 NOTE — SWALLOW BEDSIDE ASSESSMENT ADULT - SLP PERTINENT HISTORY OF CURRENT PROBLEM
Pt admitted from assisted living facility w/ tachycardia, fever, edema of hands. Pt w/ reported h/o anxiety, exhibits jerking body movements and speech is characterized by prolongations, hesitations and imprecise articulation 2' MDS. Pt known to  service from previous admission
Pt admitted from assisted living facility w/ tachycardia, fever, edema of hands. Pt w/ reported h/o anxiety, exhibits jerking body movements and speech is characterized by prolongations, hesitations and imprecise articulation 2' MDS. Pt known to  service from previous admission

## 2019-12-10 NOTE — BRIEF OPERATIVE NOTE - NSICDXBRIEFPROCEDURE_GEN_ALL_CORE_FT
PROCEDURES:  Drainage, abscess, superficial soft tissue 10-Dec-2019 17:18:33 abscess drainage bilateral hands dorsum Nick Haile

## 2019-12-10 NOTE — CONSULT NOTE ADULT - SUBJECTIVE AND OBJECTIVE BOX
HPI:  74 year old gentleman knwon to have CAD s/p stent, non-obstructive cardiomyopathy with EF 26%, hypertension, hyperlipidemia, DM II, Afib, MDS presents for fever and worsening swelling in his right hand.    Patient was recently admitted for CHF exacerbation and was discharged on .  Since before discharge, family say he had swelling and redness in both hands. Since then, swelling in right hand has been getting worse. Since 2 days before presentation, patient started having fevers. He started PO clindamycin with no improvement of symptoms.  Otherwise, ROS is negative. No SOB, cough, or chest pain.  In ED, patient was found to be septic with fever and tachycardia. He was initially put under observation. Admitted today for persistent tachycardia and non improving symptoms. (05 Dec 2019 10:02)      PAST MEDICAL & SURGICAL HISTORY:  MDS (myelodysplastic syndrome)  Afib  GERD (gastroesophageal reflux disease)  CAD (coronary artery disease)  Diabetes  HTN (hypertension)  H/O colonoscopy: 10 yeras ago      Hospital Course:    TODAY'S SUBJECTIVE & REVIEW OF SYMPTOMS:     Constitutional WNL   Cardio WNL   Resp WNL   GI WNL  Heme WNL  Endo WNL  Skin WNL  MSK Weakness  Neuro WNL  Cognitive WNL  Psych WNL      MEDICATIONS  (STANDING):  aspirin  chewable 81 milliGRAM(s) Oral daily  atorvastatin 20 milliGRAM(s) Oral at bedtime  dextrose 5%. 1000 milliLiter(s) (50 mL/Hr) IV Continuous <Continuous>  dextrose 50% Injectable 12.5 Gram(s) IV Push once  dextrose 50% Injectable 25 Gram(s) IV Push once  dextrose 50% Injectable 25 Gram(s) IV Push once  digoxin     Tablet 0.125 milliGRAM(s) Oral daily  enoxaparin Injectable 40 milliGRAM(s) SubCutaneous daily  famotidine    Tablet 40 milliGRAM(s) Oral at bedtime  furosemide    Tablet 40 milliGRAM(s) Oral every 12 hours  insulin glargine Injectable (LANTUS) 15 Unit(s) SubCutaneous at bedtime  insulin lispro (HumaLOG) corrective regimen sliding scale   SubCutaneous three times a day before meals  insulin lispro Injectable (HumaLOG) 7 Unit(s) SubCutaneous three times a day before meals  meropenem  IVPB 1000 milliGRAM(s) IV Intermittent every 8 hours  metoprolol succinate  milliGRAM(s) Oral daily  sacubitril 24 mG/valsartan 26 mG 1 Tablet(s) Oral two times a day  spironolactone 25 milliGRAM(s) Oral daily  tamsulosin 0.4 milliGRAM(s) Oral at bedtime  vancomycin  IVPB 750 milliGRAM(s) IV Intermittent every 12 hours    MEDICATIONS  (PRN):  acetaminophen   Tablet .. 650 milliGRAM(s) Oral every 6 hours PRN Temp greater or equal to 38C (100.4F)  dextrose 40% Gel 15 Gram(s) Oral once PRN Blood Glucose LESS THAN 70 milliGRAM(s)/deciliter  glucagon  Injectable 1 milliGRAM(s) IntraMuscular once PRN Glucose LESS THAN 70 milligrams/deciliter      FAMILY HISTORY:  FH: hypertension      Allergies    No Known Allergies    Intolerances        SOCIAL HISTORY:    [  ] Etoh  [  ] Smoking  [  ] Substance abuse     Home Environment:  [  ] Home Alone  [  ] Lives with Family  [  ] Home Health Aid    Dwelling:  [  ] Apartment  [  ] Private House  [  ] Adult Home  [ x ] Skilled Nursing Facility      [  ] Short Term  [  ] Long Term  [  ] Stairs       Elevator [  ]    FUNCTIONAL STATUS PTA: (Check all that apply)  Ambulation: [ x  ]Independent    [  ] Dependent     [  ] Non-Ambulatory  Assistive Device: [  ] SA Cane  [  ]  Q Cane  [ x ] Walker  [  ]  Wheelchair  ADL : [  ] Independent  [ x ]  Dependent       Vital Signs Last 24 Hrs  T(C): 36.8 (10 Dec 2019 04:47), Max: 36.8 (10 Dec 2019 04:47)  T(F): 98.2 (10 Dec 2019 04:47), Max: 98.2 (10 Dec 2019 04:47)  HR: 88 (10 Dec 2019 04:47) (88 - 96)  BP: 111/66 (10 Dec 2019 04:47) (111/66 - 116/75)  BP(mean): --  RR: 18 (09 Dec 2019 22:07) (18 - 18)  SpO2: --      PHYSICAL EXAM: Alert & awake  GENERAL: NAD  HEAD:  Atraumatic, Normocephalic  CHEST/LUNG: Clear   HEART: S1S2+  ABDOMEN: Soft, Nontender  EXTREMITIES:  no calf tenderness    NERVOUS SYSTEM:  Cranial Nerves 2-12 intact [  ] Abnormal  [  ]  ROM: WFL all extremities [x  ]  Abnormal [  ]  Motor Strength: WFL all extremities  [  ]  Abnormal [x  ]4/5 all ext  Sensation: intact to light touch [ x ] Abnormal [  ]  Reflexes: Symmetric [  ]  Abnormal [  ]    FUNCTIONAL STATUS:  Bed Mobility: Independent [  ]  Supervision [  ]  Needs Assistance [ x ]  N/A [  ]  Transfers: Independent [  ]  Supervision [  ]  Needs Assistance [x  ]  N/A [  ]   Ambulation: Independent [  ]  Supervision [  ]  Needs Assistance [  ]  N/A [  ]  ADL: Independent [  ] Requires Assistance [  ] N/A [  ]      LABS:                        8.3    1.98  )-----------( 88       ( 10 Dec 2019 08:24 )             27.1     12-10    145  |  102  |  23<H>  ----------------------------<  161<H>  3.7   |  28  |  1.1    Ca    8.3<L>      10 Dec 2019 08:24  Mg     1.9     12-10        Urinalysis Basic - ( 09 Dec 2019 21:00 )    Color: Colorless / Appearance: Clear / S.009 / pH: x  Gluc: x / Ketone: Negative  / Bili: Negative / Urobili: 3 mg/dL   Blood: x / Protein: Negative / Nitrite: Negative   Leuk Esterase: Negative / RBC: 345 /HPF / WBC 1 /HPF   Sq Epi: x / Non Sq Epi: 0 /HPF / Bacteria: Negative        RADIOLOGY & ADDITIONAL STUDIES:    Assesment:

## 2019-12-10 NOTE — PROGRESS NOTE ADULT - SUBJECTIVE AND OBJECTIVE BOX
Patient was seen and examined. Spoke with RN. Chart reviewed.  i/d today  fu cx  d/w ho    No events overnight.  Vital Signs Last 24 Hrs  T(F): 98.2 (10 Dec 2019 04:47), Max: 98.2 (10 Dec 2019 04:47)  HR: 88 (10 Dec 2019 04:47) (82 - 96)  BP: 111/66 (10 Dec 2019 04:47) (111/66 - 119/71)  SpO2: --  MEDICATIONS  (STANDING):  aspirin  chewable 81 milliGRAM(s) Oral daily  atorvastatin 20 milliGRAM(s) Oral at bedtime  dextrose 5%. 1000 milliLiter(s) (50 mL/Hr) IV Continuous <Continuous>  dextrose 50% Injectable 12.5 Gram(s) IV Push once  dextrose 50% Injectable 25 Gram(s) IV Push once  dextrose 50% Injectable 25 Gram(s) IV Push once  digoxin     Tablet 0.125 milliGRAM(s) Oral daily  enoxaparin Injectable 40 milliGRAM(s) SubCutaneous daily  famotidine    Tablet 40 milliGRAM(s) Oral at bedtime  furosemide    Tablet 40 milliGRAM(s) Oral every 12 hours  insulin glargine Injectable (LANTUS) 15 Unit(s) SubCutaneous at bedtime  insulin lispro (HumaLOG) corrective regimen sliding scale   SubCutaneous three times a day before meals  insulin lispro Injectable (HumaLOG) 7 Unit(s) SubCutaneous three times a day before meals  meropenem  IVPB 1000 milliGRAM(s) IV Intermittent every 8 hours  metoprolol succinate  milliGRAM(s) Oral daily  sacubitril 24 mG/valsartan 26 mG 1 Tablet(s) Oral two times a day  spironolactone 25 milliGRAM(s) Oral daily  tamsulosin 0.4 milliGRAM(s) Oral at bedtime  vancomycin  IVPB 750 milliGRAM(s) IV Intermittent every 12 hours    MEDICATIONS  (PRN):  acetaminophen   Tablet .. 650 milliGRAM(s) Oral every 6 hours PRN Temp greater or equal to 38C (100.4F)  dextrose 40% Gel 15 Gram(s) Oral once PRN Blood Glucose LESS THAN 70 milliGRAM(s)/deciliter  glucagon  Injectable 1 milliGRAM(s) IntraMuscular once PRN Glucose LESS THAN 70 milligrams/deciliter    Labs:                        8.3    1.98  )-----------( 88       ( 10 Dec 2019 08:24 )             27.1                         8.0    1.99  )-----------( 70       ( 09 Dec 2019 07:06 )             25.2     10 Dec 2019 08:24    145    |  102    |  23     ----------------------------<  161    3.7     |  28     |  1.1    09 Dec 2019 07:06    145    |  102    |  25     ----------------------------<  145    3.3     |  29     |  1.3      Ca    8.3        10 Dec 2019 08:24  Ca    8.2        09 Dec 2019 07:06  Mg     1.9       10 Dec 2019 08:24        Urinalysis Basic - ( 09 Dec 2019 21:00 )    Color: Colorless / Appearance: Clear / S.009 / pH: x  Gluc: x / Ketone: Negative  / Bili: Negative / Urobili: 3 mg/dL   Blood: x / Protein: Negative / Nitrite: Negative   Leuk Esterase: Negative / RBC: 345 /HPF / WBC 1 /HPF   Sq Epi: x / Non Sq Epi: 0 /HPF / Bacteria: Negative          Radiology:    General: comfortable, NAD  Neurology: A&Ox1, nonfocal  Head:  Normocephalic, atraumatic  ENT:  Mucosa moist, no ulcerations  Neck:  Supple, no JVD,   Skin: no breakdowns (as per RN)  Resp: CTA B/L  CV: RRR, S1S2,   GI: Soft, NT, bowel sounds  MS: No edema, + peripheral pulses, FROM all 4 extremity

## 2019-12-11 ENCOUNTER — LABORATORY RESULT (OUTPATIENT)
Age: 74
End: 2019-12-11

## 2019-12-11 ENCOUNTER — TRANSCRIPTION ENCOUNTER (OUTPATIENT)
Age: 74
End: 2019-12-11

## 2019-12-11 LAB
ANION GAP SERPL CALC-SCNC: 15 MMOL/L — HIGH (ref 7–14)
BUN SERPL-MCNC: 22 MG/DL — HIGH (ref 10–20)
CALCIUM SERPL-MCNC: 8.3 MG/DL — LOW (ref 8.5–10.1)
CHLORIDE SERPL-SCNC: 101 MMOL/L — SIGNIFICANT CHANGE UP (ref 98–110)
CO2 SERPL-SCNC: 26 MMOL/L — SIGNIFICANT CHANGE UP (ref 17–32)
CREAT SERPL-MCNC: 1.1 MG/DL — SIGNIFICANT CHANGE UP (ref 0.7–1.5)
CULTURE RESULTS: NO GROWTH — SIGNIFICANT CHANGE UP
GLUCOSE BLDC GLUCOMTR-MCNC: 128 MG/DL — HIGH (ref 70–99)
GLUCOSE BLDC GLUCOMTR-MCNC: 173 MG/DL — HIGH (ref 70–99)
GLUCOSE BLDC GLUCOMTR-MCNC: 228 MG/DL — HIGH (ref 70–99)
GLUCOSE BLDC GLUCOMTR-MCNC: 285 MG/DL — HIGH (ref 70–99)
GLUCOSE BLDC GLUCOMTR-MCNC: 321 MG/DL — HIGH (ref 70–99)
GLUCOSE SERPL-MCNC: 180 MG/DL — HIGH (ref 70–99)
HCT VFR BLD CALC: 26.6 % — LOW (ref 42–52)
HGB BLD-MCNC: 8.1 G/DL — LOW (ref 14–18)
MCHC RBC-ENTMCNC: 28.5 PG — SIGNIFICANT CHANGE UP (ref 27–31)
MCHC RBC-ENTMCNC: 30.5 G/DL — LOW (ref 32–37)
MCV RBC AUTO: 93.7 FL — SIGNIFICANT CHANGE UP (ref 80–94)
NRBC # BLD: 2 /100 WBCS — HIGH (ref 0–0)
PLATELET # BLD AUTO: 81 K/UL — LOW (ref 130–400)
POTASSIUM SERPL-MCNC: 3.5 MMOL/L — SIGNIFICANT CHANGE UP (ref 3.5–5)
POTASSIUM SERPL-SCNC: 3.5 MMOL/L — SIGNIFICANT CHANGE UP (ref 3.5–5)
RBC # BLD: 2.84 M/UL — LOW (ref 4.7–6.1)
RBC # FLD: 18.7 % — HIGH (ref 11.5–14.5)
SODIUM SERPL-SCNC: 142 MMOL/L — SIGNIFICANT CHANGE UP (ref 135–146)
SPECIMEN SOURCE: SIGNIFICANT CHANGE UP
WBC # BLD: 1.91 K/UL — LOW (ref 4.8–10.8)
WBC # FLD AUTO: 1.91 K/UL — LOW (ref 4.8–10.8)

## 2019-12-11 PROCEDURE — 99231 SBSQ HOSP IP/OBS SF/LOW 25: CPT

## 2019-12-11 RX ORDER — POTASSIUM CHLORIDE 20 MEQ
20 PACKET (EA) ORAL
Refills: 0 | Status: COMPLETED | OUTPATIENT
Start: 2019-12-11 | End: 2019-12-11

## 2019-12-11 RX ADMIN — Medication 4: at 18:54

## 2019-12-11 RX ADMIN — FAMOTIDINE 40 MILLIGRAM(S): 10 INJECTION INTRAVENOUS at 21:51

## 2019-12-11 RX ADMIN — Medication 110 MILLIGRAM(S): at 18:54

## 2019-12-11 RX ADMIN — Medication 1: at 08:40

## 2019-12-11 RX ADMIN — Medication 7 UNIT(S): at 18:53

## 2019-12-11 RX ADMIN — ENOXAPARIN SODIUM 40 MILLIGRAM(S): 100 INJECTION SUBCUTANEOUS at 12:09

## 2019-12-11 RX ADMIN — INSULIN GLARGINE 15 UNIT(S): 100 INJECTION, SOLUTION SUBCUTANEOUS at 21:48

## 2019-12-11 RX ADMIN — Medication 3: at 12:10

## 2019-12-11 RX ADMIN — Medication 7 UNIT(S): at 08:39

## 2019-12-11 RX ADMIN — Medication 250 MILLIGRAM(S): at 05:13

## 2019-12-11 RX ADMIN — Medication 20 MILLIEQUIVALENT(S): at 14:15

## 2019-12-11 RX ADMIN — Medication 81 MILLIGRAM(S): at 12:10

## 2019-12-11 RX ADMIN — Medication 200 MILLIGRAM(S): at 05:16

## 2019-12-11 RX ADMIN — Medication 7 UNIT(S): at 12:10

## 2019-12-11 RX ADMIN — Medication 0.12 MILLIGRAM(S): at 05:14

## 2019-12-11 RX ADMIN — MEROPENEM 100 MILLIGRAM(S): 1 INJECTION INTRAVENOUS at 05:14

## 2019-12-11 RX ADMIN — TAMSULOSIN HYDROCHLORIDE 0.4 MILLIGRAM(S): 0.4 CAPSULE ORAL at 21:50

## 2019-12-11 RX ADMIN — SACUBITRIL AND VALSARTAN 1 TABLET(S): 24; 26 TABLET, FILM COATED ORAL at 18:55

## 2019-12-11 RX ADMIN — Medication 40 MILLIGRAM(S): at 18:55

## 2019-12-11 RX ADMIN — Medication 40 MILLIGRAM(S): at 05:14

## 2019-12-11 RX ADMIN — ATORVASTATIN CALCIUM 20 MILLIGRAM(S): 80 TABLET, FILM COATED ORAL at 21:51

## 2019-12-11 RX ADMIN — SACUBITRIL AND VALSARTAN 1 TABLET(S): 24; 26 TABLET, FILM COATED ORAL at 05:14

## 2019-12-11 RX ADMIN — Medication 20 MILLIEQUIVALENT(S): at 12:09

## 2019-12-11 RX ADMIN — SPIRONOLACTONE 25 MILLIGRAM(S): 25 TABLET, FILM COATED ORAL at 05:14

## 2019-12-11 NOTE — PROGRESS NOTE ADULT - SUBJECTIVE AND OBJECTIVE BOX
SHANE ASHTON 74y Male  MRN#: [id number]         SUBJECTIVE  Patient is a 74y old Male who presents with a chief complaint of Cellulitis (08 Dec 2019 09:03)  Currently admitted to medicine with the primary diagnosis of Cellulitis    Patient is D1 post I&D and debridement of bilateral hands.      OBJECTIVE  PAST MEDICAL & SURGICAL HISTORY  MDS (myelodysplastic syndrome)  Afib  GERD (gastroesophageal reflux disease)  CAD (coronary artery disease)  Diabetes  HTN (hypertension)  H/O colonoscopy: 10 yeras ago    ALLERGIES:  No Known Allergies    MEDICATIONS:  STANDING MEDICATIONS  aspirin  chewable 81 milliGRAM(s) Oral daily  atorvastatin 20 milliGRAM(s) Oral at bedtime  dextrose 5%. 1000 milliLiter(s) IV Continuous <Continuous>  dextrose 50% Injectable 12.5 Gram(s) IV Push once  dextrose 50% Injectable 25 Gram(s) IV Push once  dextrose 50% Injectable 25 Gram(s) IV Push once  digoxin     Tablet 0.125 milliGRAM(s) Oral daily  enoxaparin Injectable 40 milliGRAM(s) SubCutaneous daily  famotidine    Tablet 40 milliGRAM(s) Oral at bedtime  furosemide    Tablet 40 milliGRAM(s) Oral every 12 hours  insulin glargine Injectable (LANTUS) 15 Unit(s) SubCutaneous at bedtime  insulin lispro (HumaLOG) corrective regimen sliding scale   SubCutaneous three times a day before meals  insulin lispro Injectable (HumaLOG) 7 Unit(s) SubCutaneous three times a day before meals  metoprolol succinate  milliGRAM(s) Oral daily  sacubitril 24 mG/valsartan 26 mG 1 Tablet(s) Oral two times a day  spironolactone 25 milliGRAM(s) Oral daily  tamsulosin 0.4 milliGRAM(s) Oral at bedtime  vancomycin  IVPB 750 milliGRAM(s) IV Intermittent every 12 hours    PRN MEDICATIONS  acetaminophen   Tablet .. 650 milliGRAM(s) Oral every 6 hours PRN  dextrose 40% Gel 15 Gram(s) Oral once PRN  glucagon  Injectable 1 milliGRAM(s) IntraMuscular once PRN      VITAL SIGNS: Last 24 Hours  T(C): 36.3 (11 Dec 2019 05:25), Max: 36.6 (10 Dec 2019 14:47)  T(F): 97.3 (11 Dec 2019 05:25), Max: 97.9 (10 Dec 2019 14:47)  HR: 101 (11 Dec 2019 05:25) (80 - 103)  BP: 117/63 (11 Dec 2019 05:25) (86/51 - 117/63)  BP(mean): --  RR: 18 (11 Dec 2019 05:25) (18 - 18)  SpO2: 95% (10 Dec 2019 20:50) (93% - 95%)    LABS:                        8.3    1.98  )-----------( 88       ( 10 Dec 2019 08:24 )             27.1         142  |  101  |  22<H>  ----------------------------<  180<H>  3.5   |  26  |  1.1    Ca    8.3<L>      11 Dec 2019 07:54  Mg     1.9     12-10        Urinalysis Basic - ( 09 Dec 2019 21:00 )    Color: Colorless / Appearance: Clear / S.009 / pH: x  Gluc: x / Ketone: Negative  / Bili: Negative / Urobili: 3 mg/dL   Blood: x / Protein: Negative / Nitrite: Negative   Leuk Esterase: Negative / RBC: 345 /HPF / WBC 1 /HPF   Sq Epi: x / Non Sq Epi: 0 /HPF / Bacteria: Negative            Culture - Urine (collected 09 Dec 2019 21:00)  Source: .Urine Clean Catch (Midstream)  Final Report (11 Dec 2019 05:22):    No growth          RADIOLOGY:          PHYSICAL EXAM:  GEN: In no acute distress. Pt. is awake in bed able to have a conversation. Head movements and lip smacking  LUNGS: CTABL, Symmetrical inspiration, no increased work of breathing  HEART: +S1,S2, Slightly Irregular rhythm appreciated   ABD: Bowel Sounds Present, Soft, non tender, non distended, no guarding, no rebound.   EXT: 2+ peripheral Pulses, no clubbing, no cyanosis, no Edema.  BL hands dressing covering post debridement and drainage  NEURO: Alert and oriented. No focal deficits.         Assessment and Plan:   74 year old gentleman knwon to have CAD s/p stent, non-obstructive cardiomyopathy with EF 26%, hypertension, hyperlipidemia, DM II, Afib, MDS presents for fever and worsening swelling in his right hand + fevers.     # Sepsis secondary to hands abscess and cellulitis  - BP stable, remain with no fever  - trough level appropriate Dec 10 --> 18 -- Continue with Vanc and meropenem D7 abx, ID onboard. Meropenem stopped yesterday  - Burn team evaluation for possible debridement --> I and D and debridement done on 12/10  - fu wound culture bacterial and fungal    # HFrEF  - SOB improving, remained on NC  - switched to oral lasix  - Continue Entresto BID and Spironolactone  - Continue BIPAP at night and PRN  - TTE- as above - EF 30-35 , GIIDD, Moderate PAH, Mild-Mod AS    # Hypokalemia  - Secondary to diuresis  - Replete as needed    # MDS/pancytopenia  - s/p 1 Uprbc on , Hb stable  - cont monitor and keep active t/s  - f/u with hem/onc john --> transfuse if hb less than 8. fu with hem/onc as outpatient ? patient to be started on Procrit    # Paroxysmal Afib not on AC  - regular rate and rhythm  - Continue with Metoprolol  qD and digoxin .125 qD    # DMII- Hold oral antidiabetics - Lantus 30 qHS Lispro 10 qAC  # BPH - Continue with  flomax 0.4 mg qd    Diet: Dysphagia 3 Soft; Thins - 1200 fluid restriction  DVT ppx:  Lovenox 40 mg qd  GI ppx: not indicated  Activity: Ambulater as tolerated  Dispo: From [facility] assisted living --> needs SNF  FULL CODE SHANE ASHTON 74y Male  MRN#: [id number]         SUBJECTIVE  Patient is a 74y old Male who presents with a chief complaint of Cellulitis (08 Dec 2019 09:03)  Currently admitted to medicine with the primary diagnosis of Cellulitis    Patient is D1 post I&D and debridement of bilateral hands.      OBJECTIVE  PAST MEDICAL & SURGICAL HISTORY  MDS (myelodysplastic syndrome)  Afib  GERD (gastroesophageal reflux disease)  CAD (coronary artery disease)  Diabetes  HTN (hypertension)  H/O colonoscopy: 10 yeras ago    ALLERGIES:  No Known Allergies    MEDICATIONS:  STANDING MEDICATIONS  aspirin  chewable 81 milliGRAM(s) Oral daily  atorvastatin 20 milliGRAM(s) Oral at bedtime  dextrose 5%. 1000 milliLiter(s) IV Continuous <Continuous>  dextrose 50% Injectable 12.5 Gram(s) IV Push once  dextrose 50% Injectable 25 Gram(s) IV Push once  dextrose 50% Injectable 25 Gram(s) IV Push once  digoxin     Tablet 0.125 milliGRAM(s) Oral daily  enoxaparin Injectable 40 milliGRAM(s) SubCutaneous daily  famotidine    Tablet 40 milliGRAM(s) Oral at bedtime  furosemide    Tablet 40 milliGRAM(s) Oral every 12 hours  insulin glargine Injectable (LANTUS) 15 Unit(s) SubCutaneous at bedtime  insulin lispro (HumaLOG) corrective regimen sliding scale   SubCutaneous three times a day before meals  insulin lispro Injectable (HumaLOG) 7 Unit(s) SubCutaneous three times a day before meals  metoprolol succinate  milliGRAM(s) Oral daily  sacubitril 24 mG/valsartan 26 mG 1 Tablet(s) Oral two times a day  spironolactone 25 milliGRAM(s) Oral daily  tamsulosin 0.4 milliGRAM(s) Oral at bedtime  vancomycin  IVPB 750 milliGRAM(s) IV Intermittent every 12 hours    PRN MEDICATIONS  acetaminophen   Tablet .. 650 milliGRAM(s) Oral every 6 hours PRN  dextrose 40% Gel 15 Gram(s) Oral once PRN  glucagon  Injectable 1 milliGRAM(s) IntraMuscular once PRN      VITAL SIGNS: Last 24 Hours  T(C): 36.3 (11 Dec 2019 05:25), Max: 36.6 (10 Dec 2019 14:47)  T(F): 97.3 (11 Dec 2019 05:25), Max: 97.9 (10 Dec 2019 14:47)  HR: 101 (11 Dec 2019 05:25) (80 - 103)  BP: 117/63 (11 Dec 2019 05:25) (86/51 - 117/63)  BP(mean): --  RR: 18 (11 Dec 2019 05:25) (18 - 18)  SpO2: 95% (10 Dec 2019 20:50) (93% - 95%)    LABS:                        8.3    1.98  )-----------( 88       ( 10 Dec 2019 08:24 )             27.1         142  |  101  |  22<H>  ----------------------------<  180<H>  3.5   |  26  |  1.1    Ca    8.3<L>      11 Dec 2019 07:54  Mg     1.9     12-10        Urinalysis Basic - ( 09 Dec 2019 21:00 )    Color: Colorless / Appearance: Clear / S.009 / pH: x  Gluc: x / Ketone: Negative  / Bili: Negative / Urobili: 3 mg/dL   Blood: x / Protein: Negative / Nitrite: Negative   Leuk Esterase: Negative / RBC: 345 /HPF / WBC 1 /HPF   Sq Epi: x / Non Sq Epi: 0 /HPF / Bacteria: Negative            Culture - Urine (collected 09 Dec 2019 21:00)  Source: .Urine Clean Catch (Midstream)  Final Report (11 Dec 2019 05:22):    No growth          RADIOLOGY:          PHYSICAL EXAM:  GEN: In no acute distress. Pt. is awake in bed able to have a conversation. Head movements and lip smacking  LUNGS: CTABL, Symmetrical inspiration, no increased work of breathing  HEART: +S1,S2, Slightly Irregular rhythm appreciated   ABD: Bowel Sounds Present, Soft, non tender, non distended, no guarding, no rebound.   EXT: 2+ peripheral Pulses, no clubbing, no cyanosis, no Edema.  BL hands dressing covering post debridement and drainage  NEURO: Alert and oriented. No focal deficits.         Assessment and Plan:   74 year old gentleman knwon to have CAD s/p stent, non-obstructive cardiomyopathy with EF 26%, hypertension, hyperlipidemia, DM II, Afib, MDS presents for fever and worsening swelling in his right hand + fevers.     # Sepsis secondary to hands abscess and cellulitis  - BP stable, remain with no fever  - trough level appropriate Dec 10 --> 18 -- Continue with Vanc and meropenem D7 abx, ID onboard. Meropenem stopped yesterday  - Burn team evaluation for possible debridement --> I and D and debridement done on 12/10  - fu wound culture bacterial and fungal    # HFrEF  - SOB improving, remained on NC  - switched to oral lasix  - Continue Entresto BID and Spironolactone  - Continue BIPAP at night and PRN  - TTE- as above - EF 30-35 , GIIDD, Moderate PAH, Mild-Mod AS    #Hematuria  - had 1 episode which resolved  - hb stable  - US kidney normal  - fu with urology as outpatient    # Hypokalemia  - Secondary to diuresis  - Replete as needed    # MDS/pancytopenia  - s/p 1 Uprbc on , Hb stable  - cont monitor and keep active t/s  - f/u with hem/onc john --> transfuse if hb less than 8. fu with hem/onc as outpatient ? patient to be started on Procrit    # Paroxysmal Afib not on AC  - regular rate and rhythm  - Continue with Metoprolol  qD and digoxin .125 qD    # DMII- Hold oral antidiabetics - Lantus 30 qHS Lispro 10 qAC  # BPH - Continue with  flomax 0.4 mg qd    Diet: Dysphagia 3 Soft; Thins - 1200 fluid restriction  DVT ppx:  Lovenox 40 mg qd  GI ppx: not indicated  Activity: Ambulater as tolerated  Dispo: From [facility] assisted living --> needs SNF  FULL CODE

## 2019-12-11 NOTE — PROGRESS NOTE ADULT - SUBJECTIVE AND OBJECTIVE BOX
SHANE ASHTON  74y, Male    All available historical data reviewed    OVERNIGHT EVENTS:  PROCEDURES:  Drainage, abscess, superficial soft tissue 10-Dec-2019 17:18:33 abscess drainage bilateral hands dorsum Nick Haile  ROS:  General: Denies rigors, nightsweats  HEENT: Denies headache, rhinorrhea, sore throat, eye pain  CV: Denies CP, palpitations  PULM: Denies wheezing, hemoptysis  GI: Denies hematemesis, hematochezia, melena  : Denies discharge, hematuria  MSK: Denies arthralgias, myalgias  SKIN: Denies rash, lesions  NEURO: Denies paresthesias, weakness  PSYCH: Denies depression, anxiety    VITALS:  T(F): 97.9, Max: 97.9 (19 @ 14:24)  HR: 100  BP: 106/64  RR: 18Vital Signs Last 24 Hrs  T(C): 36.6 (11 Dec 2019 14:24), Max: 36.6 (11 Dec 2019 14:24)  T(F): 97.9 (11 Dec 2019 14:24), Max: 97.9 (11 Dec 2019 14:24)  HR: 100 (11 Dec 2019 14:24) (100 - 103)  BP: 106/64 (11 Dec 2019 14:24) (86/51 - 117/63)  BP(mean): --  RR: 18 (11 Dec 2019 14:24) (18 - 18)  SpO2: 95% (10 Dec 2019 20:50) (93% - 95%)    TESTS & MEASUREMENTS:                        8.1    1.91  )-----------( 81       ( 11 Dec 2019 07:54 )             26.6         142  |  101  |  22<H>  ----------------------------<  180<H>  3.5   |  26  |  1.1    Ca    8.3<L>      11 Dec 2019 07:54  Mg     1.9     12-10          Culture - Urine (collected 19 @ 21:00)  Source: .Urine Clean Catch (Midstream)  Final Report (19 @ 05:22):    No growth    Culture - Blood (collected 19 @ 09:43)  Source: .Blood None  Preliminary Report (19 @ 19:01):    No growth to date.    Culture - Urine (collected 19 @ 19:05)  Source: .Urine Clean Catch (Midstream)  Final Report (19 @ 22:31):    No growth    Culture - Urine (collected 19 @ 20:40)  Source: .Urine Clean Catch (Midstream)  Final Report (19 @ 23:21):    No growth    Culture - Blood (collected 19 @ 15:20)  Source: .Blood Blood-Peripheral  Final Report (19 @ 23:01):    No growth at 5 days.    Culture - Blood (collected 19 @ 15:20)  Source: .Blood Blood-Peripheral  Final Report (19 @ 23:01):    No growth at 5 days.      Urinalysis Basic - ( 09 Dec 2019 21:00 )    Color: Colorless / Appearance: Clear / S.009 / pH: x  Gluc: x / Ketone: Negative  / Bili: Negative / Urobili: 3 mg/dL   Blood: x / Protein: Negative / Nitrite: Negative   Leuk Esterase: Negative / RBC: 345 /HPF / WBC 1 /HPF   Sq Epi: x / Non Sq Epi: 0 /HPF / Bacteria: Negative          RADIOLOGY & ADDITIONAL TESTS:  Personal review of radiological diagnostics performed  Echo and EKG results noted when applicable.     ANTIBIOTICS:  vancomycin  IVPB 750 milliGRAM(s) IV Intermittent every 12 hours

## 2019-12-11 NOTE — PROGRESS NOTE ADULT - ASSESSMENT
ASSESSMENT  74y Male with a PMH of CAD, s/p Stents, Non-Obstructive Cardiomyopathy w/ EF 26%, HTN, Hyperlipidemia, DM, MDS     IMPRESSION  # Sepsis secondary to abscess / cellulitis right hand dorsum.   -no fascitis/tenosynovitis    -has improved significantly on ABx and post debridement   # Abscess without cellulitis left hand dorsum with necrotic tissue   -better with ABx and debridement  -BCx 12/4 NG    RECOMMENDATIONS  -po Doxycycline 100 mg q12h for 7 more days  recall prn please

## 2019-12-11 NOTE — PROGRESS NOTE ADULT - SUBJECTIVE AND OBJECTIVE BOX
Patient was seen and examined. Spoke with RN. Chart reviewed.  No events overnight.  Vital Signs Last 24 Hrs  T(F): 97.3 (11 Dec 2019 05:25), Max: 97.9 (10 Dec 2019 14:47)  HR: 101 (11 Dec 2019 05:25) (80 - 103)  BP: 117/63 (11 Dec 2019 05:25) (86/51 - 117/63)  SpO2: 95% (10 Dec 2019 20:50) (93% - 95%)  MEDICATIONS  (STANDING):  aspirin  chewable 81 milliGRAM(s) Oral daily  atorvastatin 20 milliGRAM(s) Oral at bedtime  dextrose 5%. 1000 milliLiter(s) (50 mL/Hr) IV Continuous <Continuous>  dextrose 50% Injectable 12.5 Gram(s) IV Push once  dextrose 50% Injectable 25 Gram(s) IV Push once  dextrose 50% Injectable 25 Gram(s) IV Push once  digoxin     Tablet 0.125 milliGRAM(s) Oral daily  enoxaparin Injectable 40 milliGRAM(s) SubCutaneous daily  famotidine    Tablet 40 milliGRAM(s) Oral at bedtime  furosemide    Tablet 40 milliGRAM(s) Oral every 12 hours  insulin glargine Injectable (LANTUS) 15 Unit(s) SubCutaneous at bedtime  insulin lispro (HumaLOG) corrective regimen sliding scale   SubCutaneous three times a day before meals  insulin lispro Injectable (HumaLOG) 7 Unit(s) SubCutaneous three times a day before meals  metoprolol succinate  milliGRAM(s) Oral daily  potassium chloride    Tablet ER 20 milliEquivalent(s) Oral every 2 hours  sacubitril 24 mG/valsartan 26 mG 1 Tablet(s) Oral two times a day  spironolactone 25 milliGRAM(s) Oral daily  tamsulosin 0.4 milliGRAM(s) Oral at bedtime  vancomycin  IVPB 750 milliGRAM(s) IV Intermittent every 12 hours    MEDICATIONS  (PRN):  acetaminophen   Tablet .. 650 milliGRAM(s) Oral every 6 hours PRN Temp greater or equal to 38C (100.4F)  dextrose 40% Gel 15 Gram(s) Oral once PRN Blood Glucose LESS THAN 70 milliGRAM(s)/deciliter  glucagon  Injectable 1 milliGRAM(s) IntraMuscular once PRN Glucose LESS THAN 70 milligrams/deciliter    Labs:                        8.1    1.91  )-----------( 81       ( 11 Dec 2019 07:54 )             26.6                         8.3    1.98  )-----------( 88       ( 10 Dec 2019 08:24 )             27.1     11 Dec 2019 07:54    142    |  101    |  22     ----------------------------<  180    3.5     |  26     |  1.1    10 Dec 2019 08:24    145    |  102    |  23     ----------------------------<  161    3.7     |  28     |  1.1      Ca    8.3        11 Dec 2019 07:54  Ca    8.3        10 Dec 2019 08:24  Mg     1.9       10 Dec 2019 08:24        Urinalysis Basic - ( 09 Dec 2019 21:00 )    Color: Colorless / Appearance: Clear / S.009 / pH: x  Gluc: x / Ketone: Negative  / Bili: Negative / Urobili: 3 mg/dL   Blood: x / Protein: Negative / Nitrite: Negative   Leuk Esterase: Negative / RBC: 345 /HPF / WBC 1 /HPF   Sq Epi: x / Non Sq Epi: 0 /HPF / Bacteria: Negative        Culture - Urine (collected 09 Dec 2019 21:00)  Source: .Urine Clean Catch (Midstream)  Final Report (11 Dec 2019 05:22):    No growth        A/P:  74 year old man with CAD s/p stent, non-obstructive cardiomyopathy with EF 26%, hypertension, hyperlipidemia, DM II, Afib, MDS presents for fever and B/L hand eythema/ swelling; s/p I and D by burn    Burn f/u    IV abx for now    ID f/u to deescalate to PO abs    wound care    OOB    PT/rehab eval    DC when cleared by Burn and ID- will need outpt wound care and monitoring    outpt cardio, hematology,  follow up    DVT prophylaxis  Decubitus prevention- all measures as per RN protocol  Please call or text me with any questions or updates

## 2019-12-11 NOTE — DISCHARGE NOTE NURSING/CASE MANAGEMENT/SOCIAL WORK - PATIENT PORTAL LINK FT
You can access the FollowMyHealth Patient Portal offered by [facility] by registering at the following website: [email or web address]. By joining [employer]’s FollowMyHealth portal, you will also be able to view your health information using other applications (apps) compatible with our system.

## 2019-12-11 NOTE — PROGRESS NOTE ADULT - ATTENDING COMMENTS
abscess hands improved post debridement-->    rec;  soap and water qd, xeroform gauze and kerlex and ace wrap dressing change bid    no further surgery needed

## 2019-12-12 ENCOUNTER — TRANSCRIPTION ENCOUNTER (OUTPATIENT)
Age: 74
End: 2019-12-12

## 2019-12-12 VITALS
HEART RATE: 97 BPM | DIASTOLIC BLOOD PRESSURE: 58 MMHG | RESPIRATION RATE: 18 BRPM | SYSTOLIC BLOOD PRESSURE: 116 MMHG | TEMPERATURE: 97 F

## 2019-12-12 LAB
ANION GAP SERPL CALC-SCNC: 14 MMOL/L — SIGNIFICANT CHANGE UP (ref 7–14)
BUN SERPL-MCNC: 20 MG/DL — SIGNIFICANT CHANGE UP (ref 10–20)
CALCIUM SERPL-MCNC: 8.2 MG/DL — LOW (ref 8.5–10.1)
CHLORIDE SERPL-SCNC: 101 MMOL/L — SIGNIFICANT CHANGE UP (ref 98–110)
CO2 SERPL-SCNC: 26 MMOL/L — SIGNIFICANT CHANGE UP (ref 17–32)
CREAT SERPL-MCNC: 1.1 MG/DL — SIGNIFICANT CHANGE UP (ref 0.7–1.5)
CULTURE RESULTS: SIGNIFICANT CHANGE UP
GLUCOSE BLDC GLUCOMTR-MCNC: 146 MG/DL — HIGH (ref 70–99)
GLUCOSE BLDC GLUCOMTR-MCNC: 201 MG/DL — HIGH (ref 70–99)
GLUCOSE SERPL-MCNC: 155 MG/DL — HIGH (ref 70–99)
HCT VFR BLD CALC: 26.8 % — LOW (ref 42–52)
HGB BLD-MCNC: 8.3 G/DL — LOW (ref 14–18)
MCHC RBC-ENTMCNC: 29 PG — SIGNIFICANT CHANGE UP (ref 27–31)
MCHC RBC-ENTMCNC: 31 G/DL — LOW (ref 32–37)
MCV RBC AUTO: 93.7 FL — SIGNIFICANT CHANGE UP (ref 80–94)
NRBC # BLD: 2 /100 WBCS — HIGH (ref 0–0)
PLATELET # BLD AUTO: 78 K/UL — LOW (ref 130–400)
POTASSIUM SERPL-MCNC: 3.7 MMOL/L — SIGNIFICANT CHANGE UP (ref 3.5–5)
POTASSIUM SERPL-SCNC: 3.7 MMOL/L — SIGNIFICANT CHANGE UP (ref 3.5–5)
RBC # BLD: 2.86 M/UL — LOW (ref 4.7–6.1)
RBC # FLD: 18.7 % — HIGH (ref 11.5–14.5)
SODIUM SERPL-SCNC: 141 MMOL/L — SIGNIFICANT CHANGE UP (ref 135–146)
SPECIMEN SOURCE: SIGNIFICANT CHANGE UP
WBC # BLD: 1.96 K/UL — LOW (ref 4.8–10.8)
WBC # FLD AUTO: 1.96 K/UL — LOW (ref 4.8–10.8)

## 2019-12-12 PROCEDURE — 74230 X-RAY XM SWLNG FUNCJ C+: CPT | Mod: 26

## 2019-12-12 RX ADMIN — Medication 40 MILLIGRAM(S): at 17:05

## 2019-12-12 RX ADMIN — SPIRONOLACTONE 25 MILLIGRAM(S): 25 TABLET, FILM COATED ORAL at 05:59

## 2019-12-12 RX ADMIN — Medication 200 MILLIGRAM(S): at 06:00

## 2019-12-12 RX ADMIN — Medication 110 MILLIGRAM(S): at 05:59

## 2019-12-12 RX ADMIN — Medication 2: at 17:03

## 2019-12-12 RX ADMIN — Medication 110 MILLIGRAM(S): at 17:04

## 2019-12-12 RX ADMIN — Medication 7 UNIT(S): at 17:03

## 2019-12-12 RX ADMIN — Medication 40 MILLIGRAM(S): at 05:59

## 2019-12-12 RX ADMIN — Medication 81 MILLIGRAM(S): at 11:45

## 2019-12-12 RX ADMIN — SACUBITRIL AND VALSARTAN 1 TABLET(S): 24; 26 TABLET, FILM COATED ORAL at 05:59

## 2019-12-12 RX ADMIN — Medication 0.12 MILLIGRAM(S): at 05:59

## 2019-12-12 RX ADMIN — Medication 7 UNIT(S): at 08:01

## 2019-12-12 RX ADMIN — ENOXAPARIN SODIUM 40 MILLIGRAM(S): 100 INJECTION SUBCUTANEOUS at 11:45

## 2019-12-12 RX ADMIN — SACUBITRIL AND VALSARTAN 1 TABLET(S): 24; 26 TABLET, FILM COATED ORAL at 17:05

## 2019-12-12 NOTE — DISCHARGE NOTE PROVIDER - CARE PROVIDER_API CALL
James Arreguin)  Internal Medicine  [address] [address]  Phone: [phone]  Fax: [phone]  Follow Up Time: 1 week    Avel Don)  Internal Medicine; Medical Oncology  [address] [place] [id number]  Phone: [phone]  Fax: [phone]  Follow Up Time: 2 weeks    urology,   please call the [facility] [phone] to schedule an appointment with urology doctor because you had an episode of blood coming from the urine  Phone: (   )    -  Fax: (   )    -  Follow Up Time: 2 weeks    Nick Haile)  Plastic Surgery  [address] [place] [address]  Phone: [phone]  Fax: [phone]  Follow Up Time: 1 week

## 2019-12-12 NOTE — DISCHARGE NOTE PROVIDER - CARE PROVIDERS DIRECT ADDRESSES
,DirectAddress_Unknown,[email or web address],[email or web address],[email or web address]

## 2019-12-12 NOTE — PROGRESS NOTE ADULT - SUBJECTIVE AND OBJECTIVE BOX
Patient was seen and examined. Spoke with RN. Chart reviewed.    No events overnight.  Vital Signs Last 24 Hrs  T(F): 97.5 (12 Dec 2019 06:13), Max: 97.9 (11 Dec 2019 14:24)  HR: 85 (12 Dec 2019 06:13) (85 - 101)  BP: 129/66 (12 Dec 2019 06:13) (106/64 - 129/66)  SpO2: 97% (12 Dec 2019 10:13) (96% - 97%)  MEDICATIONS  (STANDING):  aspirin  chewable 81 milliGRAM(s) Oral daily  atorvastatin 20 milliGRAM(s) Oral at bedtime  dextrose 5%. 1000 milliLiter(s) (50 mL/Hr) IV Continuous <Continuous>  dextrose 50% Injectable 12.5 Gram(s) IV Push once  dextrose 50% Injectable 25 Gram(s) IV Push once  dextrose 50% Injectable 25 Gram(s) IV Push once  digoxin     Tablet 0.125 milliGRAM(s) Oral daily  doxycycline IVPB 100 milliGRAM(s) IV Intermittent every 12 hours  enoxaparin Injectable 40 milliGRAM(s) SubCutaneous daily  famotidine    Tablet 40 milliGRAM(s) Oral at bedtime  furosemide    Tablet 40 milliGRAM(s) Oral every 12 hours  insulin glargine Injectable (LANTUS) 15 Unit(s) SubCutaneous at bedtime  insulin lispro (HumaLOG) corrective regimen sliding scale   SubCutaneous three times a day before meals  insulin lispro Injectable (HumaLOG) 7 Unit(s) SubCutaneous three times a day before meals  metoprolol succinate  milliGRAM(s) Oral daily  sacubitril 24 mG/valsartan 26 mG 1 Tablet(s) Oral two times a day  spironolactone 25 milliGRAM(s) Oral daily  tamsulosin 0.4 milliGRAM(s) Oral at bedtime    MEDICATIONS  (PRN):  acetaminophen   Tablet .. 650 milliGRAM(s) Oral every 6 hours PRN Temp greater or equal to 38C (100.4F)  dextrose 40% Gel 15 Gram(s) Oral once PRN Blood Glucose LESS THAN 70 milliGRAM(s)/deciliter  glucagon  Injectable 1 milliGRAM(s) IntraMuscular once PRN Glucose LESS THAN 70 milligrams/deciliter    Labs:                        8.3    1.96  )-----------( 78       ( 12 Dec 2019 07:50 )             26.8                         8.1    1.91  )-----------( 81       ( 11 Dec 2019 07:54 )             26.6     12 Dec 2019 07:50    141    |  101    |  20     ----------------------------<  155    3.7     |  26     |  1.1    11 Dec 2019 07:54    142    |  101    |  22     ----------------------------<  180    3.5     |  26     |  1.1      Ca    8.2        12 Dec 2019 07:50  Ca    8.3        11 Dec 2019 07:54            Culture - Other (collected 10 Dec 2019 21:22)  Source: .Other left hand  Preliminary Report (12 Dec 2019 08:16):    Few Staphylococcus aureus    Culture - Other (collected 10 Dec 2019 21:22)  Source: .Other right hand  Preliminary Report (12 Dec 2019 08:33):    Numerous Staphylococcus aureus    Culture - Urine (collected 09 Dec 2019 21:00)  Source: .Urine Clean Catch (Midstream)  Final Report (11 Dec 2019 05:22):    No growth        Radiology:    General: comfortable, NAD  Neurology: Awake,confused  Head:  Normocephalic, atraumatic  ENT:  Mucosa moist, no ulcerations  Neck:  Supple, no JVD,   Skin: dressing ue  Resp: CTA B/L  CV: RRR, S1S2,   GI: Soft, NT, bowel sounds  MS: No edema, + peripheral pulses, FROM all 4 extremity

## 2019-12-12 NOTE — DISCHARGE NOTE PROVIDER - HOSPITAL COURSE
74 year old gentleman knwon to have CAD s/p stent, non-obstructive cardiomyopathy with EF 26%, hypertension, hyperlipidemia, DM II, Afib, MDS presents for fever and worsening swelling in his right hand.        Patient was recently admitted for CHF exacerbation and was discharged on 29 November.    Since before discharge, family say he had swelling and redness in both hands. Since then, swelling in right hand has been getting worse. Since 2 days before presentation, patient started having fevers. He started PO clindamycin with no improvement of symptoms.    Otherwise, ROS is negative. No SOB, cough, or chest pain.    In ED, patient was found to be septic with fever and tachycardia. He was initially put under observation. Admitted for persistent tachycardia and non improving symptoms.            # Sepsis secondary to hands abscess and cellulitis    - BP stable, remain with no fever    - took 7 days of meropenem and vancomycin. can be discharged on oral doxycycline     - Burn team evaluation for possible debridement --> I and D and debridement done on 12/10. dressing change    - fu wound culture bacterial and fungal        # HFrEF    - switched to oral lasix    - Continue Entresto BID and Spironolactone    - Continue BIPAP at night and PRN    - TTE- as above - EF 30-35 , GIIDD, Moderate PAH, Mild-Mod AS        #Hematuria    - had 1 episode which resolved    - hb stable    - US kidney normal    - fu with urology as outpatient        # MDS/pancytopenia    - s/p 1 Uprbc on 12/7, Hb stable    - cont monitor and keep active t/s    - f/u with hem/onc john --> transfuse if hb less than 8. fu with hem/onc as outpatient ? patient to be started on Procrit        # Paroxysmal Afib not on AC    - regular rate and rhythm    - Continue with Metoprolol  qD and digoxin .125 qD

## 2019-12-12 NOTE — DISCHARGE NOTE PROVIDER - NSDCCPCAREPLAN_GEN_ALL_CORE_FT
PRINCIPAL DISCHARGE DIAGNOSIS  Diagnosis: Cellulitis  Assessment and Plan of Treatment: Take the prescribed antibiotic medicine you are given as directed until it is gone. Take it even if you feel better. It treats the infection and stops it from  Not taking all the medicine can make future infections hard to treat. Keep the infected area clean. When possible, raise the infected area above the level of your heart. This helps keep swelling down. Apply clean bandages as advised. Wash your hands often to prevent spreading the infection. In the future, wash your hands before and after you touch cuts, scratches, or bandages. This will help prevent infection.   Call your doctor or returnt o the emergency room or call 911 immediately if you have any of the following: Difficulty or pain when moving the joints above or below the infected area, Discharge or pus draining from the area, rever of 100.4°F (38°C) or higher, or as directed by your healthcare provider, pain that gets worse in or around the infected site, redness that gets worse in or around the infected area, particularly if the area of redness expands to a wider area, shaking chills, swelling of the infected area, vomiting.        SECONDARY DISCHARGE DIAGNOSES  Diagnosis: Hematuria  Assessment and Plan of Treatment: Hematuria, Adult  Hematuria is blood in the urine. Blood may be visible in the urine, or it may be identified with a test. This condition can be caused by infections of the bladder, urethra, kidney, or prostate. Other possible causes include:  Kidney stones.  Cancer of the urinary tract.  Too much calcium in the urine.  Conditions that are passed from parent to child (inherited conditions).   Exercise that requires a lot of energy.  Infections can usually be treated with medicine, and a kidney stone usually will pass through your urine. If neither of these is the cause of your hematuria, more tests may be needed to identify the cause of your symptoms.  ImageIt is very important to tell your health care provider about any blood in your urine, even if it is painless or the blood stops without treatment. Blood in the urine, when it happens and then stops and then happens again, can be a symptom of a very serious condition, including cancer. There is no pain in the initial stages of many urinary cancers.  Follow these instructions at home:  Medicines   Take over-the-counter and prescription medicines only as told by your health care provider.  If you were prescribed an antibiotic medicine, take it as told by your health care provider. Do not stop taking the antibiotic even if you start to feel better.  Eating and drinking   Drink enough fluid to keep your urine clear or pale yellow. It is recommended that you drink 3–4 quarts (2.8–3.8 L) a day. If you have been diagnosed with an infection, it is recommended that you drink cranberry juice in addition to large amounts of water.  Avoid caffeine, tea, and carbonated beverages. These tend to irritate the bladder.  Avoid alcohol because it may irritate the prostate (men).  General instructions   If you have been diagnosed with a kidney stone, follow your health care provider's instructions about straining your urine to catch the stone.  Empty your bladder often. Avoid holding urine for long periods of time.  If you are female:  After a conrad    Diagnosis: Sepsis, due to unspecified organism, unspecified whether acute organ dysfunction present  Assessment and Plan of Treatment: followup with your doctor for culture results. continue your antibiotics as prescribed and follow up with burn doctor    Diagnosis: MDS (myelodysplastic syndrome)  Assessment and Plan of Treatment: follow up with your hem/onc doctor as outpatient

## 2019-12-12 NOTE — CHART NOTE - NSCHARTNOTEFT_GEN_A_CORE
Registered Dietitian Follow-Up     Patient Profile Reviewed                           Yes [x]   No []     Nutrition History Previously Obtained        Yes [x]  No []       Pertinent Subjective Information:  -Pt off unit after attempt of assessment. spoke to RN who reports pt tolerating SLP adjusted diet order better with intake 60-75%. Pt to be anticipated for d/c today but if not, pt would benefit from nutrition supplement.     Pertinent Medical Interventions:  1. Sepsis 2/2 abscess and cellulitis to b/l hands   --s/p abscess drainage of b/l hands 12/10 by burn. f/u wound culture   2. Hematuria, resolved.   3. Hypokalemia 2/2 diuresis, replete PRN   4. MDS/pancytopenia  --s/p 1unit PRBC 12/7     Diet order: Dysphagia III, thin liquids + 1200mL fluid restriction      Anthropometrics:  - Ht. 177.8cm   - Wt. 77kg (12/11), same as admit wt   - BMI 24.4   - IBW     Pertinent Lab Data: (12/12/19) WBC 1.96, RBC 2.86, H/H 8.3/26.8, glucose 155, POCT 146, Ca 8.2      Pertinent Meds: lovenox, aspirin, abx, humalig, lantus, lasiz, atorvastatin, metoprolol, aldactone      Physical Findings:  - Appearance: off unit at multiple attempts of assessment   - GI function: fecal incontinence noted   - Tubes:  - Oral/Mouth cavity: s/p MBS today, SLP recommends: dysphagia 3 w/ thin with SLP f/u upon admission to [facility]  - Skin: no edema noted. b/l hand wounds      Nutrition Requirements  Weight Used: 77kg (continued from RD initial assessment)      Estimated Calorie Needs: 1814-1966kcal/day (MSJ-1512 x AF 1.2-1.3)  Estimated Protein Needs: 77-92grams/day (1-1.2grams/kg of admit weight)  Estimated Fluid Needs: 1814-1966mL/day (1mL/kcal)     Nutrient Intake: 75% intake, improved but not yet goal rate. pt would benefit from supplement if not d/c'd      [x] Previous Nutrition Diagnosis: inadequate oral intake             [x] Ongoing, improving        [] Resolved     Nutrition Intervention: meals and snacks, medical food supplements  Recommend:  1. Add Ensure compact BID.   2. Diet order per SLP + CHO Consistent.      Goal/Expected Outcome: Pt to tolerate >75% both meals and supplements upon f/u in 4 days.      Indicator/Monitoring: RD will monitor diet order, energy intake, nutrition related labs, body composition, NFPF

## 2019-12-12 NOTE — SWALLOW VFSS/MBS ASSESSMENT ADULT - PHARYNGEAL PHASE COMMENTS
Moderate pharyngeal dysphagia for thin, nectar, puree and soft. Persistent penetration of all consistencies during and after the swallow was observed

## 2019-12-12 NOTE — DISCHARGE NOTE PROVIDER - NSDCFUADDINST_GEN_ALL_CORE_FT
local wound care and dressing change  soap and water qd, xeroform gauze and kerlex and ace wrap dressing change bid  Diet: dysphagia 3 soft thin liquids

## 2019-12-12 NOTE — PROGRESS NOTE ADULT - ASSESSMENT
Sepsis 2/2 cellulitis.     Possible underlying abscess in right hand/forearm    MDS, anemia     HFrEF with fluid overload    po abx as per id  dc planning  d/w ho

## 2019-12-12 NOTE — DISCHARGE NOTE PROVIDER - NSDCMRMEDTOKEN_GEN_ALL_CORE_FT
aspirin 81 mg oral tablet, chewable: 1 tab(s) orally once a day  atorvastatin 20 mg oral tablet: 1 tab(s) orally once a day  digoxin 125 mcg (0.125 mg) oral tablet: 1 tab(s) orally once a day  Entresto 24 mg-26 mg oral tablet: 1 tab(s) orally 2 times a day  famotidine 40 mg oral tablet: 1 tab(s) orally once a day (at bedtime)  furosemide 40 mg oral tablet: 1 tab(s) orally once a day  glimepiride 4 mg oral tablet: 1 tab(s) orally once a day  metFORMIN 1000 mg oral tablet: 1 tab(s) orally 2 times a day  metoprolol succinate 200 mg oral tablet, extended release: 1 tab(s) orally once a day  spironolactone 25 mg oral tablet: 1 tab(s) orally once a day  tamsulosin 0.4 mg oral capsule: 1 cap(s) orally once a day (at bedtime) aspirin 81 mg oral tablet, chewable: 1 tab(s) orally once a day  atorvastatin 20 mg oral tablet: 1 tab(s) orally once a day  digoxin 125 mcg (0.125 mg) oral tablet: 1 tab(s) orally once a day  doxycycline hyclate 100 mg oral capsule: 1 cap(s) orally 2 times a day   Entresto 24 mg-26 mg oral tablet: 1 tab(s) orally 2 times a day  famotidine 40 mg oral tablet: 1 tab(s) orally once a day (at bedtime)  furosemide 40 mg oral tablet: 1 tab(s) orally once a day  glimepiride 4 mg oral tablet: 1 tab(s) orally once a day  metFORMIN 1000 mg oral tablet: 1 tab(s) orally 2 times a day  metoprolol succinate 200 mg oral tablet, extended release: 1 tab(s) orally once a day  spironolactone 25 mg oral tablet: 1 tab(s) orally once a day  tamsulosin 0.4 mg oral capsule: 1 cap(s) orally once a day (at bedtime)

## 2019-12-12 NOTE — SWALLOW VFSS/MBS ASSESSMENT ADULT - SLP PERTINENT HISTORY OF CURRENT PROBLEM
Pt admitted from assisted living facility w/ tachycardia, fever, edema of hands. Pt w/ reported h/o anxiety, exhibits jerking body movements and speech is characterized by prolongations, hesitations and imprecise articulation 2' MDS. Pt known to  service from previous admission

## 2019-12-12 NOTE — SWALLOW VFSS/MBS ASSESSMENT ADULT - ADDITIONAL RECOMMENDATIONS
SLP f/u upon admission to [facility], if an SLP is unable to assess and f/u w/ pt b/s then outpt follow-up is strongly recommended.

## 2019-12-12 NOTE — SWALLOW VFSS/MBS ASSESSMENT ADULT - DIAGNOSTIC IMPRESSIONS
Moderate pharyngeal dysphagia for thin, nectar, puree and soft. Persistent penetration of all consistencies during and after the swallow was observed. Pt benefits from use of compensatory strategies. High risks for aspiration w/o use of strategies

## 2019-12-13 ENCOUNTER — INBOUND DOCUMENT (OUTPATIENT)
Age: 74
End: 2019-12-13

## 2019-12-13 LAB
-  AMPICILLIN/SULBACTAM: SIGNIFICANT CHANGE UP
-  AMPICILLIN/SULBACTAM: SIGNIFICANT CHANGE UP
-  CEFAZOLIN: SIGNIFICANT CHANGE UP
-  CEFAZOLIN: SIGNIFICANT CHANGE UP
-  CLINDAMYCIN: SIGNIFICANT CHANGE UP
-  CLINDAMYCIN: SIGNIFICANT CHANGE UP
-  ERYTHROMYCIN: SIGNIFICANT CHANGE UP
-  ERYTHROMYCIN: SIGNIFICANT CHANGE UP
-  GENTAMICIN: SIGNIFICANT CHANGE UP
-  GENTAMICIN: SIGNIFICANT CHANGE UP
-  OXACILLIN: SIGNIFICANT CHANGE UP
-  OXACILLIN: SIGNIFICANT CHANGE UP
-  PENICILLIN: SIGNIFICANT CHANGE UP
-  PENICILLIN: SIGNIFICANT CHANGE UP
-  RIFAMPIN: SIGNIFICANT CHANGE UP
-  RIFAMPIN: SIGNIFICANT CHANGE UP
-  TETRACYCLINE: SIGNIFICANT CHANGE UP
-  TETRACYCLINE: SIGNIFICANT CHANGE UP
-  TRIMETHOPRIM/SULFAMETHOXAZOLE: SIGNIFICANT CHANGE UP
-  TRIMETHOPRIM/SULFAMETHOXAZOLE: SIGNIFICANT CHANGE UP
-  VANCOMYCIN: SIGNIFICANT CHANGE UP
-  VANCOMYCIN: SIGNIFICANT CHANGE UP
CULTURE RESULTS: SIGNIFICANT CHANGE UP
CULTURE RESULTS: SIGNIFICANT CHANGE UP
METHOD TYPE: SIGNIFICANT CHANGE UP
METHOD TYPE: SIGNIFICANT CHANGE UP
ORGANISM # SPEC MICROSCOPIC CNT: SIGNIFICANT CHANGE UP
SPECIMEN SOURCE: SIGNIFICANT CHANGE UP
SPECIMEN SOURCE: SIGNIFICANT CHANGE UP

## 2019-12-16 ENCOUNTER — APPOINTMENT (OUTPATIENT)
Dept: HEMATOLOGY ONCOLOGY | Facility: CLINIC | Age: 74
End: 2019-12-16

## 2019-12-17 ENCOUNTER — APPOINTMENT (OUTPATIENT)
Dept: CARE COORDINATION | Facility: HOME HEALTH | Age: 74
End: 2019-12-17
Payer: MEDICARE

## 2019-12-17 VITALS
RESPIRATION RATE: 18 BRPM | WEIGHT: 163.14 LBS | OXYGEN SATURATION: 98 % | DIASTOLIC BLOOD PRESSURE: 70 MMHG | HEART RATE: 80 BPM | TEMPERATURE: 98 F | BODY MASS INDEX: 23.41 KG/M2 | SYSTOLIC BLOOD PRESSURE: 110 MMHG

## 2019-12-17 DIAGNOSIS — E11.52 TYPE 2 DIABETES MELLITUS WITH DIABETIC PERIPHERAL ANGIOPATHY WITH GANGRENE: ICD-10-CM

## 2019-12-17 DIAGNOSIS — I11.0 HYPERTENSIVE HEART DISEASE WITH HEART FAILURE: ICD-10-CM

## 2019-12-17 DIAGNOSIS — I50.9 HEART FAILURE, UNSPECIFIED: ICD-10-CM

## 2019-12-17 DIAGNOSIS — E87.6 HYPOKALEMIA: ICD-10-CM

## 2019-12-17 DIAGNOSIS — I27.20 PULMONARY HYPERTENSION, UNSPECIFIED: ICD-10-CM

## 2019-12-17 DIAGNOSIS — L02.511 CUTANEOUS ABSCESS OF RIGHT HAND: ICD-10-CM

## 2019-12-17 DIAGNOSIS — I42.8 OTHER CARDIOMYOPATHIES: ICD-10-CM

## 2019-12-17 DIAGNOSIS — Z95.5 PRESENCE OF CORONARY ANGIOPLASTY IMPLANT AND GRAFT: ICD-10-CM

## 2019-12-17 DIAGNOSIS — I25.10 ATHEROSCLEROTIC HEART DISEASE OF NATIVE CORONARY ARTERY WITHOUT ANGINA PECTORIS: ICD-10-CM

## 2019-12-17 DIAGNOSIS — D61.818 OTHER PANCYTOPENIA: ICD-10-CM

## 2019-12-17 DIAGNOSIS — L03.90 CELLULITIS, UNSPECIFIED: ICD-10-CM

## 2019-12-17 DIAGNOSIS — A41.9 SEPSIS, UNSPECIFIED ORGANISM: ICD-10-CM

## 2019-12-17 DIAGNOSIS — K21.9 GASTRO-ESOPHAGEAL REFLUX DISEASE WITHOUT ESOPHAGITIS: ICD-10-CM

## 2019-12-17 DIAGNOSIS — I48.91 UNSPECIFIED ATRIAL FIBRILLATION: ICD-10-CM

## 2019-12-17 DIAGNOSIS — L03.114 CELLULITIS OF LEFT UPPER LIMB: ICD-10-CM

## 2019-12-17 DIAGNOSIS — I50.20 UNSPECIFIED SYSTOLIC (CONGESTIVE) HEART FAILURE: ICD-10-CM

## 2019-12-17 DIAGNOSIS — L02.512 CUTANEOUS ABSCESS OF LEFT HAND: ICD-10-CM

## 2019-12-17 DIAGNOSIS — R26.89 OTHER ABNORMALITIES OF GAIT AND MOBILITY: ICD-10-CM

## 2019-12-17 DIAGNOSIS — E11.9 TYPE 2 DIABETES MELLITUS W/OUT COMPLICATIONS: ICD-10-CM

## 2019-12-17 DIAGNOSIS — I08.0 RHEUMATIC DISORDERS OF BOTH MITRAL AND AORTIC VALVES: ICD-10-CM

## 2019-12-17 DIAGNOSIS — R31.9 HEMATURIA, UNSPECIFIED: ICD-10-CM

## 2019-12-17 DIAGNOSIS — R13.19 OTHER DYSPHAGIA: ICD-10-CM

## 2019-12-17 DIAGNOSIS — D46.9 MYELODYSPLASTIC SYNDROME, UNSPECIFIED: ICD-10-CM

## 2019-12-17 DIAGNOSIS — N40.0 BENIGN PROSTATIC HYPERPLASIA WITHOUT LOWER URINARY TRACT SYMPTOMS: ICD-10-CM

## 2019-12-17 DIAGNOSIS — I48.0 PAROXYSMAL ATRIAL FIBRILLATION: ICD-10-CM

## 2019-12-17 DIAGNOSIS — L03.113 CELLULITIS OF RIGHT UPPER LIMB: ICD-10-CM

## 2019-12-17 DIAGNOSIS — Z79.82 LONG TERM (CURRENT) USE OF ASPIRIN: ICD-10-CM

## 2019-12-17 DIAGNOSIS — E78.5 HYPERLIPIDEMIA, UNSPECIFIED: ICD-10-CM

## 2019-12-17 DIAGNOSIS — Z79.84 LONG TERM (CURRENT) USE OF ORAL HYPOGLYCEMIC DRUGS: ICD-10-CM

## 2019-12-17 PROCEDURE — 99348 HOME/RES VST EST LOW MDM 30: CPT

## 2019-12-18 PROBLEM — I50.9 CHF (CONGESTIVE HEART FAILURE): Status: ACTIVE | Noted: 2019-12-04

## 2019-12-18 PROBLEM — L03.90 CELLULITIS: Status: ACTIVE | Noted: 2019-12-04

## 2019-12-18 PROBLEM — E11.9 DIABETES: Status: ACTIVE | Noted: 2018-02-20

## 2019-12-18 PROBLEM — I48.91 ATRIAL FIBRILLATION, UNSPECIFIED TYPE: Status: ACTIVE | Noted: 2019-12-18

## 2019-12-18 RX ORDER — DIGOXIN 0.12 MG/1
125 TABLET ORAL DAILY
Refills: 0 | Status: ACTIVE | COMMUNITY

## 2019-12-18 RX ORDER — DOXYCYCLINE HYCLATE 100 MG/1
100 CAPSULE ORAL
Refills: 0 | Status: ACTIVE | COMMUNITY

## 2019-12-18 RX ORDER — CLINDAMYCIN HYDROCHLORIDE 300 MG/1
300 CAPSULE ORAL EVERY 6 HOURS
Qty: 40 | Refills: 0 | Status: DISCONTINUED | COMMUNITY
Start: 2019-12-02 | End: 2019-12-17

## 2019-12-18 NOTE — COUNSELING
[de-identified] : Pt was informed about CN’s role/ [facility] program and overview of transitional care reviewed with patient. In addition, yellow contact card was provided. Pt educated on topics of importance such as compliance with all provider visits, prescribed medication regimen, and low salt diet. Pt encouraged calling CN with any issues, concerns or questions, also educated to notify CN if experiencing CP, SOB , cough, increased mucus/phlegm production, abdominal discomfort/swelling, difficulty sleeping or lying flat, fever, chills, fatigue, weight gain of 2-3lbs in 24 hours or 5lbs in one week,  dizziness, lightheadedness, n/v/d/c, swelling to extremities and/or any signs of CHF exacerbation as reviewed. Reassurance provided. Will continue to monitor\par \par

## 2019-12-18 NOTE — PHYSICAL EXAM
[No Acute Distress] : no acute distress [Normal Sclera/Conjunctiva] : normal sclera/conjunctiva [No JVD] : no jugular venous distention [Supple] : supple [No Respiratory Distress] : no respiratory distress  [No Accessory Muscle Use] : no accessory muscle use [Normal Rate] : normal rate  [Clear to Auscultation] : lungs were clear to auscultation bilaterally [Regular Rhythm] : with a regular rhythm [No Varicosities] : no varicosities [Normal S1, S2] : normal S1 and S2 [Pedal Pulses Present] : the pedal pulses are present [No Edema] : there was no peripheral edema [Soft] : abdomen soft [Non Tender] : non-tender [Non-distended] : non-distended [No Rash] : no rash [Grossly Normal Strength/Tone] : grossly normal strength/tone [Alert and Oriented x3] : oriented to person, place, and time [Normal Insight/Judgement] : insight and judgment were intact [No Focal Deficits] : no focal deficits [de-identified] : wounds CDI/ dressings in place

## 2019-12-18 NOTE — PLAN
[FreeTextEntry1] : CHF- c/w diuretics, BB, entresto daily weights  educated on the importance of dietary compliance, cardiology follow up \par AFib - c/w BB, digoxin not on AC hx of bleeding per patient\par DM- low carb diet  BS monitoring, c/w Glimepiride  ,metformin \par cellulitis- complete ABRX , dressing changes , PCP follow up\par c/w all prescribed medication regimens\par PCP/ cardiology follow up advised , scheduled by CINTHIA Eisenberg placed call to confirm dates

## 2019-12-18 NOTE — HISTORY OF PRESENT ILLNESS
[FreeTextEntry1] : patient s/p discharge from [facility] s/p readmission for cellultitis . patient is temporarily unable to leave home as it requires a considerable and taxing effort \par  [de-identified] : Patient is a 75 y/o male enrolled in the [facility] program  with a PMH of HTN, DM , MDS, CHF and anemia seen at home s/p recent discharge from [facility] for CHF exacerbations , patient was then readmitted for cellulitis after previously been seen by me and sent to ED for fever an tachycardia. During his inpatient stay I&D was done patient was treated with IVABRX and improved clincally . He was dc home with [facility] for dressing changes. on arrival denies c/p, fever, wt gain, NVD and SOB. Patient admits to non compliance with dietary restriction s

## 2019-12-19 ENCOUNTER — LABORATORY RESULT (OUTPATIENT)
Age: 74
End: 2019-12-19

## 2019-12-19 ENCOUNTER — APPOINTMENT (OUTPATIENT)
Dept: INFUSION THERAPY | Facility: CLINIC | Age: 74
End: 2019-12-19

## 2019-12-19 DIAGNOSIS — Z00.00 ENCOUNTER FOR GENERAL ADULT MEDICAL EXAMINATION W/OUT ABNORMAL FINDINGS: ICD-10-CM

## 2019-12-19 LAB
HCT VFR BLD CALC: 24.4 %
HGB BLD-MCNC: 7.5 G/DL
MCHC RBC-ENTMCNC: 29.3 PG
MCHC RBC-ENTMCNC: 30.7 G/DL
MCV RBC AUTO: 95.3 FL
PLATELET # BLD AUTO: 52 K/UL
PMV BLD: NORMAL
RBC # BLD: 2.56 M/UL
RBC # FLD: 19.1 %
WBC # FLD AUTO: 2.41 K/UL

## 2019-12-19 RX ORDER — ERYTHROPOIETIN 10000 [IU]/ML
30000 INJECTION, SOLUTION INTRAVENOUS; SUBCUTANEOUS ONCE
Refills: 0 | Status: COMPLETED | OUTPATIENT
Start: 2019-12-19 | End: 2019-12-19

## 2019-12-19 RX ADMIN — ERYTHROPOIETIN 30000 UNIT(S): 10000 INJECTION, SOLUTION INTRAVENOUS; SUBCUTANEOUS at 10:16

## 2019-12-23 ENCOUNTER — LABORATORY RESULT (OUTPATIENT)
Age: 74
End: 2019-12-23

## 2019-12-23 ENCOUNTER — OUTPATIENT (OUTPATIENT)
Dept: OUTPATIENT SERVICES | Facility: HOSPITAL | Age: 74
LOS: 1 days | Discharge: HOME | End: 2019-12-23

## 2019-12-23 DIAGNOSIS — D64.9 ANEMIA, UNSPECIFIED: ICD-10-CM

## 2019-12-23 DIAGNOSIS — Z98.890 OTHER SPECIFIED POSTPROCEDURAL STATES: Chronic | ICD-10-CM

## 2019-12-23 DIAGNOSIS — I50.1 LEFT VENTRICULAR FAILURE, UNSPECIFIED: ICD-10-CM

## 2019-12-23 DIAGNOSIS — I42.9 CARDIOMYOPATHY, UNSPECIFIED: ICD-10-CM

## 2019-12-23 DIAGNOSIS — Z02.9 ENCOUNTER FOR ADMINISTRATIVE EXAMINATIONS, UNSPECIFIED: ICD-10-CM

## 2019-12-26 ENCOUNTER — LABORATORY RESULT (OUTPATIENT)
Age: 74
End: 2019-12-26

## 2019-12-26 ENCOUNTER — APPOINTMENT (OUTPATIENT)
Dept: INFUSION THERAPY | Facility: CLINIC | Age: 74
End: 2019-12-26

## 2019-12-26 ENCOUNTER — APPOINTMENT (OUTPATIENT)
Dept: BURN CARE | Facility: CLINIC | Age: 74
End: 2019-12-26

## 2019-12-26 LAB
HCT VFR BLD CALC: 27.7 %
HGB BLD-MCNC: 8.5 G/DL
MCHC RBC-ENTMCNC: 29.3 PG
MCHC RBC-ENTMCNC: 30.7 G/DL
MCV RBC AUTO: 95.5 FL
PLATELET # BLD AUTO: 48 K/UL
PMV BLD: NORMAL
RBC # BLD: 2.9 M/UL
RBC # FLD: 20.2 %
WBC # FLD AUTO: 2.55 K/UL

## 2019-12-26 RX ORDER — ERYTHROPOIETIN 10000 [IU]/ML
30000 INJECTION, SOLUTION INTRAVENOUS; SUBCUTANEOUS ONCE
Refills: 0 | Status: COMPLETED | OUTPATIENT
Start: 2019-12-26 | End: 2019-12-26

## 2019-12-26 RX ADMIN — ERYTHROPOIETIN 30000 UNIT(S): 10000 INJECTION, SOLUTION INTRAVENOUS; SUBCUTANEOUS at 10:13

## 2020-01-02 ENCOUNTER — LABORATORY RESULT (OUTPATIENT)
Age: 75
End: 2020-01-02

## 2020-01-02 ENCOUNTER — APPOINTMENT (OUTPATIENT)
Dept: INFUSION THERAPY | Facility: CLINIC | Age: 75
End: 2020-01-02

## 2020-01-02 LAB
ABO + RH PNL BLD: NORMAL
BLD GP AB SCN SERPL QL: NORMAL
HCT VFR BLD CALC: 23.2 %
HGB BLD-MCNC: 7.3 G/DL
MCHC RBC-ENTMCNC: 30.2 PG
MCHC RBC-ENTMCNC: 31.5 G/DL
MCV RBC AUTO: 95.9 FL
PLATELET # BLD AUTO: 37 K/UL
PMV BLD: NORMAL
RBC # BLD: 2.42 M/UL
RBC # FLD: 21 %
WBC # FLD AUTO: 3.15 K/UL

## 2020-01-02 RX ORDER — ERYTHROPOIETIN 10000 [IU]/ML
30000 INJECTION, SOLUTION INTRAVENOUS; SUBCUTANEOUS ONCE
Refills: 0 | Status: COMPLETED | OUTPATIENT
Start: 2020-01-02 | End: 2020-01-02

## 2020-01-02 RX ADMIN — ERYTHROPOIETIN 30000 UNIT(S): 10000 INJECTION, SOLUTION INTRAVENOUS; SUBCUTANEOUS at 10:24

## 2020-01-06 ENCOUNTER — APPOINTMENT (OUTPATIENT)
Dept: INFUSION THERAPY | Facility: CLINIC | Age: 75
End: 2020-01-06

## 2020-01-06 ENCOUNTER — LABORATORY RESULT (OUTPATIENT)
Age: 75
End: 2020-01-06

## 2020-01-07 LAB
ABO + RH PNL BLD: NORMAL
BLD GP AB SCN SERPL QL: NORMAL
HCT VFR BLD CALC: 26.7 %
HGB BLD-MCNC: 8.2 G/DL
MCHC RBC-ENTMCNC: 29.7 PG
MCHC RBC-ENTMCNC: 30.7 G/DL
MCV RBC AUTO: 96.7 FL
PLATELET # BLD AUTO: 50 K/UL
PMV BLD: NORMAL
RBC # BLD: 2.76 M/UL
RBC # FLD: 21.2 %
WBC # FLD AUTO: 2 K/UL

## 2020-01-09 ENCOUNTER — OUTPATIENT (OUTPATIENT)
Dept: OUTPATIENT SERVICES | Facility: HOSPITAL | Age: 75
LOS: 1 days | Discharge: HOME | End: 2020-01-09

## 2020-01-09 ENCOUNTER — APPOINTMENT (OUTPATIENT)
Dept: HEMATOLOGY ONCOLOGY | Facility: CLINIC | Age: 75
End: 2020-01-09
Payer: MEDICARE

## 2020-01-09 ENCOUNTER — LABORATORY RESULT (OUTPATIENT)
Age: 75
End: 2020-01-09

## 2020-01-09 ENCOUNTER — APPOINTMENT (OUTPATIENT)
Dept: HEMATOLOGY ONCOLOGY | Facility: CLINIC | Age: 75
End: 2020-01-09

## 2020-01-09 ENCOUNTER — APPOINTMENT (OUTPATIENT)
Dept: INFUSION THERAPY | Facility: CLINIC | Age: 75
End: 2020-01-09

## 2020-01-09 ENCOUNTER — APPOINTMENT (OUTPATIENT)
Dept: INFUSION THERAPY | Facility: CLINIC | Age: 75
End: 2020-01-09
Payer: MEDICARE

## 2020-01-09 VITALS
HEIGHT: 70 IN | WEIGHT: 173 LBS | HEART RATE: 100 BPM | BODY MASS INDEX: 24.77 KG/M2 | RESPIRATION RATE: 14 BRPM | SYSTOLIC BLOOD PRESSURE: 130 MMHG | TEMPERATURE: 98.6 F | DIASTOLIC BLOOD PRESSURE: 76 MMHG

## 2020-01-09 DIAGNOSIS — D46.9 MYELODYSPLASTIC SYNDROME, UNSPECIFIED: ICD-10-CM

## 2020-01-09 DIAGNOSIS — Z98.890 OTHER SPECIFIED POSTPROCEDURAL STATES: Chronic | ICD-10-CM

## 2020-01-09 DIAGNOSIS — Z78.9 OTHER SPECIFIED HEALTH STATUS: ICD-10-CM

## 2020-01-09 DIAGNOSIS — L02.519 CUTANEOUS ABSCESS OF UNSPECIFIED HAND: ICD-10-CM

## 2020-01-09 LAB
HCT VFR BLD CALC: 27.1 %
HGB BLD-MCNC: 8.5 G/DL
MCHC RBC-ENTMCNC: 29.9 PG
MCHC RBC-ENTMCNC: 31.4 G/DL
MCV RBC AUTO: 95.4 FL
PLATELET # BLD AUTO: 52 K/UL
PMV BLD: NORMAL
RBC # BLD: 2.84 M/UL
RBC # FLD: 21 %
WBC # FLD AUTO: 2.65 K/UL

## 2020-01-09 PROCEDURE — 99213 OFFICE O/P EST LOW 20 MIN: CPT

## 2020-01-09 RX ORDER — ERYTHROPOIETIN 10000 [IU]/ML
30000 INJECTION, SOLUTION INTRAVENOUS; SUBCUTANEOUS ONCE
Refills: 0 | Status: COMPLETED | OUTPATIENT
Start: 2020-01-09 | End: 2020-01-09

## 2020-01-09 RX ADMIN — ERYTHROPOIETIN 30000 UNIT(S): 10000 INJECTION, SOLUTION INTRAVENOUS; SUBCUTANEOUS at 10:51

## 2020-01-10 PROBLEM — Z78.9 NON-SMOKER: Status: ACTIVE | Noted: 2018-02-20

## 2020-01-10 NOTE — REVIEW OF SYSTEMS
[Fatigue] : fatigue [SOB on Exertion] : shortness of breath during exertion [Joint Pain] : joint pain [Easy Bruising] : a tendency for easy bruising [de-identified] : Shaking head continuously.  [Negative] : Endocrine

## 2020-01-10 NOTE — PHYSICAL EXAM
[Ambulatory and capable of all self care but unable to carry out any work activities] : Status 2- Ambulatory and capable of all self care but unable to carry out any work activities. Up and about more than 50% of waking hours [Normal] : no peripheral adenopathy appreciated [de-identified] : RR, occasional extrasystoles, grade I-II/VI systolic murmur. [de-identified] : Arthritic changes noted.  [de-identified] : A little dry. Skin abscesses have cleared. [de-identified] : Difficult to tell.

## 2020-01-10 NOTE — HISTORY OF PRESENT ILLNESS
[Disease:__________________________] : Disease: [unfilled] [de-identified] : The patient is coming for his regularly scheduled follow up for his MDS.\par Presently, he has no new particular complaints.\par He is denying any bleeding or bruising. No infectious episodes.\par He is on no new medications. His only recent 1 unit transfusion was more than a month ago.\par The appetite is good. No problems with urine or bowel movements.

## 2020-01-10 NOTE — ASSESSMENT
[FreeTextEntry1] : MDS, Hgb (8.5) and platelets slightly better (52 K) than recently when the platelets were reported to be 38 K and Hgb was as low as 7.1. WBC 2.65 also slightly better from 2.00.\par \par The situation was discussed with the patient.\par Still will not intervene with any chemotherapy such as Vidaza.\par The patient may be observed and CBC repeated periodically.\par \par Follow up in 1-2 months but repeat CBC in about 2 weeks.\par \par All questions answered.

## 2020-01-16 ENCOUNTER — APPOINTMENT (OUTPATIENT)
Dept: INFUSION THERAPY | Facility: CLINIC | Age: 75
End: 2020-01-16

## 2020-01-23 ENCOUNTER — LABORATORY RESULT (OUTPATIENT)
Age: 75
End: 2020-01-23

## 2020-01-23 ENCOUNTER — APPOINTMENT (OUTPATIENT)
Dept: INFUSION THERAPY | Facility: CLINIC | Age: 75
End: 2020-01-23

## 2020-01-30 ENCOUNTER — APPOINTMENT (OUTPATIENT)
Dept: INFUSION THERAPY | Facility: CLINIC | Age: 75
End: 2020-01-30

## 2020-02-06 ENCOUNTER — APPOINTMENT (OUTPATIENT)
Dept: INFUSION THERAPY | Facility: CLINIC | Age: 75
End: 2020-02-06

## 2020-02-13 ENCOUNTER — APPOINTMENT (OUTPATIENT)
Dept: INFUSION THERAPY | Facility: CLINIC | Age: 75
End: 2020-02-13

## 2020-02-20 ENCOUNTER — APPOINTMENT (OUTPATIENT)
Dept: INFUSION THERAPY | Facility: CLINIC | Age: 75
End: 2020-02-20

## 2020-02-27 ENCOUNTER — APPOINTMENT (OUTPATIENT)
Dept: INFUSION THERAPY | Facility: CLINIC | Age: 75
End: 2020-02-27

## 2020-03-05 ENCOUNTER — APPOINTMENT (OUTPATIENT)
Dept: HEMATOLOGY ONCOLOGY | Facility: CLINIC | Age: 75
End: 2020-03-05

## 2020-03-05 ENCOUNTER — APPOINTMENT (OUTPATIENT)
Dept: INFUSION THERAPY | Facility: CLINIC | Age: 75
End: 2020-03-05

## 2021-03-02 NOTE — ED ADULT TRIAGE NOTE - NSWEIGHTCALCTOOLDRUG_GEN_A_CORE
used H Plasty Text: Given the location of the defect, shape of the defect and the proximity to free margins a H-plasty was deemed most appropriate for repair.  Using a sterile surgical marker, the appropriate advancement arms of the H-plasty were drawn incorporating the defect and placing the expected incisions within the relaxed skin tension lines where possible. The area thus outlined was incised deep to adipose tissue with a #15 scalpel blade. The skin margins were undermined to an appropriate distance in all directions utilizing iris scissors.  The opposing advancement arms were then advanced into place in opposite direction and anchored with interrupted buried subcutaneous sutures.

## 2021-10-02 NOTE — DISCHARGE NOTE PROVIDER - PROVIDER TOKENS
PROVIDER:[TOKEN:[[id number]],FOLLOWUP:[1 week]],PROVIDER:[TOKEN:[[id number]],FOLLOWUP:[2 weeks]],FREE:[LAST:[urology],PHONE:[(   )    -],FAX:[(   )    -],ADDRESS:[please call the [facility] [phone] to schedule an appointment with urology doctor because you had an episode of blood coming from the urine],FOLLOWUP:[2 weeks]],PROVIDER:[TOKEN:[[id number]],FOLLOWUP:[1 week]]
Awake/Alert

## 2021-10-14 NOTE — H&P ADULT - DOES THIS PATIENT HAVE A HISTORY OF OR HAS BEEN DX WITH HEART FAILURE?
yes Erythromycin Pregnancy And Lactation Text: This medication is Pregnancy Category B and is considered safe during pregnancy. It is also excreted in breast milk.

## 2022-02-15 NOTE — DIETITIAN INITIAL EVALUATION ADULT. - NS FNS WEIGHT CHANGE REASON
most likely d/t edema/unintentional Hydroxyzine Pregnancy And Lactation Text: This medication is not safe during pregnancy and should not be taken. It is also excreted in breast milk and breast feeding isn't recommended.

## 2022-08-11 NOTE — ED CDU PROVIDER DISPOSITION NOTE - DISPOSITION TYPE
Patient scheduled to infuse round 1 8/8/2022 at [facility]. Paperwork to scanning. ADMIT

## 2023-06-27 NOTE — PHYSICAL THERAPY INITIAL EVALUATION ADULT - GAIT DEVIATIONS NOTED, PT EVAL
Hit top of head on bar today, denies n/v or visual complaints. Denies dizziness.   
decreased aaron/decreased step length

## 2024-03-11 NOTE — DISCHARGE NOTE PROVIDER - NSDCQMSTATINC_CARD_A_CORE
Pepcid 20 mg 1 tab am and pm x 10 days  Flonase 1 spray each nostril am and pm x 10 days.   Get the Chest xray I will notify you if abnormal  
Yes

## 2024-08-21 NOTE — ED ADULT NURSE NOTE - CAS EDP DISCH DISPOSITION ADMI
[facility]  report reviewed 8/21/2024    The last fill date for Gabapentin 300 Mg Capsule  was 9/16/2023 for a 30 d/s qty 60      Last UDS: not on file     CSA Last signed: not on file         PCP: Chemo Corrigan MD    Last appt:  7/23/2024  Future Appointments   Date Time Provider Department Center   7/17/2025  8:30 AM [facility] LAB VISIT [facility]   7/24/2025  9:00 AM Chemo Corrigan MD [facility]       Requested Prescriptions     Pending Prescriptions Disp Refills    gabapentin (NEURONTIN) 300 MG capsule 60 capsule 2     Sig: Take 1 capsule by mouth 2 times daily for 90 days. Intended supply: 30 days Max Daily Amount: 600 mg        3C/Telemetry

## 2024-12-06 NOTE — PATIENT PROFILE ADULT - HOME ACCESSIBILITY CONCERNS
Sw patient, blood work is not needed as it was a [facility] protocol and they are no longer going through [facility]. After MRI he will follow up with oncology and give us a call with how that appointment goes. From there we will direct care of Spine and Pain pending MRI's come out okay    none